# Patient Record
Sex: MALE | Race: WHITE | NOT HISPANIC OR LATINO | Employment: OTHER | URBAN - METROPOLITAN AREA
[De-identification: names, ages, dates, MRNs, and addresses within clinical notes are randomized per-mention and may not be internally consistent; named-entity substitution may affect disease eponyms.]

---

## 2019-11-21 ENCOUNTER — HOSPITAL ENCOUNTER (INPATIENT)
Facility: MEDICAL CENTER | Age: 79
LOS: 21 days | DRG: 698 | End: 2019-12-12
Attending: EMERGENCY MEDICINE | Admitting: HOSPITALIST
Payer: MEDICARE

## 2019-11-21 ENCOUNTER — APPOINTMENT (OUTPATIENT)
Dept: RADIOLOGY | Facility: MEDICAL CENTER | Age: 79
DRG: 698 | End: 2019-11-21
Attending: EMERGENCY MEDICINE
Payer: MEDICARE

## 2019-11-21 DIAGNOSIS — J45.31 MILD PERSISTENT REACTIVE AIRWAY DISEASE WITH WHEEZING WITH ACUTE EXACERBATION: ICD-10-CM

## 2019-11-21 DIAGNOSIS — T83.511A URINARY TRACT INFECTION ASSOCIATED WITH INDWELLING URETHRAL CATHETER, INITIAL ENCOUNTER (HCC): ICD-10-CM

## 2019-11-21 DIAGNOSIS — N39.0 URINARY TRACT INFECTION ASSOCIATED WITH INDWELLING URETHRAL CATHETER, INITIAL ENCOUNTER (HCC): ICD-10-CM

## 2019-11-21 DIAGNOSIS — N30.00 ACUTE CYSTITIS WITHOUT HEMATURIA: ICD-10-CM

## 2019-11-21 DIAGNOSIS — J18.9 COMMUNITY ACQUIRED PNEUMONIA, UNSPECIFIED LATERALITY: ICD-10-CM

## 2019-11-21 DIAGNOSIS — J18.9 PNEUMONIA DUE TO INFECTIOUS ORGANISM, UNSPECIFIED LATERALITY, UNSPECIFIED PART OF LUNG: ICD-10-CM

## 2019-11-21 PROBLEM — E83.51 HYPOCALCEMIA: Status: ACTIVE | Noted: 2019-11-21

## 2019-11-21 PROBLEM — R41.82 ALTERED MENTAL STATUS: Status: ACTIVE | Noted: 2019-11-21

## 2019-11-21 PROBLEM — N17.9 ACUTE RENAL FAILURE (ARF) (HCC): Status: ACTIVE | Noted: 2019-11-21

## 2019-11-21 PROBLEM — D64.9 NORMOCYTIC NORMOCHROMIC ANEMIA: Status: ACTIVE | Noted: 2019-11-21

## 2019-11-21 PROBLEM — D72.829 LEUKOCYTOSIS: Status: ACTIVE | Noted: 2019-11-21

## 2019-11-21 LAB
ALBUMIN SERPL BCP-MCNC: 3.2 G/DL (ref 3.2–4.9)
ALBUMIN/GLOB SERPL: 1 G/DL
ALP SERPL-CCNC: 50 U/L (ref 30–99)
ALT SERPL-CCNC: 6 U/L (ref 2–50)
ANION GAP SERPL CALC-SCNC: 10 MMOL/L (ref 0–11.9)
APPEARANCE UR: ABNORMAL
AST SERPL-CCNC: 16 U/L (ref 12–45)
BACTERIA #/AREA URNS HPF: ABNORMAL /HPF
BASOPHILS # BLD AUTO: 0.9 % (ref 0–1.8)
BASOPHILS # BLD: 0.11 K/UL (ref 0–0.12)
BILIRUB SERPL-MCNC: 0.7 MG/DL (ref 0.1–1.5)
BILIRUB UR QL STRIP.AUTO: NEGATIVE
BUN SERPL-MCNC: 35 MG/DL (ref 8–22)
CALCIUM SERPL-MCNC: 8.1 MG/DL (ref 8.5–10.5)
CHLORIDE SERPL-SCNC: 106 MMOL/L (ref 96–112)
CO2 SERPL-SCNC: 24 MMOL/L (ref 20–33)
COLOR UR: YELLOW
CREAT SERPL-MCNC: 2.54 MG/DL (ref 0.5–1.4)
EKG IMPRESSION: NORMAL
EOSINOPHIL # BLD AUTO: 0.03 K/UL (ref 0–0.51)
EOSINOPHIL NFR BLD: 0.2 % (ref 0–6.9)
EPI CELLS #/AREA URNS HPF: NEGATIVE /HPF
ERYTHROCYTE [DISTWIDTH] IN BLOOD BY AUTOMATED COUNT: 54.1 FL (ref 35.9–50)
FERRITIN SERPL-MCNC: 261 NG/ML (ref 22–322)
FLUAV RNA SPEC QL NAA+PROBE: NEGATIVE
FLUBV RNA SPEC QL NAA+PROBE: NEGATIVE
FOLATE SERPL-MCNC: 12.9 NG/ML
GLOBULIN SER CALC-MCNC: 3.2 G/DL (ref 1.9–3.5)
GLUCOSE BLD-MCNC: 99 MG/DL (ref 65–99)
GLUCOSE SERPL-MCNC: 103 MG/DL (ref 65–99)
GLUCOSE UR STRIP.AUTO-MCNC: NEGATIVE MG/DL
HCT VFR BLD AUTO: 31.8 % (ref 42–52)
HGB BLD-MCNC: 10.1 G/DL (ref 14–18)
HYALINE CASTS #/AREA URNS LPF: ABNORMAL /LPF
IMM GRANULOCYTES # BLD AUTO: 0.07 K/UL (ref 0–0.11)
IMM GRANULOCYTES NFR BLD AUTO: 0.6 % (ref 0–0.9)
IRON SATN MFR SERPL: ABNORMAL % (ref 15–55)
IRON SERPL-MCNC: <10 UG/DL (ref 50–180)
KETONES UR STRIP.AUTO-MCNC: NEGATIVE MG/DL
LACTATE BLD-SCNC: 1.2 MMOL/L (ref 0.5–2)
LEUKOCYTE ESTERASE UR QL STRIP.AUTO: ABNORMAL
LYMPHOCYTES # BLD AUTO: 0.4 K/UL (ref 1–4.8)
LYMPHOCYTES NFR BLD: 3.2 % (ref 22–41)
MCH RBC QN AUTO: 30.4 PG (ref 27–33)
MCHC RBC AUTO-ENTMCNC: 31.8 G/DL (ref 33.7–35.3)
MCV RBC AUTO: 95.8 FL (ref 81.4–97.8)
MICRO URNS: ABNORMAL
MONOCYTES # BLD AUTO: 0.56 K/UL (ref 0–0.85)
MONOCYTES NFR BLD AUTO: 4.5 % (ref 0–13.4)
NEUTROPHILS # BLD AUTO: 11.15 K/UL (ref 1.82–7.42)
NEUTROPHILS NFR BLD: 90.6 % (ref 44–72)
NITRITE UR QL STRIP.AUTO: NEGATIVE
NRBC # BLD AUTO: 0 K/UL
NRBC BLD-RTO: 0 /100 WBC
PH UR STRIP.AUTO: 6 [PH] (ref 5–8)
PLATELET # BLD AUTO: 209 K/UL (ref 164–446)
PMV BLD AUTO: 9 FL (ref 9–12.9)
POTASSIUM SERPL-SCNC: 4.9 MMOL/L (ref 3.6–5.5)
PROT SERPL-MCNC: 6.4 G/DL (ref 6–8.2)
PROT UR QL STRIP: NEGATIVE MG/DL
RBC # BLD AUTO: 3.32 M/UL (ref 4.7–6.1)
RBC # URNS HPF: ABNORMAL /HPF
RBC UR QL AUTO: ABNORMAL
SODIUM SERPL-SCNC: 140 MMOL/L (ref 135–145)
SP GR UR STRIP.AUTO: 1.01
TIBC SERPL-MCNC: 122 UG/DL (ref 250–450)
UROBILINOGEN UR STRIP.AUTO-MCNC: 0.2 MG/DL
VIT B12 SERPL-MCNC: 252 PG/ML (ref 211–911)
WBC # BLD AUTO: 12.3 K/UL (ref 4.8–10.8)
WBC #/AREA URNS HPF: ABNORMAL /HPF

## 2019-11-21 PROCEDURE — 51702 INSERT TEMP BLADDER CATH: CPT

## 2019-11-21 PROCEDURE — 87502 INFLUENZA DNA AMP PROBE: CPT

## 2019-11-21 PROCEDURE — 700102 HCHG RX REV CODE 250 W/ 637 OVERRIDE(OP): Performed by: EMERGENCY MEDICINE

## 2019-11-21 PROCEDURE — 304561 HCHG STAT O2

## 2019-11-21 PROCEDURE — 82746 ASSAY OF FOLIC ACID SERUM: CPT

## 2019-11-21 PROCEDURE — 700111 HCHG RX REV CODE 636 W/ 250 OVERRIDE (IP): Performed by: HOSPITALIST

## 2019-11-21 PROCEDURE — 700101 HCHG RX REV CODE 250: Performed by: EMERGENCY MEDICINE

## 2019-11-21 PROCEDURE — 94760 N-INVAS EAR/PLS OXIMETRY 1: CPT

## 2019-11-21 PROCEDURE — A9270 NON-COVERED ITEM OR SERVICE: HCPCS | Performed by: EMERGENCY MEDICINE

## 2019-11-21 PROCEDURE — 96365 THER/PROPH/DIAG IV INF INIT: CPT

## 2019-11-21 PROCEDURE — 307059 PAD,EAR PROTECTOR: Performed by: HOSPITALIST

## 2019-11-21 PROCEDURE — 87106 FUNGI IDENTIFICATION YEAST: CPT

## 2019-11-21 PROCEDURE — 302146: Performed by: HOSPITALIST

## 2019-11-21 PROCEDURE — 303105 HCHG CATHETER EXTRA

## 2019-11-21 PROCEDURE — 82728 ASSAY OF FERRITIN: CPT

## 2019-11-21 PROCEDURE — 87449 NOS EACH ORGANISM AG IA: CPT

## 2019-11-21 PROCEDURE — 99223 1ST HOSP IP/OBS HIGH 75: CPT | Mod: AI | Performed by: HOSPITALIST

## 2019-11-21 PROCEDURE — 87077 CULTURE AEROBIC IDENTIFY: CPT

## 2019-11-21 PROCEDURE — 87186 SC STD MICRODIL/AGAR DIL: CPT

## 2019-11-21 PROCEDURE — 700102 HCHG RX REV CODE 250 W/ 637 OVERRIDE(OP): Performed by: HOSPITALIST

## 2019-11-21 PROCEDURE — 700105 HCHG RX REV CODE 258: Performed by: EMERGENCY MEDICINE

## 2019-11-21 PROCEDURE — 81001 URINALYSIS AUTO W/SCOPE: CPT

## 2019-11-21 PROCEDURE — A9270 NON-COVERED ITEM OR SERVICE: HCPCS | Performed by: HOSPITALIST

## 2019-11-21 PROCEDURE — 80053 COMPREHEN METABOLIC PANEL: CPT

## 2019-11-21 PROCEDURE — 82962 GLUCOSE BLOOD TEST: CPT

## 2019-11-21 PROCEDURE — 770020 HCHG ROOM/CARE - TELE (206)

## 2019-11-21 PROCEDURE — 99285 EMERGENCY DEPT VISIT HI MDM: CPT

## 2019-11-21 PROCEDURE — 83550 IRON BINDING TEST: CPT

## 2019-11-21 PROCEDURE — 93005 ELECTROCARDIOGRAM TRACING: CPT | Performed by: EMERGENCY MEDICINE

## 2019-11-21 PROCEDURE — 83605 ASSAY OF LACTIC ACID: CPT

## 2019-11-21 PROCEDURE — 96367 TX/PROPH/DG ADDL SEQ IV INF: CPT

## 2019-11-21 PROCEDURE — 700105 HCHG RX REV CODE 258: Performed by: HOSPITALIST

## 2019-11-21 PROCEDURE — 85025 COMPLETE CBC W/AUTO DIFF WBC: CPT

## 2019-11-21 PROCEDURE — 87040 BLOOD CULTURE FOR BACTERIA: CPT | Mod: 91

## 2019-11-21 PROCEDURE — 700111 HCHG RX REV CODE 636 W/ 250 OVERRIDE (IP): Performed by: EMERGENCY MEDICINE

## 2019-11-21 PROCEDURE — 82607 VITAMIN B-12: CPT

## 2019-11-21 PROCEDURE — 71045 X-RAY EXAM CHEST 1 VIEW: CPT

## 2019-11-21 PROCEDURE — 87086 URINE CULTURE/COLONY COUNT: CPT

## 2019-11-21 PROCEDURE — 83540 ASSAY OF IRON: CPT

## 2019-11-21 RX ORDER — ACETAMINOPHEN 325 MG/1
650 TABLET ORAL EVERY 6 HOURS PRN
Status: DISCONTINUED | OUTPATIENT
Start: 2019-11-21 | End: 2019-12-12 | Stop reason: HOSPADM

## 2019-11-21 RX ORDER — LATANOPROST 50 UG/ML
1 SOLUTION/ DROPS OPHTHALMIC NIGHTLY
Status: DISCONTINUED | OUTPATIENT
Start: 2019-11-21 | End: 2019-12-12 | Stop reason: HOSPADM

## 2019-11-21 RX ORDER — CLOTRIMAZOLE 1 %
1 CREAM (GRAM) TOPICAL 2 TIMES DAILY
Status: DISCONTINUED | OUTPATIENT
Start: 2019-11-21 | End: 2019-12-12 | Stop reason: HOSPADM

## 2019-11-21 RX ORDER — POLYETHYLENE GLYCOL 3350 17 G/17G
17 POWDER, FOR SOLUTION ORAL DAILY
Status: ON HOLD | COMMUNITY
End: 2020-03-10

## 2019-11-21 RX ORDER — SIMVASTATIN 20 MG
10 TABLET ORAL NIGHTLY
Status: DISCONTINUED | OUTPATIENT
Start: 2019-11-21 | End: 2019-12-12 | Stop reason: HOSPADM

## 2019-11-21 RX ORDER — ACETAMINOPHEN 500 MG
500-1000 TABLET ORAL EVERY 6 HOURS PRN
Status: DISCONTINUED | OUTPATIENT
Start: 2019-11-21 | End: 2019-11-21

## 2019-11-21 RX ORDER — AMLODIPINE BESYLATE 10 MG/1
10 TABLET ORAL DAILY
Status: DISCONTINUED | OUTPATIENT
Start: 2019-11-21 | End: 2019-11-24

## 2019-11-21 RX ORDER — POLYETHYLENE GLYCOL 3350 17 G/17G
1 POWDER, FOR SOLUTION ORAL
Status: DISCONTINUED | OUTPATIENT
Start: 2019-11-21 | End: 2019-12-12 | Stop reason: HOSPADM

## 2019-11-21 RX ORDER — BISACODYL 10 MG
10 SUPPOSITORY, RECTAL RECTAL
Status: DISCONTINUED | OUTPATIENT
Start: 2019-11-21 | End: 2019-12-12 | Stop reason: HOSPADM

## 2019-11-21 RX ORDER — SIMVASTATIN 10 MG
10 TABLET ORAL NIGHTLY
COMMUNITY

## 2019-11-21 RX ORDER — LATANOPROST 50 UG/ML
1 SOLUTION/ DROPS OPHTHALMIC NIGHTLY
COMMUNITY

## 2019-11-21 RX ORDER — POLYETHYLENE GLYCOL 3350 17 G/17G
1 POWDER, FOR SOLUTION ORAL DAILY
Status: DISCONTINUED | OUTPATIENT
Start: 2019-11-21 | End: 2019-12-12 | Stop reason: HOSPADM

## 2019-11-21 RX ORDER — ACETAMINOPHEN 500 MG
500-1000 TABLET ORAL EVERY 6 HOURS PRN
Status: ON HOLD | COMMUNITY
End: 2020-03-10

## 2019-11-21 RX ORDER — CLOTRIMAZOLE 1 %
1 CREAM (GRAM) TOPICAL 2 TIMES DAILY
COMMUNITY
End: 2019-12-25

## 2019-11-21 RX ORDER — FAMOTIDINE 20 MG/1
20 TABLET, FILM COATED ORAL DAILY
COMMUNITY
End: 2019-12-25

## 2019-11-21 RX ORDER — SODIUM CHLORIDE 9 MG/ML
INJECTION, SOLUTION INTRAVENOUS CONTINUOUS
Status: DISCONTINUED | OUTPATIENT
Start: 2019-11-21 | End: 2019-11-24

## 2019-11-21 RX ORDER — TAMSULOSIN HYDROCHLORIDE 0.4 MG/1
0.4 CAPSULE ORAL DAILY
Status: DISCONTINUED | OUTPATIENT
Start: 2019-11-21 | End: 2019-12-12 | Stop reason: HOSPADM

## 2019-11-21 RX ORDER — ACETAMINOPHEN 325 MG/1
650 TABLET ORAL ONCE
Status: COMPLETED | OUTPATIENT
Start: 2019-11-21 | End: 2019-11-21

## 2019-11-21 RX ORDER — AMOXICILLIN 250 MG
2 CAPSULE ORAL 2 TIMES DAILY
Status: DISCONTINUED | OUTPATIENT
Start: 2019-11-21 | End: 2019-12-12 | Stop reason: HOSPADM

## 2019-11-21 RX ORDER — ONDANSETRON 4 MG/1
4 TABLET, ORALLY DISINTEGRATING ORAL EVERY 6 HOURS PRN
Status: ON HOLD | COMMUNITY
End: 2020-03-10

## 2019-11-21 RX ORDER — AMLODIPINE BESYLATE 10 MG/1
10 TABLET ORAL DAILY
Status: ON HOLD | COMMUNITY
End: 2019-12-28

## 2019-11-21 RX ORDER — DOCUSATE SODIUM 100 MG/1
100 CAPSULE, LIQUID FILLED ORAL 2 TIMES DAILY
Status: ON HOLD | COMMUNITY
End: 2020-03-10

## 2019-11-21 RX ORDER — ONDANSETRON 4 MG/1
4 TABLET, ORALLY DISINTEGRATING ORAL EVERY 4 HOURS PRN
Status: DISCONTINUED | OUTPATIENT
Start: 2019-11-21 | End: 2019-12-12 | Stop reason: HOSPADM

## 2019-11-21 RX ORDER — ENALAPRILAT 1.25 MG/ML
1.25 INJECTION INTRAVENOUS EVERY 6 HOURS PRN
Status: DISCONTINUED | OUTPATIENT
Start: 2019-11-21 | End: 2019-11-24

## 2019-11-21 RX ORDER — TAMSULOSIN HYDROCHLORIDE 0.4 MG/1
0.4 CAPSULE ORAL DAILY
Status: ON HOLD | COMMUNITY
End: 2019-12-28

## 2019-11-21 RX ORDER — ONDANSETRON 2 MG/ML
4 INJECTION INTRAMUSCULAR; INTRAVENOUS EVERY 4 HOURS PRN
Status: DISCONTINUED | OUTPATIENT
Start: 2019-11-21 | End: 2019-12-12 | Stop reason: HOSPADM

## 2019-11-21 RX ADMIN — SIMVASTATIN 10 MG: 20 TABLET, FILM COATED ORAL at 20:13

## 2019-11-21 RX ADMIN — ASPIRIN 81 MG: 81 TABLET, COATED ORAL at 12:32

## 2019-11-21 RX ADMIN — SODIUM CHLORIDE: 9 INJECTION, SOLUTION INTRAVENOUS at 14:44

## 2019-11-21 RX ADMIN — ACETAMINOPHEN 650 MG: 325 TABLET, FILM COATED ORAL at 12:31

## 2019-11-21 RX ADMIN — LATANOPROST 1 DROP: 50 SOLUTION OPHTHALMIC at 20:12

## 2019-11-21 RX ADMIN — TAMSULOSIN HYDROCHLORIDE 0.4 MG: 0.4 CAPSULE ORAL at 12:33

## 2019-11-21 RX ADMIN — AZITHROMYCIN MONOHYDRATE 500 MG: 500 INJECTION, POWDER, LYOPHILIZED, FOR SOLUTION INTRAVENOUS at 17:31

## 2019-11-21 RX ADMIN — ACETAMINOPHEN 650 MG: 325 TABLET, FILM COATED ORAL at 18:41

## 2019-11-21 RX ADMIN — CEFTRIAXONE SODIUM 2 G: 2 INJECTION, POWDER, FOR SOLUTION INTRAMUSCULAR; INTRAVENOUS at 05:16

## 2019-11-21 RX ADMIN — CLOTRIMAZOLE 1 APPLICATION: 10 CREAM TOPICAL at 17:31

## 2019-11-21 RX ADMIN — ACETAMINOPHEN 650 MG: 325 TABLET, FILM COATED ORAL at 05:23

## 2019-11-21 RX ADMIN — AMLODIPINE BESYLATE 10 MG: 10 TABLET ORAL at 12:32

## 2019-11-21 RX ADMIN — DOXYCYCLINE 100 MG: 100 INJECTION, POWDER, LYOPHILIZED, FOR SOLUTION INTRAVENOUS at 06:00

## 2019-11-21 RX ADMIN — FENTANYL CITRATE 25 MCG: 50 INJECTION INTRAMUSCULAR; INTRAVENOUS at 19:01

## 2019-11-21 ASSESSMENT — LIFESTYLE VARIABLES
TOTAL SCORE: 0
EVER FELT BAD OR GUILTY ABOUT YOUR DRINKING: NO
HOW MANY TIMES IN THE PAST YEAR HAVE YOU HAD 5 OR MORE DRINKS IN A DAY: 0
HAVE PEOPLE ANNOYED YOU BY CRITICIZING YOUR DRINKING: NO
ALCOHOL_USE: NO
TOTAL SCORE: 0
ON A TYPICAL DAY WHEN YOU DRINK ALCOHOL HOW MANY DRINKS DO YOU HAVE: 0
CONSUMPTION TOTAL: NEGATIVE
EVER HAD A DRINK FIRST THING IN THE MORNING TO STEADY YOUR NERVES TO GET RID OF A HANGOVER: NO
DO YOU DRINK ALCOHOL: NO
AVERAGE NUMBER OF DAYS PER WEEK YOU HAVE A DRINK CONTAINING ALCOHOL: 0
HAVE YOU EVER FELT YOU SHOULD CUT DOWN ON YOUR DRINKING: NO
EVER_SMOKED: UNABLE TO EVALUATE AT THIS TIME - NEEDS ASSESSMENT PRIOR TO DISCHARGE
TOTAL SCORE: 0
EVER_SMOKED: NEVER
DOES PATIENT WANT TO STOP DRINKING: NO
EVER_SMOKED: NEVER
EVER_SMOKED: NEVER

## 2019-11-21 ASSESSMENT — COGNITIVE AND FUNCTIONAL STATUS - GENERAL
DAILY ACTIVITIY SCORE: 12
WALKING IN HOSPITAL ROOM: A LOT
HELP NEEDED FOR BATHING: A LOT
STANDING UP FROM CHAIR USING ARMS: A LOT
MOBILITY SCORE: 12
TOILETING: A LOT
DRESSING REGULAR UPPER BODY CLOTHING: A LOT
PERSONAL GROOMING: A LOT
MOVING FROM LYING ON BACK TO SITTING ON SIDE OF FLAT BED: A LOT
DRESSING REGULAR LOWER BODY CLOTHING: A LOT
SUGGESTED CMS G CODE MODIFIER DAILY ACTIVITY: CL
TURNING FROM BACK TO SIDE WHILE IN FLAT BAD: A LOT
MOVING TO AND FROM BED TO CHAIR: A LOT
CLIMB 3 TO 5 STEPS WITH RAILING: A LOT
EATING MEALS: A LOT
SUGGESTED CMS G CODE MODIFIER MOBILITY: CL

## 2019-11-21 ASSESSMENT — COPD QUESTIONNAIRES
DO YOU EVER COUGH UP ANY MUCUS OR PHLEGM?: NO/ONLY WITH OCCASIONAL COLDS OR INFECTIONS
HAVE YOU SMOKED AT LEAST 100 CIGARETTES IN YOUR ENTIRE LIFE: NO/DON'T KNOW
HAVE YOU SMOKED AT LEAST 100 CIGARETTES IN YOUR ENTIRE LIFE: NO/DON'T KNOW
DURING THE PAST 4 WEEKS HOW MUCH DID YOU FEEL SHORT OF BREATH: NONE/LITTLE OF THE TIME
COPD SCREENING SCORE: 2
COPD SCREENING SCORE: 2
DO YOU EVER COUGH UP ANY MUCUS OR PHLEGM?: NO/ONLY WITH OCCASIONAL COLDS OR INFECTIONS
DURING THE PAST 4 WEEKS HOW MUCH DID YOU FEEL SHORT OF BREATH: NONE/LITTLE OF THE TIME

## 2019-11-21 ASSESSMENT — PATIENT HEALTH QUESTIONNAIRE - PHQ9
SUM OF ALL RESPONSES TO PHQ9 QUESTIONS 1 AND 2: 0
2. FEELING DOWN, DEPRESSED, IRRITABLE, OR HOPELESS: NOT AT ALL
1. LITTLE INTEREST OR PLEASURE IN DOING THINGS: NOT AT ALL

## 2019-11-21 NOTE — ASSESSMENT & PLAN NOTE
Secondary to the current pneumonia and urinary tract infection   Resolved  Continue antibiotics as above

## 2019-11-21 NOTE — H&P
Hospital Medicine History & Physical Note    Date of Service  2019    Primary Care Physician  No primary care provider on file.    Consultants  None.    Code Status  Full code, verified as he does have a POLST form    Chief Complaint  Unknown, altered mental status    History of Presenting Illness  79 y.o. male who presented 2019 with altered mental status.  The patient is identified to have community-acquired pneumonia bilaterally.  The patient is also found to have urinary tract infection with acute on chronic renal failure.  The patient's renal failure is most likely secondary to malfunctioning Steen catheter.  The patient's Steen catheter at this point has been replaced.  The patient had 1.5 L of urine retained.  Since the retention his abdominal pain is better and apparently the patient is urinating well.  The patient at this point will need continued aggressive antibiotic management.  We will monitor his mental status.    Review of Systems  Review of Systems   Unable to perform ROS: Mental acuity       Past Medical History  Unable to provide past medical history due to mental status    Surgical History  Unable to provide due to mental status    Family History  He was only able to tell me that both parents are  he is unable to give me any family history due to mental status.    Social History  Unable to provide social history.    Allergies  No Known Allergies    Medications  Prior to Admission Medications   Prescriptions Last Dose Informant Patient Reported? Taking?   SITagliptin (JANUVIA) 50 MG Tab 2019 at 0910 MAR from Other Facility Yes Yes   Sig: Take 50 mg by mouth every day.   acetaminophen (TYLENOL) 500 MG Tab PRN at PRN MAR from Other Facility Yes Yes   Sig: Take 500-1,000 mg by mouth every 6 hours as needed.   amLODIPine (NORVASC) 10 MG Tab 2019 at 0910 MAR from Other Facility Yes Yes   Sig: Take 10 mg by mouth every day.   aspirin EC (ECOTRIN) 81 MG Tablet Delayed  Response 11/20/2019 at 0910 MAR from Other Facility Yes Yes   Sig: Take 81 mg by mouth every day.   clotrimazole (LOTRIMIN) 1 % Cream 11/20/2019 at 1125 MAR from Other Facility Yes Yes   Sig: Apply 1 Application to affected area(s) 2 times a day. Apply to feet and toes   docusate sodium (COLACE) 100 MG Cap 11/20/2019 at 0700 MAR from Other Facility Yes Yes   Sig: Take 100 mg by mouth 2 times a day.   famotidine (PEPCID) 20 MG Tab 11/20/2019 at 0910 MAR from Other Facility Yes Yes   Sig: Take 20 mg by mouth every day.   insulin regular (HUMULIN R) 100 Unit/mL Solution 11/20/2019 at 2200 MAR from Other Facility Yes Yes   Sig: Inject 4-12 Units as instructed 4 Times a Day,Before Meals and at Bedtime. Sliding Scale  201-250= 4 units  251-300= 6 units  301-350= 8 units  351-400= 12 units   latanoprost (XALATAN) 0.005 % Solution 11/20/2019 at 1905 MAR from Other Facility Yes Yes   Sig: Place 1 Drop in both eyes every evening.   ondansetron (ZOFRAN ODT) 4 MG TABLET DISPERSIBLE PRN at PRN MAR from Other Facility Yes Yes   Sig: Take 4 mg by mouth every 6 hours as needed for Nausea.   polyethylene glycol/lytes (MIRALAX) Pack 11/20/2019 at 0910 MAR from Other Facility Yes Yes   Sig: Take 17 g by mouth every day.   simvastatin (ZOCOR) 10 MG Tab 11/20/2019 at 1900 MAR from Other Facility Yes Yes   Sig: Take 10 mg by mouth every evening.   tamsulosin (FLOMAX) 0.4 MG capsule 11/20/2019 at 1900 MAR from Other Facility Yes Yes   Sig: Take 0.4 mg by mouth every day.      Facility-Administered Medications: None       Physical Exam  Temp:  [36.9 °C (98.5 °F)-38.2 °C (100.7 °F)] 36.9 °C (98.5 °F)  Pulse:  [63-86] 63  Resp:  [20-30] 20  BP: (100-136)/(43-57) 100/43  SpO2:  [95 %-100 %] 97 %    Physical Exam  Vitals signs and nursing note reviewed.   Constitutional:       Appearance: He is obese. He is ill-appearing.      Interventions: Nasal cannula in place.   HENT:      Head: Normocephalic and atraumatic.      Right Ear: External ear  normal.      Left Ear: External ear normal.      Nose: Nose normal.      Mouth/Throat:      Mouth: Mucous membranes are dry.   Eyes:      General:         Right eye: No discharge.         Left eye: No discharge.      Extraocular Movements: Extraocular movements intact.      Conjunctiva/sclera: Conjunctivae normal.      Pupils: Pupils are equal, round, and reactive to light.   Neck:      Musculoskeletal: Normal range of motion and neck supple. No neck rigidity.      Vascular: No carotid bruit.   Cardiovascular:      Rate and Rhythm: Normal rate and regular rhythm.      Pulses: Normal pulses.      Heart sounds: Murmur present.   Pulmonary:      Effort: Accessory muscle usage and prolonged expiration present.      Breath sounds: Decreased air movement present. Examination of the right-upper field reveals wheezing. Examination of the left-upper field reveals wheezing. Examination of the right-middle field reveals wheezing. Examination of the left-middle field reveals wheezing. Examination of the right-lower field reveals wheezing and rhonchi. Examination of the left-lower field reveals wheezing and rhonchi. Wheezing and rhonchi present.   Abdominal:      General: Abdomen is flat. Bowel sounds are normal.      Palpations: Abdomen is soft.   Musculoskeletal: Normal range of motion.         General: No swelling.      Right lower leg: No edema.        Feet:    Skin:     General: Skin is warm and dry.      Findings: Abrasion and bruising present.          Neurological:      General: No focal deficit present.      Mental Status: He is easily aroused. He is lethargic, disoriented and confused.      GCS: GCS eye subscore is 4. GCS verbal subscore is 4. GCS motor subscore is 6.      Cranial Nerves: Cranial nerves are intact. No cranial nerve deficit.      Sensory: Sensation is intact. No sensory deficit.      Motor: Motor function is intact.      Coordination: Coordination is intact.      Gait: Gait normal.      Deep Tendon  Reflexes: Reflexes normal.   Psychiatric:         Attention and Perception: He is inattentive.         Mood and Affect: Affect is flat.         Speech: Speech is delayed.         Behavior: Behavior is slowed. Behavior is cooperative.         Cognition and Memory: Cognition is impaired. Memory is impaired. He exhibits impaired recent memory and impaired remote memory.         Laboratory:  Recent Labs     11/21/19  0441   WBC 12.3*   RBC 3.32*   HEMOGLOBIN 10.1*   HEMATOCRIT 31.8*   MCV 95.8   MCH 30.4   MCHC 31.8*   RDW 54.1*   PLATELETCT 209   MPV 9.0     Recent Labs     11/21/19  0441   SODIUM 140   POTASSIUM 4.9   CHLORIDE 106   CO2 24   GLUCOSE 103*   BUN 35*   CREATININE 2.54*   CALCIUM 8.1*     Recent Labs     11/21/19  0441   ALTSGPT 6   ASTSGOT 16   ALKPHOSPHAT 50   TBILIRUBIN 0.7   GLUCOSE 103*         No results for input(s): NTPROBNP in the last 72 hours.      No results for input(s): TROPONINT in the last 72 hours.    Urinalysis:    Recent Labs     11/21/19  0633   SPECGRAVITY 1.012   GLUCOSEUR Negative   KETONES Negative   NITRITE Negative   LEUKESTERAS Large*   WBCURINE *   RBCURINE 10-20*   BACTERIA Many*   EPITHELCELL Negative        Imaging:  DX-CHEST-PORTABLE (1 VIEW)   Final Result         1.  Pulmonary edema and/or infiltrates are identified, which are stable since the prior exam.   2.  Cardiomegaly   3.  Atherosclerosis            Assessment/Plan:  I anticipate this patient will require at least two midnights for appropriate medical management, necessitating inpatient admission.    * CAP (community acquired pneumonia)  Assessment & Plan  Patient at this point is too confused to tell me what happened.  Apparently the patient comes in because of respiratory insufficiency and is found to have at this point on imaging studies bilateral lower lobe infiltrates.  I reviewed the chest x-ray myself.  Patient also has leukocytosis.  At this point patient will be admitted for IV antibiotics with  Rocephin and azithromycin.  He will be placed on oxygen and RT protocol.  We will get sputum and blood cultures prior to initiation of antibiotics.    Acute renal failure (ARF) ([facility])  Assessment & Plan  Patient will be given fluid resuscitation with acute renal failure.  Continue to monitor renal functions and get a renal ultrasound.  This may be secondary to urinary obstruction as the patient initially had no output through his Steen catheter.  The Steen catheter has been replaced and at this point he put out 1.5 L of urine.  The patient will be monitored strictly on his intake and output.    Altered mental status  Assessment & Plan  Altered mental status may be a combination of the infections versus underlying dementia we will at this point continue to monitor his mental status.    UTI (urinary tract infection)  Assessment & Plan  Patient's urinary tract infection at this point is most likely secondary to a plugged Steen catheter.  This has now been replaced continue with IV Rocephin and follow cultures.    Leukocytosis  Assessment & Plan  Secondary to the current pneumonia and urinary tract infection continue to follow white blood cell count    Hypocalcemia  Assessment & Plan  Give calcium supplementation although corrected calcium at this point is within normal limits.    Normocytic normochromic anemia  Assessment & Plan  Monitor H&H if drops below 7 or 21 transfuse.  In the meantime check an iron panel.      VTE prophylaxis: Heparin

## 2019-11-21 NOTE — PROGRESS NOTES
2 RN Skin Check    2 RN skin check complete, with Kuldeep RN and Rand RN.   Devices in place: Nasal Cannula.  Skin assessed under devices: yes.  Confirmed pressure ulcers found on: Left ear, Left great toe, Left 2nd digit toe, Right great toe, stage 2 sacral. Left ear DTI, Left hip DTI, Left lateral and midal knee, Left knee abrasion; left heel boggy; left foot 3rd digit missing; left lower extremity dusky; left elbow dti; Right Lower extremity dusky; Right Ear DTI; Right Hip DTI; Right KNEE midial, and lateral DTI; Right knee scab;Right heel boggy; Right foot second digit missing; 3 open wounds open on back; Sacrum DTI to upper lumbar.  New potential pressure ulcers noted on see above . Wound consult placed Yes, pictures taken and uploaded.  The following interventions in place Pillows, Mepilex, Lotion, Barrier cream, Heel float boots, Waffle bed overlay and Waffle chair cushion, gray foams applied.

## 2019-11-21 NOTE — ED NOTES
Patient is AOx3 at this time, work of breathing is deep, unlabored, on 3lpm NC.  IV ABX running.  VSS.  ERP updated on patient condition.

## 2019-11-21 NOTE — ED TRIAGE NOTES
"Chief Complaint   Patient presents with   • Shortness of Breath     RR 30, came from assisted living facility, staff found him AOx2, does not wear O2, placed on 4lpm     Patient brought in by EMS from [facility].  Patient has had cough for 2 weeks, with green sputum, per EMS patient was placed on lpm at facility, received 1 albuterol, and 1 due neb treatment prior to arrival.    AOx4, on monitor, on Non rebreather at 7lpm currently.  O2 100%.    FSBS 99    /57   Pulse 82   Temp (!) 38.2 °C (100.7 °F) (Oral)   Resp (!) 30   Ht 2.007 m (6' 7\")   Wt 86.2 kg (190 lb)   SpO2 100%   BMI 21.40 kg/m²         "

## 2019-11-21 NOTE — ASSESSMENT & PLAN NOTE
-likely now has returned back to his baseline  -Neurology has been consulted and recommend OP FU.

## 2019-11-21 NOTE — ASSESSMENT & PLAN NOTE
-Likely was due urinary retention/sawyer obstruction  -CT renal colic on 11/22 showed normal kidneys  -FeNa 2.3%  -Monitor urine output, sawyer cath in place  -Creatinine stable  -Nephrology following  -follow labs

## 2019-11-21 NOTE — ED NOTES
Spoke with Luiz COREAS at [facility], RN states on rounding patients breathing had labored breathing, tachypneic, expiratory wheezes upon assessment with fever. RN states patient was AOx3 base line for them upon arrival to facility however, was altered (AOx2) today.  Called EMS due to concerns for Sepsis.

## 2019-11-21 NOTE — ASSESSMENT & PLAN NOTE
-Likely due to chronic indwelling Steen catheter  -Steen catheter was changed 11/21/2019  -Completed ABX course 12/7/2018

## 2019-11-21 NOTE — ED PROVIDER NOTES
"ED Provider Note    CHIEF COMPLAINT  Chief Complaint   Patient presents with   • Shortness of Breath     RR 30, came from assisted living facility, staff found him AOx2, does not wear O2, placed on 4lpm       HPI  Randy Bates is a 79 y.o. male who presents to the emergency department as a transfer from [facility].  He has had a cough for about 1 day by his report and otherwise feels fine.  He was noted to have a fever, tachypnea and altered mental status.  He was placed on supplemental oxygen by staff and EMS was called.  Apparently had wheezing and was given DuoNeb prior to arrival.  He reports that he is breathing fine now.  Reviewing transferred chart, he recently was diagnosed with pyelonephritis .  He had associated sepsis and acute renal failure.  He was bacteremic at the time.  He received ertapenem until 11/9.  Patient otherwise is a poor historian    Full code    REVIEW OF SYSTEMS  As per HPI, otherwise a 10 point review of systems is negative    PAST MEDICAL HISTORY  Chronic indwelling Steen catheter, pyelonephritis, sepsis, chronic kidney disease, pneumonia, type 2 diabetes, DVT, hyperlipidemia, osteoarthritis, hypertension    SOCIAL HISTORY  Social History     Tobacco Use   • Smoking status: Not on file   Substance Use Topics   • Alcohol use: Not on file   • Drug use: Not on file       SURGICAL HISTORY  No past surgical history on file.    CURRENT MEDICATIONS  Home Medications    **Home medications have not yet been reviewed for this encounter**         ALLERGIES  No Known Allergies    PHYSICAL EXAM  VITAL SIGNS: /57   Pulse 82   Temp (!) 38.2 °C (100.7 °F) (Oral)   Resp (!) 30   Ht 2.007 m (6' 7\")   Wt 86.2 kg (190 lb)   SpO2 100%   BMI 21.40 kg/m²    Constitutional: Awake and alert.  Elderly male  HENT:  Atraumatic, Normocephalic.Oropharynx mildly dry mucus membranes, Nose normal inspection.   Eyes: Normal inspection  Neck: Supple  Cardiovascular: Normal heart rate, " Normal rhythm.  Symmetric peripheral pulses.   Thorax & Lungs: Bibasilar crackles.  Decreased breath sounds without obvious wheezing  Abdomen: Bowel sounds normal, soft, non-distended, nontender, no mass  Skin: No cellulitis  Back: No tenderness, No CVA tenderness.   Extremities: Pedal edema with chronic venous stasis changes.  Missing toes.  Several abrasions over her toes and one on the left knee.  Neurologic: Grossly normal   Psychiatric: Anxious appearing    RADIOLOGY/PROCEDURES  DX-CHEST-PORTABLE (1 VIEW)   Final Result         1.  Pulmonary edema and/or infiltrates are identified, which are stable since the prior exam.   2.  Cardiomegaly   3.  Atherosclerosis           Imaging is interpreted by radiologist    Labs:  Results for orders placed or performed during the hospital encounter of 11/21/19   Lactic acid (lactate)   Result Value Ref Range    Lactic Acid 1.2 0.5 - 2.0 mmol/L   CBC WITH DIFFERENTIAL   Result Value Ref Range    WBC 12.3 (H) 4.8 - 10.8 K/uL    RBC 3.32 (L) 4.70 - 6.10 M/uL    Hemoglobin 10.1 (L) 14.0 - 18.0 g/dL    Hematocrit 31.8 (L) 42.0 - 52.0 %    MCV 95.8 81.4 - 97.8 fL    MCH 30.4 27.0 - 33.0 pg    MCHC 31.8 (L) 33.7 - 35.3 g/dL    RDW 54.1 (H) 35.9 - 50.0 fL    Platelet Count 209 164 - 446 K/uL    MPV 9.0 9.0 - 12.9 fL    Neutrophils-Polys 90.60 (H) 44.00 - 72.00 %    Lymphocytes 3.20 (L) 22.00 - 41.00 %    Monocytes 4.50 0.00 - 13.40 %    Eosinophils 0.20 0.00 - 6.90 %    Basophils 0.90 0.00 - 1.80 %    Immature Granulocytes 0.60 0.00 - 0.90 %    Nucleated RBC 0.00 /100 WBC    Neutrophils (Absolute) 11.15 (H) 1.82 - 7.42 K/uL    Lymphs (Absolute) 0.40 (L) 1.00 - 4.80 K/uL    Monos (Absolute) 0.56 0.00 - 0.85 K/uL    Eos (Absolute) 0.03 0.00 - 0.51 K/uL    Baso (Absolute) 0.11 0.00 - 0.12 K/uL    Immature Granulocytes (abs) 0.07 0.00 - 0.11 K/uL    NRBC (Absolute) 0.00 K/uL   COMP METABOLIC PANEL   Result Value Ref Range    Sodium 140 135 - 145 mmol/L    Potassium 4.9 3.6 - 5.5 mmol/L     Chloride 106 96 - 112 mmol/L    Co2 24 20 - 33 mmol/L    Anion Gap 10.0 0.0 - 11.9    Glucose 103 (H) 65 - 99 mg/dL    Bun 35 (H) 8 - 22 mg/dL    Creatinine 2.54 (H) 0.50 - 1.40 mg/dL    Calcium 8.1 (L) 8.5 - 10.5 mg/dL    AST(SGOT) 16 12 - 45 U/L    ALT(SGPT) 6 2 - 50 U/L    Alkaline Phosphatase 50 30 - 99 U/L    Total Bilirubin 0.7 0.1 - 1.5 mg/dL    Albumin 3.2 3.2 - 4.9 g/dL    Total Protein 6.4 6.0 - 8.2 g/dL    Globulin 3.2 1.9 - 3.5 g/dL    A-G Ratio 1.0 g/dL   URINALYSIS   Result Value Ref Range    Color Yellow     Character Cloudy (A)     Specific Gravity 1.012 <1.035    Ph 6.0 5.0 - 8.0    Glucose Negative Negative mg/dL    Ketones Negative Negative mg/dL    Protein Negative Negative mg/dL    Bilirubin Negative Negative    Urobilinogen, Urine 0.2 Negative    Nitrite Negative Negative    Leukocyte Esterase Large (A) Negative    Occult Blood Moderate (A) Negative    Micro Urine Req Microscopic    Influenza A/B By PCR (Adult - Flu Only)   Result Value Ref Range    Influenza virus A RNA Negative Negative    Influenza virus B, PCR Negative Negative   ESTIMATED GFR   Result Value Ref Range    GFR If African American 30 (A) >60 mL/min/1.73 m 2    GFR If Non  24 (A) >60 mL/min/1.73 m 2   URINE MICROSCOPIC (W/UA)   Result Value Ref Range    WBC  (A) /hpf    RBC 10-20 (A) /hpf    Bacteria Many (A) None /hpf    Epithelial Cells Negative /hpf    Hyaline Cast 0-2 /lpf   ACCU-CHEK GLUCOSE   Result Value Ref Range    Glucose - Accu-Ck 99 65 - 99 mg/dL   EKG   Result Value Ref Range    Report       [facility]    Test Date:  2019  Pt Name:    NIR JOSHI               Department: ER  MRN:        [id number]                      Room:        02  Gender:     Male                         Technician: [id number]  :        [id number]                   Requested By:ER TRIAGE PROTOCOL  Order #:    [id number]                    Franco MD: CLAYTNO PALMER,  MD    Measurements  Intervals                                Axis  Rate:       69                           P:  MS:                                      QRS:        36  QRSD:       84                           T:          71  QT:         348  QTc:        373    Interpretive Statements  Sinus rhythm  Nonspecific ST changes  No previous ECG available for comparison  Electronically Signed On 11- 5:03:01 PST by CLAYTON PALMER MD           COURSE & MEDICAL DECISION MAKING  Patient presents to the ER with cough, shortness of breath and fever.  Also recently had urinary tract infection and was recently hospitalized by report.  Patient was given IV antibiotics ceftriaxone and doxycycline.  Chest x-ray shows edema or infiltrates.  Has leukocytosis.  Steen catheter was changed and when new Steen was placed over a liter of urine was released.  Likely had obstruction and he has pyuria suggesting infection.  Urine culture was sent.  Patient will need to be admitted to the hospital for further treatment.  Dr. Parks was consulted.    FINAL IMPRESSION  1.  Simple sepsis  2.  Urinary tract infection, catheter associated  3.  Pulmonary edema, possible pneumonia  4.  Chronic renal insufficiency    CRITICAL CARE TIME 30 minutes  There was a very real possibility of deterioration of the patient's condition.  This patient required the highest level of care.  I provided critical care services which included: review of the medical record, treatment orders, ordering and reviewing test results, frequent reevaluation of the patient's condition and response to treatment, as well as discussing the case with appropriate personnel and various consultants. The critical care time associated with the care of this patient is exclusive of any procedures or specific interventions.    This dictation was created using voice recognition software. The accuracy of the dictation is limited to the abilities of the software.  The nursing notes were  reviewed and certain aspects of this information were incorporated into this note.      Electronically signed by: Edd Farias, 11/21/2019 4:58 AM

## 2019-11-21 NOTE — PROGRESS NOTES
Patient transferred from ED to [facility]. Tele monitor on and monitor room notified. Patient complaining of 7/10 abdominal pain and BP was 168/72. MD notified and orders received. Patient AOx3, disoriented to time (thinks it November of 1999) and on 3 L O2. Call light and belongings within reach. Bed in lowest position and locked. No further needs at this time.

## 2019-11-22 ENCOUNTER — APPOINTMENT (OUTPATIENT)
Dept: RADIOLOGY | Facility: MEDICAL CENTER | Age: 79
DRG: 698 | End: 2019-11-22
Attending: INTERNAL MEDICINE
Payer: MEDICARE

## 2019-11-22 PROBLEM — R78.81 GRAM-NEGATIVE BACTEREMIA: Status: ACTIVE | Noted: 2019-11-22

## 2019-11-22 PROBLEM — J96.01 ACUTE RESPIRATORY FAILURE WITH HYPOXIA (HCC): Status: ACTIVE | Noted: 2019-11-22

## 2019-11-22 PROBLEM — J45.901 REACTIVE AIRWAY DISEASE WITH WHEEZING WITH ACUTE EXACERBATION: Status: ACTIVE | Noted: 2019-11-22

## 2019-11-22 LAB
ANION GAP SERPL CALC-SCNC: 11 MMOL/L (ref 0–11.9)
BUN SERPL-MCNC: 33 MG/DL (ref 8–22)
CALCIUM SERPL-MCNC: 8 MG/DL (ref 8.5–10.5)
CHLORIDE SERPL-SCNC: 105 MMOL/L (ref 96–112)
CO2 SERPL-SCNC: 22 MMOL/L (ref 20–33)
CREAT SERPL-MCNC: 2.29 MG/DL (ref 0.5–1.4)
EKG IMPRESSION: NORMAL
ERYTHROCYTE [DISTWIDTH] IN BLOOD BY AUTOMATED COUNT: 54.6 FL (ref 35.9–50)
GLUCOSE SERPL-MCNC: 110 MG/DL (ref 65–99)
HCT VFR BLD AUTO: 31.8 % (ref 42–52)
HGB BLD-MCNC: 10.2 G/DL (ref 14–18)
L PNEUMO AG UR QL IA: NEGATIVE
LACTATE BLD-SCNC: 0.9 MMOL/L (ref 0.5–2)
MCH RBC QN AUTO: 30.6 PG (ref 27–33)
MCHC RBC AUTO-ENTMCNC: 32.1 G/DL (ref 33.7–35.3)
MCV RBC AUTO: 95.5 FL (ref 81.4–97.8)
PLATELET # BLD AUTO: 176 K/UL (ref 164–446)
PMV BLD AUTO: 9.1 FL (ref 9–12.9)
POTASSIUM SERPL-SCNC: 5.3 MMOL/L (ref 3.6–5.5)
RBC # BLD AUTO: 3.33 M/UL (ref 4.7–6.1)
SODIUM SERPL-SCNC: 138 MMOL/L (ref 135–145)
WBC # BLD AUTO: 17.4 K/UL (ref 4.8–10.8)

## 2019-11-22 PROCEDURE — 770020 HCHG ROOM/CARE - TELE (206)

## 2019-11-22 PROCEDURE — 700111 HCHG RX REV CODE 636 W/ 250 OVERRIDE (IP): Performed by: HOSPITALIST

## 2019-11-22 PROCEDURE — 36415 COLL VENOUS BLD VENIPUNCTURE: CPT

## 2019-11-22 PROCEDURE — 97165 OT EVAL LOW COMPLEX 30 MIN: CPT

## 2019-11-22 PROCEDURE — 80048 BASIC METABOLIC PNL TOTAL CA: CPT

## 2019-11-22 PROCEDURE — 99233 SBSQ HOSP IP/OBS HIGH 50: CPT | Performed by: INTERNAL MEDICINE

## 2019-11-22 PROCEDURE — 700102 HCHG RX REV CODE 250 W/ 637 OVERRIDE(OP): Performed by: HOSPITALIST

## 2019-11-22 PROCEDURE — 700111 HCHG RX REV CODE 636 W/ 250 OVERRIDE (IP): Performed by: INTERNAL MEDICINE

## 2019-11-22 PROCEDURE — 700105 HCHG RX REV CODE 258: Performed by: HOSPITALIST

## 2019-11-22 PROCEDURE — 74176 CT ABD & PELVIS W/O CONTRAST: CPT

## 2019-11-22 PROCEDURE — 93005 ELECTROCARDIOGRAM TRACING: CPT | Performed by: HOSPITALIST

## 2019-11-22 PROCEDURE — 85027 COMPLETE CBC AUTOMATED: CPT

## 2019-11-22 PROCEDURE — 93010 ELECTROCARDIOGRAM REPORT: CPT | Performed by: INTERNAL MEDICINE

## 2019-11-22 PROCEDURE — 83605 ASSAY OF LACTIC ACID: CPT

## 2019-11-22 PROCEDURE — A9270 NON-COVERED ITEM OR SERVICE: HCPCS | Performed by: HOSPITALIST

## 2019-11-22 PROCEDURE — 97162 PT EVAL MOD COMPLEX 30 MIN: CPT

## 2019-11-22 PROCEDURE — 700105 HCHG RX REV CODE 258: Performed by: INTERNAL MEDICINE

## 2019-11-22 PROCEDURE — 94760 N-INVAS EAR/PLS OXIMETRY 1: CPT

## 2019-11-22 RX ORDER — MORPHINE SULFATE 4 MG/ML
2 INJECTION, SOLUTION INTRAMUSCULAR; INTRAVENOUS ONCE
Status: ACTIVE | OUTPATIENT
Start: 2019-11-22 | End: 2019-11-23

## 2019-11-22 RX ADMIN — MICAFUNGIN SODIUM 100 MG: 20 INJECTION, POWDER, LYOPHILIZED, FOR SOLUTION INTRAVENOUS at 17:37

## 2019-11-22 RX ADMIN — ACETAMINOPHEN 650 MG: 325 TABLET, FILM COATED ORAL at 17:37

## 2019-11-22 RX ADMIN — CLOTRIMAZOLE 1 APPLICATION: 10 CREAM TOPICAL at 17:37

## 2019-11-22 RX ADMIN — AMLODIPINE BESYLATE 10 MG: 10 TABLET ORAL at 05:05

## 2019-11-22 RX ADMIN — SIMVASTATIN 10 MG: 20 TABLET, FILM COATED ORAL at 21:10

## 2019-11-22 RX ADMIN — TAMSULOSIN HYDROCHLORIDE 0.4 MG: 0.4 CAPSULE ORAL at 05:06

## 2019-11-22 RX ADMIN — ACETAMINOPHEN 650 MG: 325 TABLET, FILM COATED ORAL at 03:30

## 2019-11-22 RX ADMIN — FENTANYL CITRATE 50 MCG: 50 INJECTION INTRAMUSCULAR; INTRAVENOUS at 00:30

## 2019-11-22 RX ADMIN — SITAGLIPTIN 25 MG: 25 TABLET, FILM COATED ORAL at 06:46

## 2019-11-22 RX ADMIN — CLOTRIMAZOLE 1 APPLICATION: 10 CREAM TOPICAL at 05:05

## 2019-11-22 RX ADMIN — CEFTRIAXONE SODIUM 2 G: 2 INJECTION, POWDER, FOR SOLUTION INTRAMUSCULAR; INTRAVENOUS at 05:06

## 2019-11-22 RX ADMIN — SENNOSIDES AND DOCUSATE SODIUM 2 TABLET: 8.6; 5 TABLET ORAL at 05:05

## 2019-11-22 RX ADMIN — POLYETHYLENE GLYCOL 3350 1 PACKET: 17 POWDER, FOR SOLUTION ORAL at 05:05

## 2019-11-22 RX ADMIN — SENNOSIDES AND DOCUSATE SODIUM 2 TABLET: 8.6; 5 TABLET ORAL at 17:37

## 2019-11-22 RX ADMIN — FENTANYL CITRATE 50 MCG: 50 INJECTION INTRAMUSCULAR; INTRAVENOUS at 02:16

## 2019-11-22 RX ADMIN — LATANOPROST 1 DROP: 50 SOLUTION OPHTHALMIC at 21:10

## 2019-11-22 RX ADMIN — SODIUM CHLORIDE: 9 INJECTION, SOLUTION INTRAVENOUS at 03:05

## 2019-11-22 RX ADMIN — ASPIRIN 81 MG: 81 TABLET, COATED ORAL at 05:05

## 2019-11-22 ASSESSMENT — ENCOUNTER SYMPTOMS
HEMOPTYSIS: 0
WEAKNESS: 0
FEVER: 0
NAUSEA: 0
WHEEZING: 0
DIZZINESS: 0
CHILLS: 0
VOMITING: 0
SHORTNESS OF BREATH: 1
PALPITATIONS: 0
COUGH: 0
HEADACHES: 0
DIARRHEA: 0
TREMORS: 0
LOSS OF CONSCIOUSNESS: 0
ABDOMINAL PAIN: 0
FOCAL WEAKNESS: 0
SEIZURES: 0
FALLS: 0
EYE PAIN: 0
MYALGIAS: 0
EYE REDNESS: 0
INSOMNIA: 0
NERVOUS/ANXIOUS: 0
CONSTIPATION: 0
BLOOD IN STOOL: 0

## 2019-11-22 ASSESSMENT — COGNITIVE AND FUNCTIONAL STATUS - GENERAL
CLIMB 3 TO 5 STEPS WITH RAILING: A LOT
DRESSING REGULAR UPPER BODY CLOTHING: A LITTLE
DRESSING REGULAR LOWER BODY CLOTHING: A LITTLE
HELP NEEDED FOR BATHING: A LITTLE
MOBILITY SCORE: 13
PERSONAL GROOMING: A LITTLE
TOILETING: A LITTLE
WALKING IN HOSPITAL ROOM: A LITTLE
STANDING UP FROM CHAIR USING ARMS: A LITTLE
SUGGESTED CMS G CODE MODIFIER MOBILITY: CL
SUGGESTED CMS G CODE MODIFIER DAILY ACTIVITY: CK
MOVING FROM LYING ON BACK TO SITTING ON SIDE OF FLAT BED: UNABLE
DAILY ACTIVITIY SCORE: 19
MOVING TO AND FROM BED TO CHAIR: UNABLE
TURNING FROM BACK TO SIDE WHILE IN FLAT BAD: A LITTLE

## 2019-11-22 ASSESSMENT — GAIT ASSESSMENTS
GAIT LEVEL OF ASSIST: MINIMAL ASSIST
DEVIATION: BRADYKINETIC;DECREASED HEEL STRIKE;DECREASED TOE OFF
ASSISTIVE DEVICE: FRONT WHEEL WALKER
DISTANCE (FEET): 75

## 2019-11-22 ASSESSMENT — ACTIVITIES OF DAILY LIVING (ADL): TOILETING: INDEPENDENT

## 2019-11-22 NOTE — WOUND TEAM
"[facility]  Inpatient Services  Initial Wound and Skin Care Evaluation    Admission Date: 11/21/2019     Consult Date: 11/22/2019  HPI, PMH, SH: Reviewed    Unit where seen by Wound Team: [id number]     WOUND CONSULT RELATED TO:  Multiple areas    SUBJECTIVE:  \"What's this for?\"      Self Report / Pain Level:  \"My hips hurt.\"       OBJECTIVE:  Patient had difficulty understanding why skin assessment being done    WOUND TYPE, LOCATION, CHARACTERISTICS (Pressure Injuries: location, stage, POA or date identified)       Pressure Injury 11/21/19 Coccyx (Active)   Pressure Injury Stage 2    State of Healing Non-healing    Site Assessment Red    Winsome-wound Assessment Blanchable erythema;Hyperpigmented    Margins Attached edges    Wound Length (cm) 0.5 cm    Wound Width (cm) 0.8 cm    Wound Depth (cm) 0.1 cm    Wound Surface Area (cm^2) 0.4 cm^2    Tunneling 0 cm    Undermining 0 cm    Closure None    Drainage Amount None    Non-staged Wound Description Not applicable    Treatments Site care;Cleansed    Cleansing Normal Saline Irrigation    Periwound Protectant Not Applicable    Dressing Options Mepilex    Dressing Cleansing/Solutions Not Applicable    Dressing Status Clean;Dry;Intact    Dressing Change Frequency Every 72 hrs    NEXT Dressing Change  11/25/19    NEXT Weekly Photo (Inpatient Only) 11/28/19    WOUND NURSE ONLY - Odor None    WOUND NURSE ONLY - Exposed Structures None    WOUND NURSE ONLY - Tissue Type and Percentage 100% red        Vascular:    Dorsal Pedal pulses:  2+  Posterior tib pulses:   1+    ROTIZ:      NA not applying compression, no signs of arterial disease    Lab Values:    Lab Results   Component Value Date/Time    WBC 17.4 (H) 11/22/2019 01:59 AM    RBC 3.33 (L) 11/22/2019 01:59 AM    HEMOGLOBIN 10.2 (L) 11/22/2019 01:59 AM    HEMATOCRIT 31.8 (L) 11/22/2019 01:59 AM        No results found for: HBA1C        Culture:   Obtained No not needed, Results NA      INTERVENTIONS BY WOUND TEAM: "  Performed hand hygiene, donned gloves. Assessed feet, heels, toes first. Patient has darkly pigmented hemosiderin staining from knees to toes. Skin looks good, skin moisturizer is being applied. Small superficial abrasions to left medial distal foot and a few toes, these do not need advanced wound care, left open to air. Left knee with superficial abrasions, right knee intact. Left elbow intact. Ears intact. Sacrum intact. POA pressure injury to coccyx. Ordered sacral mepilex. Assisted CNA in pulling patient up in bed.    Dressing Selection:  Sacral mepilex         Interdisciplinary consultation: Patient, Bedside RN     EVALUATION: sacral mepilex redistributes the forces of friction and shear protecting bony prominences.     Factors affecting wound healing: confusion,   pressure  Goals: Steady decrease in wound area and depth weekly.    NURSING PLAN OF CARE ORDERS (X):    Dressing changes: See Dressing Care orders:   Skin care: See Skin Care orders: X  Rectal tube care: See Rectal Tube Care orders:   Other orders:    RSKIN: CURRENT (X) ORDERED (O):   Q shift Jose E:  X  Q shift pressure point assessments:  X  Pressure redistribution mattress  X          JANET          Bariatric JANET         Bariatric foam           Heel float boots      Heel silicone dressing      Float Heels off Bed with Pillows   X            Barrier wipes         Barrier Cream         Barrier paste          Sacral silicone dressing   X  Silicone O2 tubing    X     Anchorfast         Cannula fixation Device (Tender )          Gray Foam Ear protectors           Trach with Optifoam split foam                 Waffle cushion   O     Waffle Overlay   X      Rectal tube or BMS   Purwick/Condom Cath         Antifungal tx      Interdry          Reposition q 2 hours   X     Up to chair        Ambulate      PT/OT        Dietician        Diabetes Education      PO  X   TF     TPN     NPO   # days   Other        WOUND TEAM PLAN OF CARE (X):   NPWT change 3  x week:        Dressing changes by wound team:       Follow up as needed:   X    Other (explain):     Anticipated discharge plans (X):   SNF:   X        Home Care:           Outpatient Wound Center:            Self Care:            Other:

## 2019-11-22 NOTE — PROGRESS NOTES
Received report from night shift RN.  Assumed care at 0700. Call light within reach. Bed locked and in lowest position.  Non skid socks on, Belongings within  Reach.  Will continue to monitor hourly.  Pt in 8/10 pain but pain meds just given educated pt on Metabolization of medication and the body process.

## 2019-11-22 NOTE — PROGRESS NOTES
Received bedside report from RN, pt care assumed, VSS, pt assessment complete. Pt AAOx3 with periods of confusion noted, no pain c/o 0/10 pain at this time. No signs of acute distress noted at this time. POC discussed with pt and verbalizes no questions. Pt denies any additional needs at this time. Bed in lowest position, bed alarm on, pt educated on fall risk and verbalized understanding, call light within reach, hourly rounding initiated.

## 2019-11-22 NOTE — THERAPY
"Pt is an 78 y/o male admitted for AMS from SNF, found to have CAP. Pt presents to acute PT with impairments in fucntional mobility, balance, gait, strength and activity tolerance. pt required standing rest breaks every 20-25 ft during ambulation due to SOB while on 2L. Pt will benefit from acute PT interventions to address present impairments.     Physical Therapy Evaluation completed.   Bed Mobility:  Supine to Sit: (seated in chair upon PT arrival)  Transfers: Sit to Stand: Moderate Assist(-> Min A depending on surface height)  Gait: Level Of Assist: Minimal Assist with Front-Wheel Walker  x75 ft     Plan of Care: Will benefit from Physical Therapy 3 times per week  Discharge Recommendations: Equipment: Will Continue to Assess for Equipment Needs.   Post-acute therapy: Recommend post-acute placement for continued physical therapy services prior to discharge home. Patient can tolerate post-acute therapies at a 5x/week frequency.         See \"Rehab Therapy-Acute\" Patient Summary Report for complete documentation.     " Larisa Espinal asked me to call patient. She is scheduled on Wednesday 10/4/17 at 9:20AM with Larisa Espinal. Larisa would like to have more time for her appointment (40 Minutes) and wants to know if she can come into clinic a little earlier at the 9:00AM spot. Please relay this to patient when she calls back. I left her a voicmail.       Irene Mathur CMA ([facility])

## 2019-11-22 NOTE — CARE PLAN
Problem: Communication  Goal: The ability to communicate needs accurately and effectively will improve  Outcome: PROGRESSING AS EXPECTED  Intervention: [place] patient and significant other/support system to call light to alert staff of needs  Flowsheets (Taken 11/22/2019 0201)  Oriented to:: All of the Following : Location of Bathroom, Visiting Policy, Unit Routine, Call Light and Bedside Controls, Bedside Rail Policy, Smoking Policy, Rights and Responsibilities, Bedside Report, and Patient Education Notebook; Visiting Policy; Call Light & Bedside Controls; Smoking Policy; Patient Education Notebook; Location of bathroom; Unit Routine; Bedside Rail Policy; Patient Rights and Responsibilities     Problem: Safety  Goal: Will remain free from falls  Outcome: PROGRESSING AS EXPECTED  Intervention: Implement fall precautions  Flowsheets  Taken 11/22/2019 0201  Bed Alarm: Yes - Alarm On  Chair/Bed Strip Alarm: Yes - Alarm On  Taken 11/21/2019 2000  Environmental Precautions: Treaded Slipper Socks on Patient;Personal Belongings, Wastebasket, Call Bell etc. in Easy Reach;Transferred to Stronger Side;Report Given to Other Health Care Providers Regarding Fall Risk;Bed in Low Position;Communication Sign for Patients & Families;Mobility Assessed & Appropriate Sign Placed     Problem: Skin Integrity  Goal: Risk for impaired skin integrity will decrease  Outcome: PROGRESSING AS EXPECTED  Intervention: Implement precautions to protect skin integrity in collaboration with the interdisciplinary team  Flowsheets (Taken 11/22/2019 0201)  Skin Preventative Measures: Pillows in Use for Support / Positioning; Silicone Oxygen Tubing in Use; Pillows in Use to Float Heels; Gray Foam for Oxygen Tubing; Heel Float Boots in Use   Bed Types: Pressure Redistribution Mattress (Atmosair)

## 2019-11-22 NOTE — THERAPY
"Occupational Therapy Evaluation completed.   Functional Status:  SPV LB dressing, SPV seated grooming, Independent self feeding, Min A toileting, Min A stand pivot w/ FWW  Plan of Care: Will benefit from Occupational Therapy 3 times per week  Discharge Recommendations:  Equipment: Will Continue to Assess for Equipment Needs. Post-acute therapy Recommend post-acute placement for additional occupational therapy services prior to discharge home. Patient can tolerate post-acute therapies at a 5x/week frequency.    See \"Rehab Therapy-Acute\" Patient Summary Report for complete documentation.      Pt is an 78 y/o male who presents to acute due to AMS and a cough. Pt found to have CAP and UTI. [facility] at [facility] for [facility], per RN plan is for [facility]. Pt donned socks w/ SPV and increased time, required min A for toileting and stand pivot for balance and sequencing. Will continue to follow for Acute OT services. At this time recommend post acute placement, however, pt shows potential to progress to DC home w/ HH if it is an [facility].  "

## 2019-11-23 ENCOUNTER — APPOINTMENT (OUTPATIENT)
Dept: CARDIOLOGY | Facility: MEDICAL CENTER | Age: 79
DRG: 698 | End: 2019-11-23
Attending: INTERNAL MEDICINE
Payer: MEDICARE

## 2019-11-23 LAB
ANION GAP SERPL CALC-SCNC: 11 MMOL/L (ref 0–11.9)
BACTERIA UR CULT: ABNORMAL
BACTERIA UR CULT: ABNORMAL
BUN SERPL-MCNC: 35 MG/DL (ref 8–22)
CALCIUM SERPL-MCNC: 8.7 MG/DL (ref 8.5–10.5)
CHLORIDE SERPL-SCNC: 104 MMOL/L (ref 96–112)
CO2 SERPL-SCNC: 24 MMOL/L (ref 20–33)
CREAT SERPL-MCNC: 1.97 MG/DL (ref 0.5–1.4)
ERYTHROCYTE [DISTWIDTH] IN BLOOD BY AUTOMATED COUNT: 55.2 FL (ref 35.9–50)
GLUCOSE SERPL-MCNC: 132 MG/DL (ref 65–99)
HCT VFR BLD AUTO: 33.3 % (ref 42–52)
HGB BLD-MCNC: 10.3 G/DL (ref 14–18)
LV EJECT FRACT MOD 2C 99903: 67.05
MCH RBC QN AUTO: 30.5 PG (ref 27–33)
MCHC RBC AUTO-ENTMCNC: 30.9 G/DL (ref 33.7–35.3)
MCV RBC AUTO: 98.5 FL (ref 81.4–97.8)
PLATELET # BLD AUTO: 196 K/UL (ref 164–446)
PMV BLD AUTO: 9.5 FL (ref 9–12.9)
POTASSIUM SERPL-SCNC: 4.6 MMOL/L (ref 3.6–5.5)
PROCALCITONIN SERPL-MCNC: 1.82 NG/ML
RBC # BLD AUTO: 3.38 M/UL (ref 4.7–6.1)
SIGNIFICANT IND 70042: ABNORMAL
SITE SITE: ABNORMAL
SODIUM SERPL-SCNC: 139 MMOL/L (ref 135–145)
SOURCE SOURCE: ABNORMAL
WBC # BLD AUTO: 11.2 K/UL (ref 4.8–10.8)

## 2019-11-23 PROCEDURE — 93306 TTE W/DOPPLER COMPLETE: CPT

## 2019-11-23 PROCEDURE — 770020 HCHG ROOM/CARE - TELE (206)

## 2019-11-23 PROCEDURE — 94640 AIRWAY INHALATION TREATMENT: CPT

## 2019-11-23 PROCEDURE — 84145 PROCALCITONIN (PCT): CPT

## 2019-11-23 PROCEDURE — 94760 N-INVAS EAR/PLS OXIMETRY 1: CPT

## 2019-11-23 PROCEDURE — 700101 HCHG RX REV CODE 250: Performed by: INTERNAL MEDICINE

## 2019-11-23 PROCEDURE — 85027 COMPLETE CBC AUTOMATED: CPT

## 2019-11-23 PROCEDURE — 700111 HCHG RX REV CODE 636 W/ 250 OVERRIDE (IP): Performed by: INTERNAL MEDICINE

## 2019-11-23 PROCEDURE — 700102 HCHG RX REV CODE 250 W/ 637 OVERRIDE(OP): Performed by: HOSPITALIST

## 2019-11-23 PROCEDURE — 700111 HCHG RX REV CODE 636 W/ 250 OVERRIDE (IP): Performed by: HOSPITALIST

## 2019-11-23 PROCEDURE — A9270 NON-COVERED ITEM OR SERVICE: HCPCS | Performed by: HOSPITALIST

## 2019-11-23 PROCEDURE — 51798 US URINE CAPACITY MEASURE: CPT

## 2019-11-23 PROCEDURE — 93306 TTE W/DOPPLER COMPLETE: CPT | Mod: 26 | Performed by: INTERNAL MEDICINE

## 2019-11-23 PROCEDURE — 80048 BASIC METABOLIC PNL TOTAL CA: CPT

## 2019-11-23 PROCEDURE — 700105 HCHG RX REV CODE 258: Performed by: HOSPITALIST

## 2019-11-23 PROCEDURE — 99223 1ST HOSP IP/OBS HIGH 75: CPT | Performed by: INTERNAL MEDICINE

## 2019-11-23 PROCEDURE — 99233 SBSQ HOSP IP/OBS HIGH 50: CPT | Performed by: INTERNAL MEDICINE

## 2019-11-23 PROCEDURE — 700105 HCHG RX REV CODE 258: Performed by: INTERNAL MEDICINE

## 2019-11-23 RX ORDER — HALOPERIDOL 5 MG/ML
2 INJECTION INTRAMUSCULAR ONCE
Status: COMPLETED | OUTPATIENT
Start: 2019-11-23 | End: 2019-11-23

## 2019-11-23 RX ADMIN — SENNOSIDES AND DOCUSATE SODIUM 2 TABLET: 8.6; 5 TABLET ORAL at 17:17

## 2019-11-23 RX ADMIN — POLYETHYLENE GLYCOL 3350 1 PACKET: 17 POWDER, FOR SOLUTION ORAL at 17:17

## 2019-11-23 RX ADMIN — FAMOTIDINE 20 MG: 10 INJECTION INTRAVENOUS at 17:17

## 2019-11-23 RX ADMIN — MICAFUNGIN SODIUM 100 MG: 20 INJECTION, POWDER, LYOPHILIZED, FOR SOLUTION INTRAVENOUS at 05:49

## 2019-11-23 RX ADMIN — ACETAMINOPHEN 650 MG: 325 TABLET, FILM COATED ORAL at 14:19

## 2019-11-23 RX ADMIN — ASPIRIN 81 MG: 81 TABLET, COATED ORAL at 05:21

## 2019-11-23 RX ADMIN — HALOPERIDOL LACTATE 2 MG: 5 INJECTION, SOLUTION INTRAMUSCULAR at 21:08

## 2019-11-23 RX ADMIN — ACETAMINOPHEN 650 MG: 325 TABLET, FILM COATED ORAL at 05:21

## 2019-11-23 RX ADMIN — CLOTRIMAZOLE 1 APPLICATION: 10 CREAM TOPICAL at 05:49

## 2019-11-23 RX ADMIN — AMLODIPINE BESYLATE 10 MG: 10 TABLET ORAL at 05:21

## 2019-11-23 RX ADMIN — SITAGLIPTIN 25 MG: 25 TABLET, FILM COATED ORAL at 05:53

## 2019-11-23 RX ADMIN — LATANOPROST 1 DROP: 50 SOLUTION OPHTHALMIC at 21:44

## 2019-11-23 RX ADMIN — ALBUTEROL SULFATE 2.5 MG: 2.5 SOLUTION RESPIRATORY (INHALATION) at 05:43

## 2019-11-23 RX ADMIN — SODIUM CHLORIDE: 9 INJECTION, SOLUTION INTRAVENOUS at 01:28

## 2019-11-23 RX ADMIN — CEFTRIAXONE SODIUM 2 G: 2 INJECTION, POWDER, FOR SOLUTION INTRAMUSCULAR; INTRAVENOUS at 05:21

## 2019-11-23 RX ADMIN — CEFEPIME 2 G: 2 INJECTION, POWDER, FOR SOLUTION INTRAVENOUS at 08:51

## 2019-11-23 RX ADMIN — TAMSULOSIN HYDROCHLORIDE 0.4 MG: 0.4 CAPSULE ORAL at 05:22

## 2019-11-23 RX ADMIN — SODIUM CHLORIDE: 9 INJECTION, SOLUTION INTRAVENOUS at 19:11

## 2019-11-23 ASSESSMENT — ENCOUNTER SYMPTOMS
EYE PAIN: 0
DIARRHEA: 0
CONSTIPATION: 0
COUGH: 0
BLOOD IN STOOL: 0
ABDOMINAL PAIN: 0
DIZZINESS: 0
INSOMNIA: 0
FALLS: 0
HEADACHES: 0
WHEEZING: 0
EYE REDNESS: 0
FEVER: 0
TREMORS: 0
SHORTNESS OF BREATH: 1
MYALGIAS: 0
VOMITING: 0
HEMOPTYSIS: 0
FOCAL WEAKNESS: 0
NERVOUS/ANXIOUS: 0
WEAKNESS: 0
LOSS OF CONSCIOUSNESS: 0
CHILLS: 0
NAUSEA: 0
SEIZURES: 0
PALPITATIONS: 0

## 2019-11-23 NOTE — PROGRESS NOTES
Pt returned from CT. Tele box on and monitor room notified. Pt is A&O3, on 3L via NC, no complaints of pain. Pt updated on POC- all questions and concerns answered. Will continue to monitor.

## 2019-11-23 NOTE — PROGRESS NOTES
Report received at bedside, patient care assumed. Tele box on. Patient sitting up in chair, reoriented and updated on POC, no requests at this time. Fall precautions in place with bed in lowest position, treaded socks on, and call light within reach. Chair alarm in use.

## 2019-11-23 NOTE — CONSULTS
DATE OF SERVICE:  2019    INFECTIOUS DISEASE CONSULTATION    REASON FOR CONSULT:  UTI and Gram-negative sepsis.    CONSULTING PHYSICIAN:  Erich Alba MD    HISTORY OF PRESENT ILLNESS:  Please note majority of history was obtained from   the medical records as the patient is a poor historian due to his underlying   dementia.  This is an 79-year-old white male who was admitted to the hospital   on 2019 due to altered mental status.  He had concern for   acute-on-chronic renal failure, urinary tract infection as well as   community-acquired pneumonia.  His renal failure was thought to be due to a   malfunctioning Steen catheter and a new catheter was placed.  He was found to   have significant urinary retention up over 1500 mL.  Urine culture was showing   Gram-negative rods and blood cultures showing Gram-negative rods, so   infectious disease was consulted for antibiotic recommendations and   management.  The patient is currently sitting up in a chair.  He denied any   abdominal pain, visual changes except for one episode of lower abdominal pain   this morning, but that has resolved.  He denies nausea or vomiting or any   other complaints.  He just wants to know when if he is going home today.    ALLERGIES No antibiotic allergies      REVIEW OF SYSTEMS:  Definitely limited due to the patient's mental status and   probable underlying dementia and is therefore unreliable.    PAST MEDICAL HISTORY:  Hypertension, GERD, diabetes, and BPH.    FAMILY HISTORY:  The patient states both his parents are .  He states   he does have a family history of diabetes.   Denies any cancer, coronary   artery disease or stroke.    SOCIAL HISTORY:  Denies smoking, drinking, or use of illicit drugs.    PHYSICAL EXAMINATION:  GENERAL:  He is an elderly male in no acute distress, pleasant, cooperative.  VITAL SIGNS:  T-max of 101.5, current temperature 96.9, blood pressure 140/79,   pulse 57, respiratory rate 17,  oxygen saturation 96% on 2 L nasal cannula.    He weighs 90 kilos.  HEENT:  Normocephalic, atraumatic.  Pupils are equal, round, reactive to   light.  Extraocular movements are intact.  Oropharynx is clear.  NECK:  Supple.  There is no JVD or stridor.  No cervical lymphadenopathy.  CARDIOVASCULAR:  Bradycardic, regular rate and rhythm, unable to auscultate   any murmurs.  CHEST:  Grossly clear to auscultation bilaterally and unlabored.  Initial   wheezing and rhonchi were reported, none present currently.  ABDOMEN:  Soft, nontender, nondistended.  There is no rebound or guarding.    Steen catheter is in place.  EXTREMITIES:  He has bilateral lower extremity edema.  SKIN:  Warm.  NEUROLOGIC:  He is awake, alert, oriented to person and place.  Mood is   normal.  Affect is normal.    LABORATORY DATA:  Current labs show white blood cell count 11.2, improved from   17.4, H and H of 10.3 and 33, platelets 196.  Sodium 139, potassium 4.6,   chloride 104, bicarbonate 24, glucose 132, BUN 35, creatinine 1.97, admitting   creatinine of 2.29.  Urinalysis showed moderate blood, large leukocyte   esterase,  wbc's.  Legionella screen was negative.  Influenza screen was   negative.  Blood cultures 2/2 were showing non-lactose fermenting   Gram-negative rods.  One blood culture is showing yeast.  Urine culture   showing pseudomonas.    IMAGING:  CT scan of the kidneys showed dilated small bowel, possible early   obstruction.  No bowel perforation or enlarged prostate.  Small bilateral   pleural effusions with atelectasis.  Chest x-ray showed pulmonary edema.  EKG   shows a QTC of 447.    ASSESSMENT AND PLAN:  An 79-year-old male admitted with altered mental status.    Initially thought to have community-acquired pneumonia; however, chest x-ray   was reviewed by me shows more pulmonary edema and atelectasis.   A CT scan of   the kidneys also showed atelectasis, small pleural effusions.  He did   have significant fever and  leukocytosis, so initially started on ceftriaxone   and because of the findings of pseudomonas was switched to cefepime.    Anticipate the non-lactose fermenting Gram-negative millicent causing his   Gram-negative sepsis will most likely be pseudomonas as well.  Unclear source   for the yeast in his blood.  He should have repeat blood cultures every 48   hours until negative.  I have started him on micafungin pending identification   of the yeast.  Because he is an unreliable historian, he should have   ophthalmology evaluate for Candida endophthalmitis.  He is also having acute   renal failure.  This is multifactorial including his sepsis, urinary tract   infection, and obstructed Steen catheter.  His Steen catheter has since been   replaced.  Antibiotics will need to be dose adjusted accordingly and continue   to monitor his urinary function carefully.  Further recommendations per   culture results and clinical course.  Discussed with internal medicine.    Thank you and we will follow with you.       ____________________________________     MD ADILSON SRIVASTAVA / TIM    DD:  11/23/2019 13:45:24  DT:  11/23/2019 14:24:43    D#:  [id number]  Job#:  [id number]

## 2019-11-23 NOTE — CARE PLAN
Problem: Communication  Goal: The ability to communicate needs accurately and effectively will improve  Outcome: PROGRESSING AS EXPECTED  Note:   Pt participates in POC however requires frequent reminders and reorienting. Pt and board updated, all questions and concerns answered.       Problem: Safety  Goal: Will remain free from injury  Outcome: PROGRESSING AS EXPECTED  Note:   Safety and fall education given with bed and strip alarm on. All fall precautions are in place with belongings at bedside table. Needs are tended to. Educated to use call light for assistance. Pt verbalized understanding.       Problem: Venous Thromboembolism (VTW)/Deep Vein Thrombosis (DVT) Prevention:  Goal: Patient will participate in Venous Thrombosis (VTE)/Deep Vein Thrombosis (DVT)Prevention Measures  Outcome: PROGRESSING AS EXPECTED  Flowsheets (Taken 11/22/2019 2005)  Mechanical Prophylaxis : SCDs, Sequential Compression Device  Note:   Pt has SCDs for VTE prophylaxis. SCDs on.     Problem: Bowel/Gastric:  Goal: Normal bowel function is maintained or improved  Outcome: PROGRESSING AS EXPECTED  Note:   Pt had 900 mL out at beginning of shift. Steen care done with soap and water.      Problem: Pain Management  Goal: Pain level will decrease to patient's comfort goal  Outcome: PROGRESSING AS EXPECTED  Note:   Pt has reported no pain throughout shift.      Problem: Skin Integrity  Goal: Risk for impaired skin integrity will decrease  Outcome: PROGRESSING AS EXPECTED  Note:   Pt is on Q2 turns. Mepilex in use on bony prominences. Pillows/float boots to float heels. Barrier cream applied to sacrum.

## 2019-11-23 NOTE — PROGRESS NOTES
[facility] Medicine Daily Progress Note    Date of Service  11/22/2019    Chief Complaint  79 y.o. male admitted 11/21/2019 with Shortness of Breath (RR 30, came from assisted living facility, staff found him AOx2, does not wear O2, placed on 4lpm)        Hospital Course    Presents with Shortness of Breath (RR 30, came from assisted living facility, staff found him AOx2, does not wear O2, placed on 4lpm)  Also altered.  At the ED, mild fever, hemodynamically stable. He however was hypoxic and put on O2.   CXR:  1.  Pulmonary edema and/or infiltrates are identified, which are stable since the prior exam.  2.  Cardiomegaly  3.  Atherosclerosis  Mild leukocytosis. Elevated Cr-. U/A suggestive of UTI.        Interval Problem Update  11/12. Doing better, Still wheezing. No pain.     Consultants/Specialty  COnsult Nephrology if Cr- worsens    Code Status  full    Disposition  PT/OT ordered. HHC?    Review of Systems  Review of Systems   Constitutional: Negative for chills and fever.   HENT: Negative for congestion, hearing loss and nosebleeds.    Eyes: Negative for pain and redness.   Respiratory: Positive for shortness of breath. Negative for cough, hemoptysis and wheezing.    Cardiovascular: Negative for chest pain and palpitations.   Gastrointestinal: Negative for abdominal pain, blood in stool, constipation, diarrhea, nausea and vomiting.   Genitourinary: Negative for dysuria, frequency and hematuria.   Musculoskeletal: Negative for falls, joint pain and myalgias.   Skin: Negative for rash.   Neurological: Negative for dizziness, tremors, focal weakness, seizures, loss of consciousness, weakness and headaches.   Psychiatric/Behavioral: The patient is not nervous/anxious and does not have insomnia.    All other systems reviewed and are negative.       Physical Exam  Temp:  [36.3 °C (97.3 °F)-38.6 °C (101.5 °F)] 37.8 °C (100 °F)  Pulse:  [52-73] 58  Resp:  [18-30] 18  BP: (102-131)/(46-58) 124/55  SpO2:  [89 %-98 %] 96  %    Physical Exam  Vitals signs and nursing note reviewed.   HENT:      Head: Normocephalic and atraumatic.      Right Ear: External ear normal.      Left Ear: External ear normal.      Nose: Nose normal.      Mouth/Throat:      Mouth: Mucous membranes are moist.   Eyes:      General: No scleral icterus.     Conjunctiva/sclera: Conjunctivae normal.   Neck:      Musculoskeletal: Normal range of motion and neck supple.   Cardiovascular:      Rate and Rhythm: Normal rate and regular rhythm.      Heart sounds: No murmur. No friction rub. No gallop.    Pulmonary:      Breath sounds: Wheezing and rhonchi present.      Comments: COugh  Abdominal:      General: Abdomen is flat. Bowel sounds are normal. There is no distension.      Palpations: Abdomen is soft.      Tenderness: There is no tenderness. There is no guarding.   Musculoskeletal: Normal range of motion.      Right lower leg: Edema (Trace) present.      Left lower leg: Edema (Trace) present.   Skin:     General: Skin is warm.   Neurological:      Mental Status: He is alert and oriented to person, place, and time. Mental status is at baseline.   Psychiatric:         Mood and Affect: Mood normal.         Behavior: Behavior normal.         Thought Content: Thought content normal.         Judgment: Judgment normal.         Fluids  No intake or output data in the 24 hours ending 11/22/19 1609    Laboratory  Recent Labs     11/21/19  0441 11/22/19  0159   WBC 12.3* 17.4*   RBC 3.32* 3.33*   HEMOGLOBIN 10.1* 10.2*   HEMATOCRIT 31.8* 31.8*   MCV 95.8 95.5   MCH 30.4 30.6   MCHC 31.8* 32.1*   RDW 54.1* 54.6*   PLATELETCT 209 176   MPV 9.0 9.1     Recent Labs     11/21/19  0441 11/22/19  0159   SODIUM 140 138   POTASSIUM 4.9 5.3   CHLORIDE 106 105   CO2 24 22   GLUCOSE 103* 110*   BUN 35* 33*   CREATININE 2.54* 2.29*   CALCIUM 8.1* 8.0*                   Imaging  DX-CHEST-PORTABLE (1 VIEW)   Final Result         1.  Pulmonary edema and/or infiltrates are identified, which  "are stable since the prior exam.   2.  Cardiomegaly   3.  Atherosclerosis      CT-RENAL COLIC EVALUATION(A/P W/O)    (Results Pending)   EC-ECHOCARDIOGRAM COMPLETE W/O CONT    (Results Pending)        Assessment/Plan  * Acute respiratory failure with hypoxia (HCC)  Assessment & Plan  Due to PNA  May have RAD    Acute renal failure (ARF) ([facility])  Assessment & Plan  \"Patient will be given fluid resuscitation with acute renal failure.  Continue to monitor renal functions and get a renal ultrasound.  This may be secondary to urinary obstruction as the patient initially had no output through his Steen catheter.  The Steen catheter has been replaced and at this point he put out 1.5 L of urine.  The patient will be monitored strictly on his intake and output.\"  No renal US ordered, so I ordered a CT renal instead  Trend Cr-    Altered mental status  Assessment & Plan  Altered mental status may be a combination of the infections versus underlying dementia we will at this point continue to monitor his mental status.  Resolved.  Cognitive deficits. May be his baseline.    UTI (urinary tract infection)  Assessment & Plan  Patient's urinary tract infection at this point is most likely secondary to a plugged Steen catheter.  This has now been replaced continue with IV Rocephin and follow cultures.  Ordered CT renal    CAP (community acquired pneumonia)  Assessment & Plan  Patient at this point is too confused to tell me what happened.  Apparently the patient comes in because of respiratory insufficiency and is found to have at this point on imaging studies bilateral lower lobe infiltrates.  I reviewed the chest x-ray myself.  Patient also has leukocytosis.  At this point patient will be admitted for IV antibiotics with Rocephin and azithromycin.  He will be placed on oxygen and RT protocol.  We will get sputum and blood cultures prior to initiation of antibiotics.  COntinue antibiotics, resp and O2 per " protocol      Leukocytosis  Assessment & Plan  Secondary to the current pneumonia and urinary tract infection continue to follow white blood cell count    Gram-negative bacteremia  Assessment & Plan  Noted on blood cultures 11/21  ID consult in the AM  COntinue Rocephin    Reactive airway disease with wheezing with acute exacerbation  Assessment & Plan  Mild  Resp and O2 per protocol    Hypocalcemia  Assessment & Plan  Give calcium supplementation although corrected calcium at this point is within normal limits.    Normocytic normochromic anemia  Assessment & Plan  Monitor H&H if drops below 7 or 21 transfuse.  In the meantime check an iron panel.       VTE prophylaxis: SCDs  Reviewed vitals, labs, imaging, staff notes.  Discussed assessment and plan with Randy Bates   Discussed with LYUDMILA

## 2019-11-23 NOTE — CARE PLAN
Problem: Oxygenation:  Goal: Maintain adequate oxygenation dependent on patient condition  Outcome: PROGRESSING AS EXPECTED  Note:   2klpm/nc     Problem: Hyperinflation:  Goal: Prevent or improve atelectasis  Outcome: PROGRESSING AS EXPECTED  Note:   IS QID pulling 1750

## 2019-11-23 NOTE — PROGRESS NOTES
Bedside report received and patient care assumed. Pt is off for CT. Pt board updated. Will round when patient returns.

## 2019-11-23 NOTE — ASSESSMENT & PLAN NOTE
-Positive urine culture as well  -ID has signed off.  Patient completed ABX course 12/7/2019  -Cardiology has signed off.  SARAVANAN on 12/3/2019- for endocarditis  -Afebrile.  No leukocytosis

## 2019-11-24 ENCOUNTER — APPOINTMENT (OUTPATIENT)
Dept: RADIOLOGY | Facility: MEDICAL CENTER | Age: 79
DRG: 698 | End: 2019-11-24
Attending: INTERNAL MEDICINE
Payer: MEDICARE

## 2019-11-24 PROBLEM — G92.8 TOXIC METABOLIC ENCEPHALOPATHY: Status: ACTIVE | Noted: 2019-11-24

## 2019-11-24 LAB
ACTION RANGE TRIGGERED IACRT: NO
ALBUMIN SERPL BCP-MCNC: 3.1 G/DL (ref 3.2–4.9)
ALBUMIN/GLOB SERPL: 1.1 G/DL
ALP SERPL-CCNC: 43 U/L (ref 30–99)
ALT SERPL-CCNC: 9 U/L (ref 2–50)
AMMONIA PLAS-SCNC: 24 UMOL/L (ref 11–45)
AMMONIA PLAS-SCNC: 35 UMOL/L (ref 11–45)
AMPHET UR QL SCN: NEGATIVE
ANION GAP SERPL CALC-SCNC: 5 MMOL/L (ref 0–11.9)
ANION GAP SERPL CALC-SCNC: 7 MMOL/L (ref 0–11.9)
APPEARANCE UR: CLEAR
AST SERPL-CCNC: 32 U/L (ref 12–45)
BACTERIA #/AREA URNS HPF: NEGATIVE /HPF
BACTERIA BLD CULT: ABNORMAL
BACTERIA BLD CULT: ABNORMAL
BARBITURATES UR QL SCN: NEGATIVE
BASE EXCESS BLDA CALC-SCNC: -1 MMOL/L (ref -4–3)
BASE EXCESS BLDA CALC-SCNC: -2 MMOL/L (ref -4–3)
BASE EXCESS BLDA CALC-SCNC: -2 MMOL/L (ref -4–3)
BASOPHILS # BLD AUTO: 0.3 % (ref 0–1.8)
BASOPHILS # BLD: 0.03 K/UL (ref 0–0.12)
BENZODIAZ UR QL SCN: NEGATIVE
BILIRUB SERPL-MCNC: 0.7 MG/DL (ref 0.1–1.5)
BILIRUB UR QL STRIP.AUTO: NEGATIVE
BODY TEMPERATURE: 38.6 CENTIGRADE
BODY TEMPERATURE: ABNORMAL DEGREES
BODY TEMPERATURE: NORMAL CENTIGRADE
BUN SERPL-MCNC: 31 MG/DL (ref 8–22)
BUN SERPL-MCNC: 34 MG/DL (ref 8–22)
BZE UR QL SCN: NEGATIVE
CALCIUM SERPL-MCNC: 8 MG/DL (ref 8.5–10.5)
CALCIUM SERPL-MCNC: 8.3 MG/DL (ref 8.5–10.5)
CANNABINOIDS UR QL SCN: NEGATIVE
CHLORIDE SERPL-SCNC: 105 MMOL/L (ref 96–112)
CHLORIDE SERPL-SCNC: 107 MMOL/L (ref 96–112)
CO2 BLDA-SCNC: 24 MMOL/L (ref 20–33)
CO2 SERPL-SCNC: 22 MMOL/L (ref 20–33)
CO2 SERPL-SCNC: 24 MMOL/L (ref 20–33)
COLOR UR: YELLOW
CREAT SERPL-MCNC: 1.84 MG/DL (ref 0.5–1.4)
CREAT SERPL-MCNC: 1.95 MG/DL (ref 0.5–1.4)
EOSINOPHIL # BLD AUTO: 0.02 K/UL (ref 0–0.51)
EOSINOPHIL NFR BLD: 0.2 % (ref 0–6.9)
EPI CELLS #/AREA URNS HPF: NEGATIVE /HPF
ERYTHROCYTE [DISTWIDTH] IN BLOOD BY AUTOMATED COUNT: 52.7 FL (ref 35.9–50)
ERYTHROCYTE [DISTWIDTH] IN BLOOD BY AUTOMATED COUNT: 52.8 FL (ref 35.9–50)
GLOBULIN SER CALC-MCNC: 2.7 G/DL (ref 1.9–3.5)
GLUCOSE SERPL-MCNC: 114 MG/DL (ref 65–99)
GLUCOSE SERPL-MCNC: 94 MG/DL (ref 65–99)
GLUCOSE UR STRIP.AUTO-MCNC: NEGATIVE MG/DL
HCO3 BLDA-SCNC: 22 MMOL/L (ref 17–25)
HCO3 BLDA-SCNC: 23 MMOL/L (ref 17–25)
HCO3 BLDA-SCNC: 23 MMOL/L (ref 17–25)
HCT VFR BLD AUTO: 28.3 % (ref 42–52)
HCT VFR BLD AUTO: 29.3 % (ref 42–52)
HGB BLD-MCNC: 8.9 G/DL (ref 14–18)
HGB BLD-MCNC: 9.3 G/DL (ref 14–18)
HOROWITZ INDEX BLDA+IHG-RTO: 148 MM[HG]
IMM GRANULOCYTES # BLD AUTO: 0.07 K/UL (ref 0–0.11)
IMM GRANULOCYTES NFR BLD AUTO: 0.7 % (ref 0–0.9)
INHALED O2 FLOW RATE: 2 L/MIN (ref 2–10)
INHALED O2 FLOW RATE: 5 L/MIN (ref 2–10)
INR PPP: 1.5 (ref 0.87–1.13)
INST. QUALIFIED PATIENT IIQPT: YES
KETONES UR STRIP.AUTO-MCNC: NEGATIVE MG/DL
LACTATE BLD-SCNC: 0.9 MMOL/L (ref 0.5–2)
LACTATE BLD-SCNC: 1 MMOL/L (ref 0.5–2)
LACTATE BLD-SCNC: 1.2 MMOL/L (ref 0.5–2)
LACTATE BLD-SCNC: 1.3 MMOL/L (ref 0.5–2)
LEUKOCYTE ESTERASE UR QL STRIP.AUTO: ABNORMAL
LIPASE SERPL-CCNC: 13 U/L (ref 11–82)
LYMPHOCYTES # BLD AUTO: 0.5 K/UL (ref 1–4.8)
LYMPHOCYTES NFR BLD: 4.6 % (ref 22–41)
MCH RBC QN AUTO: 30 PG (ref 27–33)
MCH RBC QN AUTO: 30.2 PG (ref 27–33)
MCHC RBC AUTO-ENTMCNC: 31.4 G/DL (ref 33.7–35.3)
MCHC RBC AUTO-ENTMCNC: 31.7 G/DL (ref 33.7–35.3)
MCV RBC AUTO: 95.1 FL (ref 81.4–97.8)
MCV RBC AUTO: 95.3 FL (ref 81.4–97.8)
METHADONE UR QL SCN: NEGATIVE
MICRO URNS: ABNORMAL
MONOCYTES # BLD AUTO: 0.67 K/UL (ref 0–0.85)
MONOCYTES NFR BLD AUTO: 6.2 % (ref 0–13.4)
MRSA DNA SPEC QL NAA+PROBE: NORMAL
NEUTROPHILS # BLD AUTO: 9.47 K/UL (ref 1.82–7.42)
NEUTROPHILS NFR BLD: 88 % (ref 44–72)
NITRITE UR QL STRIP.AUTO: NEGATIVE
NRBC # BLD AUTO: 0 K/UL
NRBC BLD-RTO: 0 /100 WBC
O2/TOTAL GAS SETTING VFR VENT: 60 %
OPIATES UR QL SCN: NEGATIVE
OXYCODONE UR QL SCN: NEGATIVE
PCO2 BLDA: 32.8 MMHG (ref 26–37)
PCO2 BLDA: 34.3 MMHG (ref 26–37)
PCO2 BLDA: 37.6 MMHG (ref 26–37)
PCO2 TEMP ADJ BLDA: 37.4 MMHG (ref 26–37)
PCP UR QL SCN: NEGATIVE
PH BLDA: 7.39 [PH] (ref 7.4–7.5)
PH BLDA: 7.44 [PH] (ref 7.4–7.5)
PH BLDA: 7.44 [PH] (ref 7.4–7.5)
PH TEMP ADJ BLDA: 7.4 [PH] (ref 7.4–7.5)
PH UR STRIP.AUTO: 5 [PH] (ref 5–8)
PLATELET # BLD AUTO: 172 K/UL (ref 164–446)
PLATELET # BLD AUTO: 172 K/UL (ref 164–446)
PMV BLD AUTO: 9.2 FL (ref 9–12.9)
PMV BLD AUTO: 9.5 FL (ref 9–12.9)
PO2 BLDA: 59.7 MMHG (ref 64–87)
PO2 BLDA: 85.8 MMHG (ref 64–87)
PO2 BLDA: 89 MMHG (ref 64–87)
PO2 TEMP ADJ BLDA: 88 MMHG (ref 64–87)
POTASSIUM SERPL-SCNC: 4.4 MMOL/L (ref 3.6–5.5)
POTASSIUM SERPL-SCNC: 4.5 MMOL/L (ref 3.6–5.5)
PROPOXYPH UR QL SCN: NEGATIVE
PROT SERPL-MCNC: 5.8 G/DL (ref 6–8.2)
PROT UR QL STRIP: NEGATIVE MG/DL
PROTHROMBIN TIME: 18.5 SEC (ref 12–14.6)
RBC # BLD AUTO: 2.97 M/UL (ref 4.7–6.1)
RBC # BLD AUTO: 3.08 M/UL (ref 4.7–6.1)
RBC # URNS HPF: ABNORMAL /HPF
RBC UR QL AUTO: ABNORMAL
SAO2 % BLDA: 90.3 % (ref 93–99)
SAO2 % BLDA: 95.9 % (ref 93–99)
SAO2 % BLDA: 97 % (ref 93–99)
SIGNIFICANT IND 70042: ABNORMAL
SIGNIFICANT IND 70042: NORMAL
SITE SITE: ABNORMAL
SITE SITE: NORMAL
SODIUM SERPL-SCNC: 134 MMOL/L (ref 135–145)
SODIUM SERPL-SCNC: 136 MMOL/L (ref 135–145)
SOURCE SOURCE: ABNORMAL
SOURCE SOURCE: NORMAL
SP GR UR STRIP.AUTO: 1.01
SPECIMEN DRAWN FROM PATIENT: ABNORMAL
TSH SERPL DL<=0.005 MIU/L-ACNC: 4.67 UIU/ML (ref 0.38–5.33)
UROBILINOGEN UR STRIP.AUTO-MCNC: 0.2 MG/DL
VIT B12 SERPL-MCNC: 528 PG/ML (ref 211–911)
WBC # BLD AUTO: 10.8 K/UL (ref 4.8–10.8)
WBC # BLD AUTO: 11.8 K/UL (ref 4.8–10.8)
WBC #/AREA URNS HPF: ABNORMAL /HPF

## 2019-11-24 PROCEDURE — 83605 ASSAY OF LACTIC ACID: CPT

## 2019-11-24 PROCEDURE — 700102 HCHG RX REV CODE 250 W/ 637 OVERRIDE(OP): Performed by: HOSPITALIST

## 2019-11-24 PROCEDURE — 94640 AIRWAY INHALATION TREATMENT: CPT

## 2019-11-24 PROCEDURE — 36600 WITHDRAWAL OF ARTERIAL BLOOD: CPT

## 2019-11-24 PROCEDURE — 700111 HCHG RX REV CODE 636 W/ 250 OVERRIDE (IP): Performed by: INTERNAL MEDICINE

## 2019-11-24 PROCEDURE — A9270 NON-COVERED ITEM OR SERVICE: HCPCS | Performed by: HOSPITALIST

## 2019-11-24 PROCEDURE — 83036 HEMOGLOBIN GLYCOSYLATED A1C: CPT

## 2019-11-24 PROCEDURE — 83690 ASSAY OF LIPASE: CPT

## 2019-11-24 PROCEDURE — 82525 ASSAY OF COPPER: CPT

## 2019-11-24 PROCEDURE — 99359 PROLONG SERV W/O CONTACT ADD: CPT | Performed by: INTERNAL MEDICINE

## 2019-11-24 PROCEDURE — 80307 DRUG TEST PRSMV CHEM ANLYZR: CPT

## 2019-11-24 PROCEDURE — 700102 HCHG RX REV CODE 250 W/ 637 OVERRIDE(OP): Performed by: INTERNAL MEDICINE

## 2019-11-24 PROCEDURE — 85025 COMPLETE CBC W/AUTO DIFF WBC: CPT

## 2019-11-24 PROCEDURE — 80048 BASIC METABOLIC PNL TOTAL CA: CPT

## 2019-11-24 PROCEDURE — 36415 COLL VENOUS BLD VENIPUNCTURE: CPT

## 2019-11-24 PROCEDURE — 99233 SBSQ HOSP IP/OBS HIGH 50: CPT | Mod: 25 | Performed by: INTERNAL MEDICINE

## 2019-11-24 PROCEDURE — 87040 BLOOD CULTURE FOR BACTERIA: CPT | Mod: 91

## 2019-11-24 PROCEDURE — 85027 COMPLETE CBC AUTOMATED: CPT

## 2019-11-24 PROCEDURE — 82140 ASSAY OF AMMONIA: CPT

## 2019-11-24 PROCEDURE — 51798 US URINE CAPACITY MEASURE: CPT

## 2019-11-24 PROCEDURE — 99358 PROLONG SERVICE W/O CONTACT: CPT | Performed by: INTERNAL MEDICINE

## 2019-11-24 PROCEDURE — 84207 ASSAY OF VITAMIN B-6: CPT

## 2019-11-24 PROCEDURE — 84443 ASSAY THYROID STIM HORMONE: CPT

## 2019-11-24 PROCEDURE — 700101 HCHG RX REV CODE 250: Performed by: INTERNAL MEDICINE

## 2019-11-24 PROCEDURE — 99233 SBSQ HOSP IP/OBS HIGH 50: CPT | Performed by: INTERNAL MEDICINE

## 2019-11-24 PROCEDURE — 700105 HCHG RX REV CODE 258: Performed by: INTERNAL MEDICINE

## 2019-11-24 PROCEDURE — 82164 ANGIOTENSIN I ENZYME TEST: CPT

## 2019-11-24 PROCEDURE — 82607 VITAMIN B-12: CPT

## 2019-11-24 PROCEDURE — 87641 MR-STAPH DNA AMP PROBE: CPT

## 2019-11-24 PROCEDURE — 82803 BLOOD GASES ANY COMBINATION: CPT

## 2019-11-24 PROCEDURE — 94760 N-INVAS EAR/PLS OXIMETRY 1: CPT

## 2019-11-24 PROCEDURE — 84155 ASSAY OF PROTEIN SERUM: CPT

## 2019-11-24 PROCEDURE — 86039 ANTINUCLEAR ANTIBODIES (ANA): CPT

## 2019-11-24 PROCEDURE — 86038 ANTINUCLEAR ANTIBODIES: CPT

## 2019-11-24 PROCEDURE — 770022 HCHG ROOM/CARE - ICU (200)

## 2019-11-24 PROCEDURE — 700105 HCHG RX REV CODE 258: Performed by: HOSPITALIST

## 2019-11-24 PROCEDURE — 99291 CRITICAL CARE FIRST HOUR: CPT | Performed by: INTERNAL MEDICINE

## 2019-11-24 PROCEDURE — 81001 URINALYSIS AUTO W/SCOPE: CPT

## 2019-11-24 PROCEDURE — 80053 COMPREHEN METABOLIC PANEL: CPT

## 2019-11-24 PROCEDURE — 86790 VIRUS ANTIBODY NOS: CPT

## 2019-11-24 PROCEDURE — 85610 PROTHROMBIN TIME: CPT

## 2019-11-24 PROCEDURE — 84165 PROTEIN E-PHORESIS SERUM: CPT

## 2019-11-24 PROCEDURE — 84425 ASSAY OF VITAMIN B-1: CPT

## 2019-11-24 PROCEDURE — A9270 NON-COVERED ITEM OR SERVICE: HCPCS | Performed by: INTERNAL MEDICINE

## 2019-11-24 PROCEDURE — 71045 X-RAY EXAM CHEST 1 VIEW: CPT

## 2019-11-24 RX ORDER — IPRATROPIUM BROMIDE AND ALBUTEROL SULFATE 2.5; .5 MG/3ML; MG/3ML
3 SOLUTION RESPIRATORY (INHALATION)
Status: DISCONTINUED | OUTPATIENT
Start: 2019-11-24 | End: 2019-11-27

## 2019-11-24 RX ORDER — FUROSEMIDE 10 MG/ML
20 INJECTION INTRAMUSCULAR; INTRAVENOUS ONCE
Status: COMPLETED | OUTPATIENT
Start: 2019-11-24 | End: 2019-11-24

## 2019-11-24 RX ORDER — SODIUM CHLORIDE, SODIUM LACTATE, POTASSIUM CHLORIDE, AND CALCIUM CHLORIDE .6; .31; .03; .02 G/100ML; G/100ML; G/100ML; G/100ML
500 INJECTION, SOLUTION INTRAVENOUS
Status: DISCONTINUED | OUTPATIENT
Start: 2019-11-24 | End: 2019-11-26

## 2019-11-24 RX ORDER — ASPIRIN 81 MG/1
324 TABLET, CHEWABLE ORAL DAILY
Status: DISCONTINUED | OUTPATIENT
Start: 2019-11-25 | End: 2019-11-25

## 2019-11-24 RX ORDER — FUROSEMIDE 10 MG/ML
INJECTION INTRAMUSCULAR; INTRAVENOUS
Status: ACTIVE
Start: 2019-11-24 | End: 2019-11-25

## 2019-11-24 RX ORDER — ASPIRIN 300 MG/1
300 SUPPOSITORY RECTAL DAILY
Status: DISCONTINUED | OUTPATIENT
Start: 2019-11-25 | End: 2019-11-25

## 2019-11-24 RX ORDER — LACTOBACILLUS RHAMNOSUS GG 10B CELL
1 CAPSULE ORAL
Status: DISCONTINUED | OUTPATIENT
Start: 2019-11-24 | End: 2019-11-25

## 2019-11-24 RX ORDER — ENALAPRILAT 1.25 MG/ML
1.25 INJECTION INTRAVENOUS EVERY 6 HOURS PRN
Status: DISCONTINUED | OUTPATIENT
Start: 2019-11-24 | End: 2019-12-08

## 2019-11-24 RX ORDER — LORAZEPAM 2 MG/ML
0.5 INJECTION INTRAMUSCULAR EVERY 4 HOURS PRN
Status: DISCONTINUED | OUTPATIENT
Start: 2019-11-24 | End: 2019-11-24

## 2019-11-24 RX ORDER — HEPARIN SODIUM 5000 [USP'U]/ML
5000 INJECTION, SOLUTION INTRAVENOUS; SUBCUTANEOUS EVERY 8 HOURS
Status: DISCONTINUED | OUTPATIENT
Start: 2019-11-24 | End: 2019-12-12 | Stop reason: HOSPADM

## 2019-11-24 RX ORDER — HALOPERIDOL 5 MG/ML
2-5 INJECTION INTRAMUSCULAR EVERY 4 HOURS PRN
Status: DISCONTINUED | OUTPATIENT
Start: 2019-11-24 | End: 2019-12-12 | Stop reason: HOSPADM

## 2019-11-24 RX ORDER — ASPIRIN 81 MG/1
243 TABLET, CHEWABLE ORAL ONCE
Status: COMPLETED | OUTPATIENT
Start: 2019-11-24 | End: 2019-11-24

## 2019-11-24 RX ORDER — ASPIRIN 325 MG
325 TABLET ORAL DAILY
Status: DISCONTINUED | OUTPATIENT
Start: 2019-11-25 | End: 2019-11-25

## 2019-11-24 RX ORDER — IPRATROPIUM BROMIDE AND ALBUTEROL SULFATE 2.5; .5 MG/3ML; MG/3ML
3 SOLUTION RESPIRATORY (INHALATION)
Status: DISCONTINUED | OUTPATIENT
Start: 2019-11-24 | End: 2019-12-12 | Stop reason: HOSPADM

## 2019-11-24 RX ADMIN — ALBUTEROL SULFATE 2.5 MG: 2.5 SOLUTION RESPIRATORY (INHALATION) at 00:35

## 2019-11-24 RX ADMIN — IPRATROPIUM BROMIDE AND ALBUTEROL SULFATE 3 ML: .5; 3 SOLUTION RESPIRATORY (INHALATION) at 18:49

## 2019-11-24 RX ADMIN — TAMSULOSIN HYDROCHLORIDE 0.4 MG: 0.4 CAPSULE ORAL at 05:43

## 2019-11-24 RX ADMIN — ALBUTEROL SULFATE 2.5 MG: 2.5 SOLUTION RESPIRATORY (INHALATION) at 07:51

## 2019-11-24 RX ADMIN — LORAZEPAM 0.5 MG: 2 INJECTION INTRAMUSCULAR; INTRAVENOUS at 14:45

## 2019-11-24 RX ADMIN — ASPIRIN 81 MG 243 MG: 81 TABLET ORAL at 13:20

## 2019-11-24 RX ADMIN — HEPARIN SODIUM 5000 UNITS: 5000 INJECTION INTRAVENOUS; SUBCUTANEOUS at 15:19

## 2019-11-24 RX ADMIN — PIPERACILLIN AND TAZOBACTAM 4.5 G: 4; .5 INJECTION, POWDER, LYOPHILIZED, FOR SOLUTION INTRAVENOUS; PARENTERAL at 15:18

## 2019-11-24 RX ADMIN — MICAFUNGIN SODIUM 100 MG: 20 INJECTION, POWDER, LYOPHILIZED, FOR SOLUTION INTRAVENOUS at 07:00

## 2019-11-24 RX ADMIN — ACETAMINOPHEN 650 MG: 325 TABLET, FILM COATED ORAL at 08:02

## 2019-11-24 RX ADMIN — FUROSEMIDE 20 MG: 10 INJECTION, SOLUTION INTRAMUSCULAR; INTRAVENOUS at 15:02

## 2019-11-24 RX ADMIN — LORAZEPAM 0.5 MG: 2 INJECTION INTRAMUSCULAR; INTRAVENOUS at 14:23

## 2019-11-24 RX ADMIN — HEPARIN SODIUM 5000 UNITS: 5000 INJECTION INTRAVENOUS; SUBCUTANEOUS at 21:36

## 2019-11-24 RX ADMIN — POLYETHYLENE GLYCOL 3350 1 PACKET: 17 POWDER, FOR SOLUTION ORAL at 05:40

## 2019-11-24 RX ADMIN — SITAGLIPTIN 50 MG: 50 TABLET, FILM COATED ORAL at 05:43

## 2019-11-24 RX ADMIN — CLOTRIMAZOLE 1 APPLICATION: 10 CREAM TOPICAL at 19:28

## 2019-11-24 RX ADMIN — LORAZEPAM 0.5 MG: 2 INJECTION INTRAMUSCULAR; INTRAVENOUS at 09:12

## 2019-11-24 RX ADMIN — VANCOMYCIN HYDROCHLORIDE 2300 MG: 500 INJECTION, POWDER, LYOPHILIZED, FOR SOLUTION INTRAVENOUS at 10:07

## 2019-11-24 RX ADMIN — ASPIRIN 81 MG: 81 TABLET, COATED ORAL at 05:43

## 2019-11-24 RX ADMIN — SENNOSIDES AND DOCUSATE SODIUM 2 TABLET: 8.6; 5 TABLET ORAL at 05:42

## 2019-11-24 RX ADMIN — PIPERACILLIN AND TAZOBACTAM 4.5 G: 4; .5 INJECTION, POWDER, LYOPHILIZED, FOR SOLUTION INTRAVENOUS; PARENTERAL at 18:19

## 2019-11-24 RX ADMIN — CLOTRIMAZOLE 1 APPLICATION: 10 CREAM TOPICAL at 05:43

## 2019-11-24 RX ADMIN — FAMOTIDINE 20 MG: 10 INJECTION INTRAVENOUS at 05:43

## 2019-11-24 RX ADMIN — IPRATROPIUM BROMIDE AND ALBUTEROL SULFATE 3 ML: .5; 3 SOLUTION RESPIRATORY (INHALATION) at 15:07

## 2019-11-24 RX ADMIN — IPRATROPIUM BROMIDE AND ALBUTEROL SULFATE 3 ML: .5; 3 SOLUTION RESPIRATORY (INHALATION) at 10:28

## 2019-11-24 RX ADMIN — SODIUM CHLORIDE: 9 INJECTION, SOLUTION INTRAVENOUS at 10:17

## 2019-11-24 RX ADMIN — LATANOPROST 1 DROP: 50 SOLUTION OPHTHALMIC at 22:33

## 2019-11-24 RX ADMIN — AMLODIPINE BESYLATE 10 MG: 10 TABLET ORAL at 05:42

## 2019-11-24 RX ADMIN — CEFEPIME 2 G: 2 INJECTION, POWDER, FOR SOLUTION INTRAVENOUS at 05:41

## 2019-11-24 RX ADMIN — Medication 1 CAPSULE: at 09:12

## 2019-11-24 ASSESSMENT — ENCOUNTER SYMPTOMS
FEVER: 0
BLOOD IN STOOL: 0
SHORTNESS OF BREATH: 1
EYE REDNESS: 0
EYE PAIN: 0
WHEEZING: 0
CHILLS: 0
COUGH: 0
MYALGIAS: 0
CONSTIPATION: 0
INSOMNIA: 0
PALPITATIONS: 0
DIZZINESS: 0
LOSS OF CONSCIOUSNESS: 0
FOCAL WEAKNESS: 0
DIARRHEA: 0
NERVOUS/ANXIOUS: 0
NAUSEA: 0
HEMOPTYSIS: 0
HEADACHES: 0
TREMORS: 0
VOMITING: 0
ABDOMINAL PAIN: 0
SEIZURES: 0
FALLS: 0
WEAKNESS: 0

## 2019-11-24 NOTE — PROGRESS NOTES
Dr. Gonda notified of pt bradycardia with HR sustaining between 45-48bpm.  Plan of care to continue.

## 2019-11-24 NOTE — CONSULTS
Surgery General History & Physical Note    Date  11/24/2019    Primary Care Physician  Madhuri Rahman M.D.    CC  Concern for developing bowel obstruction on CT    Consulting physician Dr. Anjali SCRUGGS  This is a 79 y.o. male who presented with altered mental status on 11/21/2019 and was admitted to the hospitalist service. He was found to have pneumonia, a UTI and bacteremia. I was consulted this morning by Dr. Alba for concern for bowel obstruction mentioned by the radiologist on his CT scan dated 11/22. Per report, there has been no nausea or vomiting. He hasn't had a bowel movement since admission. Apparently he was complaining of some abdominal pain yesterday. I am unable to elicit any history of the patient due to disorientation.     No past medical history on file.   Diabetes, hypertension, GERD, CKD    No past surgical history on file.   Unable to ask patient, none in chart    Current Facility-Administered Medications   Medication Dose Route Frequency Provider Last Rate Last Dose   • ipratropium-albuterol (DUONEB) nebulizer solution  3 mL Nebulization 4X/DAY (RT) Erich Alba M.D.   3 mL at 11/24/19 1028   • lactated ringers infusion (BOLUS)  500 mL Intravenous Once PRN Erich Alba M.D.       • lactobacillus rhamnosus (CULTURELLE) capsule 1 Cap  1 Cap Oral QDAY with Breakfast Erich Alba M.D.   1 Cap at 11/24/19 0912   • Pharmacy consult request - Allow for permissive hypertension: SBP up to 220 mmHg/DBP up to 120 mmHg x 48 hours   Other PHARMACY TO DOSE Erich Alba M.D.       • [START ON 11/25/2019] aspirin (ASA) tablet 325 mg  325 mg Oral DAILY Erich Alba M.D.        Or   • [START ON 11/25/2019] aspirin (ASA) chewable tab 324 mg  324 mg Oral DAILY Erich Alba M.D.        Or   • [START ON 11/25/2019] aspirin (ASA) suppository 300 mg  300 mg Rectal DAILY Erich Alba M.D.       • LORazepam (ATIVAN) injection 0.5 mg  0.5 mg Intravenous Q4HRS PRN Erich Alba M.D.    0.5 mg at 11/24/19 0912   • cefepime (MAXIPIME) 2 g in  mL IVPB  2 g Intravenous Q12HRS Flor Patel M.D.       • MD Alert...Vancomycin per Pharmacy   Other PHARMACY TO DOSE Erich Alba M.D.       • [START ON 11/25/2019] famotidine (PEPCID) injection 20 mg  20 mg Intravenous DAILY Erich Alba M.D.       • enalaprilat (VASOTEC) injection 1.25 mg  1.25 mg Intravenous Q6HRS PRN Erich Alba M.D.       • aspirin (ASA) chewable tab 243 mg  243 mg Oral Once Erich Alba M.D.       • vancomycin (VANCOCIN) 2,300 mg in  mL IVPB  25 mg/kg Intravenous Once Erich Alba M.D. 166.7 mL/hr at 11/24/19 1007 2,300 mg at 11/24/19 1007   • heparin injection 5,000 Units  5,000 Units Subcutaneous Q8HRS Erich Alba M.D.       • ipratropium-albuterol (DUONEB) nebulizer solution  3 mL Nebulization Q2HRS PRN (RT) Jeremy M Gonda, M.D.       • haloperidol lactate (HALDOL) injection 2-5 mg  2-5 mg Intravenous Q4HRS PRN Jeremy M Gonda, M.D.       • SITagliptin (JANUVIA) tablet 50 mg  50 mg Oral DAILY Erich Alba M.D.   50 mg at 11/24/19 0543   • micafungin (MYCAMINE) 100 mg in D5W 100 mL ivpb  100 mg Intravenous Q24HRS Flor Patel M.D.   Stopped at 11/24/19 0800   • clotrimazole (LOTRIMIN) 1 % cream 1 Application  1 Application Topical BID Shelbie Treviño M.D.   1 Application at 11/24/19 0543   • latanoprost (XALATAN) 0.005 % ophthalmic solution 1 Drop  1 Drop Both Eyes Nightly Shelbie Treviño M.D.   1 Drop at 11/23/19 3914   • polyethylene glycol/lytes (MIRALAX) PACKET 1 Packet  1 Packet Oral DAILY Levabdulkadir Levai, M.D.   1 Packet at 11/24/19 0540   • simvastatin (ZOCOR) tablet 10 mg  10 mg Oral Nightly Shelbie Treviño M.D.   10 mg at 11/22/19 2110   • tamsulosin (FLOMAX) capsule 0.4 mg  0.4 mg Oral DAILY Shelbie Treviño M.D.   0.4 mg at 11/24/19 0543   • acetaminophen (TYLENOL) tablet 650 mg  650 mg Oral Q6HRS PRN Shelbie Treviño M.D.   650 mg at 11/24/19 0802   • ondansetron  (ZOFRAN) syringe/vial injection 4 mg  4 mg Intravenous Q4HRS PRN Shelbie Treviño M.D.       • ondansetron (ZOFRAN ODT) dispertab 4 mg  4 mg Oral Q4HRS PRN Shelbie Treviño M.D.       • senna-docusate (PERICOLACE or SENOKOT S) 8.6-50 MG per tablet 2 Tab  2 Tab Oral BID Shelbie Treviño M.D.   2 Tab at 11/24/19 0542    And   • polyethylene glycol/lytes (MIRALAX) PACKET 1 Packet  1 Packet Oral QDAY PRN Shelbie Treviño M.D.   1 Packet at 11/23/19 1717    And   • magnesium hydroxide (MILK OF MAGNESIA) suspension 30 mL  30 mL Oral QDAY PRN Shelbie Treviño M.D.        And   • bisacodyl (DULCOLAX) suppository 10 mg  10 mg Rectal QDAY PRN Shelbie Treviño M.D.       • Respiratory Care per Protocol   Nebulization Continuous RT Shelbie Treviño M.D.       • fentaNYL (SUBLIMAZE) injection 25-50 mcg  25-50 mcg Intravenous Q HOUR PRN Shelbie Treviño M.D.   50 mcg at 11/22/19 0216   • Influenza Vaccine High-Dose pf injection 0.5 mL  0.5 mL Intramuscular Once PRN Kuldeep Art R.N.           Social History     Socioeconomic History   • Marital status:      Spouse name: Not on file   • Number of children: Not on file   • Years of education: Not on file   • Highest education level: Not on file   Occupational History   • Not on file   Social Needs   • Financial resource strain: Not on file   • Food insecurity:     Worry: Not on file     Inability: Not on file   • Transportation needs:     Medical: Not on file     Non-medical: Not on file   Tobacco Use   • Smoking status: Never Smoker   • Smokeless tobacco: Never Used   Substance and Sexual Activity   • Alcohol use: Not on file   • Drug use: Not on file   • Sexual activity: Not on file   Lifestyle   • Physical activity:     Days per week: Not on file     Minutes per session: Not on file   • Stress: Not on file   Relationships   • Social connections:     Talks on phone: Not on file     Gets together: Not on file     Attends [employer] service: Not on file     Active member of club or  organization: Not on file     Attends meetings of clubs or organizations: Not on file     Relationship status: Not on file   • Intimate partner violence:     Fear of current or ex partner: Not on file     Emotionally abused: Not on file     Physically abused: Not on file     Forced sexual activity: Not on file   Other Topics Concern   • Not on file   Social History Narrative   • Not on file       No family history on file.    Allergies  Patient has no known allergies.    Review of Systems  Unable to obtain due to patient's mental status    Physical Exam   Constitutional: He appears well-developed and well-nourished.   HENT:   Head: Normocephalic and atraumatic.   Eyes: Conjunctivae are normal. Right eye exhibits no discharge. Left eye exhibits no discharge. No scleral icterus.   Neck: Normal range of motion. No thyromegaly present.   Cardiovascular: Normal rate, regular rhythm and intact distal pulses.   Pulmonary/Chest: Effort normal. No respiratory distress.   Abdominal: Soft. He exhibits no distension. There is no tenderness.   Abdominal exam difficult due to shortness of breath and mental status, but he is not wincing or guarding on palpation. He does not seem to have peritoneal signs.    Musculoskeletal: Normal range of motion.         General: Edema present.   Lymphadenopathy:     He has no cervical adenopathy.   Neurological:   Makes eye contact. Speaks in sentences. Doesn't answer questions.    Skin: Skin is warm and dry. No rash noted. He is not diaphoretic. No erythema.   BIlateral lower extremity edema, 2+ pitting below the mid shin   Psychiatric:   Agitated, not answering questions, speaks in full sentences that do not have any relation to topics at hand       Vital Signs  Blood Pressure : 145/95   Temperature: (!) 38.4 °C (101.1 °F)   Pulse: 60   Respiration: (!) 32   Pulse Oximetry: 96 %       Labs:  Recent Labs     11/23/19  1217 11/24/19  0255 11/24/19  0900   WBC 11.2* 11.8* 10.8   RBC 3.38* 3.08*  2.97*   HEMOGLOBIN 10.3* 9.3* 8.9*   HEMATOCRIT 33.3* 29.3* 28.3*   MCV 98.5* 95.1 95.3   MCH 30.5 30.2 30.0   MCHC 30.9* 31.7* 31.4*   RDW 55.2* 52.8* 52.7*   PLATELETCT 196 172 172   MPV 9.5 9.5 9.2     Recent Labs     11/23/19  1217 11/24/19  0255 11/24/19  0827   SODIUM 139 136 134*   POTASSIUM 4.6 4.5 4.4   CHLORIDE 104 105 107   CO2 24 24 22   GLUCOSE 132* 94 114*   BUN 35* 34* 31*   CREATININE 1.97* 1.95* 1.84*   CALCIUM 8.7 8.0* 8.3*     Recent Labs     11/24/19  0900   INR 1.50*     Recent Labs     11/24/19  0827 11/24/19  0900 11/24/19  1054   ASTSGOT 32  --   --    ALTSGPT 9  --   --    TBILIRUBIN 0.7  --   --    ALKPHOSPHAT 43  --   --    GLOBULIN 2.7  --   --    INR  --  1.50*  --    AMMONIA 24  --  35       Radiology:  DX-CHEST-PORTABLE (1 VIEW)   Final Result      Similar reticular opacities suspicious for edema and there are likely small layering effusions      More focal basilar opacity is more likely from atelectasis than consolidation      EC-ECHOCARDIOGRAM COMPLETE W/O CONT   Final Result      CT-RENAL COLIC EVALUATION(A/P W/O)   Final Result      1.  Dilated fluid-filled small bowel loops in RIGHT lower quadrant concerning for developing obstruction.   2.  No evidence of bowel perforation.   3.  Colonic diverticulosis without evidence for diverticulitis.   4.  No renal stone or hydronephrosis.   5.  Appendix is not visualized.   6.  Enlarged prostate.   7.  Small bilateral pleural effusions with associated atelectasis.   8.  Colonic diverticulosis without evidence for diverticulitis.      DX-CHEST-PORTABLE (1 VIEW)   Final Result         1.  Pulmonary edema and/or infiltrates are identified, which are stable since the prior exam.   2.  Cardiomegaly   3.  Atherosclerosis      CT-CHEST (THORAX) W/O    (Results Pending)   [id number] VIEW    (Results Pending)   CT-HEAD W/O    (Results Pending)   MR-MRA HEAD-W/O    (Results Pending)   MR-BRAIN-W/O    (Results Pending)   MR-CERVICAL SPINE-W/O     (Results Pending)   DX-LUMBAR PUNCTURE FOR DIAGNOSIS    (Results Pending)     On my review of the CT Abd/pelvis imaging on 11/22, there are some dilated loops of small bowel in the right lower quadrant. There is air and stool throughout the colon. No free air or free fluid. No mesenteric or bowel edema.     Assessment/Plan:  Unlikely to have a bowel obstruction or bowel ischemia to explain his clinical status.   More likely ileus due to .     Though this patient has not had a bowel movement during his hospital course, he also has not had any nausea or vomiting. No abdominal imaging has been done since his CT two days ago, but on exam he is not distended. Given his pneumonia, UTI and bacteremia I think it is very likely he has an ileus due to these illnesses, but I see no indication for surgical intervention at this time. I agree with checking a plain film of the abdomen today. Since he has not had any vomiting I would not recommend NG tube placement at this time.   Will follow.

## 2019-11-24 NOTE — PROGRESS NOTES
Notified provider that patient is very agitated and restless, pulling off monitor, pulling sawyer catheter, and pulled out IV. Patient is AOx1 and trying to get out of bed.    DR. Lopez placed order for 2mg Haldol - given.

## 2019-11-24 NOTE — PROGRESS NOTES
Dr. Gonda notified of pt HR in the 50's.  Per Dr. Gonda, notify MD if pt HR sustains <50 bpm and pt becomes symptomatic with bradycardia.

## 2019-11-24 NOTE — ASSESSMENT & PLAN NOTE
Likely metabolic encephalopathy  Delirium and I question whether this is his true baseline.   Seroquel 25mg qhs  Haldol prn agitation, delirium

## 2019-11-24 NOTE — PROGRESS NOTES
Called Dr. Lopez to update that patient is still agitated and restless, pulled out another IV and tugging at sawyer, will not leave oxygen or heart monitor place. Received verbal order with readback for bilateral soft wrist restraints - orders placed by provider request.

## 2019-11-24 NOTE — ASSESSMENT & PLAN NOTE
Continue scheduled and PRN bronchodilators  RT protocol  Hold off on steroids for now as he does not appear bronchospastic on my exam

## 2019-11-24 NOTE — PROGRESS NOTES
Infectious Disease Progress Note    Author: Flor Patel M.D. Date & Time of service: 2019  2:32 PM    Chief Complaint:  UTI and Gram-negative sepsis   Candiemia    Interval History:  79-year-old male admitted with altered mental status.    Bkwp886.1 WBC 10.8 AMS worsened-resp failure and bradycardia-now transferred to ICU. Obtunded    Labs Reviewed, Medications Reviewed and Radiology Reviewed.    Review of Systems:  Review of Systems   Unable to perform ROS: Mental status change       Hemodynamics:  Temp (24hrs), Av.8 °C (100 °F), Min:37.2 °C (99 °F), Max:38.4 °C (101.1 °F)  Temperature: (!) 38.4 °C (101.1 °F)  Pulse  Av.3  Min: 48  Max: 86   Blood Pressure : 145/95       Physical Exam:  Physical Exam  Vitals signs and nursing note reviewed.   Constitutional:       Appearance: He is ill-appearing. He is not diaphoretic.   HENT:      Nose: No congestion or rhinorrhea.      Mouth/Throat:      Mouth: Mucous membranes are dry.   Eyes:      General: No scleral icterus.        Right eye: No discharge.         Left eye: No discharge.   Neck:      Musculoskeletal: No neck rigidity or muscular tenderness.   Cardiovascular:      Rate and Rhythm: Bradycardia present.   Pulmonary:      Effort: Pulmonary effort is normal.      Breath sounds: No stridor. Rales present.   Chest:      Chest wall: No tenderness.   Abdominal:      General: There is no distension.      Palpations: Abdomen is soft.      Tenderness: There is no tenderness. There is no guarding.   Genitourinary:     Comments: sawyer  Musculoskeletal:      Right lower leg: Edema present.      Left lower leg: Edema present.   Neurological:      Comments: obtunded   Psychiatric:      Comments: Unable to assess         Meds:    Current Facility-Administered Medications:   •  ipratropium-albuterol  •  LR  •  lactobacillus rhamnosus  •  Pharmacy  •  [START ON 2019] aspirin **OR** [START ON 2019] aspirin **OR** [START ON 2019]  aspirin  •  LORazepam  •  MD Alert...Vancomycin per Pharmacy  •  [START ON 11/25/2019] famotidine  •  enalaprilat  •  heparin  •  ipratropium-albuterol  •  haloperidol lactate  •  piperacillin-tazobactam **AND** piperacillin-tazobactam  •  SITagliptin  •  micafungin  •  clotrimazole  •  latanoprost  •  polyethylene glycol/lytes  •  simvastatin  •  tamsulosin  •  acetaminophen  •  ondansetron  •  ondansetron  •  senna-docusate **AND** polyethylene glycol/lytes **AND** magnesium hydroxide **AND** bisacodyl  •  Respiratory Care per Protocol  •  fentaNYL  •  Influenza Vaccine High-Dose pf    Labs:  Recent Labs     11/23/19 1217 11/24/19  0255 11/24/19  0900   WBC 11.2* 11.8* 10.8   RBC 3.38* 3.08* 2.97*   HEMOGLOBIN 10.3* 9.3* 8.9*   HEMATOCRIT 33.3* 29.3* 28.3*   MCV 98.5* 95.1 95.3   MCH 30.5 30.2 30.0   RDW 55.2* 52.8* 52.7*   PLATELETCT 196 172 172   MPV 9.5 9.5 9.2   NEUTSPOLYS  --   --  88.00*   LYMPHOCYTES  --   --  4.60*   MONOCYTES  --   --  6.20   EOSINOPHILS  --   --  0.20   BASOPHILS  --   --  0.30     Recent Labs     11/23/19  1217 11/24/19  0255 11/24/19  0827   SODIUM 139 136 134*   POTASSIUM 4.6 4.5 4.4   CHLORIDE 104 105 107   CO2 24 24 22   GLUCOSE 132* 94 114*   BUN 35* 34* 31*     Recent Labs     11/23/19  1217 11/24/19  0255 11/24/19  0827   ALBUMIN  --   --  3.1*   TBILIRUBIN  --   --  0.7   ALKPHOSPHAT  --   --  43   TOTPROTEIN  --   --  5.8*   ALTSGPT  --   --  9   ASTSGOT  --   --  32   CREATININE 1.97* 1.95* 1.84*       Imaging:  Ct-renal Colic Evaluation(a/p W/o)    Result Date: 11/22/2019 11/22/2019 6:59 PM HISTORY/REASON FOR EXAM:  abnormal labs Cr- and urine infection r/o hydronephrosis. TECHNIQUE/EXAM DESCRIPTION AND NUMBER OF VIEWS: CT scan renal/colic without contrast. 5 mm helical images of the abdomen and pelvis were obtained from the diaphragmatic domes through the pubic symphysis. Low dose optimization technique was utilized for this CT exam including automated exposure control and  adjustment of the mA and/or kV according to patient size. COMPARISON: None. FINDINGS: Visualized lung bases show small bilateral pleural fluid collections with associated dependent atelectasis, worse on the LEFT. The liver is unremarkable. The spleen is unremarkable. Adrenal glands are unremarkable. The kidneys are unremarkable. Pancreas is atrophic. The gallbladder is unremarkable. Steen catheter within mildly distended bladder. Prostate is enlarged. Colonic diverticula noted. Increased small bowel and colonic gas.  Prominent fluid-filled small bowel loops in the RIGHT lower quadrant. No pneumoperitoneum. Moderate atherosclerotic calcification of abdominal aorta. Lumbar spine degenerative changes. T12 vertebral hemangioma. T-spine wall edema.     1.  Dilated fluid-filled small bowel loops in RIGHT lower quadrant concerning for developing obstruction. 2.  No evidence of bowel perforation. 3.  Colonic diverticulosis without evidence for diverticulitis. 4.  No renal stone or hydronephrosis. 5.  Appendix is not visualized. 6.  Enlarged prostate. 7.  Small bilateral pleural effusions with associated atelectasis. 8.  Colonic diverticulosis without evidence for diverticulitis.    Dx-chest-portable (1 View)    Result Date: 11/24/2019 11/24/2019 10:53 AM HISTORY/REASON FOR EXAM: Shortness of Breath. TECHNIQUE/EXAM DESCRIPTION AND NUMBER OF VIEWS: Single AP view of the chest. COMPARISON: 11/21/2019 FINDINGS: Lungs: Large volumes with reticular predominantly increased opacity is similar to comparison. There is also some hazy opacity at the bases especially on the left where there is some hemidiaphragm elevation Pleura:  Small layering effusions are likely Heart and mediastinum: There is stable cardiac silhouette enlargement.     Similar reticular opacities suspicious for edema and there are likely small layering effusions More focal basilar opacity is more likely from atelectasis than consolidation    Dx-chest-portable (1  View)    Result Date: 11/21/2019 11/21/2019 4:49 AM HISTORY/REASON FOR EXAM: Possible sepsis. TECHNIQUE/EXAM DESCRIPTION:  Single AP view of the chest. COMPARISON: None FINDINGS: Overlying cardiac leads are present. Cardiomegaly is observed. Atherosclerotic calcification of the aorta is noted.  The central  pulmonary vasculature appears prominent and indistinct. The lungs appear well expanded bilaterally.  Diffuse scattered hazy pulmonary parenchymal opacities are seen. The lung bases are partially excluded. The bony structures appear age-appropriate.     1.  Pulmonary edema and/or infiltrates are identified, which are stable since the prior exam. 2.  Cardiomegaly 3.  Atherosclerosis    Ec-echocardiogram Complete W/o Cont    Result Date: 11/23/2019  Transthoracic Echo Report Echocardiography Laboratory CONCLUSIONS No prior study is available for comparison. Technically difficult study. Normal left ventricular chamber size. Grossly preserved left ventricular systolic function. The right ventricle was normal in size and function. Mild aortic stenosis. S LV EF:        % FINDINGS Left Ventricle Normal left ventricular chamber size. Mild concentric left ventricular hypertrophy. Grossly preserved left ventricular systolic function. Unable to assess wall motion. Indeterminate diastolic function. Right Ventricle The right ventricle was normal in size and function. Right Atrium The right atrium is normal in size. Inferior vena cava is not well visualized. Left Atrium The left atrium is normal in size. Left atrial volume index is 22 mL/sq m. Mitral Valve The mitral valve is not well visualized. No stenosis or regurgitation seen. Aortic Valve The aortic valve is not well visualized. Calcified aortic valve leaflets. Mild aortic stenosis. Transvalvular gradients are - Peak: 20 mmHg, Mean: 12 mmHg. Dimensionless index is (0.39). No aortic insufficiency. Tricuspid Valve Structurally normal tricuspid valve without significant  "stenosis or regurgitation. Pulmonic Valve Structurally normal pulmonic valve without significant stenosis or regurgitation. Pericardium Normal pericardium without effusion. Aorta The aortic root is normal. Ascending aorta not well visualized. Marcelo Smith MD (Electronically Signed) Final Date:     23 November 2019                 16:53      Micro:  Results     Procedure Component Value Units Date/Time    Blood Culture [[id number]] Collected:  11/24/19 1300    Order Status:  Completed Specimen:  Blood from Peripheral Updated:  11/24/19 1419    Narrative:       Collected By:[id number] CAMRON MARISABEL L.  From different peripheral sites, if not done within the last  24 hours (Per Hospital Policy: Only change specimen source to  \"Line\" if specified by physician order)    Blood Culture [[id number]] Collected:  11/24/19 1300    Order Status:  Completed Specimen:  Blood from Peripheral Updated:  11/24/19 1419    Narrative:       Collected By:[id number] CAMRON MARISABEL L.  From different peripheral sites, if not done within the last  24 hours (Per Hospital Policy: Only change specimen source to  \"Line\" if specified by physician order)    HSV 1/2 By PCR(Herpes)+[id number] [[id number]]     Order Status:  No result Specimen:  Genital from Blood     CSF Culture [[id number]]     Order Status:  No result Specimen:  CSF from Tap     BLOOD CULTURE [[id number]]  (Abnormal) Collected:  11/21/19 0441    Order Status:  Completed Specimen:  Blood from Peripheral Updated:  11/24/19 0945     Significant Indicator POS     Source BLD     Site PERIPHERAL     Culture Result Growth detected by Bactec instrument. 11/22/2019  14:54      Pseudomonas aeruginosa  See previous culture for sensitivity report.  P.aeruginosa may develop resistance during prolonged therapy  with all antibiotics. Isolates that are initially susceptible  may become resistant within three to four days after  initiation of therapy. Testing of repeat isolates may be  warranted.        " "Yeast    Narrative:       CALL  [facility]  171 tel. [phone],  CALLED  171 tel. [phone] 11/22/2019, 16:12, RB PERF. RESULTS CALLED  TO:[phone]  Per Hospital Policy: Only change Specimen Src: to \"Line\" if  specified by physician order.  Left Forearm/Arm    BLOOD CULTURE [[id number]]  (Abnormal)  (Susceptibility) Collected:  11/21/19 0441    Order Status:  Completed Specimen:  Blood from Peripheral Updated:  11/24/19 0930     Significant Indicator POS     Source BLD     Site PERIPHERAL     Culture Result Growth detected by Bactec instrument. 11/22/2019  06:34      Pseudomonas aeruginosa  P.aeruginosa may develop resistance during prolonged therapy  with all antibiotics. Isolates that are initially susceptible  may become resistant within three to four days after  initiation of therapy. Testing of repeat isolates may be  warranted.      Narrative:       CALL  [facility]  171 tel. [phone],  CALLED  171 tel. [phone] 11/22/2019, 06:38, RB PERF. RESULTS CALLED  TO:[phone]  Per Hospital Policy: Only change Specimen Src: to \"Line\" if  specified by physician order.  Right AC    Susceptibility     Pseudomonas aeruginosa (1)     Antibiotic Interpretation Microscan Method Status    Ceftazidime Sensitive 4 mcg/mL GIRISH Final    Ciprofloxacin Sensitive <=1 mcg/mL GIRISH Final    Cefepime Sensitive <=2 mcg/mL GIRISH Final    Amikacin Sensitive <=16 mcg/mL GIRISH Final    Gentamicin Sensitive <=4 mcg/mL GIRISH Final    Tobramycin Sensitive <=4 mcg/mL GIRISH Final    Meropenem Sensitive <=1 mcg/mL GIRISH Final    Pip/Tazobactam Sensitive <=16 mcg/mL GIRISH Final                   MRSA By PCR (Amp) [[id number]]     Order Status:  No result Specimen:  Respirate from Nares     URINALYSIS [[id number]]     Order Status:  No result Specimen:  Urine, Clean Catch     Urinalysis [[id number]]     Order Status:  No result Specimen:  Urine     URINE CULTURE(NEW) [[id number]]  (Abnormal)  (Susceptibility) Collected:  11/21/19 0633    Order Status:  Completed Specimen:  Urine " Updated:  11/23/19 0823     Significant Indicator POS     Source UR     Site -     Culture Result -      Pseudomonas aeruginosa  ,000 cfu/mL  P.aeruginosa may develop resistance during prolonged therapy  with all antibiotics. Isolates that are initially susceptible  may become resistant within three to four days after  initiation of therapy. Testing of repeat isolates may be  warranted.      Narrative:       Indication for culture:->Septic Shock: Persistent  hypotension,  Lactic acid > 4, vasopressors/inotropes started  Indication for culture:->Septic Shock: Persistent    Susceptibility     Pseudomonas aeruginosa (1)     Antibiotic Interpretation Microscan Method Status    Ceftazidime Sensitive 4 mcg/mL GIRISH Final    Ciprofloxacin Sensitive <=1 mcg/mL GIRISH Final    Cefepime Sensitive <=2 mcg/mL GIRISH Final    Amikacin Sensitive <=16 mcg/mL GIRISH Final    Gentamicin Sensitive <=4 mcg/mL GIRISH Final    Tobramycin Sensitive <=4 mcg/mL GIRISH Final    Levofloxacin Sensitive <=2 mcg/mL GIRISH Final    Meropenem Sensitive <=1 mcg/mL GIRISH Final    Pip/Tazobactam Sensitive <=16 mcg/mL GIRISH Final                   Influenza By PCR, A/B [[id number]]     Order Status:  Sent Specimen:  Respirate from Nasopharyngeal     Culture Respiratory W/ GRM STN [[id number]]     Order Status:  Completed Specimen:  Respirate from Sputum     URINALYSIS [[id number]]  (Abnormal) Collected:  11/21/19 0633    Order Status:  Completed Specimen:  Urine Updated:  11/21/19 0716     Color Yellow     Character Cloudy     Specific Gravity 1.012     Ph 6.0     Glucose Negative mg/dL      Ketones Negative mg/dL      Protein Negative mg/dL      Bilirubin Negative     Urobilinogen, Urine 0.2     Nitrite Negative     Leukocyte Esterase Large     Occult Blood Moderate     Micro Urine Req Microscopic    Narrative:       Indication for culture:->Septic Shock: Persistent  hypotension,  Lactic acid > 4, vasopressors/inotropes started    Influenza A/B By PCR (Adult - Flu Only)  [[id number]] Collected:  11/21/19 0525    Order Status:  Completed Specimen:  Respirate from Nasopharyngeal Updated:  11/21/19 0615     Influenza virus A RNA Negative     Influenza virus B, PCR Negative    BLOOD CULTURE [[id number]] Collected:  11/21/19 0000    Order Status:  Canceled Specimen:  Other from Peripheral     BLOOD CULTURE [[id number]] Collected:  11/21/19 0000    Order Status:  Canceled Specimen:  Other from Peripheral           Assessment:  Active Hospital Problems    Diagnosis   • *Acute respiratory failure with hypoxia, RAD, PNA (Formerly [facility]) [J96.01]   • CAP (community acquired pneumonia) [J18.9]   • UTI (urinary tract infection) [N39.0]   • Altered mental status [R41.82]   • Acute renal failure (ARF) ([facility]ly [facility]) [N17.9]   • Leukocytosis [D72.829]   • Reactive airway disease with wheezing with acute exacerbation [J45.901]   • Gram-negative bacteremia [R78.81]       Plan:  UTI and Gram-negative sepsis   Remains febrile  AMS worse  LOLA improved  Resp status worse-CXR c/w pulm edema  Urine and Blood cxs +PSAR  Change cefepime to Zosyn due to concern for aspiration and worsening mental status    Candidemia  Continue micafungin pending identification of the yeast.     Ophthalmology evaluate for Candida endophthalmitis.   Recommend SARAVANAN if feasible as TTE unrevealing  Repeat blood cxs done today    Acute renal failure  Multifactorial including his sepsis, urinary tract infection, and obstructed Steen catheter.    His Steen catheter has since been replaced.    Antibiotics will need to be dose adjusted accordingly  CT neg for obstruction or stone  Monitor his urinary function carefully.    AMS, worse  Multifactorial incl infections, meds, hosp,   Aspiration and fall precautions  CT or MRI brain if feasible    Ileus vs bowel obstr  Appreciate surgery eval  Keep abx IV  High risk for     Discussed with DR Alba/Pharm.

## 2019-11-24 NOTE — PROGRESS NOTES
[facility] Medicine Daily Progress Note    Date of Service  11/24/2019    Chief Complaint  79 y.o. male admitted 11/21/2019 with Shortness of Breath (RR 30, came from assisted living facility, staff found him AOx2, does not wear O2, placed on 4lpm)        Hospital Course    Presents with Shortness of Breath (RR 30, came from assisted living facility, staff found him AOx2, does not wear O2, placed on 4lpm)  Also altered.  At the ED, mild fever, hemodynamically stable. He however was hypoxic and put on O2.   CXR:  1.  Pulmonary edema and/or infiltrates are identified, which are stable since the prior exam.  2.  Cardiomegaly  3.  Atherosclerosis  Mild leukocytosis. Elevated Cr-. U/A suggestive of UTI.        Interval Problem Update  11/22. Doing better, Still wheezing. No pain.   11/23. Improved but deconditioned.  11/24. Patient was having difficulty breathing. He was also confused and had to be put on restraints. Patient also complained of foot paresthesias. Nurses felt left leg was weak. On exam, he did say it was painful rather than weak. He also has a fever. He hasn;t had a bowel movement and a CT ordered showed dilated loops of small bowel RUQ.    Consultants/Specialty  Nephrology  Ophthalmology  ID  Critical Care  Neurology  Surgery    Code Status  full    Disposition  ICU    Review of Systems  Review of Systems   Constitutional: Negative for chills and fever.   HENT: Negative for congestion, hearing loss and nosebleeds.    Eyes: Negative for pain and redness.   Respiratory: Positive for shortness of breath. Negative for cough, hemoptysis and wheezing.    Cardiovascular: Negative for chest pain and palpitations.   Gastrointestinal: Negative for abdominal pain, blood in stool, constipation, diarrhea, nausea and vomiting.   Genitourinary: Negative for dysuria, frequency and hematuria.   Musculoskeletal: Negative for falls, joint pain and myalgias.   Skin: Negative for rash.   Neurological: Negative for dizziness,  tremors, focal weakness, seizures, loss of consciousness, weakness and headaches.   Psychiatric/Behavioral: The patient is not nervous/anxious and does not have insomnia.    All other systems reviewed and are negative.       Physical Exam  Temp:  [36.1 °C (96.9 °F)-38.4 °C (101.1 °F)] 38.4 °C (101.1 °F)  Pulse:  [55-66] 65  Resp:  [16-34] 34  BP: (133-148)/(67-92) 141/92  SpO2:  [93 %-97 %] 97 %    Physical Exam  Vitals signs and nursing note reviewed.   Constitutional:       Comments: Elderly   HENT:      Head: Normocephalic and atraumatic.      Right Ear: External ear normal.      Left Ear: External ear normal.      Nose: Nose normal.      Mouth/Throat:      Mouth: Mucous membranes are moist.   Eyes:      General: No scleral icterus.     Conjunctiva/sclera: Conjunctivae normal.   Neck:      Musculoskeletal: Normal range of motion and neck supple.   Cardiovascular:      Rate and Rhythm: Normal rate and regular rhythm.      Heart sounds: No murmur. No friction rub. No gallop.    Pulmonary:      Effort: Respiratory distress present.      Breath sounds: Wheezing and rhonchi present.      Comments: COugh  Abdominal:      General: Abdomen is flat. Bowel sounds are normal. There is no distension.      Palpations: Abdomen is soft.      Tenderness: There is no tenderness. There is no guarding.   Musculoskeletal: Normal range of motion.      Right lower leg: Edema (Trace) present.      Left lower leg: Edema (Trace) present.   Skin:     General: Skin is warm.   Neurological:      Comments: Confused  Agitated  LE weakness and paresthesias   Psychiatric:      Comments: Agitated         Fluids    Intake/Output Summary (Last 24 hours) at 11/24/2019 1034  Last data filed at 11/24/2019 0531  Gross per 24 hour   Intake 1816 ml   Output 1650 ml   Net 166 ml       Laboratory  Recent Labs     11/23/19  1217 11/24/19  0255 11/24/19  0900   WBC 11.2* 11.8* 10.8   RBC 3.38* 3.08* 2.97*   HEMOGLOBIN 10.3* 9.3* 8.9*   HEMATOCRIT 33.3*  29.3* 28.3*   MCV 98.5* 95.1 95.3   MCH 30.5 30.2 30.0   MCHC 30.9* 31.7* 31.4*   RDW 55.2* 52.8* 52.7*   PLATELETCT 196 172 172   MPV 9.5 9.5 9.2     Recent Labs     11/23/19  1217 11/24/19  0255 11/24/19  0827   SODIUM 139 136 134*   POTASSIUM 4.6 4.5 4.4   CHLORIDE 104 105 107   CO2 24 24 22   GLUCOSE 132* 94 114*   BUN 35* 34* 31*   CREATININE 1.97* 1.95* 1.84*   CALCIUM 8.7 8.0* 8.3*     Recent Labs     11/24/19  0900   INR 1.50*               Imaging  EC-ECHOCARDIOGRAM COMPLETE W/O CONT   Final Result      CT-RENAL COLIC EVALUATION(A/P W/O)   Final Result      1.  Dilated fluid-filled small bowel loops in RIGHT lower quadrant concerning for developing obstruction.   2.  No evidence of bowel perforation.   3.  Colonic diverticulosis without evidence for diverticulitis.   4.  No renal stone or hydronephrosis.   5.  Appendix is not visualized.   6.  Enlarged prostate.   7.  Small bilateral pleural effusions with associated atelectasis.   8.  Colonic diverticulosis without evidence for diverticulitis.      DX-CHEST-PORTABLE (1 VIEW)   Final Result         1.  Pulmonary edema and/or infiltrates are identified, which are stable since the prior exam.   2.  Cardiomegaly   3.  Atherosclerosis      CT-CHEST (THORAX) W/O    (Results Pending)   [id number] VIEW    (Results Pending)   CT-HEAD W/O    (Results Pending)   MR-MRA HEAD-W/O    (Results Pending)   MR-BRAIN-W/O    (Results Pending)   MR-CERVICAL SPINE-W/O    (Results Pending)   DX-LUMBAR PUNCTURE FOR DIAGNOSIS    (Results Pending)        Assessment/Plan  * Acute respiratory failure with hypoxia, RAD, PNA ([facility])  Assessment & Plan  Due to PNA  May have RAD  Improved. Ordered ambulate with oximetry. SNF vs rehab  11/24. More short of breath. ABG unremarkable but then he continued to have increased work of breathing and mentation changes.  Ordered repeat ABG, CXR, CT Chest  COnsulted Critical Care  Transfer to ICU    Acute renal failure (ARF) ([facility])  Assessment &  "Plan  \"Patient will be given fluid resuscitation with acute renal failure.  Continue to monitor renal functions and get a renal ultrasound.  This may be secondary to urinary obstruction as the patient initially had no output through his Steen catheter.  The Steen catheter has been replaced and at this point he put out 1.5 L of urine.  The patient will be monitored strictly on his intake and output.\"  No renal US ordered, so I ordered a CT renal instead  Trend Cr-  While better than admission, on 11/24, patient confused, possibly septic. No history of CKD that can be obtained. Nephrology consulted.     Altered mental status  Assessment & Plan  Altered mental status may be a combination of the infections versus underlying dementia we will at this point continue to monitor his mental status.  Resolved.  Cognitive deficits. May be his baseline.  On 11/24 he became more agiated and confused. He also has respiratory difficulty    UTI (urinary tract infection)  Assessment & Plan  Patient's urinary tract infection at this point is most likely secondary to a plugged Steen catheter.  This has now been replaced continue with IV Rocephin and follow cultures.  Ordered CT renal  That showed no hydronephrosis but did show RUQ dilated loops (see above)    CAP (community acquired pneumonia)  Assessment & Plan  Patient at this point is too confused to tell me what happened.  Apparently the patient comes in because of respiratory insufficiency and is found to have at this point on imaging studies bilateral lower lobe infiltrates.  I reviewed the chest x-ray myself.  Patient also has leukocytosis.  At this point patient will be admitted for IV antibiotics with Rocephin and azithromycin.  He will be placed on oxygen and RT protocol.  We will get sputum and blood cultures prior to initiation of antibiotics.  COntinue antibiotics, resp and O2 per protocol  On 11/24 resp distress. On Zosyn, micafungin      Leukocytosis  Assessment & " Plan  Secondary to the current pneumonia and urinary tract infection continue to follow white blood cell count    Toxic metabolic encephalopathy  Assessment & Plan  Multifactorial.  Also has fever.   Unable to do bedside LP because of agitation  Spoke with Radiology they also mentioned they can;t do image guided LP  Head CT  Neurology ordered MRI/MRA    Gram-negative bacteremia  Assessment & Plan  Noted on blood cultures 11/21  ID consult in the AM  COntinue Rocephin    Reactive airway disease with wheezing with acute exacerbation  Assessment & Plan  Mild  Resp and O2 per protocol    Hypocalcemia  Assessment & Plan  Give calcium supplementation although corrected calcium at this point is within normal limits.    Normocytic normochromic anemia  Assessment & Plan  Monitor H&H if drops below 7 or 21 transfuse.  In the meantime check an iron panel.       VTE prophylaxis: per Dr. Treviño's note heparin SQ was intended so I ordered thatr  Reviewed vitals, labs, imaging, staff notes.  Discussed assessment and plan with Randy Bates   Discussed with RN    I spent total of 35 minutes reviewing  the chart, notes, vitals, labs, imaging, ordering labs, evaluating Randy Bates for assessment, enacting the plan above. Medical decision making is therefore complex. Time was devoted to counseling and coordinating care including review of records, pertinent lab data and studies, as well as discussing diagnostic evaluation and work up, planned therapeutic interventions and future disposition of care. Where indicated, the assessment and plan reflect discussion of patient with consultants, other healthcare providers, family members, and additional research needed to obtain further information in formulating the plan of care for Randy Bates.    I spent 120 minutes of non face to face time performing additional research, reviewing medical records, discussing plan of care with other healthcare providers. Start time: 0830 End time:  1030  Addressed rapid response and ordered a plethora of tests and labs: Ativan PRN for agiattion, CXR, CT Chest, CT head, MRI and MRA, LP and LP tests, STAT labs, advanced antibiotics  Consulted and spoke with   Dr. Perez Pulmo/CC  Dr. Patel, ID  Dr. Lopes, Ophthalmology  Dr. Lockwood, Nephrology  Dr. Harrison, Neurology  Dr. Doan, Surgery

## 2019-11-24 NOTE — CONSULTS
Neurology Interval Note    The patient's chart has been reviewed. Case discussed with Dr. Erich Alba.  Neurological question posed as it pertains to the patient's paresthesias in the distal extremities.  I was unable to evaluate the patient at the bedside as a rapid response was called for respiratory distress and patient was upgraded to the ICU.    Preliminary neurological recommendations:  -MRI brain and MRA head to assess for central pathology  -Serum studies ordered  -May require lumbar puncture for diagnostic work-up  -Pending consultation by infectious diseases, nephrology, and pulmonary    As the work-up for paresthesias is usually done in an outpatient setting, I will follow the serum protein electrophoresis with reflex to immunofixation, hemoglobin A1c, heavy metals, TSH, angiotensin-converting enzyme, vitamin B12, vitamin B1 and B6, HIV and HSV. Please formally consult when the patient's clinical status is appropriate, if there are revealing neuroimaging findings, or if there is a specific neurological question that needs addressing.    Lawrence Harrison MD  Director, [facility], [facility]1983  Neurohospitalist, [facility]  Clinical  of Neurology, [employer]  t) [phone] (f) [phone]

## 2019-11-24 NOTE — ASSESSMENT & PLAN NOTE
Continue cefepime and vancomycin  Follow-up on final culture results  Bronchoscopy if patient requires intubation

## 2019-11-24 NOTE — CONSULTS
DATE OF SERVICE:  11/24/2019    NEPHROLOGY CONSULTATION    REASON FOR CONSULTATION:  This is a nephrology consultation for evaluation and   management of acute kidney injury on chronic kidney disease.    REQUESTING PHYSICIAN:  From [facility].    HISTORY OF PRESENT ILLNESS:  The patient is an 79-year-old gentleman with   history of dementia, hypertension, diabetes, benign prostatic hypertrophy, and   gastroesophageal reflux disease who presented to [facility] on 11/21 with altered mental status.  There was concern at the time for   acute on chronic renal failure, unknown what his baseline serum creatinine   is, but when he initially presented to [facility], his serum creatinine was 2.54   and over the course of his stay, it came down to 1.84 today.  Nephrology was   asked to see him to evaluate his renal failure.  He is unable to give me a   full history given the fact that he is demented and currently tachypneic, in   respiratory distress.  ICU team is in the process of bringing him over to the   ICU for further evaluation and emergent treatment.  His renal failure was   presumed to be secondary to an obstructed Steen.  He had urinary retention of   about 1500 mL  Urine culture was also showing evidence of gram-negative rods   and blood cultures showing gram-negative rods.    ALLERGIES:  No known drug allergies noted.    REVIEW OF SYSTEMS:  Unable to fully assess given his overall critical medical   condition.    PAST MEDICAL HISTORY:  As mentioned above, diabetes, hypertension, BPH,   gastroesophageal reflux disease, and chronic kidney disease, unknown what his   baseline serum creatinine is.    FAMILY HISTORY:  Unremarkable for kidney disease.    SOCIAL HISTORY:  There is no evidence of smoking, drinking, or illicit drug   use.    REVIEW OF SYSTEMS:  As mentioned, unable to fully assess.    CURRENT MEDICATIONS:  Include aspirin, Maxipime, Lotrimin, Pepcid, Zocor,   Januvia,  and Flomax.    PHYSICAL EXAMINATION:  VITAL SIGNS:  Revealing a blood pressure 145/95 with a pulse of 60,   respiratory rate is 28, temperature 38.4.  GENERAL:  He is lying in bed, in respiratory distress, on 100% oxygen with   critical care team at bedside as well.  HEENT:  Normocephalic, atraumatic.  NECK:  Supple, no JVD.  CHEST:  With crackles at the base bilaterally.  CARDIOVASCULAR:  S1, S2 heard.  ABDOMEN:  Soft, nondistended.  Bowel sounds are present.  EXTREMITIES:  Without any evidence of cyanosis or clubbing.  He has bilateral   lower extremity edema.  SKIN:  With no evidence of rash, pruritus, or wounds.  NEUROLOGIC:  Nonfocal.    LABORATORY DATA:  White count of 10.8, hemoglobin 8.9, and platelet count of   172.  Sodium 134, potassium 4.4, chloride 107, bicarbonate 22, BUN and   creatinine 31/1.84, calcium 8.3, and albumin of 3.1.    ASSESSMENT:  1.  His acute kidney injury, on presentation, has improved somewhat, but we do   not know what his underlying baseline serum creatinine is as there is no   hospital record of this.  I would discontinue his IV fluids at this point   given the fact that he is in respiratory distress.  He has echocardiogram   showing normal left ventricular chamber size, mild concentric left ventricular   hypertrophy, and preserved left ventricular systolic function.  His current   distress is very likely secondary to sepsis/pneumonia and not congestive heart   failure, very likely.  2.  Chronic kidney disease, unknown baseline levels.  3.  Acidosis.  4.  Anemia.  5.  Respiratory distress.  6.  Community-acquired pneumonia.  7.  Sepsis.  8.  Altered mental status.  9.  Urinary tract infection.  10.  Gram-negative bacteremia.  11.  Hypocalcemia.    PLAN:  There is no urgent need for dialysis at this time.  As mentioned above,   stop the IV fluids.  He needs to go to the intensive care unit, should be on   IV antibiotics with renal dosing.  Avoid nephrotoxins.    Thank you for  allowing us to care for the patient.  We will closely follow him   with you.    Total critical care time over 65 minutes.       ____________________________________     MD CARLOS WOLF / TIM    DD:  11/24/2019 11:09:29  DT:  11/24/2019 11:32:59    D#:  [id number]  Job#:  [id number]

## 2019-11-24 NOTE — ASSESSMENT & PLAN NOTE
Secondary to sepsis, ATN - Creatinine is stable.   Target goal UOP >50cc/hr  Avoid nephrotoxins  Monitor with ongoing sepsis management, maintain perfusion  Monitor electrolytes, urine output, creatinine closely

## 2019-11-24 NOTE — ASSESSMENT & PLAN NOTE
Pseudomonas, pansensitive, likely secondary UTI versus pneumonia  Continue cefepime  ID consultation

## 2019-11-24 NOTE — PROGRESS NOTES
[facility] Medicine Daily Progress Note    Date of Service  11/23/2019    Chief Complaint  79 y.o. male admitted 11/21/2019 with Shortness of Breath (RR 30, came from assisted living facility, staff found him AOx2, does not wear O2, placed on 4lpm)        Hospital Course    Presents with Shortness of Breath (RR 30, came from assisted living facility, staff found him AOx2, does not wear O2, placed on 4lpm)  Also altered.  At the ED, mild fever, hemodynamically stable. He however was hypoxic and put on O2.   CXR:  1.  Pulmonary edema and/or infiltrates are identified, which are stable since the prior exam.  2.  Cardiomegaly  3.  Atherosclerosis  Mild leukocytosis. Elevated Cr-. U/A suggestive of UTI.        Interval Problem Update  11/22. Doing better, Still wheezing. No pain.   11/23. Improved but deconditioned.    Consultants/Specialty  COnsult Nephrology if Cr- worsens    Code Status  full    Disposition  TX recommend skilled. Skilled vs rehab    Review of Systems  Review of Systems   Constitutional: Negative for chills and fever.   HENT: Negative for congestion, hearing loss and nosebleeds.    Eyes: Negative for pain and redness.   Respiratory: Positive for shortness of breath. Negative for cough, hemoptysis and wheezing.    Cardiovascular: Negative for chest pain and palpitations.   Gastrointestinal: Negative for abdominal pain, blood in stool, constipation, diarrhea, nausea and vomiting.   Genitourinary: Negative for dysuria, frequency and hematuria.   Musculoskeletal: Negative for falls, joint pain and myalgias.   Skin: Negative for rash.   Neurological: Negative for dizziness, tremors, focal weakness, seizures, loss of consciousness, weakness and headaches.   Psychiatric/Behavioral: The patient is not nervous/anxious and does not have insomnia.    All other systems reviewed and are negative.       Physical Exam  Temp:  [36.1 °C (96.9 °F)-37.3 °C (99.2 °F)] 37.2 °C (99 °F)  Pulse:  [51-66] 66  Resp:  [16-20]  18  BP: (121-152)/(71-79) 144/73  SpO2:  [92 %-97 %] 94 %    Physical Exam  Vitals signs and nursing note reviewed.   HENT:      Head: Normocephalic and atraumatic.      Right Ear: External ear normal.      Left Ear: External ear normal.      Nose: Nose normal.      Mouth/Throat:      Mouth: Mucous membranes are moist.   Eyes:      General: No scleral icterus.     Conjunctiva/sclera: Conjunctivae normal.   Neck:      Musculoskeletal: Normal range of motion and neck supple.   Cardiovascular:      Rate and Rhythm: Normal rate and regular rhythm.      Heart sounds: No murmur. No friction rub. No gallop.    Pulmonary:      Breath sounds: Wheezing and rhonchi present.      Comments: COugh  Abdominal:      General: Abdomen is flat. Bowel sounds are normal. There is no distension.      Palpations: Abdomen is soft.      Tenderness: There is no tenderness. There is no guarding.   Musculoskeletal: Normal range of motion.      Right lower leg: Edema (Trace) present.      Left lower leg: Edema (Trace) present.   Skin:     General: Skin is warm.   Neurological:      Mental Status: He is alert and oriented to person, place, and time. Mental status is at baseline.   Psychiatric:         Mood and Affect: Mood normal.         Behavior: Behavior normal.         Thought Content: Thought content normal.         Judgment: Judgment normal.         Fluids    Intake/Output Summary (Last 24 hours) at 11/23/2019 2044  Last data filed at 11/23/2019 1800  Gross per 24 hour   Intake 2296 ml   Output 2250 ml   Net 46 ml       Laboratory  Recent Labs     11/21/19  0441 11/22/19  0159 11/23/19  1217   WBC 12.3* 17.4* 11.2*   RBC 3.32* 3.33* 3.38*   HEMOGLOBIN 10.1* 10.2* 10.3*   HEMATOCRIT 31.8* 31.8* 33.3*   MCV 95.8 95.5 98.5*   MCH 30.4 30.6 30.5   MCHC 31.8* 32.1* 30.9*   RDW 54.1* 54.6* 55.2*   PLATELETCT 209 176 196   MPV 9.0 9.1 9.5     Recent Labs     11/21/19  0441 11/22/19  0159 11/23/19  1217   SODIUM 140 138 139   POTASSIUM 4.9 5.3 4.6  "  CHLORIDE 106 105 104   CO2 24 22 24   GLUCOSE 103* 110* 132*   BUN 35* 33* 35*   CREATININE 2.54* 2.29* 1.97*   CALCIUM 8.1* 8.0* 8.7                   Imaging  EC-ECHOCARDIOGRAM COMPLETE W/O CONT   Final Result      CT-RENAL COLIC EVALUATION(A/P W/O)   Final Result      1.  Dilated fluid-filled small bowel loops in RIGHT lower quadrant concerning for developing obstruction.   2.  No evidence of bowel perforation.   3.  Colonic diverticulosis without evidence for diverticulitis.   4.  No renal stone or hydronephrosis.   5.  Appendix is not visualized.   6.  Enlarged prostate.   7.  Small bilateral pleural effusions with associated atelectasis.   8.  Colonic diverticulosis without evidence for diverticulitis.      DX-CHEST-PORTABLE (1 VIEW)   Final Result         1.  Pulmonary edema and/or infiltrates are identified, which are stable since the prior exam.   2.  Cardiomegaly   3.  Atherosclerosis           Assessment/Plan  * Acute respiratory failure with hypoxia, RAD, PNA ([facility])  Assessment & Plan  Due to PNA  May have RAD  Improved. Ordered ambulate with oximetry. SNF vs rehab    Acute renal failure (ARF) ([facility])  Assessment & Plan  \"Patient will be given fluid resuscitation with acute renal failure.  Continue to monitor renal functions and get a renal ultrasound.  This may be secondary to urinary obstruction as the patient initially had no output through his Steen catheter.  The Steen catheter has been replaced and at this point he put out 1.5 L of urine.  The patient will be monitored strictly on his intake and output.\"  No renal US ordered, so I ordered a CT renal instead  Trend Cr-    Altered mental status  Assessment & Plan  Altered mental status may be a combination of the infections versus underlying dementia we will at this point continue to monitor his mental status.  Resolved.  Cognitive deficits. May be his baseline.    UTI (urinary tract infection)  Assessment & Plan  Patient's urinary tract infection at " this point is most likely secondary to a plugged Steen catheter.  This has now been replaced continue with IV Rocephin and follow cultures.  Ordered CT renal    CAP (community acquired pneumonia)  Assessment & Plan  Patient at this point is too confused to tell me what happened.  Apparently the patient comes in because of respiratory insufficiency and is found to have at this point on imaging studies bilateral lower lobe infiltrates.  I reviewed the chest x-ray myself.  Patient also has leukocytosis.  At this point patient will be admitted for IV antibiotics with Rocephin and azithromycin.  He will be placed on oxygen and RT protocol.  We will get sputum and blood cultures prior to initiation of antibiotics.  COntinue antibiotics, resp and O2 per protocol      Leukocytosis  Assessment & Plan  Secondary to the current pneumonia and urinary tract infection continue to follow white blood cell count    Gram-negative bacteremia  Assessment & Plan  Noted on blood cultures 11/21  ID consult in the AM  COntinue Rocephin    Reactive airway disease with wheezing with acute exacerbation  Assessment & Plan  Mild  Resp and O2 per protocol    Hypocalcemia  Assessment & Plan  Give calcium supplementation although corrected calcium at this point is within normal limits.    Normocytic normochromic anemia  Assessment & Plan  Monitor H&H if drops below 7 or 21 transfuse.  In the meantime check an iron panel.       VTE prophylaxis: per Dr. Treviño's note heparin SQ was intended so I ordered thatr  Reviewed vitals, labs, imaging, staff notes.  Discussed assessment and plan with Randy Bates   Discussed with LYUDMILA

## 2019-11-24 NOTE — ASSESSMENT & PLAN NOTE
With pansensitive Pseudomonas  Continue antibiotic, antifungal  ID following  Sawyer has been exchanged at admission. Pt is chronic sawyer indwelling.

## 2019-11-24 NOTE — CONSULTS
Critical Care Consultation    Date of consult: 11/24/2019    Referring Physician  Erich Alba M.D.    Reason for Consultation  Acute hypoxic respiratory failure, AMS    History of Presenting Illness  79 y.o. male who presented 11/21/2019 with a past medical history significant for hypertension, chronic healing Steen catheter and type 2 diabetes who was transferred from [facility] on November 21 for 1 day of fever, tachypnea, altered mental status and cough.  He was found to be mildly hypoxic and wheezing and responded to a DuoNeb.  He was admitted to the hospital and began treatment for pneumonia as well as urinary tract infection and was found to be bacteremic with Pseudomonas which is the same bacteria in his urine culture.  Infectious disease was consulted and agreed with his ongoing antibiotic therapy.  He remained altered and this morning had an increase in his oxygen requirement and work of breathing and a rapid response was called and he was transferred to the intensive care unit where I was consulted for his critical care management.  He has been seen by neurology as well although I do not yet see a note from them who ordered a stroke work-up.  Patient is unable to provide any additional history at this time given his current clinical condition.    Code Status  Full Code    Review of Systems  Review of Systems   Unable to perform ROS: Mental status change     Past Medical History   has no past medical history on file. -Hypertension, chronic indwelling Steen catheter, pyelonephritis, chronic kidney disease, type C diabetes, DVT, hyperlipidemia, osteoarthritis    Surgical History   has no past surgical history on file. -Unknown    Family History  family history is not on file. -Hypertension    Social History   reports that he has never smoked. He has never used smokeless tobacco.    Medications  Home Medications     Reviewed by Shelbie Treviño M.D. (Physician) on 11/21/19 at 0748  Med  List Status: Complete   Medication Last Dose Status   acetaminophen (TYLENOL) 500 MG Tab PRN Active   amLODIPine (NORVASC) 10 MG Tab 11/20/2019 Active   aspirin EC (ECOTRIN) 81 MG Tablet Delayed Response 11/20/2019 Active   clotrimazole (LOTRIMIN) 1 % Cream 11/20/2019 Active   docusate sodium (COLACE) 100 MG Cap 11/20/2019 Active   famotidine (PEPCID) 20 MG Tab 11/20/2019 Active   insulin regular (HUMULIN R) 100 Unit/mL Solution 11/20/2019 Active   latanoprost (XALATAN) 0.005 % Solution 11/20/2019 Active   ondansetron (ZOFRAN ODT) 4 MG TABLET DISPERSIBLE PRN Active   polyethylene glycol/lytes (MIRALAX) Pack 11/20/2019 Active   simvastatin (ZOCOR) 10 MG Tab 11/20/2019 Active   SITagliptin (JANUVIA) 50 MG Tab 11/20/2019 Active   tamsulosin (FLOMAX) 0.4 MG capsule 11/20/2019 Active              Current Facility-Administered Medications   Medication Dose Route Frequency Provider Last Rate Last Dose   • ipratropium-albuterol (DUONEB) nebulizer solution  3 mL Nebulization 4X/DAY (RT) Erich Alba M.D.   3 mL at 11/24/19 1028   • lactated ringers infusion (BOLUS)  500 mL Intravenous Once PRN Erich Alba M.D.       • lactobacillus rhamnosus (CULTURELLE) capsule 1 Cap  1 Cap Oral QDAY with Breakfast Erich Alba M.D.   1 Cap at 11/24/19 0912   • Pharmacy consult request - Allow for permissive hypertension: SBP up to 220 mmHg/DBP up to 120 mmHg x 48 hours   Other PHARMACY TO DOSE Erich Alba M.D.       • [START ON 11/25/2019] aspirin (ASA) tablet 325 mg  325 mg Oral DAILY Erich Alba M.D.        Or   • [START ON 11/25/2019] aspirin (ASA) chewable tab 324 mg  324 mg Oral DAILY Erich Alba M.D.        Or   • [START ON 11/25/2019] aspirin (ASA) suppository 300 mg  300 mg Rectal DAILY Erich Alba M.D.       • LORazepam (ATIVAN) injection 0.5 mg  0.5 mg Intravenous Q4HRS PRN Erich Alba M.D.   0.5 mg at 11/24/19 0912   • cefepime (MAXIPIME) 2 g in  mL IVPB  2 g Intravenous Q12HRS  Flor Patel M.D.       • MD Alert...Vancomycin per Pharmacy   Other PHARMACY TO DOSE Erich Alba M.D.       • [START ON 11/25/2019] famotidine (PEPCID) injection 20 mg  20 mg Intravenous DAILY Erich Alba M.D.       • enalaprilat (VASOTEC) injection 1.25 mg  1.25 mg Intravenous Q6HRS PRN Erich Alba M.D.       • aspirin (ASA) chewable tab 243 mg  243 mg Oral Once Erich Alab M.D.       • vancomycin (VANCOCIN) 2,300 mg in  mL IVPB  25 mg/kg Intravenous Once Erich Alba M.D. 166.7 mL/hr at 11/24/19 1007 2,300 mg at 11/24/19 1007   • heparin injection 5,000 Units  5,000 Units Subcutaneous Q8HRS Erich Alba M.D.       • albuterol (PROVENTIL) 2.5mg/0.5ml nebulizer solution 2.5 mg  2.5 mg Nebulization Q4H PRN (RT) Erich Alba M.D.   2.5 mg at 11/24/19 0751   • SITagliptin (JANUVIA) tablet 50 mg  50 mg Oral DAILY Erich Alba M.D.   50 mg at 11/24/19 0543   • micafungin (MYCAMINE) 100 mg in D5W 100 mL ivpb  100 mg Intravenous Q24HRS Flor Patel M.D.   Stopped at 11/24/19 0800   • clotrimazole (LOTRIMIN) 1 % cream 1 Application  1 Application Topical BID Shelbie Treviño M.D.   1 Application at 11/24/19 0543   • latanoprost (XALATAN) 0.005 % ophthalmic solution 1 Drop  1 Drop Both Eyes Nightly Shelbie Treviño M.D.   1 Drop at 11/23/19 2144   • polyethylene glycol/lytes (MIRALAX) PACKET 1 Packet  1 Packet Oral DAILY Shelbie Treviño M.D.   1 Packet at 11/24/19 0540   • simvastatin (ZOCOR) tablet 10 mg  10 mg Oral Nightly Shelbie Treviño M.D.   10 mg at 11/22/19 2110   • tamsulosin (FLOMAX) capsule 0.4 mg  0.4 mg Oral DAILY Shelbie Treviño M.D.   0.4 mg at 11/24/19 0543   • acetaminophen (TYLENOL) tablet 650 mg  650 mg Oral Q6HRS PRN Shelbie Treviño M.D.   650 mg at 11/24/19 0802   • ondansetron (ZOFRAN) syringe/vial injection 4 mg  4 mg Intravenous Q4HRS PRN Shelbie Treviño M.D.       • ondansetron (ZOFRAN ODT) dispertab 4 mg  4 mg Oral Q4HRS PRN Shelbie Treviño M.D.        • senna-docusate (PERICOLACE or SENOKOT S) 8.6-50 MG per tablet 2 Tab  2 Tab Oral BID Shelbie Treviño M.D.   2 Tab at 11/24/19 0542    And   • polyethylene glycol/lytes (MIRALAX) PACKET 1 Packet  1 Packet Oral QDAY PRN Shelbie Treviño M.D.   1 Packet at 11/23/19 1717    And   • magnesium hydroxide (MILK OF MAGNESIA) suspension 30 mL  30 mL Oral QDAY PRN Shelbie Treviño M.D.        And   • bisacodyl (DULCOLAX) suppository 10 mg  10 mg Rectal QDAY PRN Shelbie Treviño M.D.       • Respiratory Care per Protocol   Nebulization Continuous RT Shelbie Treviño M.D.       • fentaNYL (SUBLIMAZE) injection 25-50 mcg  25-50 mcg Intravenous Q HOUR PRN Shelbie Treviño M.D.   50 mcg at 11/22/19 0216   • Influenza Vaccine High-Dose pf injection 0.5 mL  0.5 mL Intramuscular Once PRN Kuldeep Art R.N.           Allergies  No Known Allergies    Vital Signs last 24 hours  Temp:  [36.1 °C (96.9 °F)-38.4 °C (101.1 °F)] 38.4 °C (101.1 °F)  Pulse:  [55-66] 60  Resp:  [16-34] 32  BP: (133-148)/(67-95) 145/95  SpO2:  [93 %-98 %] 96 %    Physical Exam  Physical Exam  Vitals signs and nursing note reviewed.   Constitutional:       General: He is in acute distress.      Appearance: Normal appearance. He is normal weight. He is ill-appearing. He is not diaphoretic.   HENT:      Head: Normocephalic and atraumatic.      Right Ear: External ear normal.      Left Ear: External ear normal.      Nose: Nose normal. No congestion.      Mouth/Throat:      Mouth: Mucous membranes are dry.      Pharynx: No oropharyngeal exudate.      Comments: Oxygen facemask in place  Eyes:      General: No scleral icterus.     Conjunctiva/sclera: Conjunctivae normal.      Pupils: Pupils are equal, round, and reactive to light.   Neck:      Musculoskeletal: Neck supple. No neck rigidity.      Comments: No meningismus  Cardiovascular:      Rate and Rhythm: Regular rhythm. Bradycardia present. Frequent extrasystoles are present.     Chest Wall: PMI is displaced.       Pulses: No decreased pulses.      Heart sounds: Heart sounds are distant. No murmur.   Pulmonary:      Effort: Tachypnea and respiratory distress present. No accessory muscle usage or retractions.      Breath sounds: Examination of the right-lower field reveals rhonchi and rales. Examination of the left-lower field reveals rhonchi and rales. Rhonchi and rales present. No decreased breath sounds or wheezing.      Comments: Able to speak in full sentences, strong cough, gag intact  Abdominal:      General: Bowel sounds are normal. There is no distension.      Palpations: Abdomen is soft.      Tenderness: There is no tenderness. There is no guarding.   Genitourinary:     Comments: Genital edema, Steen catheter in place  Musculoskeletal:         General: Swelling present. No tenderness.      Right lower leg: Edema present.      Left lower leg: Edema present.   Skin:     General: Skin is warm and dry.      Capillary Refill: Capillary refill takes less than 2 seconds.      Findings: No rash.   Neurological:      General: No focal deficit present.      Mental Status: He is alert. He is disoriented.      Cranial Nerves: No cranial nerve deficit.      Comments: Moves all extremities, follows commands, oriented to person only, talking to himself   Psychiatric:      Comments: Unable to assess given current clinical condition         Fluids    Intake/Output Summary (Last 24 hours) at 11/24/2019 1108  Last data filed at 11/24/2019 0531  Gross per 24 hour   Intake 1816 ml   Output 1650 ml   Net 166 ml       Laboratory  Recent Results (from the past 48 hour(s))   CBC WITHOUT DIFFERENTIAL    Collection Time: 11/23/19 12:17 PM   Result Value Ref Range    WBC 11.2 (H) 4.8 - 10.8 K/uL    RBC 3.38 (L) 4.70 - 6.10 M/uL    Hemoglobin 10.3 (L) 14.0 - 18.0 g/dL    Hematocrit 33.3 (L) 42.0 - 52.0 %    MCV 98.5 (H) 81.4 - 97.8 fL    MCH 30.5 27.0 - 33.0 pg    MCHC 30.9 (L) 33.7 - 35.3 g/dL    RDW 55.2 (H) 35.9 - 50.0 fL    Platelet Count 196  164 - 446 K/uL    MPV 9.5 9.0 - 12.9 fL   Procalcitonin    Collection Time: 11/23/19 12:17 PM   Result Value Ref Range    Procalcitonin 1.82 (H) <0.25 ng/mL   Basic Metabolic Panel    Collection Time: 11/23/19 12:17 PM   Result Value Ref Range    Sodium 139 135 - 145 mmol/L    Potassium 4.6 3.6 - 5.5 mmol/L    Chloride 104 96 - 112 mmol/L    Co2 24 20 - 33 mmol/L    Glucose 132 (H) 65 - 99 mg/dL    Bun 35 (H) 8 - 22 mg/dL    Creatinine 1.97 (H) 0.50 - 1.40 mg/dL    Calcium 8.7 8.5 - 10.5 mg/dL    Anion Gap 11.0 0.0 - 11.9   ESTIMATED GFR    Collection Time: 11/23/19 12:17 PM   Result Value Ref Range    GFR If  40 (A) >60 mL/min/1.73 m 2    GFR If Non  33 (A) >60 mL/min/1.73 m 2   EC-ECHOCARDIOGRAM COMPLETE W/O CONT    Collection Time: 11/23/19  3:41 PM   Result Value Ref Range    Eject.Frac. MOD 2C 67.05    CBC WITHOUT DIFFERENTIAL    Collection Time: 11/24/19  2:55 AM   Result Value Ref Range    WBC 11.8 (H) 4.8 - 10.8 K/uL    RBC 3.08 (L) 4.70 - 6.10 M/uL    Hemoglobin 9.3 (L) 14.0 - 18.0 g/dL    Hematocrit 29.3 (L) 42.0 - 52.0 %    MCV 95.1 81.4 - 97.8 fL    MCH 30.2 27.0 - 33.0 pg    MCHC 31.7 (L) 33.7 - 35.3 g/dL    RDW 52.8 (H) 35.9 - 50.0 fL    Platelet Count 172 164 - 446 K/uL    MPV 9.5 9.0 - 12.9 fL   Basic Metabolic Panel    Collection Time: 11/24/19  2:55 AM   Result Value Ref Range    Sodium 136 135 - 145 mmol/L    Potassium 4.5 3.6 - 5.5 mmol/L    Chloride 105 96 - 112 mmol/L    Co2 24 20 - 33 mmol/L    Glucose 94 65 - 99 mg/dL    Bun 34 (H) 8 - 22 mg/dL    Creatinine 1.95 (H) 0.50 - 1.40 mg/dL    Calcium 8.0 (L) 8.5 - 10.5 mg/dL    Anion Gap 7.0 0.0 - 11.9   ESTIMATED GFR    Collection Time: 11/24/19  2:55 AM   Result Value Ref Range    GFR If  40 (A) >60 mL/min/1.73 m 2    GFR If Non  33 (A) >60 mL/min/1.73 m 2   LACTIC ACID    Collection Time: 11/24/19  8:27 AM   Result Value Ref Range    Lactic Acid 1.3 0.5 - 2.0 mmol/L   AMMONIA     Collection Time: 11/24/19  8:27 AM   Result Value Ref Range    Ammonia 24 11 - 45 umol/L   Comp Metabolic Panel    Collection Time: 11/24/19  8:27 AM   Result Value Ref Range    Sodium 134 (L) 135 - 145 mmol/L    Potassium 4.4 3.6 - 5.5 mmol/L    Chloride 107 96 - 112 mmol/L    Co2 22 20 - 33 mmol/L    Anion Gap 5.0 0.0 - 11.9    Glucose 114 (H) 65 - 99 mg/dL    Bun 31 (H) 8 - 22 mg/dL    Creatinine 1.84 (H) 0.50 - 1.40 mg/dL    Calcium 8.3 (L) 8.5 - 10.5 mg/dL    AST(SGOT) 32 12 - 45 U/L    ALT(SGPT) 9 2 - 50 U/L    Alkaline Phosphatase 43 30 - 99 U/L    Total Bilirubin 0.7 0.1 - 1.5 mg/dL    Albumin 3.1 (L) 3.2 - 4.9 g/dL    Total Protein 5.8 (L) 6.0 - 8.2 g/dL    Globulin 2.7 1.9 - 3.5 g/dL    A-G Ratio 1.1 g/dL   LIPASE    Collection Time: 11/24/19  8:27 AM   Result Value Ref Range    Lipase 13 11 - 82 U/L   ESTIMATED GFR    Collection Time: 11/24/19  8:27 AM   Result Value Ref Range    GFR If  43 (A) >60 mL/min/1.73 m 2    GFR If Non African American 35 (A) >60 mL/min/1.73 m 2   ABG - LAB    Collection Time: 11/24/19  8:47 AM   Result Value Ref Range    Ph 7.44 7.40 - 7.50    Pco2 34.3 26.0 - 37.0 mmHg    Po2 59.7 (L) 64.0 - 87.0 mmHg    O2 Saturation 90.3 (L) 93.0 - 99.0 %    Hco3 23 17 - 25 mmol/L    Base Excess -1 -4 - 3 mmol/L    Body Temp 38.6 Centigrade    O2 Therapy 2.0 2.0 - 10.0 L/min   Prothrombin time (INR)    Collection Time: 11/24/19  9:00 AM   Result Value Ref Range    PT 18.5 (H) 12.0 - 14.6 sec    INR 1.50 (H) 0.87 - 1.13   CBC WITH DIFFERENTIAL    Collection Time: 11/24/19  9:00 AM   Result Value Ref Range    WBC 10.8 4.8 - 10.8 K/uL    RBC 2.97 (L) 4.70 - 6.10 M/uL    Hemoglobin 8.9 (L) 14.0 - 18.0 g/dL    Hematocrit 28.3 (L) 42.0 - 52.0 %    MCV 95.3 81.4 - 97.8 fL    MCH 30.0 27.0 - 33.0 pg    MCHC 31.4 (L) 33.7 - 35.3 g/dL    RDW 52.7 (H) 35.9 - 50.0 fL    Platelet Count 172 164 - 446 K/uL    MPV 9.2 9.0 - 12.9 fL    Neutrophils-Polys 88.00 (H) 44.00 - 72.00 %    Lymphocytes  4.60 (L) 22.00 - 41.00 %    Monocytes 6.20 0.00 - 13.40 %    Eosinophils 0.20 0.00 - 6.90 %    Basophils 0.30 0.00 - 1.80 %    Immature Granulocytes 0.70 0.00 - 0.90 %    Nucleated RBC 0.00 /100 WBC    Neutrophils (Absolute) 9.47 (H) 1.82 - 7.42 K/uL    Lymphs (Absolute) 0.50 (L) 1.00 - 4.80 K/uL    Monos (Absolute) 0.67 0.00 - 0.85 K/uL    Eos (Absolute) 0.02 0.00 - 0.51 K/uL    Baso (Absolute) 0.03 0.00 - 0.12 K/uL    Immature Granulocytes (abs) 0.07 0.00 - 0.11 K/uL    NRBC (Absolute) 0.00 K/uL       Imaging  DX-CHEST-PORTABLE (1 VIEW)   Final Result      Similar reticular opacities suspicious for edema and there are likely small layering effusions      More focal basilar opacity is more likely from atelectasis than consolidation      EC-ECHOCARDIOGRAM COMPLETE W/O CONT   Final Result      CT-RENAL COLIC EVALUATION(A/P W/O)   Final Result      1.  Dilated fluid-filled small bowel loops in RIGHT lower quadrant concerning for developing obstruction.   2.  No evidence of bowel perforation.   3.  Colonic diverticulosis without evidence for diverticulitis.   4.  No renal stone or hydronephrosis.   5.  Appendix is not visualized.   6.  Enlarged prostate.   7.  Small bilateral pleural effusions with associated atelectasis.   8.  Colonic diverticulosis without evidence for diverticulitis.      DX-CHEST-PORTABLE (1 VIEW)   Final Result         1.  Pulmonary edema and/or infiltrates are identified, which are stable since the prior exam.   2.  Cardiomegaly   3.  Atherosclerosis      CT-CHEST (THORAX) W/O    (Results Pending)   [id number] VIEW    (Results Pending)   CT-HEAD W/O    (Results Pending)   MR-MRA HEAD-W/O    (Results Pending)   MR-BRAIN-W/O    (Results Pending)   MR-CERVICAL SPINE-W/O    (Results Pending)    *Personally reviewed chest x-ray showing enlarged cardiac silhouette with moderate diffuse infiltrates, hiatal hernia, and not significantly changed from admission x-ray.    Assessment/Plan  * Acute  respiratory failure with hypoxia, RAD, PNA ([facility])  Assessment & Plan  Begin patient on high flow nasal cannula titrating flow and FiO2 to keep SaO2 greater than 92%  Low threshold for intubation, not a BiPAP candidate given acute delirium  RT/O2 protocol  Pulmonary toiletry  Diuresis    Acute renal failure (ARF) ([facility])  Assessment & Plan  Secondary to sepsis, ATN -improving  Avoid nephrotoxins  Monitor with ongoing sepsis management, maintain perfusion  Monitor electrolytes, urine output, creatinine closely    Altered mental status  Assessment & Plan  Likely metabolic encephalopathy  CT head without contrast  Neurology consulted  Hold off on LP for now given no focal deficits, no meningismus, obvious source for fever and deterioration and risk of procedure  Limit sedatives, trial PRN Haldol if needed for agitation    UTI (urinary tract infection)  Assessment & Plan  With pansensitive Pseudomonas  Continue antibiotic, antifungal  Infectious disease consultation  Steen catheter exchange if not already done since admission    CAP (community acquired pneumonia)  Assessment & Plan  Continue cefepime and vancomycin  Follow-up on final culture results  Bronchoscopy if patient requires intubation    Leukocytosis  Assessment & Plan  Secondary to infection -improving  Monitor with anti-infective therapy    Gram-negative bacteremia  Assessment & Plan  Pseudomonas, pansensitive, likely secondary UTI versus pneumonia  Continue cefepime  ID consultation    Reactive airway disease with wheezing with acute exacerbation  Assessment & Plan  Continue scheduled and PRN bronchodilators  RT protocol  Hold off on steroids for now as he does not appear bronchospastic on my exam    Hypocalcemia  Assessment & Plan  Repletion and monitor closely    Normocytic normochromic anemia  Assessment & Plan  Daily CBC with conservative transfusion strategy      Discussed patient condition and risk of morbidity and/or mortality with Hospitalist, RN, RT,  Pharmacy, Charge nurse / hot rounds, Patient and infectious disease.    The patient remains critically ill.  Critical care time = 34 minutes in directly providing and coordinating critical care and extensive data review.  No time overlap and excludes procedures.

## 2019-11-24 NOTE — PROGRESS NOTES
Daughter Carrie updated on move to ICU T622  She reports that she will call and check up on him in a little while

## 2019-11-24 NOTE — RESPIRATORY CARE
COPD EDUCATION by COPD CLINICAL EDUCATOR  11/24/2019 at 6:29 AM by Lauren Jenkins     Patient reviewed by COPD education team. Patient does not have a history or diagnosis of COPD and is a non-smoker, therefore does not qualify for the COPD program.

## 2019-11-24 NOTE — CARE PLAN
Respiratory Update    Treatment modality: IS  Frequency: QID  IS value 1000    Pt tolerating current treatments well with no adverse reactions.

## 2019-11-25 ENCOUNTER — APPOINTMENT (OUTPATIENT)
Dept: RADIOLOGY | Facility: MEDICAL CENTER | Age: 79
DRG: 698 | End: 2019-11-25
Attending: SURGERY
Payer: MEDICARE

## 2019-11-25 ENCOUNTER — APPOINTMENT (OUTPATIENT)
Dept: RADIOLOGY | Facility: MEDICAL CENTER | Age: 79
DRG: 698 | End: 2019-11-25
Attending: INTERNAL MEDICINE
Payer: MEDICARE

## 2019-11-25 PROBLEM — B37.7 CANDIDEMIA (HCC): Status: ACTIVE | Noted: 2019-11-25

## 2019-11-25 PROBLEM — B96.5 BACTEREMIA DUE TO PSEUDOMONAS: Status: ACTIVE | Noted: 2019-11-22

## 2019-11-25 LAB
ANION GAP SERPL CALC-SCNC: 9 MMOL/L (ref 0–11.9)
BASOPHILS # BLD AUTO: 0.9 % (ref 0–1.8)
BASOPHILS # BLD: 0.07 K/UL (ref 0–0.12)
BUN SERPL-MCNC: 27 MG/DL (ref 8–22)
CALCIUM SERPL-MCNC: 8.2 MG/DL (ref 8.5–10.5)
CHLORIDE SERPL-SCNC: 107 MMOL/L (ref 96–112)
CHOLEST SERPL-MCNC: 97 MG/DL (ref 100–199)
CO2 SERPL-SCNC: 25 MMOL/L (ref 20–33)
CREAT SERPL-MCNC: 1.83 MG/DL (ref 0.5–1.4)
EOSINOPHIL # BLD AUTO: 0.2 K/UL (ref 0–0.51)
EOSINOPHIL NFR BLD: 2.5 % (ref 0–6.9)
ERYTHROCYTE [DISTWIDTH] IN BLOOD BY AUTOMATED COUNT: 53 FL (ref 35.9–50)
EST. AVERAGE GLUCOSE BLD GHB EST-MCNC: 114 MG/DL
GLUCOSE SERPL-MCNC: 77 MG/DL (ref 65–99)
HBA1C MFR BLD: 5.6 % (ref 0–5.6)
HCT VFR BLD AUTO: 29.7 % (ref 42–52)
HDLC SERPL-MCNC: 38 MG/DL
HGB BLD-MCNC: 9.3 G/DL (ref 14–18)
IMM GRANULOCYTES # BLD AUTO: 0.04 K/UL (ref 0–0.11)
IMM GRANULOCYTES NFR BLD AUTO: 0.5 % (ref 0–0.9)
LDLC SERPL CALC-MCNC: 45 MG/DL
LYMPHOCYTES # BLD AUTO: 0.47 K/UL (ref 1–4.8)
LYMPHOCYTES NFR BLD: 5.9 % (ref 22–41)
MCH RBC QN AUTO: 30.1 PG (ref 27–33)
MCHC RBC AUTO-ENTMCNC: 31.3 G/DL (ref 33.7–35.3)
MCV RBC AUTO: 96.1 FL (ref 81.4–97.8)
MONOCYTES # BLD AUTO: 0.57 K/UL (ref 0–0.85)
MONOCYTES NFR BLD AUTO: 7.1 % (ref 0–13.4)
NEUTROPHILS # BLD AUTO: 6.64 K/UL (ref 1.82–7.42)
NEUTROPHILS NFR BLD: 83.1 % (ref 44–72)
NRBC # BLD AUTO: 0 K/UL
NRBC BLD-RTO: 0 /100 WBC
PLATELET # BLD AUTO: 165 K/UL (ref 164–446)
PMV BLD AUTO: 9.2 FL (ref 9–12.9)
POTASSIUM SERPL-SCNC: 4.4 MMOL/L (ref 3.6–5.5)
PROCALCITONIN SERPL-MCNC: 0.71 NG/ML
RBC # BLD AUTO: 3.09 M/UL (ref 4.7–6.1)
SODIUM SERPL-SCNC: 141 MMOL/L (ref 135–145)
TRIGL SERPL-MCNC: 69 MG/DL (ref 0–149)
WBC # BLD AUTO: 8 K/UL (ref 4.8–10.8)

## 2019-11-25 PROCEDURE — 770022 HCHG ROOM/CARE - ICU (200)

## 2019-11-25 PROCEDURE — 700102 HCHG RX REV CODE 250 W/ 637 OVERRIDE(OP): Performed by: INTERNAL MEDICINE

## 2019-11-25 PROCEDURE — 94640 AIRWAY INHALATION TREATMENT: CPT

## 2019-11-25 PROCEDURE — 700111 HCHG RX REV CODE 636 W/ 250 OVERRIDE (IP): Performed by: INTERNAL MEDICINE

## 2019-11-25 PROCEDURE — 87040 BLOOD CULTURE FOR BACTERIA: CPT

## 2019-11-25 PROCEDURE — 99233 SBSQ HOSP IP/OBS HIGH 50: CPT | Performed by: HOSPITALIST

## 2019-11-25 PROCEDURE — 700102 HCHG RX REV CODE 250 W/ 637 OVERRIDE(OP): Performed by: HOSPITALIST

## 2019-11-25 PROCEDURE — 700101 HCHG RX REV CODE 250: Performed by: INTERNAL MEDICINE

## 2019-11-25 PROCEDURE — 74018 RADEX ABDOMEN 1 VIEW: CPT

## 2019-11-25 PROCEDURE — 80048 BASIC METABOLIC PNL TOTAL CA: CPT

## 2019-11-25 PROCEDURE — 99233 SBSQ HOSP IP/OBS HIGH 50: CPT | Performed by: INTERNAL MEDICINE

## 2019-11-25 PROCEDURE — 700105 HCHG RX REV CODE 258: Performed by: INTERNAL MEDICINE

## 2019-11-25 PROCEDURE — 71045 X-RAY EXAM CHEST 1 VIEW: CPT

## 2019-11-25 PROCEDURE — A9270 NON-COVERED ITEM OR SERVICE: HCPCS | Performed by: INTERNAL MEDICINE

## 2019-11-25 PROCEDURE — 85025 COMPLETE CBC W/AUTO DIFF WBC: CPT

## 2019-11-25 PROCEDURE — A9270 NON-COVERED ITEM OR SERVICE: HCPCS | Performed by: HOSPITALIST

## 2019-11-25 PROCEDURE — 99291 CRITICAL CARE FIRST HOUR: CPT | Performed by: INTERNAL MEDICINE

## 2019-11-25 PROCEDURE — 80061 LIPID PANEL: CPT

## 2019-11-25 PROCEDURE — 84145 PROCALCITONIN (PCT): CPT

## 2019-11-25 RX ORDER — QUETIAPINE FUMARATE 25 MG/1
50 TABLET, FILM COATED ORAL EVERY EVENING
Status: DISCONTINUED | OUTPATIENT
Start: 2019-11-25 | End: 2019-11-30

## 2019-11-25 RX ADMIN — SENNOSIDES AND DOCUSATE SODIUM 2 TABLET: 8.6; 5 TABLET ORAL at 17:10

## 2019-11-25 RX ADMIN — IPRATROPIUM BROMIDE AND ALBUTEROL SULFATE 3 ML: .5; 3 SOLUTION RESPIRATORY (INHALATION) at 15:34

## 2019-11-25 RX ADMIN — PIPERACILLIN AND TAZOBACTAM 4.5 G: 4; .5 INJECTION, POWDER, LYOPHILIZED, FOR SOLUTION INTRAVENOUS; PARENTERAL at 21:59

## 2019-11-25 RX ADMIN — MICAFUNGIN SODIUM 100 MG: 20 INJECTION, POWDER, LYOPHILIZED, FOR SOLUTION INTRAVENOUS at 05:14

## 2019-11-25 RX ADMIN — HEPARIN SODIUM 5000 UNITS: 5000 INJECTION INTRAVENOUS; SUBCUTANEOUS at 14:24

## 2019-11-25 RX ADMIN — HEPARIN SODIUM 5000 UNITS: 5000 INJECTION INTRAVENOUS; SUBCUTANEOUS at 04:48

## 2019-11-25 RX ADMIN — QUETIAPINE FUMARATE 50 MG: 25 TABLET ORAL at 17:10

## 2019-11-25 RX ADMIN — LATANOPROST 1 DROP: 50 SOLUTION OPHTHALMIC at 21:59

## 2019-11-25 RX ADMIN — IPRATROPIUM BROMIDE AND ALBUTEROL SULFATE 3 ML: .5; 3 SOLUTION RESPIRATORY (INHALATION) at 05:21

## 2019-11-25 RX ADMIN — IPRATROPIUM BROMIDE AND ALBUTEROL SULFATE 3 ML: .5; 3 SOLUTION RESPIRATORY (INHALATION) at 18:33

## 2019-11-25 RX ADMIN — CLOTRIMAZOLE 1 APPLICATION: 10 CREAM TOPICAL at 17:10

## 2019-11-25 RX ADMIN — SITAGLIPTIN 50 MG: 50 TABLET, FILM COATED ORAL at 10:46

## 2019-11-25 RX ADMIN — SIMVASTATIN 10 MG: 20 TABLET, FILM COATED ORAL at 21:59

## 2019-11-25 RX ADMIN — Medication 1 CAPSULE: at 10:47

## 2019-11-25 RX ADMIN — HALOPERIDOL LACTATE 3 MG: 5 INJECTION, SOLUTION INTRAMUSCULAR at 15:56

## 2019-11-25 RX ADMIN — IPRATROPIUM BROMIDE AND ALBUTEROL SULFATE 3 ML: .5; 3 SOLUTION RESPIRATORY (INHALATION) at 10:57

## 2019-11-25 RX ADMIN — FAMOTIDINE 20 MG: 10 INJECTION INTRAVENOUS at 04:49

## 2019-11-25 RX ADMIN — PIPERACILLIN AND TAZOBACTAM 4.5 G: 4; .5 INJECTION, POWDER, LYOPHILIZED, FOR SOLUTION INTRAVENOUS; PARENTERAL at 14:24

## 2019-11-25 RX ADMIN — PIPERACILLIN AND TAZOBACTAM 4.5 G: 4; .5 INJECTION, POWDER, LYOPHILIZED, FOR SOLUTION INTRAVENOUS; PARENTERAL at 04:48

## 2019-11-25 RX ADMIN — ASPIRIN 300 MG: 300 SUPPOSITORY RECTAL at 09:35

## 2019-11-25 RX ADMIN — TAMSULOSIN HYDROCHLORIDE 0.4 MG: 0.4 CAPSULE ORAL at 10:46

## 2019-11-25 RX ADMIN — HEPARIN SODIUM 5000 UNITS: 5000 INJECTION INTRAVENOUS; SUBCUTANEOUS at 21:59

## 2019-11-25 RX ADMIN — ACETAMINOPHEN 650 MG: 325 TABLET, FILM COATED ORAL at 14:29

## 2019-11-25 RX ADMIN — CLOTRIMAZOLE 1 APPLICATION: 10 CREAM TOPICAL at 04:48

## 2019-11-25 ASSESSMENT — ENCOUNTER SYMPTOMS
SHORTNESS OF BREATH: 0
FEVER: 0
SHORTNESS OF BREATH: 1
ABDOMINAL PAIN: 0
VOMITING: 0
COUGH: 1
BACK PAIN: 0
CHILLS: 0
DIZZINESS: 0
HEADACHES: 0
COUGH: 0
WEAKNESS: 0
HEMOPTYSIS: 0
PALPITATIONS: 0
DIARRHEA: 0
NERVOUS/ANXIOUS: 0
ORTHOPNEA: 0
NAUSEA: 0
SPUTUM PRODUCTION: 0

## 2019-11-25 NOTE — PROGRESS NOTES
Infectious Disease Progress Note    Author: Julia Sherman M.D. Date & Time of service: 2019  7:10 AM    Chief Complaint:  UTI and Gram-negative sepsis   Candiemia     Interval History:  79-year-old male admitted with altered mental status.    [id number] WBC 10.8 AMS worsened-resp failure and bradycardia-now transferred to ICU. Obtunded     Review of Systems:  Review of Systems   Unable to perform ROS: Mental status change       Hemodynamics:  Temp (24hrs), Av.1 °C (98.8 °F), Min:36.7 °C (98 °F), Max:38.4 °C (101.1 °F)  Temperature: 36.8 °C (98.2 °F)  Pulse  Av.9  Min: 44  Max: 86   Blood Pressure : 138/64       Physical Exam:  Physical Exam  Constitutional:       Appearance: Normal appearance.   HENT:      Head: Normocephalic and atraumatic.   Cardiovascular:      Rate and Rhythm: Normal rate and regular rhythm.      Heart sounds: Normal heart sounds.   Pulmonary:      Effort: Pulmonary effort is normal.      Comments: Crackles   Abdominal:      General: Abdomen is flat. There is no distension.      Palpations: Abdomen is soft.      Tenderness: There is no tenderness. There is no guarding.   Musculoskeletal:         General: No swelling or tenderness.   Skin:     General: Skin is warm and dry.   Neurological:      Comments: Oriented only to self, per nursing moving all extremities         Meds:    Current Facility-Administered Medications:   •  ipratropium-albuterol  •  LR  •  lactobacillus rhamnosus  •  Pharmacy  •  aspirin **OR** aspirin **OR** aspirin  •  famotidine  •  enalaprilat  •  heparin  •  ipratropium-albuterol  •  haloperidol lactate  •  [COMPLETED] piperacillin-tazobactam **AND** piperacillin-tazobactam  •  SITagliptin  •  micafungin  •  clotrimazole  •  latanoprost  •  polyethylene glycol/lytes  •  simvastatin  •  tamsulosin  •  acetaminophen  •  ondansetron  •  ondansetron  •  senna-docusate **AND** polyethylene glycol/lytes **AND** magnesium hydroxide **AND** bisacodyl  •   Respiratory Care per Protocol  •  fentaNYL  •  Influenza Vaccine High-Dose pf    Labs:  Recent Labs     11/24/19 0255 11/24/19  0900 11/25/19  0440   WBC 11.8* 10.8 8.0   RBC 3.08* 2.97* 3.09*   HEMOGLOBIN 9.3* 8.9* 9.3*   HEMATOCRIT 29.3* 28.3* 29.7*   MCV 95.1 95.3 96.1   MCH 30.2 30.0 30.1   RDW 52.8* 52.7* 53.0*   PLATELETCT 172 172 165   MPV 9.5 9.2 9.2   NEUTSPOLYS  --  88.00* 83.10*   LYMPHOCYTES  --  4.60* 5.90*   MONOCYTES  --  6.20 7.10   EOSINOPHILS  --  0.20 2.50   BASOPHILS  --  0.30 0.90     Recent Labs     11/24/19 0255 11/24/19  0827 11/25/19  0400   SODIUM 136 134* 141   POTASSIUM 4.5 4.4 4.4   CHLORIDE 105 107 107   CO2 24 22 25   GLUCOSE 94 114* 77   BUN 34* 31* 27*     Recent Labs     11/24/19 0255 11/24/19  0827 11/25/19  0400   ALBUMIN  --  3.1*  --    TBILIRUBIN  --  0.7  --    ALKPHOSPHAT  --  43  --    TOTPROTEIN  --  5.8*  --    ALTSGPT  --  9  --    ASTSGOT  --  32  --    CREATININE 1.95* 1.84* 1.83*       Imaging:  Ct-renal Colic Evaluation(a/p W/o)    Result Date: 11/22/2019 11/22/2019 6:59 PM HISTORY/REASON FOR EXAM:  abnormal labs Cr- and urine infection r/o hydronephrosis. TECHNIQUE/EXAM DESCRIPTION AND NUMBER OF VIEWS: CT scan renal/colic without contrast. 5 mm helical images of the abdomen and pelvis were obtained from the diaphragmatic domes through the pubic symphysis. Low dose optimization technique was utilized for this CT exam including automated exposure control and adjustment of the mA and/or kV according to patient size. COMPARISON: None. FINDINGS: Visualized lung bases show small bilateral pleural fluid collections with associated dependent atelectasis, worse on the LEFT. The liver is unremarkable. The spleen is unremarkable. Adrenal glands are unremarkable. The kidneys are unremarkable. Pancreas is atrophic. The gallbladder is unremarkable. Steen catheter within mildly distended bladder. Prostate is enlarged. Colonic diverticula noted. Increased small bowel and colonic  gas.  Prominent fluid-filled small bowel loops in the RIGHT lower quadrant. No pneumoperitoneum. Moderate atherosclerotic calcification of abdominal aorta. Lumbar spine degenerative changes. T12 vertebral hemangioma. T-spine wall edema.     1.  Dilated fluid-filled small bowel loops in RIGHT lower quadrant concerning for developing obstruction. 2.  No evidence of bowel perforation. 3.  Colonic diverticulosis without evidence for diverticulitis. 4.  No renal stone or hydronephrosis. 5.  Appendix is not visualized. 6.  Enlarged prostate. 7.  Small bilateral pleural effusions with associated atelectasis. 8.  Colonic diverticulosis without evidence for diverticulitis.    Dx-chest-limited (1 View)    Result Date: 11/25/2019 11/25/2019 2:08 AM HISTORY/REASON FOR EXAM:  Respiratory failure TECHNIQUE/EXAM DESCRIPTION AND NUMBER OF VIEWS: Single portable view of the chest. COMPARISON: 11/24/2019 FINDINGS: LUNGS: Slight interval worsening in opacities in the right mid/lower lung. Unchanged opacity in the left lung base. No significant pleural effusions. PNEUMOTHORAX: None. LINES AND TUBES: None. MEDIASTINUM: Stable cardiac silhouette. MUSCULOSKELETAL STRUCTURES: Unchanged.     1.  Interval worsening of pulmonary opacities in the right mid/lower lung. 2.  Unchanged left basilar atelectasis and/or consolidation. No significant pleural effusions.    Dx-chest-portable (1 View)    Result Date: 11/24/2019 11/24/2019 10:53 AM HISTORY/REASON FOR EXAM: Shortness of Breath. TECHNIQUE/EXAM DESCRIPTION AND NUMBER OF VIEWS: Single AP view of the chest. COMPARISON: 11/21/2019 FINDINGS: Lungs: Large volumes with reticular predominantly increased opacity is similar to comparison. There is also some hazy opacity at the bases especially on the left where there is some hemidiaphragm elevation Pleura:  Small layering effusions are likely Heart and mediastinum: There is stable cardiac silhouette enlargement.     Similar reticular opacities  suspicious for edema and there are likely small layering effusions More focal basilar opacity is more likely from atelectasis than consolidation    Dx-chest-portable (1 View)    Result Date: 2019 4:49 AM HISTORY/REASON FOR EXAM: Possible sepsis. TECHNIQUE/EXAM DESCRIPTION:  Single AP view of the chest. COMPARISON: None FINDINGS: Overlying cardiac leads are present. Cardiomegaly is observed. Atherosclerotic calcification of the aorta is noted.  The central  pulmonary vasculature appears prominent and indistinct. The lungs appear well expanded bilaterally.  Diffuse scattered hazy pulmonary parenchymal opacities are seen. The lung bases are partially excluded. The bony structures appear age-appropriate.     1.  Pulmonary edema and/or infiltrates are identified, which are stable since the prior exam. 2.  Cardiomegaly 3.  Atherosclerosis    Ec-echocardiogram Complete W/o Cont    Result Date: 2019  Transthoracic Echo Report Echocardiography Laboratory CONCLUSIONS No prior study is available for comparison. Technically difficult study. Normal left ventricular chamber size. Grossly preserved left ventricular systolic function. The right ventricle was normal in size and function. Mild aortic stenosis. NIR JOSHI Exam Date:         2019                    13:42 Exam Location:     Inpatient Priority:          Routine Ordering Physician:        KRISTINA GALEANA Referring Physician:       [id number] Sosa Sonographer:               Tessie Hill RDCS Age:    79     Gender:    M MRN:    [id number] :    1940 BSA:    2.27   Ht (in):    79     Wt (lb):    198 Exam Type:     Complete Indications:     Shortness of breath ICD Codes:       R06.02 CPT Codes:       91960 BP:   124    /   55     HR:   55 Technical Quality:       Technically difficult study -                          adequate information is obtained MEASUREMENTS  (Male / Female) Normal Values 2D ECHO LV Diastolic Diameter PLAX         5 cm                  4.2 - 5.9 / 3.9 - 5.3 cm LV Systolic Diameter PLAX         2.6 cm                2.1 - 4.0 cm IVS Diastolic Thickness           1.1 cm                LVPW Diastolic Thickness          1.3 cm                LVOT Diameter                     1.7 cm                LV Ejection Fraction MOD 2C       67.1 %                DOPPLER AV Peak Velocity                  2.2 m/s               AV Peak Gradient                  19 mmHg               AV Mean Gradient                  10.9 mmHg             LVOT Peak Velocity                1.1 m/s               AV Area Cont Eq vti               0.97 cm???              Mitral E Point Velocity           0.61 m/s              Mitral E to A Ratio               1.7                   MV Pressure Half Time             27.9 ms               MV Area PHT                       7.9 cm???               MV Deceleration Time              96.4 ms               * Indicates values subject to auto-interpretation LV EF:        % FINDINGS Left Ventricle Normal left ventricular chamber size. Mild concentric left ventricular hypertrophy. Grossly preserved left ventricular systolic function. Unable to assess wall motion. Indeterminate diastolic function. Right Ventricle The right ventricle was normal in size and function. Right Atrium The right atrium is normal in size. Inferior vena cava is not well visualized. Left Atrium The left atrium is normal in size. Left atrial volume index is 22 mL/sq m. Mitral Valve The mitral valve is not well visualized. No stenosis or regurgitation seen. Aortic Valve The aortic valve is not well visualized. Calcified aortic valve leaflets. Mild aortic stenosis. Transvalvular gradients are - Peak: 20 mmHg, Mean: 12 mmHg. Dimensionless index is (0.39). No aortic insufficiency. Tricuspid Valve Structurally normal tricuspid valve without significant stenosis or regurgitation. Pulmonic Valve Structurally normal pulmonic valve without significant stenosis or  "regurgitation. Pericardium Normal pericardium without effusion. Aorta The aortic root is normal. Ascending aorta not well visualized. Marcelo Smith MD (Electronically Signed) Final Date:     23 November 2019                 16:53      Micro:  Results     Procedure Component Value Units Date/Time    Urinalysis [[id number]]  (Abnormal) Collected:  11/24/19 1920    Order Status:  Completed Specimen:  Urine, Steen Cath Updated:  11/24/19 2328     Color Yellow     Character Clear     Specific Gravity 1.010     Ph 5.0     Glucose Negative mg/dL      Ketones Negative mg/dL      Protein Negative mg/dL      Bilirubin Negative     Urobilinogen, Urine 0.2     Nitrite Negative     Leukocyte Esterase Moderate     Occult Blood Large     Micro Urine Req Microscopic    Narrative:       Collected By:[id number] BESSY ALONSO  If not done within the last 24 hours    MRSA By PCR (Amp) [[id number]] Collected:  11/24/19 1730    Order Status:  Completed Specimen:  Respirate from Nares Updated:  11/24/19 2121     Significant Indicator NEG     Source RESP     Site NARES     MRSA PCR Negative for MRSA by PCR.    Narrative:       Collected By:[id number] CAMRON MCCOY  Per [facility] Protocol  Collected By:[id number] CAMRON MCCOY    URINALYSIS [[id number]] Collected:  11/24/19 1920    Order Status:  Canceled Specimen:  Urine, Steen Cath     URINALYSIS [[id number]] Collected:  11/24/19 1920    Order Status:  Canceled Specimen:  Urine, Steen Cath     Blood Culture [[id number]] Collected:  11/24/19 1300    Order Status:  Completed Specimen:  Blood from Peripheral Updated:  11/24/19 1419    Narrative:       Collected By:[id number] CMARON MCCOY  From different peripheral sites, if not done within the last  24 hours (Per Hospital Policy: Only change specimen source to  \"Line\" if specified by physician order)    Blood Culture [[id number]] Collected:  11/24/19 1300    Order Status:  Completed Specimen:  Blood from Peripheral Updated:  11/24/19 " "1419    Narrative:       Collected By:[id number] CAMRON MCCOY  From different peripheral sites, if not done within the last  24 hours (Per Hospital Policy: Only change specimen source to  \"Line\" if specified by physician order)    HSV 1/2 By PCR(Herpes)+FW1400 [[id number]]     Order Status:  Canceled Specimen:  Genital from Blood     CSF Culture [[id number]]     Order Status:  Canceled Specimen:  CSF from Tap     BLOOD CULTURE [[id number]]  (Abnormal) Collected:  11/21/19 0441    Order Status:  Completed Specimen:  Blood from Peripheral Updated:  11/24/19 0945     Significant Indicator POS     Source BLD     Site PERIPHERAL     Culture Result Growth detected by Bactec instrument. 11/22/2019  14:54      Pseudomonas aeruginosa  See previous culture for sensitivity report.  P.aeruginosa may develop resistance during prolonged therapy  with all antibiotics. Isolates that are initially susceptible  may become resistant within three to four days after  initiation of therapy. Testing of repeat isolates may be  warranted.        Yeast    Narrative:       CALL  [facility]  171 tel. [phone],  CALLED  171 tel. [phone] 11/22/2019, 16:12, RB PERF. RESULTS CALLED  TO:[id number]  Per Hospital Policy: Only change Specimen Src: to \"Line\" if  specified by physician order.  Left Forearm/Arm    BLOOD CULTURE [[id number]]  (Abnormal)  (Susceptibility) Collected:  11/21/19 0441    Order Status:  Completed Specimen:  Blood from Peripheral Updated:  11/24/19 0930     Significant Indicator POS     Source BLD     Site PERIPHERAL     Culture Result Growth detected by Bactec instrument. 11/22/2019  06:34      Pseudomonas aeruginosa  P.aeruginosa may develop resistance during prolonged therapy  with all antibiotics. Isolates that are initially susceptible  may become resistant within three to four days after  initiation of therapy. Testing of repeat isolates may be  warranted.      Narrative:       CALL  [facility]  171 tel. [phone],  CALLED  171 tel. " "[phone] 11/22/2019, 06:38, RB PERF. RESULTS CALLED  TO:[id number]  Per Hospital Policy: Only change Specimen Src: to \"Line\" if  specified by physician order.  Right AC    Susceptibility     Pseudomonas aeruginosa (1)     Antibiotic Interpretation Microscan Method Status    Ceftazidime Sensitive 4 mcg/mL GIRISH Final    Ciprofloxacin Sensitive <=1 mcg/mL GIRISH Final    Cefepime Sensitive <=2 mcg/mL GIRISH Final    Amikacin Sensitive <=16 mcg/mL GIRISH Final    Gentamicin Sensitive <=4 mcg/mL GIRISH Final    Tobramycin Sensitive <=4 mcg/mL GIRISH Final    Meropenem Sensitive <=1 mcg/mL GIRISH Final    Pip/Tazobactam Sensitive <=16 mcg/mL GIRISH Final                   URINE CULTURE(NEW) [[id number]]  (Abnormal)  (Susceptibility) Collected:  11/21/19 0633    Order Status:  Completed Specimen:  Urine Updated:  11/23/19 0823     Significant Indicator POS     Source UR     Site -     Culture Result -      Pseudomonas aeruginosa  ,000 cfu/mL  P.aeruginosa may develop resistance during prolonged therapy  with all antibiotics. Isolates that are initially susceptible  may become resistant within three to four days after  initiation of therapy. Testing of repeat isolates may be  warranted.      Narrative:       Indication for culture:->Septic Shock: Persistent  hypotension,  Lactic acid > 4, vasopressors/inotropes started  Indication for culture:->Septic Shock: Persistent    Susceptibility     Pseudomonas aeruginosa (1)     Antibiotic Interpretation Microscan Method Status    Ceftazidime Sensitive 4 mcg/mL GIRISH Final    Ciprofloxacin Sensitive <=1 mcg/mL GIRISH Final    Cefepime Sensitive <=2 mcg/mL GIRISH Final    Amikacin Sensitive <=16 mcg/mL GIRISH Final    Gentamicin Sensitive <=4 mcg/mL GIRISH Final    Tobramycin Sensitive <=4 mcg/mL GIRISH Final    Levofloxacin Sensitive <=2 mcg/mL GIRISH Final    Meropenem Sensitive <=1 mcg/mL GIRISH Final    Pip/Tazobactam Sensitive <=16 mcg/mL GIRISH Final                   URINALYSIS [[id number]]  (Abnormal) Collected:  " 11/21/19 0633    Order Status:  Completed Specimen:  Urine Updated:  11/21/19 0716     Color Yellow     Character Cloudy     Specific Gravity 1.012     Ph 6.0     Glucose Negative mg/dL      Ketones Negative mg/dL      Protein Negative mg/dL      Bilirubin Negative     Urobilinogen, Urine 0.2     Nitrite Negative     Leukocyte Esterase Large     Occult Blood Moderate     Micro Urine Req Microscopic    Narrative:       Indication for culture:->Septic Shock: Persistent  hypotension,  Lactic acid > 4, vasopressors/inotropes started    Influenza A/B By PCR (Adult - Flu Only) [[id number]] Collected:  11/21/19 0525    Order Status:  Completed Specimen:  Respirate from Nasopharyngeal Updated:  11/21/19 0615     Influenza virus A RNA Negative     Influenza virus B, PCR Negative    Culture Respiratory W/ GRM STN [[id number]] Collected:  11/21/19 0000    Order Status:  Canceled Specimen:  Sputum     BLOOD CULTURE [[id number]] Collected:  11/21/19 0000    Order Status:  Canceled Specimen:  Other from Peripheral     BLOOD CULTURE [[id number]] Collected:  11/21/19 0000    Order Status:  Canceled Specimen:  Other from Peripheral     Influenza By PCR, A/B [[id number]] Collected:  11/21/19 0000    Order Status:  Canceled Specimen:  Respirate from Nasopharyngeal           Assessment:  Active Hospital Problems    Diagnosis   • *Acute respiratory failure with hypoxia, RAD, PNA ([facility]) [J96.01]   • CAP (community acquired pneumonia) [J18.9]   • UTI (urinary tract infection) [N39.0]   • Altered mental status [R41.82]   • Acute renal failure (ARF) ([facility]) [N17.9]   • Leukocytosis [D72.829]   • Normocytic normochromic anemia [D64.9]   • Hypocalcemia [E83.51]   • Toxic metabolic encephalopathy [G92]   • Reactive airway disease with wheezing with acute exacerbation [J45.901]   • Gram-negative bacteremia [R78.81]     Interval 24 hour assessment:      101.1, O2 15 L high flow, increased from admit but weaning slowly today  Labs reviewed  Imaging  personally reviewed both images and report.  Chest x-ray stable to slightly worse on right per my read.   Studies reviewed    Micro reviewed    Pt continued on Zosyn 4.5 g every 8 and micafungin.  Remains oriented only to self.  Not responsive and not agitated during my exam. Events: LP attempted at bedside but unable to obtain due to agitation.  Neurology recommended MRI/MRA.     79-year-old white male admitted on 11/21/2019 due to altered mental status.  He had concern for   acute-on-chronic renal failure, urinary tract infection as well as CAP.  His renal failure was thought to be due to a malfunctioning Steen catheter and a new catheter was placed with significant urinary retention up over 1500 mL.  Urine culture was showing  Gram-negative rods and blood cultures showing Gram-negative rods, so infectious disease was consulted for antibiotic recommendations and management.  Rapid response was called due to concern for paresthesia of the distal extremities and he was transferred to the ICU.  Blood cultures on 11/21 with growth of yeast as well as Pseudomonas.     Plan:  UTI and Gram-negative bacteremia -Pseudomonas  Fevers, ongoing   Urine and Blood cxs +PSAR    --- Continue Zosyn for PSAR UTI and bacteremia, possible aspiration pneumonia  --- Agree with repeat blood cultures today, in process     Candidemia, unclear etiology, patient without central line, significant urinary retention but did not grow from urine culture.  CT concerning for developing obstruction but no obvious bowel perforation.    --- Ophthalmology evaluate for Candida endophthalmitis as patient is encephalopathic and unable to voice if there are any vision changes  --- Recommend SARAVANAN if feasible as TTE unrevealing  --- Follow-up repeat blood cultures from 11/24   --- Continue micafungin until yeast is identified     Encephalopathy, agitation  Plan had been for LP but this was not completed  MRA/ MRI pending    --- Follow-up imaging.  If patient  continues to be febrile and encephalopathic will need to reconsider LP    Hypoxemia, pulmonary edema     Acute renal failure-ongoing but improved from admit  Multifactorial including his sepsis, urinary tract infection, and obstructed Steen catheter.    His Steen catheter has since been replaced.    Antibiotics will need to be dose adjusted accordingly  CT neg for obstruction or stone  Monitor his urinary function carefully.     AMS, worse  Multifactorial incl infections, meds, hosp,   Aspiration and fall precautions  CT or MRI brain if feasible     Ileus vs bowel obstr  Appreciate surgery eval  Keep abx IV     Discussed with internal medicine, Dr. Gutierrez. ID will follow.

## 2019-11-25 NOTE — PROGRESS NOTES
2 Rn skin assessment completed with LYUDMILA Moss.  Pt has wounds located:  - bruising on R lower back  - Red sores on R foot  - Abrasion on L knee  - scattered bruising/redness on BUE and BLE  - open sore on sacrum with nonblanchable redness.    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

## 2019-11-25 NOTE — PROGRESS NOTES
While waiting for CT scan pt began having increased expiratory wheezes and remained agitated.  Pt transported back to T622 at 1445.   Dr. Gonda and RT at bedside assessing pt.   Orders placed into Epic.

## 2019-11-25 NOTE — PROGRESS NOTES
Nephrology Daily Progress Note    Date of Service  11/25/2019    Chief Complaint  The patient is an 79-year-old gentleman with   history of dementia, hypertension, diabetes, benign prostatic hypertrophy, and   gastroesophageal reflux disease who presented to [facility] on 11/21 with altered mental status.  There was concern at the time for   acute on chronic renal failure, unknown what his baseline serum creatinine   is, but when he initially presented to [facility], his serum creatinine was 2.54   and over the course of his stay, it came down to 1.84.    11/24/19 - Nephrology was   asked to see him to evaluate his renal failure.  He is unable to give me a   full history given the fact that he is demented and currently tachypneic, in   respiratory distress.  ICU team is in the process of bringing him over to the   ICU for further evaluation and emergent treatment.  His renal failure was   presumed to be secondary to an obstructed Steen.  He had urinary retention of   about 1500 mL  Urine culture was also showing evidence of gram-negative rods   and blood cultures showing gram-negative rods.    Interval Problem Update  11/25/19 - Appears comfortable.    Review of Systems  Review of Systems   Unable to perform ROS: Dementia        Physical Exam  Temp:  [36.5 °C (97.7 °F)-37 °C (98.6 °F)] 36.9 °C (98.4 °F)  Pulse:  [44-62] 56  Resp:  [16-27] 16  BP: (114-149)/() 128/66  SpO2:  [89 %-100 %] 95 %    Physical Exam  Vitals signs and nursing note reviewed.   HENT:      Head: Normocephalic and atraumatic.   Eyes:      Extraocular Movements: Extraocular movements intact.   Neck:      Musculoskeletal: Neck supple.   Cardiovascular:      Rate and Rhythm: Regular rhythm. Bradycardia present.      Pulses: Normal pulses.   Pulmonary:      Breath sounds: Normal breath sounds.   Abdominal:      Palpations: Abdomen is soft.   Skin:     General: Skin is warm.         Fluids    Intake/Output Summary (Last 24  hours) at 11/25/2019 1131  Last data filed at 11/25/2019 1000  Gross per 24 hour   Intake 1000.1 ml   Output 4025 ml   Net -3024.9 ml       Laboratory  Recent Labs     11/24/19  0255 11/24/19  0900 11/25/19  0440   WBC 11.8* 10.8 8.0   RBC 3.08* 2.97* 3.09*   HEMOGLOBIN 9.3* 8.9* 9.3*   HEMATOCRIT 29.3* 28.3* 29.7*   MCV 95.1 95.3 96.1   MCH 30.2 30.0 30.1   MCHC 31.7* 31.4* 31.3*   RDW 52.8* 52.7* 53.0*   PLATELETCT 172 172 165   MPV 9.5 9.2 9.2     Recent Labs     11/24/19  0255 11/24/19  0827 11/25/19  0400   SODIUM 136 134* 141   POTASSIUM 4.5 4.4 4.4   CHLORIDE 105 107 107   CO2 24 22 25   GLUCOSE 94 114* 77   BUN 34* 31* 27*   CREATININE 1.95* 1.84* 1.83*   CALCIUM 8.0* 8.3* 8.2*     Recent Labs     11/24/19  0900   INR 1.50*     No results for input(s): NTPROBNP in the last 72 hours.  Recent Labs     11/25/19  0400   TRIGLYCERIDE 69   HDL 38*   LDL 45     ASSESSMENT:  1.  His acute kidney injury, on presentation, has improved somewhat. Cause likely sepsis + urinary retention.  2.  Chronic kidney disease, unknown baseline levels.  3.  Acidosis.  4.  Anemia.  5.  Respiratory distress.  6.  Community-acquired pneumonia.  7.  Sepsis.  8.  Altered mental status.  9.  Urinary tract infection.  10.  Gram-negative bacteremia.  11.  Hypocalcemia    Plan:  Continue as is  Follow.

## 2019-11-25 NOTE — PROGRESS NOTES
Progress Note               Author: Shellie Escotobyron Date & Time created: 11/25/2019  8:07 AM     Interval History:  No evidence of bowel function yesterday. No vomiting.     Review of Systems:  Review of Systems   Reason unable to perform ROS: Oriented only to self.       Physical Exam:  Physical Exam  Constitutional:       General: He is not in acute distress.     Comments: Sleeping comfortably, doesn't awaken to voice or touch   HENT:      Head: Normocephalic and atraumatic.      Mouth/Throat:      Mouth: Mucous membranes are moist.   Cardiovascular:      Rate and Rhythm: Normal rate and regular rhythm.   Pulmonary:      Effort: Pulmonary effort is normal.      Comments: On high flow nasal cannula  Abdominal:      General: Abdomen is flat. There is no distension.      Palpations: Abdomen is soft.      Comments: Unable to evaluate tenderness   Musculoskeletal: Normal range of motion.   Skin:     General: Skin is warm and dry.         Labs:  Recent Labs     11/24/19  0847 11/24/19  1054 11/24/19  1505   IFVCY27W 7.44 7.44  --    NJCNIK980W 34.3 32.8  --    BAOGD046G 59.7* 85.8  --    OYGR2SFT 90.3* 95.9  --    ARTHCO3 23 22  --    O5LTCUISQ 2.0 5.0  --    ARTBE -1 -2  --    ISTATAPH  --   --  7.394*   ISTATAPCO2  --   --  37.6*   ISTATAPO2  --   --  89*   ISTATATCO2  --   --  24   JNRGADE9LHM  --   --  97   ISTATARTHCO3  --   --  23.0   ISTATARTBE  --   --  -2   ISTATTEMP  --   --  98.3 F   ISTATFIO2  --   --  60   ISTATSPEC  --   --  Arterial   ISTATAPHTC  --   --  7.396*   [id number]  --   --  88*         Recent Labs     11/24/19  0255 11/24/19  0827 11/25/19  0400   SODIUM 136 134* 141   POTASSIUM 4.5 4.4 4.4   CHLORIDE 105 107 107   CO2 24 22 25   BUN 34* 31* 27*   CREATININE 1.95* 1.84* 1.83*   CALCIUM 8.0* 8.3* 8.2*     Recent Labs     11/24/19  0255 11/24/19  0827 11/25/19  0400   ALTSGPT  --  9  --    ASTSGOT  --  32  --    ALKPHOSPHAT  --  43  --    TBILIRUBIN  --  0.7  --    LIPASE  --  13  --     GLUCOSE 94 114* 77     Recent Labs     19  02519  0919  0440   RBC 3.08* 2.97* 3.09*   HEMOGLOBIN 9.3* 8.9* 9.3*   HEMATOCRIT 29.3* 28.3* 29.7*   PLATELETCT 172 172 165   PROTHROMBTM  --  18.5*  --    INR  --  1.50*  --      Recent Labs     19  0819  0440   WBC 11.8*  --  10.8 8.0   NEUTSPOLYS  --   --  88.00* 83.10*   LYMPHOCYTES  --   --  4.60* 5.90*   MONOCYTES  --   --  6.20 7.10   EOSINOPHILS  --   --  0.20 2.50   BASOPHILS  --   --  0.30 0.90   ASTSGOT  --  32  --   --    ALTSGPT  --  9  --   --    ALKPHOSPHAT  --  43  --   --    TBILIRUBIN  --  0.7  --   --      Hemodynamics:  Temp (24hrs), Av.9 °C (98.5 °F), Min:36.7 °C (98 °F), Max:38.4 °C (101.1 °F)  Temperature: 36.8 °C (98.2 °F)  Pulse  Av.9  Min: 44  Max: 86   Blood Pressure : 138/64     Respiratory:    Respiration: 16, Pulse Oximetry: 96 %, O2 Daily Delivery Respiratory : Highflow Nasal Cannula     Given By:: (aerogen), Work Of Breathing / Effort: Mild  RUL Breath Sounds: Transmitted Upper Airway Sound, RML Breath Sounds: Transmitted Upper Airway Sound, RLL Breath Sounds: Transmitted Upper Airway Sound, STANLEY Breath Sounds: Transmitted Upper Airway Sound, LLL Breath Sounds: Transmitted Upper Airway Sound  Fluids:    Intake/Output Summary (Last 24 hours) at 2019 0807  Last data filed at 2019 0614  Gross per 24 hour   Intake 800.1 ml   Output 3300 ml   Net -2499.9 ml     Weight: 96 kg (211 lb 10.3 oz)  GI/Nutrition:  Orders Placed This Encounter   Procedures   • Diet Order Clear Liquid     Standing Status:   Standing     Number of Occurrences:   1     Order Specific Question:   Diet:     Answer:   Clear Liquid [10]     Medical Decision Making, by Problem:  Active Hospital Problems    Diagnosis   • *Acute respiratory failure with hypoxia, RAD, PNA (HCC) [J96.01]   • CAP (community acquired pneumonia) [J18.9]   • UTI (urinary tract infection) [N39.0]   • Altered mental  status [R41.82]   • Acute renal failure (ARF) (HCC) [N17.9]   • Leukocytosis [D72.829]   • Normocytic normochromic anemia [D64.9]   • Hypocalcemia [E83.51]   • Toxic metabolic encephalopathy [G92]   • Reactive airway disease with wheezing with acute exacerbation [J45.901]   • Gram-negative bacteremia [R78.81]       Plan:  No evidence of obstruction on exam or by history other than that he hasn't had a bowel movement.   Abd plain film ordered to evaluate for signs of obstruction.   Ok to give meds by mouth if otherwise appropriate. Ok to give suppositories to encourage bowel function.     Quality-Core Measures   Reviewed items::  Labs reviewed  Steen catheter::  No Steen

## 2019-11-25 NOTE — PROGRESS NOTES
Pt's work of breathing has increased, RT at bedside. Pt continues to be alert, pleasantly confused, restless/agitated and in bilateral soft wrist restraints. Charge RN notified and rapid response called. Pt will be transferred to the [facility]

## 2019-11-25 NOTE — CARE PLAN
Problem: Oxygenation:  Goal: Maintain adequate oxygenation dependent on patient condition  Outcome: PROGRESSING AS EXPECTED   Pt on 50L, 50% HHFNC, will continue to titrate as tolerated.    Problem: Bronchoconstriction:  Goal: Improve in air movement and diminished wheezing  Outcome: PROGRESSING AS EXPECTED   Duoneb QID

## 2019-11-25 NOTE — PROGRESS NOTES
Dr. Gonda and Dr. Stallworth at bedside.  Dr. Gonda and Dr. Stallworth aware that pt is lethargic yet arousable with noxious stimuli.

## 2019-11-25 NOTE — CONSULTS
CC: yeast in blood, rule out endophthalmitis    HPI: 78 yo male admitted for AMS, hypoxia, was found to have renal failure, pneumonia, UTI, gram neg rods on blood cultures and yeast on blood cultures. Unable to give history of any visual changes.    POHx:   ?cat surgery    PMHx: HTN, GERD, DM, BPH, CKD.    ipratropium-albuterol, 3 mL, Nebulization, 4X/DAY (RT)  lactobacillus rhamnosus, 1 Cap, Oral, QDAY with Breakfast  Pharmacy, , Other, PHARMACY TO DOSE  aspirin, 325 mg, Oral, DAILY    Or  aspirin, 324 mg, Oral, DAILY    Or  aspirin, 300 mg, Rectal, DAILY  famotidine, 20 mg, Intravenous, DAILY  heparin, 5,000 Units, Subcutaneous, Q8HRS  piperacillin-tazobactam, 4.5 g, Intravenous, Q8HRS  SITagliptin, 50 mg, Oral, DAILY  micafungin, 100 mg, Intravenous, Q24HRS  clotrimazole, 1 Application, Topical, BID  latanoprost, 1 Drop, Both Eyes, Nightly  polyethylene glycol/lytes, 1 Packet, Oral, DAILY  simvastatin, 10 mg, Oral, Nightly  tamsulosin, 0.4 mg, Oral, DAILY  senna-docusate, 2 Tab, Oral, BID        Patient has no known allergies.    Social History     Tobacco Use   • Smoking status: Never Smoker   • Smokeless tobacco: Never Used   Substance Use Topics   • Alcohol use: Not on file   • Drug use: Not on file     No family history on file.    EXAM:  Vitals:    11/25/19 0640   BP:    Pulse: (!) 52   Resp: 16   Temp:    SpO2: 96%     Patient not alert or oriented  VA - pt unable, pupils minimally reactive  Pupils - minimally reactive, unable to appreciate any definite APD  Motility +dolls head  CF - pt unable  Ext: eyelids closed, no lag, +ULD  SLE: PCIOL and PCO OD, 2+NSC OS  DFE: no vitritis, no heme, no optic nerve head edema.    A/P:  78 yo in ICU with multiple medical conditions, yeast in blood cultures:  - No evidence of endophthalmitis on dilated exam.  - Pseudophakia, PCO OD - follow as outpatient.  - Cataract OS - follow as outpatient.  - Dermatochalasis - follow as outpatient.     Filibreto Wyman M.D.  [facility]  [phone]     controlled: AC per cardiology etiology unclear - ? due to a.fib  cardiac cath proposed - patient refusing and wants second opinion with his out patient cardiologist - encouraged patient to have his out patient MDs call me if any hematology concern no further events  continue to monitor   echo normal Chest pain is due to fracture: Pain control  - Sling for comfort  - No acute orthopedic intervention Chest pain is due to fracture: Pain control  - Sling for comfort  - No acute orthopedic intervention Chest pain is due to fracture: Pain control  - Sling for comfort  - No acute orthopedic intervention etiology unclear - ? due to a.fib  cardiac cath proposed - patient refusing and wants second opinion with his out patient cardiologist leg elevation leg elevation no further events  continue to monitor   echo normal no further events  continue to monitor   echo normal no further events  continue to monitor   echo normal  patient at present refusing coronary angiogram this AM event likely vasovagal secondary to blood draws  now resolved  continue to monitor   echo normal leg elevation Rate controlled continue to monitor on tele  echo NL LV fxn   defer to cardiologist controlled: AC per cardiology

## 2019-11-25 NOTE — PROGRESS NOTES
Bedside report received from night shift RN. Pt alert, pleasantly confused, agitated/restless and is in bilateral soft wrist restraints. Pt using accessory muscles  To breath, RT at bedside. Elevated temp, pt was given acetaminophen and cooling measures initiated. MD paged and updated on PT condition. Spoke with MRI, xray and CT who will schedule to pt when he is calmer and able to sit still for imaging. Bed in low and locked position, call button within reach and fall precautions are in palce

## 2019-11-25 NOTE — PROGRESS NOTES
Critical Care Progress Note    Date of admission  11/21/2019    Chief Complaint  Acute hypoxic resp failure, [facility]    Hospital Course    79 y.o. male who presented 11/21/2019 with a past medical history significant for hypertension, chronic healing Steen catheter and type 2 diabetes who was transferred from [facility] on November 21 for 1 day of fever, tachypnea, altered mental status and cough.  He was found to be mildly hypoxic and wheezing and responded to a DuoNeb.  He was admitted to the hospital and began treatment for pneumonia as well as urinary tract infection and was found to be bacteremic with Pseudomonas which is the same bacteria in his urine culture.  Infectious disease was consulted and agreed with his ongoing antibiotic therapy.  He remained altered and this morning had an increase in his oxygen requirement and work of breathing and a rapid response was called and he was transferred to the intensive care unit where I was consulted for his critical care management.  He has been seen by neurology as well although I do not yet see a note from them who ordered a stroke work-up.  Patient is unable to provide any additional history at this time given his current clinical condition      Interval Problem Update  Reviewed last 24 hour events:  Afebrile overnihgt. Last fever was 11/24 with Tm 38.4C  HR in 40-50s, SBP in 130s-140s  FiO2 40%, on 15L/min, sat >95%  WBC 8.0K  Hgb 9.3K, plt 165K  Sodium 141, K 4.4, HCO3 25.   Serum creatinine 1.83, good UOP with negative 3L in the past 24 hours, -7L since admission  Lactate 0.9  PCL 1.82 to 0.71      Review of Systems  Review of Systems   Constitutional: Negative for chills, fever and malaise/fatigue.   HENT: Negative for congestion.    Respiratory: Negative for cough, sputum production and shortness of breath.    Cardiovascular: Negative for chest pain, palpitations, orthopnea and leg swelling.   Gastrointestinal: Negative for abdominal pain,  diarrhea, nausea and vomiting.   Musculoskeletal: Negative for back pain and joint pain.   Skin: Negative for rash.   Neurological: Negative for weakness and headaches.   Psychiatric/Behavioral: The patient is not nervous/anxious.    All other systems reviewed and are negative.       Vital Signs for last 24 hours   Temp:  [36.7 °C (98 °F)-38.4 °C (101.1 °F)] 36.8 °C (98.2 °F)  Pulse:  [44-68] 52  Resp:  [16-34] 16  BP: (114-149)/() 138/64  SpO2:  [89 %-100 %] 96 %    Hemodynamic parameters for last 24 hours       Respiratory Information for the last 24 hours       Physical Exam   Physical Exam  Vitals signs and nursing note reviewed.   Constitutional:       General: He is not in acute distress.     Appearance: Normal appearance. He is not ill-appearing, toxic-appearing or diaphoretic.   HENT:      Head: Normocephalic and atraumatic.      Mouth/Throat:      Comments: Dry oral mucosa  Eyes:      Conjunctiva/sclera: Conjunctivae normal.   Neck:      Musculoskeletal: Neck supple.   Cardiovascular:      Rate and Rhythm: Normal rate and regular rhythm.      Pulses: Normal pulses.      Heart sounds: Normal heart sounds. No murmur.   Pulmonary:      Effort: Pulmonary effort is normal. No respiratory distress.      Breath sounds: Normal breath sounds. No wheezing, rhonchi or rales.      Comments: On high flow nasal cannula 30% 15L/min  Abdominal:      General: Bowel sounds are normal. There is no distension.      Palpations: Abdomen is soft. There is no mass.      Tenderness: There is no tenderness.      Hernia: No hernia is present.   Musculoskeletal:         General: No swelling or tenderness.   Skin:     General: Skin is warm and dry.      Coloration: Skin is not jaundiced.   Neurological:      General: No focal deficit present.      Mental Status: He is alert and oriented to person, place, and time.      Cranial Nerves: No cranial nerve deficit.      Comments: Moving all extremities, no focal neuro deficit    Psychiatric:         Mood and Affect: Mood normal.         Behavior: Behavior normal.         Medications  Current Facility-Administered Medications   Medication Dose Route Frequency Provider Last Rate Last Dose   • ipratropium-albuterol (DUONEB) nebulizer solution  3 mL Nebulization 4X/DAY (RT) Erich Alba M.D.   Stopped at 11/25/19 0700   • lactated ringers infusion (BOLUS)  500 mL Intravenous Once PRN Erich Alba M.D.       • lactobacillus rhamnosus (CULTURELLE) capsule 1 Cap  1 Cap Oral QDAY with Breakfast Erich Alba M.D.   1 Cap at 11/24/19 0912   • Pharmacy consult request - Allow for permissive hypertension: SBP up to 220 mmHg/DBP up to 120 mmHg x 48 hours   Other PHARMACY TO DOSE Erich Alba M.D.       • aspirin (ASA) tablet 325 mg  325 mg Oral DAILY Erich Alba M.D.        Or   • aspirin (ASA) chewable tab 324 mg  324 mg Oral DAILY Erich Alba M.D.        Or   • aspirin (ASA) suppository 300 mg  300 mg Rectal DAILY Erich Alba M.D.       • famotidine (PEPCID) injection 20 mg  20 mg Intravenous DAILY Erich Alba M.D.   20 mg at 11/25/19 0449   • enalaprilat (VASOTEC) injection 1.25 mg  1.25 mg Intravenous Q6HRS PRN Erich Alba M.D.       • heparin injection 5,000 Units  5,000 Units Subcutaneous Q8HRS Erich Alba M.D.   5,000 Units at 11/25/19 0448   • ipratropium-albuterol (DUONEB) nebulizer solution  3 mL Nebulization Q2HRS PRN (RT) Jeremy M Gonda, M.D.   3 mL at 11/25/19 0521   • haloperidol lactate (HALDOL) injection 2-5 mg  2-5 mg Intravenous Q4HRS PRN Jeremy M Gonda, M.D.       • piperacillin-tazobactam (ZOSYN) 4.5 g in  mL IVPB  4.5 g Intravenous Q8HRS Flor Patel M.D. 25 mL/hr at 11/25/19 0448 4.5 g at 11/25/19 0448   • SITagliptin (JANUVIA) tablet 50 mg  50 mg Oral DAILY Erich Alba M.D.   50 mg at 11/24/19 0543   • micafungin (MYCAMINE) 100 mg in D5W 100 mL ivpb  100 mg Intravenous Q24HRS Flor Patel M.D.    Stopped at 11/25/19 0614   • clotrimazole (LOTRIMIN) 1 % cream 1 Application  1 Application Topical BID Shelbie Treviño M.D.   1 Application at 11/25/19 0448   • latanoprost (XALATAN) 0.005 % ophthalmic solution 1 Drop  1 Drop Both Eyes Nightly Shelbie Treviño M.D.   1 Drop at 11/24/19 2233   • polyethylene glycol/lytes (MIRALAX) PACKET 1 Packet  1 Packet Oral DAILY Shelbie Treviño M.D.   Stopped at 11/25/19 0600   • simvastatin (ZOCOR) tablet 10 mg  10 mg Oral Nightly Shelbie Treviño M.D.   Stopped at 11/24/19 2100   • tamsulosin (FLOMAX) capsule 0.4 mg  0.4 mg Oral DAILY Shelbie Treviño M.D.   0.4 mg at 11/24/19 0543   • acetaminophen (TYLENOL) tablet 650 mg  650 mg Oral Q6HRS PRN Shelbie Treviño M.D.   650 mg at 11/24/19 0802   • ondansetron (ZOFRAN) syringe/vial injection 4 mg  4 mg Intravenous Q4HRS PRN Shelbie Treviño M.D.       • ondansetron (ZOFRAN ODT) dispertab 4 mg  4 mg Oral Q4HRS PRN Shelbie Treviño M.D.       • senna-docusate (PERICOLACE or SENOKOT S) 8.6-50 MG per tablet 2 Tab  2 Tab Oral BID Shelbie Treviño M.D.   Stopped at 11/24/19 1800    And   • polyethylene glycol/lytes (MIRALAX) PACKET 1 Packet  1 Packet Oral QDAY PRN Shelbie Treviño M.D.   1 Packet at 11/23/19 1717    And   • magnesium hydroxide (MILK OF MAGNESIA) suspension 30 mL  30 mL Oral QDAY PRN Shelbie Treviño M.D.        And   • bisacodyl (DULCOLAX) suppository 10 mg  10 mg Rectal QDAY PRN Shelbie Treviño M.D.       • Respiratory Care per Protocol   Nebulization Continuous RT Shelbie Treviño M.D.       • fentaNYL (SUBLIMAZE) injection 25-50 mcg  25-50 mcg Intravenous Q HOUR PRN Shelbie Treviño M.D.   50 mcg at 11/22/19 0216   • Influenza Vaccine High-Dose pf injection 0.5 mL  0.5 mL Intramuscular Once PRN Kuldeep Art, RAnishN.           Fluids    Intake/Output Summary (Last 24 hours) at 11/25/2019 0723  Last data filed at 11/25/2019 0614  Gross per 24 hour   Intake 800.1 ml   Output 3300 ml   Net -2499.9 ml       Laboratory  Recent Labs      11/24/19  0847 11/24/19  1054 11/24/19  1505   YQHZH21G 7.44 7.44  --    MEJDHY974O 34.3 32.8  --    REMSZ422A 59.7* 85.8  --    UCVR6BRG 90.3* 95.9  --    ARTHCO3 23 22  --    [id number] 2.0 5.0  --    ARTBE -1 -2  --    ISTATAPH  --   --  7.394*   ISTATAPCO2  --   --  37.6*   ISTATAPO2  --   --  89*   ISTATATCO2  --   --  24   [id number]  --   --  97   ISTATARTHCO3  --   --  23.0   ISTATARTBE  --   --  -2   ISTATTEMP  --   --  98.3 F   ISTATFIO2  --   --  60   ISTATSPEC  --   --  Arterial   ISTATAPHTC  --   --  7.396*   [id number]  --   --  88*         Recent Labs     11/24/19  0255 11/24/19  0827 11/25/19  0400   SODIUM 136 134* 141   POTASSIUM 4.5 4.4 4.4   CHLORIDE 105 107 107   CO2 24 22 25   BUN 34* 31* 27*   CREATININE 1.95* 1.84* 1.83*   CALCIUM 8.0* 8.3* 8.2*     Recent Labs     11/24/19  0255 11/24/19  0827 11/25/19  0400   ALTSGPT  --  9  --    ASTSGOT  --  32  --    ALKPHOSPHAT  --  43  --    TBILIRUBIN  --  0.7  --    LIPASE  --  13  --    GLUCOSE 94 114* 77     Recent Labs     11/24/19  0255 11/24/19  0827 11/24/19  0900 11/25/19  0440   WBC 11.8*  --  10.8 8.0   NEUTSPOLYS  --   --  88.00* 83.10*   LYMPHOCYTES  --   --  4.60* 5.90*   MONOCYTES  --   --  6.20 7.10   EOSINOPHILS  --   --  0.20 2.50   BASOPHILS  --   --  0.30 0.90   ASTSGOT  --  32  --   --    ALTSGPT  --  9  --   --    ALKPHOSPHAT  --  43  --   --    TBILIRUBIN  --  0.7  --   --      Recent Labs     11/24/19  0255 11/24/19  0900 11/25/19  0440   RBC 3.08* 2.97* 3.09*   HEMOGLOBIN 9.3* 8.9* 9.3*   HEMATOCRIT 29.3* 28.3* 29.7*   PLATELETCT 172 172 165   PROTHROMBTM  --  18.5*  --    INR  --  1.50*  --        Imaging  X-Ray:  I have personally reviewed the images and compared with prior images.  CXR today 11/125 with bilateral infiltrates noted. +pulm congestion    Microbiology  11/21 BCx 2/2: pseudomonas (pan sensitive), yeast  11/21 UCx: pseudomonas, UA +pyuria  11/24 Nasal MRSA screen negative    Assessment/Plan  * UTI (urinary tract  infection)- (present on admission)  Assessment & Plan  With pansensitive Pseudomonas  Continue antibiotic, antifungal  Infectious disease consultation  Steen catheter exchange if not already done since admission    Candidemia ([facility])- (present on admission)  Assessment & Plan  On micafungin.   Pending speciation for yeast.   Repeated blood cultures are pending    Bacteremia due to Pseudomonas- (present on admission)  Assessment & Plan  Pseudomonas, pansensitive, likely secondary UTI versus pneumonia  Continue cefepime  ID consultation    Acute respiratory failure with hypoxia, RAD, PNA ([facility])- (present on admission)  Assessment & Plan  Continue active titration of high flow nasal cannula FiO2 to keep SaO2 greater than 92%  Low threshold for intubation, not a BiPAP candidate given acute delirium  RT/O2 protocol  Pulmonary toiletry  Diuresis    Altered mental status- (present on admission)  Assessment & Plan  Likely metabolic encephalopathy. Appears resolving compared to yesterday  Given the resolution of his AMS, can discontinue CT brain and MRI brain  No need for LP at this point.   Limit sedatives, trial PRN Haldol if needed for agitation  Neurology consulted    CAP (community acquired pneumonia)- (present on admission)  Assessment & Plan  Continue cefepime and vancomycin  Follow-up on final culture results  Bronchoscopy if patient requires intubation    Acute renal failure (ARF) ([facility])- (present on admission)  Assessment & Plan  Secondary to sepsis, ATN - Creatinine is stable.   Target goal UOP >50cc/hr  Avoid nephrotoxins  Monitor with ongoing sepsis management, maintain perfusion  Monitor electrolytes, urine output, creatinine closely    Leukocytosis- (present on admission)  Assessment & Plan  Secondary to infection -improving overall.       Reactive airway disease with wheezing with acute exacerbation- (present on admission)  Assessment & Plan  Continue scheduled and PRN bronchodilators  RT protocol  Hold off on  steroids for now as he does not appear bronchospastic on my exam    Hypocalcemia- (present on admission)  Assessment & Plan  Repletion and monitor closely    Normocytic normochromic anemia- (present on admission)  Assessment & Plan  Daily CBC with conservative transfusion strategy       VTE:  Heparin  Ulcer: Not Indicated  Lines: Steen Catheter  Ongoing indication addressed    I have performed a physical exam and reviewed and updated ROS and Plan today (11/25/2019). In review of yesterday's note (11/24/2019), there are no changes except as documented above.     Discussed patient condition and risk of morbidity and/or mortality with Hospitalist, RN, RT, Charge nurse / hot rounds and Patient  The patient remains critically ill.  Critical care time = 40 minutes in directly providing and coordinating critical care and extensive data review.  No time overlap and excludes procedures.

## 2019-11-25 NOTE — ASSESSMENT & PLAN NOTE
On micafungin.   Pt is candida glabrata. Repeated BCx negative  ?source - abdomen vs other. No central line  Repeated blood cultures are negative to date  Defer to ID for duration

## 2019-11-25 NOTE — CARE PLAN
Problem: Respiratory:  Goal: Respiratory status will improve  Outcome: PROGRESSING AS EXPECTED     Problem: Pain Management  Goal: Pain level will decrease to patient's comfort goal  Outcome: PROGRESSING AS EXPECTED     Problem: Safety - Medical Restraint  Goal: Remains free of injury from restraints (Restraint for Interference with Medical Device)  Description  INTERVENTIONS:  1. Determine that other, less restrictive measures have been tried or would not be effective before applying the restraint  2. Evaluate the patient's condition at the time of restraint application  3. Inform patient/family regarding the reason for restraint  4. Q2H: Monitor safety, psychosocial status, comfort, nutrition and hydration  Outcome: PROGRESSING AS EXPECTED   Patient remains in bilateral soft wrist restraints due to attempts to pull at lines and tubes. Will continue to monitor.

## 2019-11-25 NOTE — CARE PLAN
Problem: Communication  Goal: The ability to communicate needs accurately and effectively will improve  Outcome: PROGRESSING AS EXPECTED  Intervention: Reorient patient to environment as needed  Note:   Pt now back to baseline orientation, A&O2     Problem: Safety  Goal: Will remain free from injury  Outcome: PROGRESSING AS EXPECTED  Intervention: Provide assistance with mobility  Note:   Pt up edge of bed with minimal agitation/hip pain.

## 2019-11-25 NOTE — ASSESSMENT & PLAN NOTE
Multifactorial.  Also has fever.   Unable to do bedside LP because of agitation  Spoke with Radiology they also mentioned they can;t do image guided LP  Head CT  Neurology ordered MRI/MRA

## 2019-11-25 NOTE — ASSESSMENT & PLAN NOTE
-Blood cultures  -ID has signed off.  Completed ABX course with micafungin 12/6/2019  -Evaluated by ophthalmology -negative for endophthalmitis  -Afebrile.  No leukocytosis  -Resolved

## 2019-11-25 NOTE — CARE PLAN
Problem: Oxygenation:  Goal: Maintain adequate oxygenation dependent on patient condition  Outcome: PROGRESSING AS EXPECTED     Problem: Hyperinflation:  Goal: Prevent or improve atelectasis  Outcome: PROGRESSING AS EXPECTED     Problem: Bronchoconstriction:  Goal: Improve in air movement and diminished wheezing  Outcome: PROGRESSING AS EXPECTED     15L 40% HHFNC  Duo QID  IS QID

## 2019-11-26 ENCOUNTER — APPOINTMENT (OUTPATIENT)
Dept: RADIOLOGY | Facility: MEDICAL CENTER | Age: 79
DRG: 698 | End: 2019-11-26
Attending: INTERNAL MEDICINE
Payer: MEDICARE

## 2019-11-26 LAB
ACE SERPL-CCNC: 24 U/L (ref 9–67)
COPPER SERPL-MCNC: 120.3 UG/DL (ref 70–140)
NUCLEAR IGG SER QL IA: DETECTED

## 2019-11-26 PROCEDURE — 700111 HCHG RX REV CODE 636 W/ 250 OVERRIDE (IP): Performed by: INTERNAL MEDICINE

## 2019-11-26 PROCEDURE — 94640 AIRWAY INHALATION TREATMENT: CPT

## 2019-11-26 PROCEDURE — 99233 SBSQ HOSP IP/OBS HIGH 50: CPT | Performed by: INTERNAL MEDICINE

## 2019-11-26 PROCEDURE — 71045 X-RAY EXAM CHEST 1 VIEW: CPT

## 2019-11-26 PROCEDURE — A9270 NON-COVERED ITEM OR SERVICE: HCPCS | Performed by: INTERNAL MEDICINE

## 2019-11-26 PROCEDURE — 83825 ASSAY OF MERCURY: CPT

## 2019-11-26 PROCEDURE — 700102 HCHG RX REV CODE 250 W/ 637 OVERRIDE(OP): Performed by: INTERNAL MEDICINE

## 2019-11-26 PROCEDURE — 700102 HCHG RX REV CODE 250 W/ 637 OVERRIDE(OP): Performed by: HOSPITALIST

## 2019-11-26 PROCEDURE — 770001 HCHG ROOM/CARE - MED/SURG/GYN PRIV*

## 2019-11-26 PROCEDURE — 87899 AGENT NOS ASSAY W/OPTIC: CPT

## 2019-11-26 PROCEDURE — 82300 ASSAY OF CADMIUM: CPT

## 2019-11-26 PROCEDURE — 700105 HCHG RX REV CODE 258: Performed by: INTERNAL MEDICINE

## 2019-11-26 PROCEDURE — 83655 ASSAY OF LEAD: CPT

## 2019-11-26 PROCEDURE — 82175 ASSAY OF ARSENIC: CPT

## 2019-11-26 PROCEDURE — 97164 PT RE-EVAL EST PLAN CARE: CPT

## 2019-11-26 PROCEDURE — 99232 SBSQ HOSP IP/OBS MODERATE 35: CPT | Performed by: HOSPITALIST

## 2019-11-26 PROCEDURE — 700101 HCHG RX REV CODE 250: Performed by: INTERNAL MEDICINE

## 2019-11-26 PROCEDURE — A9270 NON-COVERED ITEM OR SERVICE: HCPCS | Performed by: HOSPITALIST

## 2019-11-26 RX ORDER — AMLODIPINE BESYLATE 10 MG/1
10 TABLET ORAL
Status: DISCONTINUED | OUTPATIENT
Start: 2019-11-26 | End: 2019-12-12 | Stop reason: HOSPADM

## 2019-11-26 RX ADMIN — HEPARIN SODIUM 5000 UNITS: 5000 INJECTION INTRAVENOUS; SUBCUTANEOUS at 05:16

## 2019-11-26 RX ADMIN — LATANOPROST 1 DROP: 50 SOLUTION OPHTHALMIC at 20:15

## 2019-11-26 RX ADMIN — IPRATROPIUM BROMIDE AND ALBUTEROL SULFATE 3 ML: .5; 3 SOLUTION RESPIRATORY (INHALATION) at 18:47

## 2019-11-26 RX ADMIN — SENNOSIDES AND DOCUSATE SODIUM 2 TABLET: 8.6; 5 TABLET ORAL at 05:16

## 2019-11-26 RX ADMIN — SIMVASTATIN 10 MG: 20 TABLET, FILM COATED ORAL at 20:15

## 2019-11-26 RX ADMIN — PIPERACILLIN AND TAZOBACTAM 4.5 G: 4; .5 INJECTION, POWDER, LYOPHILIZED, FOR SOLUTION INTRAVENOUS; PARENTERAL at 20:15

## 2019-11-26 RX ADMIN — CLOTRIMAZOLE 1 APPLICATION: 10 CREAM TOPICAL at 05:16

## 2019-11-26 RX ADMIN — POLYETHYLENE GLYCOL 3350 1 PACKET: 17 POWDER, FOR SOLUTION ORAL at 05:32

## 2019-11-26 RX ADMIN — FAMOTIDINE 20 MG: 10 INJECTION INTRAVENOUS at 05:16

## 2019-11-26 RX ADMIN — HALOPERIDOL LACTATE 4 MG: 5 INJECTION, SOLUTION INTRAMUSCULAR at 16:39

## 2019-11-26 RX ADMIN — SITAGLIPTIN 50 MG: 50 TABLET, FILM COATED ORAL at 05:17

## 2019-11-26 RX ADMIN — PIPERACILLIN AND TAZOBACTAM 4.5 G: 4; .5 INJECTION, POWDER, LYOPHILIZED, FOR SOLUTION INTRAVENOUS; PARENTERAL at 05:41

## 2019-11-26 RX ADMIN — PIPERACILLIN AND TAZOBACTAM 4.5 G: 4; .5 INJECTION, POWDER, LYOPHILIZED, FOR SOLUTION INTRAVENOUS; PARENTERAL at 13:01

## 2019-11-26 RX ADMIN — HEPARIN SODIUM 5000 UNITS: 5000 INJECTION INTRAVENOUS; SUBCUTANEOUS at 20:14

## 2019-11-26 RX ADMIN — AMLODIPINE BESYLATE 10 MG: 10 TABLET ORAL at 10:59

## 2019-11-26 RX ADMIN — HEPARIN SODIUM 5000 UNITS: 5000 INJECTION INTRAVENOUS; SUBCUTANEOUS at 13:02

## 2019-11-26 RX ADMIN — TAMSULOSIN HYDROCHLORIDE 0.4 MG: 0.4 CAPSULE ORAL at 05:17

## 2019-11-26 RX ADMIN — IPRATROPIUM BROMIDE AND ALBUTEROL SULFATE 3 ML: .5; 3 SOLUTION RESPIRATORY (INHALATION) at 14:55

## 2019-11-26 RX ADMIN — IPRATROPIUM BROMIDE AND ALBUTEROL SULFATE 3 ML: .5; 3 SOLUTION RESPIRATORY (INHALATION) at 06:46

## 2019-11-26 RX ADMIN — SENNOSIDES AND DOCUSATE SODIUM 2 TABLET: 8.6; 5 TABLET ORAL at 16:45

## 2019-11-26 RX ADMIN — MICAFUNGIN SODIUM 100 MG: 20 INJECTION, POWDER, LYOPHILIZED, FOR SOLUTION INTRAVENOUS at 05:00

## 2019-11-26 RX ADMIN — CLOTRIMAZOLE 1 APPLICATION: 10 CREAM TOPICAL at 16:45

## 2019-11-26 RX ADMIN — ACETAMINOPHEN 650 MG: 325 TABLET, FILM COATED ORAL at 22:09

## 2019-11-26 RX ADMIN — QUETIAPINE FUMARATE 50 MG: 25 TABLET ORAL at 17:00

## 2019-11-26 RX ADMIN — MAGNESIUM HYDROXIDE 30 ML: 400 SUSPENSION ORAL at 13:07

## 2019-11-26 RX ADMIN — ACETAMINOPHEN 650 MG: 325 TABLET, FILM COATED ORAL at 05:16

## 2019-11-26 RX ADMIN — IPRATROPIUM BROMIDE AND ALBUTEROL SULFATE 3 ML: .5; 3 SOLUTION RESPIRATORY (INHALATION) at 10:15

## 2019-11-26 ASSESSMENT — ENCOUNTER SYMPTOMS
WEAKNESS: 0
ORTHOPNEA: 0
BACK PAIN: 0
PALPITATIONS: 0
NERVOUS/ANXIOUS: 0
SPUTUM PRODUCTION: 0
FEVER: 0
VOMITING: 0
ABDOMINAL PAIN: 0
CHILLS: 0
DIZZINESS: 0
COUGH: 0
HEADACHES: 0
BLURRED VISION: 0
NAUSEA: 0
MYALGIAS: 0
DIARRHEA: 0
SHORTNESS OF BREATH: 0

## 2019-11-26 ASSESSMENT — GAIT ASSESSMENTS: GAIT LEVEL OF ASSIST: UNABLE TO PARTICIPATE

## 2019-11-26 ASSESSMENT — COGNITIVE AND FUNCTIONAL STATUS - GENERAL
MOBILITY SCORE: 10
WALKING IN HOSPITAL ROOM: TOTAL
TURNING FROM BACK TO SIDE WHILE IN FLAT BAD: A LOT
MOVING TO AND FROM BED TO CHAIR: A LOT
CLIMB 3 TO 5 STEPS WITH RAILING: TOTAL
STANDING UP FROM CHAIR USING ARMS: A LOT
MOVING FROM LYING ON BACK TO SITTING ON SIDE OF FLAT BED: A LOT
SUGGESTED CMS G CODE MODIFIER MOBILITY: CL

## 2019-11-26 NOTE — CARE PLAN
Problem: Infection  Goal: Will remain free from infection  Outcome: PROGRESSING AS EXPECTED  Intervention: Assess signs and symptoms of infection  Note:   Blood cultures resulted negative. Performing sawyer care and oral care to decrease risk for infection.     Problem: Respiratory:  Goal: Respiratory status will improve  Outcome: PROGRESSING AS EXPECTED  Intervention: Assess and monitor pulmonary status  Note:   Patient's oxygen demand is increasing. Pt now on 2L oxymask.

## 2019-11-26 NOTE — PROGRESS NOTES
Monitor Summary     Sinus Gurmeet to Normal Sinus 0.20/0.08/0.46    HR- 54-65  BP- 120-149 Systolic

## 2019-11-26 NOTE — PROGRESS NOTES
This RN spoke with patient's cousin (Carrie) on the phone, who says she needs an MD note saying the pt cannot make decisions for himself in order to pay his bills for assisted living. This RN told Carrie that Lolita DORANTES would call to explain more to her if we can or cannot obtain that and gave Lolita Butler's number.    Carrie (cousin): [phone]

## 2019-11-26 NOTE — PROGRESS NOTES
Infectious Disease Progress Note    Author: Julia Sherman M.D. Date & Time of service: 2019  11:15 AM    Chief Complaint:  UTI and Gram-negative sepsis   Candiemia     Interval History:  79-year-old male admitted with altered mental status.    [id number] WBC 10.8 AMS worsened-resp failure and bradycardia-now transferred to ICU. Obtunded   101.1, O2 15 L high flow, increased from admit but weaning slowly today, oriented only to self.  LP attempted at bedside but unable to obtain due to agitation.  Neurology recommended MRI/MRA.     Review of Systems:  Review of Systems   Unable to perform ROS: Mental status change   Constitutional: Negative for chills and fever.       Hemodynamics:  Temp (24hrs), Av.7 °C (98.1 °F), Min:36.2 °C (97.1 °F), Max:37 °C (98.6 °F)  Temperature: 36.9 °C (98.4 °F)  Pulse  Av.4  Min: 44  Max: 86   Blood Pressure : 146/68       Physical Exam:  Physical Exam  Constitutional:       Appearance: Normal appearance.   HENT:      Head: Normocephalic and atraumatic.   Cardiovascular:      Rate and Rhythm: Normal rate and regular rhythm.      Pulses: Normal pulses.      Heart sounds: Normal heart sounds.   Pulmonary:      Effort: Pulmonary effort is normal.      Breath sounds: Normal breath sounds.   Abdominal:      General: Abdomen is flat. Bowel sounds are normal. There is no distension.      Palpations: Abdomen is soft.   Musculoskeletal:         General: Swelling present.   Skin:     General: Skin is warm and dry.   Neurological:      Comments: Somnolent, confused per team          Meds:    Current Facility-Administered Medications:   •  amLODIPine  •  QUEtiapine  •  ipratropium-albuterol  •  famotidine  •  enalaprilat  •  heparin  •  ipratropium-albuterol  •  haloperidol lactate  •  [COMPLETED] piperacillin-tazobactam **AND** piperacillin-tazobactam  •  SITagliptin  •  micafungin  •  clotrimazole  •  latanoprost  •  polyethylene glycol/lytes  •  simvastatin  •   tamsulosin  •  acetaminophen  •  ondansetron  •  ondansetron  •  senna-docusate **AND** polyethylene glycol/lytes **AND** magnesium hydroxide **AND** bisacodyl  •  Respiratory Care per Protocol  •  Influenza Vaccine High-Dose pf    Labs:  Recent Labs     11/24/19 0255 11/24/19  0900 11/25/19  0440   WBC 11.8* 10.8 8.0   RBC 3.08* 2.97* 3.09*   HEMOGLOBIN 9.3* 8.9* 9.3*   HEMATOCRIT 29.3* 28.3* 29.7*   MCV 95.1 95.3 96.1   MCH 30.2 30.0 30.1   RDW 52.8* 52.7* 53.0*   PLATELETCT 172 172 165   MPV 9.5 9.2 9.2   NEUTSPOLYS  --  88.00* 83.10*   LYMPHOCYTES  --  4.60* 5.90*   MONOCYTES  --  6.20 7.10   EOSINOPHILS  --  0.20 2.50   BASOPHILS  --  0.30 0.90     Recent Labs     11/24/19 0255 11/24/19  0827 11/25/19  0400   SODIUM 136 134* 141   POTASSIUM 4.5 4.4 4.4   CHLORIDE 105 107 107   CO2 24 22 25   GLUCOSE 94 114* 77   BUN 34* 31* 27*     Recent Labs     11/24/19 0255 11/24/19  0827 11/25/19  0400   ALBUMIN  --  3.1*  --    TBILIRUBIN  --  0.7  --    ALKPHOSPHAT  --  43  --    TOTPROTEIN  --  5.8*  --    ALTSGPT  --  9  --    ASTSGOT  --  32  --    CREATININE 1.95* 1.84* 1.83*       Imaging:  Ct-renal Colic Evaluation(a/p W/o)    Result Date: 11/22/2019 11/22/2019 6:59 PM HISTORY/REASON FOR EXAM:  abnormal labs Cr- and urine infection r/o hydronephrosis. TECHNIQUE/EXAM DESCRIPTION AND NUMBER OF VIEWS: CT scan renal/colic without contrast. 5 mm helical images of the abdomen and pelvis were obtained from the diaphragmatic domes through the pubic symphysis. Low dose optimization technique was utilized for this CT exam including automated exposure control and adjustment of the mA and/or kV according to patient size. COMPARISON: None. FINDINGS: Visualized lung bases show small bilateral pleural fluid collections with associated dependent atelectasis, worse on the LEFT. The liver is unremarkable. The spleen is unremarkable. Adrenal glands are unremarkable. The kidneys are unremarkable. Pancreas is atrophic. The  gallbladder is unremarkable. Steen catheter within mildly distended bladder. Prostate is enlarged. Colonic diverticula noted. Increased small bowel and colonic gas.  Prominent fluid-filled small bowel loops in the RIGHT lower quadrant. No pneumoperitoneum. Moderate atherosclerotic calcification of abdominal aorta. Lumbar spine degenerative changes. T12 vertebral hemangioma. T-spine wall edema.     1.  Dilated fluid-filled small bowel loops in RIGHT lower quadrant concerning for developing obstruction. 2.  No evidence of bowel perforation. 3.  Colonic diverticulosis without evidence for diverticulitis. 4.  No renal stone or hydronephrosis. 5.  Appendix is not visualized. 6.  Enlarged prostate. 7.  Small bilateral pleural effusions with associated atelectasis. 8.  Colonic diverticulosis without evidence for diverticulitis.    [id number] View    Result Date: 11/25/2019 11/25/2019 3:01 PM HISTORY/REASON FOR EXAM:  Abdominal pain. TECHNIQUE/EXAM DESCRIPTION AND NUMBER OF VIEWS:  1 view(s) of the abdomen. COMPARISON: None FINDINGS: AP view of the abdomen demonstrates scattered loops of air-filled bowel. Small bowel loops measure up to 4.9 cm. Phleboliths are in the pelvis. There are arterial calcifications. Degenerative changes are in the spine.     Diffuse bowel distention. Findings are suggestive of ileus. Early obstruction could have a similar appearance.    Dx-chest-limited (1 View)    Result Date: 11/26/2019 11/26/2019 3:12 AM HISTORY/REASON FOR EXAM:  Shortness of Breath TECHNIQUE/EXAM DESCRIPTION AND NUMBER OF VIEWS: Single portable view of the chest. COMPARISON: Yesterday FINDINGS: HEART: Stable size. There is atherosclerotic calcification in the aortic arch. LUNGS: Lung volumes are low. There are perihilar opacities. There are bibasilar opacities. PLEURA: No effusion or pneumothorax.     Bibasilar and perihilar underinflation atelectasis which could obscure an additional process. This is  unchanged.    Dx-chest-limited (1 View)    Result Date: 11/25/2019 11/25/2019 2:08 AM HISTORY/REASON FOR EXAM:  Respiratory failure TECHNIQUE/EXAM DESCRIPTION AND NUMBER OF VIEWS: Single portable view of the chest. COMPARISON: 11/24/2019 FINDINGS: LUNGS: Slight interval worsening in opacities in the right mid/lower lung. Unchanged opacity in the left lung base. No significant pleural effusions. PNEUMOTHORAX: None. LINES AND TUBES: None. MEDIASTINUM: Stable cardiac silhouette. MUSCULOSKELETAL STRUCTURES: Unchanged.     1.  Interval worsening of pulmonary opacities in the right mid/lower lung. 2.  Unchanged left basilar atelectasis and/or consolidation. No significant pleural effusions.    Dx-chest-portable (1 View)    Result Date: 11/24/2019 11/24/2019 10:53 AM HISTORY/REASON FOR EXAM: Shortness of Breath. TECHNIQUE/EXAM DESCRIPTION AND NUMBER OF VIEWS: Single AP view of the chest. COMPARISON: 11/21/2019 FINDINGS: Lungs: Large volumes with reticular predominantly increased opacity is similar to comparison. There is also some hazy opacity at the bases especially on the left where there is some hemidiaphragm elevation Pleura:  Small layering effusions are likely Heart and mediastinum: There is stable cardiac silhouette enlargement.     Similar reticular opacities suspicious for edema and there are likely small layering effusions More focal basilar opacity is more likely from atelectasis than consolidation    Dx-chest-portable (1 View)    Result Date: 11/21/2019 11/21/2019 4:49 AM HISTORY/REASON FOR EXAM: Possible sepsis. TECHNIQUE/EXAM DESCRIPTION:  Single AP view of the chest. COMPARISON: None FINDINGS: Overlying cardiac leads are present. Cardiomegaly is observed. Atherosclerotic calcification of the aorta is noted.  The central  pulmonary vasculature appears prominent and indistinct. The lungs appear well expanded bilaterally.  Diffuse scattered hazy pulmonary parenchymal opacities are seen. The lung bases are  partially excluded. The bony structures appear age-appropriate.     1.  Pulmonary edema and/or infiltrates are identified, which are stable since the prior exam. 2.  Cardiomegaly 3.  Atherosclerosis    Ec-echocardiogram Complete W/o Cont    Result Date: 2019  Transthoracic Echo Report Echocardiography Laboratory CONCLUSIONS No prior study is available for comparison. Technically difficult study. Normal left ventricular chamber size. Grossly preserved left ventricular systolic function. The right ventricle was normal in size and function. Mild aortic stenosis. NIR JOSHI Exam Date:         2019                    13:42 Exam Location:     Inpatient Priority:          Routine Ordering Physician:        KRISTINA GALEANA Referring Physician:       [id number] Whiteside Sonographer:               Tessie Hill RDCS Age:    79     Gender:    M MRN:    [id number] :    1940 BSA:    2.27   Ht (in):    79     Wt (lb):    198 Exam Type:     Complete Indications:     Shortness of breath ICD Codes:       R06.02 CPT Codes:       69138 BP:   124    /   55     HR:   55 Technical Quality:       Technically difficult study -                          adequate information is obtained MEASUREMENTS  (Male / Female) Normal Values 2D ECHO LV Diastolic Diameter PLAX        5 cm                  4.2 - 5.9 / 3.9 - 5.3 cm LV Systolic Diameter PLAX         2.6 cm                2.1 - 4.0 cm IVS Diastolic Thickness           1.1 cm                LVPW Diastolic Thickness          1.3 cm                LVOT Diameter                     1.7 cm                LV Ejection Fraction MOD 2C       67.1 %                DOPPLER AV Peak Velocity                  2.2 m/s               AV Peak Gradient                  19 mmHg               AV Mean Gradient                  10.9 mmHg             LVOT Peak Velocity                1.1 m/s               AV Area Cont Eq vti               0.97 cm???              Mitral E Point Velocity            0.61 m/s              Mitral E to A Ratio               1.7                   MV Pressure Half Time             27.9 ms               MV Area PHT                       7.9 cm???               MV Deceleration Time              96.4 ms               * Indicates values subject to auto-interpretation LV EF:        % FINDINGS Left Ventricle Normal left ventricular chamber size. Mild concentric left ventricular hypertrophy. Grossly preserved left ventricular systolic function. Unable to assess wall motion. Indeterminate diastolic function. Right Ventricle The right ventricle was normal in size and function. Right Atrium The right atrium is normal in size. Inferior vena cava is not well visualized. Left Atrium The left atrium is normal in size. Left atrial volume index is 22 mL/sq m. Mitral Valve The mitral valve is not well visualized. No stenosis or regurgitation seen. Aortic Valve The aortic valve is not well visualized. Calcified aortic valve leaflets. Mild aortic stenosis. Transvalvular gradients are - Peak: 20 mmHg, Mean: 12 mmHg. Dimensionless index is (0.39). No aortic insufficiency. Tricuspid Valve Structurally normal tricuspid valve without significant stenosis or regurgitation. Pulmonic Valve Structurally normal pulmonic valve without significant stenosis or regurgitation. Pericardium Normal pericardium without effusion. Aorta The aortic root is normal. Ascending aorta not well visualized. Marcelo Smith MD (Electronically Signed) Final Date:     23 November 2019                 16:53      Micro:  Results     Procedure Component Value Units Date/Time    BLOOD CULTURE [[id number]]  (Abnormal) Collected:  11/21/19 0441    Order Status:  Completed Specimen:  Blood from Peripheral Updated:  11/26/19 0832     Significant Indicator POS     Source BLD     Site PERIPHERAL     Culture Result Growth detected by Bactec instrument. 11/22/2019  14:54      Pseudomonas aeruginosa  See previous culture for sensitivity  "report.  P.aeruginosa may develop resistance during prolonged therapy  with all antibiotics. Isolates that are initially susceptible  may become resistant within three to four days after  initiation of therapy. Testing of repeat isolates may be  warranted.        Candida glabrata    Narrative:       CALL  Seals  171 tel. [phone],  CALLED  171 tel. [phone] 11/22/2019, 16:12, RB PERF. RESULTS CALLED  TO:[id number]  Per Hospital Policy: Only change Specimen Src: to \"Line\" if  specified by physician order.  Left Forearm/Arm    BLOOD CULTURE [[id number]] Collected:  11/25/19 0800    Order Status:  Completed Specimen:  Blood from Peripheral Updated:  11/26/19 0732     Significant Indicator NEG     Source BLD     Site PERIPHERAL     Culture Result No Growth  Note: Blood cultures are incubated for 5 days and  are monitored continuously.Positive blood cultures  are called to the RN and reported as soon as  they are identified.      Narrative:       Collected By:[id number] YUKO MCCOY  Per Hospital Policy: Only change Specimen Src: to \"Line\" if  specified by physician order.  Right Forearm/Arm    BLOOD CULTURE [[id number]] Collected:  11/25/19 0800    Order Status:  Completed Specimen:  Blood from Peripheral Updated:  11/26/19 0732     Significant Indicator NEG     Source BLD     Site PERIPHERAL     Culture Result No Growth  Note: Blood cultures are incubated for 5 days and  are monitored continuously.Positive blood cultures  are called to the RN and reported as soon as  they are identified.      Narrative:       Collected By:[id number] YUKO MCCOY  Per Hospital Policy: Only change Specimen Src: to \"Line\" if  specified by physician order.  Left Forearm/Arm    Blood Culture [[id number]] Collected:  11/24/19 1300    Order Status:  Completed Specimen:  Blood from Peripheral Updated:  11/25/19 0845     Significant Indicator NEG     Source BLD     Site PERIPHERAL     Culture Result No Growth  Note: Blood cultures are " "incubated for 5 days and  are monitored continuously.Positive blood cultures  are called to the RN and reported as soon as  they are identified.      Narrative:       Collected By:[id number] CAMRON MCCOY  From different peripheral sites, if not done within the last  24 hours (Per Hospital Policy: Only change specimen source to  \"Line\" if specified by physician order)  Right Forearm/Arm    Blood Culture [[id number]] Collected:  11/24/19 1300    Order Status:  Completed Specimen:  Blood from Peripheral Updated:  11/25/19 0845     Significant Indicator NEG     Source BLD     Site PERIPHERAL     Culture Result No Growth  Note: Blood cultures are incubated for 5 days and  are monitored continuously.Positive blood cultures  are called to the RN and reported as soon as  they are identified.      Narrative:       Collected By:[id number] CAMRON MCCOY  From different peripheral sites, if not done within the last  24 hours (Per Hospital Policy: Only change specimen source to  \"Line\" if specified by physician order)  Left Forearm/Arm    Urinalysis [[id number]]  (Abnormal) Collected:  11/24/19 1920    Order Status:  Completed Specimen:  Urine, Steen Cath Updated:  11/24/19 2328     Color Yellow     Character Clear     Specific Gravity 1.010     Ph 5.0     Glucose Negative mg/dL      Ketones Negative mg/dL      Protein Negative mg/dL      Bilirubin Negative     Urobilinogen, Urine 0.2     Nitrite Negative     Leukocyte Esterase Moderate     Occult Blood Large     Micro Urine Req Microscopic    Narrative:       Collected By:[id number] BESSY ALONSO  If not done within the last 24 hours    MRSA By PCR (Amp) [[id number]] Collected:  11/24/19 1730    Order Status:  Completed Specimen:  Respirate from Nares Updated:  11/24/19 2121     Significant Indicator NEG     Source RESP     Site NARES     MRSA PCR Negative for MRSA by PCR.    Narrative:       Collected By:[id number] CAMRON MCCOY  Per [employer] Protocol  Collected " "By:[id number] CAMRON MCCOY    URINALYSIS [[id number]] Collected:  11/24/19 1920    Order Status:  Canceled Specimen:  Urine, Steen Cath     URINALYSIS [[id number]] Collected:  11/24/19 1920    Order Status:  Canceled Specimen:  Urine, Steen Cath     HSV 1/2 By PCR(Herpes)+[id number] [[id number]]     Order Status:  Canceled Specimen:  Genital from Blood     CSF Culture [[id number]]     Order Status:  Canceled Specimen:  CSF from Tap     BLOOD CULTURE [[id number]]  (Abnormal)  (Susceptibility) Collected:  11/21/19 0441    Order Status:  Completed Specimen:  Blood from Peripheral Updated:  11/24/19 0930     Significant Indicator POS     Source BLD     Site PERIPHERAL     Culture Result Growth detected by Bactec instrument. 11/22/2019  06:34      Pseudomonas aeruginosa  P.aeruginosa may develop resistance during prolonged therapy  with all antibiotics. Isolates that are initially susceptible  may become resistant within three to four days after  initiation of therapy. Testing of repeat isolates may be  warranted.      Narrative:       CALL  Seals  171 tel. [phone],  CALLED  171 tel. [phone] 11/22/2019, 06:38, RB PERF. RESULTS CALLED  TO:[id number]  Per Hospital Policy: Only change Specimen Src: to \"Line\" if  specified by physician order.  Right AC    Susceptibility     Pseudomonas aeruginosa (1)     Antibiotic Interpretation Microscan Method Status    Ceftazidime Sensitive 4 mcg/mL GIRISH Final    Ciprofloxacin Sensitive <=1 mcg/mL GIRISH Final    Cefepime Sensitive <=2 mcg/mL GIRISH Final    Amikacin Sensitive <=16 mcg/mL GIRISH Final    Gentamicin Sensitive <=4 mcg/mL GIRISH Final    Tobramycin Sensitive <=4 mcg/mL GIRISH Final    Meropenem Sensitive <=1 mcg/mL GIRISH Final    Pip/Tazobactam Sensitive <=16 mcg/mL GIRISH Final                   URINE CULTURE(NEW) [[id number]]  (Abnormal)  (Susceptibility) Collected:  11/21/19 0633    Order Status:  Completed Specimen:  Urine Updated:  11/23/19 0823     Significant Indicator POS     Source UR     " Site -     Culture Result -      Pseudomonas aeruginosa  ,000 cfu/mL  P.aeruginosa may develop resistance during prolonged therapy  with all antibiotics. Isolates that are initially susceptible  may become resistant within three to four days after  initiation of therapy. Testing of repeat isolates may be  warranted.      Narrative:       Indication for culture:->Septic Shock: Persistent  hypotension,  Lactic acid > 4, vasopressors/inotropes started  Indication for culture:->Septic Shock: Persistent    Susceptibility     Pseudomonas aeruginosa (1)     Antibiotic Interpretation Microscan Method Status    Ceftazidime Sensitive 4 mcg/mL GIRISH Final    Ciprofloxacin Sensitive <=1 mcg/mL GIRISH Final    Cefepime Sensitive <=2 mcg/mL GIRISH Final    Amikacin Sensitive <=16 mcg/mL GIRISH Final    Gentamicin Sensitive <=4 mcg/mL GIRISH Final    Tobramycin Sensitive <=4 mcg/mL GIRISH Final    Levofloxacin Sensitive <=2 mcg/mL GIRISH Final    Meropenem Sensitive <=1 mcg/mL GIRISH Final    Pip/Tazobactam Sensitive <=16 mcg/mL GIRISH Final                   URINALYSIS [[id number]]  (Abnormal) Collected:  11/21/19 0633    Order Status:  Completed Specimen:  Urine Updated:  11/21/19 0716     Color Yellow     Character Cloudy     Specific Gravity 1.012     Ph 6.0     Glucose Negative mg/dL      Ketones Negative mg/dL      Protein Negative mg/dL      Bilirubin Negative     Urobilinogen, Urine 0.2     Nitrite Negative     Leukocyte Esterase Large     Occult Blood Moderate     Micro Urine Req Microscopic    Narrative:       Indication for culture:->Septic Shock: Persistent  hypotension,  Lactic acid > 4, vasopressors/inotropes started    Influenza A/B By PCR (Adult - Flu Only) [[id number]] Collected:  11/21/19 0525    Order Status:  Completed Specimen:  Respirate from Nasopharyngeal Updated:  11/21/19 0615     Influenza virus A RNA Negative     Influenza virus B, PCR Negative    Culture Respiratory W/ GRM STN [[id number]] Collected:  11/21/19 0000    Order  Status:  Canceled Specimen:  Sputum     BLOOD CULTURE [[id number]] Collected:  11/21/19 0000    Order Status:  Canceled Specimen:  Other from Peripheral     BLOOD CULTURE [[id number]] Collected:  11/21/19 0000    Order Status:  Canceled Specimen:  Other from Peripheral     Influenza By PCR, A/B [[id number]] Collected:  11/21/19 0000    Order Status:  Canceled Specimen:  Respirate from Nasopharyngeal           Assessment:  Active Hospital Problems    Diagnosis   • *UTI (urinary tract infection) [N39.0]   • Candidemia ([facility]) [B37.7]   • Acute respiratory failure with hypoxia, RAD, PNA ([facility]) [J96.01]   • Bacteremia due to Pseudomonas [R78.81]   • CAP (community acquired pneumonia) [J18.9]   • Altered mental status [R41.82]   • Leukocytosis [D72.829]   • Acute renal failure (ARF) ([facility]) [N17.9]   • Reactive airway disease with wheezing with acute exacerbation [J45.901]   • Normocytic normochromic anemia [D64.9]   • Hypocalcemia [E83.51]   • Toxic metabolic encephalopathy [G92]     Interval 24 hour assessment:      AF, O2 1 L oxymask   Labs reviewed, none new today   Imaging personally reviewed both images and report. Stable per my read.   Studies reviewed  Micro reviewed    Pt continued on Zosyn and micafungin.  Somnolent today and remains confused.    79-year-old white male admitted on 11/21/2019 due to altered mental status.  He had concern for   acute-on-chronic renal failure, urinary tract infection as well as CAP.  His renal failure was thought to be due to a malfunctioning Steen catheter and a new catheter was placed with significant urinary retention up over 1500 mL.  Urine culture was showing  Gram-negative rods and blood cultures showing Gram-negative rods, so infectious disease was consulted for antibiotic recommendations and management.  Rapid response was called due to concern for paresthesia of the distal extremities and he was transferred to the ICU.  Blood cultures on 11/21 with growth of yeast as well as  Pseudomonas.      Plan:  UTI and Gram-negative bacteremia - PSAR   Fevers, ongoing   Urine and Blood cxs +PSAR     --- Continue Zosyn for PSAR UTI and bacteremia, possible aspiration pneumonia  --- F/up repeat blood cultures today, in process     Candidemia, C glabrata unclear etiology, patient without central line, significant urinary retention but did not grow from urine culture.  CT concerning for developing obstruction but no obvious bowel perforation.  BCx 11/21+ C glabrata     --- Ophthalmology evaluate for Candida endophthalmitis as patient is encephalopathic and unable to voice if there are any vision changes  --- Recommend SARAVANAN if feasible as TTE unrevealing  --- Follow-up repeat blood cultures from 11/24   --- Continue micafungin until yeast is identified      Encephalopathy, agitation  Plan had been for LP but this was not completed  MRA/ MRI considered but not done      Hypoxemia, pulmonary edema - improved      Acute renal failure-ongoing but improved from admit  Multifactorial including his sepsis, urinary tract infection, and obstructed Steen catheter.    His Steen catheter has since been replaced.    Antibiotics will need to be dose adjusted accordingly  CT neg for obstruction or stone  Monitor his urinary function carefully.     AMS, worse  Multifactorial incl infections, meds, hosp,   Aspiration and fall precautions  CT or MRI brain if feasible     Ileus vs bowel obstr  Appreciate surgery eval  Keep abx IV     Discussed with internal medicine, Dr. Gutierrez.  ID will follow.

## 2019-11-26 NOTE — PROGRESS NOTES
Critical Care Progress Note    Date of admission  11/21/2019    Chief Complaint  Acute hypoxic resp failure, AMS    Hospital Course    79 y.o. male who presented 11/21/2019 with a past medical history significant for hypertension, chronic healing Steen catheter and type 2 diabetes who was transferred from [facility] on November 21 for 1 day of fever, tachypnea, altered mental status and cough.  He was found to be mildly hypoxic and wheezing and responded to a DuoNeb.  He was admitted to the hospital and began treatment for pneumonia as well as urinary tract infection and was found to be bacteremic with Pseudomonas which is the same bacteria in his urine culture.  Infectious disease was consulted and agreed with his ongoing antibiotic therapy.  He remained altered and this morning had an increase in his oxygen requirement and work of breathing and a rapid response was called and he was transferred to the intensive care unit where I was consulted for his critical care management.  He has been seen by neurology as well although I do not yet see a note from them who ordered a stroke work-up.  Patient is unable to provide any additional history at this time given his current clinical condition      Interval Problem Update  Reviewed last 24 hour events:  Afebrile overnihgt.   HRin 50-60s  SBP in 120-140s  On 1L NC, sat >93%  Pt remains delirious. Received seroquel 25mg PO last night and had good sleep  Received total haldol 3mg yesterday.   Per family, this is his baseline mental status.   UOP in 150-600cc.   BMP, CBC pending    Review of Systems  Review of Systems   Constitutional: Negative for chills, fever and malaise/fatigue.   HENT: Negative for congestion.    Respiratory: Negative for cough, sputum production and shortness of breath.    Cardiovascular: Negative for chest pain, palpitations, orthopnea and leg swelling.   Gastrointestinal: Negative for abdominal pain, diarrhea, nausea and vomiting.    Musculoskeletal: Negative for back pain and joint pain.   Skin: Negative for rash.   Neurological: Negative for weakness and headaches.   Psychiatric/Behavioral: The patient is not nervous/anxious.    All other systems reviewed and are negative.       Vital Signs for last 24 hours   Temp:  [36.2 °C (97.1 °F)-37 °C (98.6 °F)] 36.5 °C (97.7 °F)  Pulse:  [54-64] 57  Resp:  [15-25] 17  BP: (121-149)/(51-84) 139/68  SpO2:  [87 %-100 %] 97 %    Hemodynamic parameters for last 24 hours       Respiratory Information for the last 24 hours       Physical Exam   Physical Exam  Vitals signs and nursing note reviewed.   Constitutional:       General: He is not in acute distress.     Appearance: Normal appearance. He is not ill-appearing, toxic-appearing or diaphoretic.   HENT:      Head: Normocephalic and atraumatic.      Mouth/Throat:      Comments: Dry oral mucosa  Eyes:      Conjunctiva/sclera: Conjunctivae normal.   Neck:      Musculoskeletal: Neck supple.   Cardiovascular:      Rate and Rhythm: Normal rate and regular rhythm.      Pulses: Normal pulses.      Heart sounds: Normal heart sounds. No murmur.   Pulmonary:      Effort: Pulmonary effort is normal. No respiratory distress.      Breath sounds: Normal breath sounds. No wheezing, rhonchi or rales.      Comments: On high flow nasal cannula 30% 15L/min  Abdominal:      General: Bowel sounds are normal. There is no distension.      Palpations: Abdomen is soft. There is no mass.      Tenderness: There is no tenderness.      Hernia: No hernia is present.   Musculoskeletal:         General: No swelling or tenderness.   Skin:     General: Skin is warm and dry.      Coloration: Skin is not jaundiced.   Neurological:      General: No focal deficit present.      Mental Status: He is alert and oriented to person, place, and time.      Cranial Nerves: No cranial nerve deficit.      Comments: Moving all extremities, no focal neuro deficit   Psychiatric:         Mood and Affect:  Mood normal.         Behavior: Behavior normal.         Medications  Current Facility-Administered Medications   Medication Dose Route Frequency Provider Last Rate Last Dose   • QUEtiapine (SEROQUEL) tablet 50 mg  50 mg Oral Q EVENING Loki Gutierrez D.O.   50 mg at 11/25/19 1710   • ipratropium-albuterol (DUONEB) nebulizer solution  3 mL Nebulization 4X/DAY (RT) Erich Alba M.D.   3 mL at 11/26/19 0646   • lactated ringers infusion (BOLUS)  500 mL Intravenous Once PRN Erich Alba M.D.       • famotidine (PEPCID) injection 20 mg  20 mg Intravenous DAILY Erich Alba M.D.   20 mg at 11/26/19 0516   • enalaprilat (VASOTEC) injection 1.25 mg  1.25 mg Intravenous Q6HRS PRN Erich Alba M.D.       • heparin injection 5,000 Units  5,000 Units Subcutaneous Q8HRS Erich Alba M.D.   5,000 Units at 11/26/19 0516   • ipratropium-albuterol (DUONEB) nebulizer solution  3 mL Nebulization Q2HRS PRN (RT) Jeremy M Gonda, M.D.   3 mL at 11/25/19 0521   • haloperidol lactate (HALDOL) injection 2-5 mg  2-5 mg Intravenous Q4HRS PRN Jeremy M Gonda, M.D.   3 mg at 11/25/19 1556   • piperacillin-tazobactam (ZOSYN) 4.5 g in  mL IVPB  4.5 g Intravenous Q8HRS Flor Patel M.D. 25 mL/hr at 11/26/19 0541 4.5 g at 11/26/19 0541   • SITagliptin (JANUVIA) tablet 50 mg  50 mg Oral DAILY Erich Alba M.D.   50 mg at 11/26/19 0517   • micafungin (MYCAMINE) 100 mg in D5W 100 mL ivpb  100 mg Intravenous Q24HRS Flor Patel M.D.   Stopped at 11/26/19 0600   • clotrimazole (LOTRIMIN) 1 % cream 1 Application  1 Application Topical BID Shelbie Treviño M.D.   1 Application at 11/26/19 0516   • latanoprost (XALATAN) 0.005 % ophthalmic solution 1 Drop  1 Drop Both Eyes Nightly Shelbie Treviño M.D.   1 Drop at 11/25/19 3216   • polyethylene glycol/lytes (MIRALAX) PACKET 1 Packet  1 Packet Oral DAILY Shelbie Treviño M.D.   1 Packet at 11/26/19 3853   • simvastatin (ZOCOR) tablet 10 mg  10 mg Oral Nightly Shelbie  MEL Treviño   10 mg at 11/25/19 2159   • tamsulosin (FLOMAX) capsule 0.4 mg  0.4 mg Oral DAILY Shelbie Treviño M.D.   0.4 mg at 11/26/19 0517   • acetaminophen (TYLENOL) tablet 650 mg  650 mg Oral Q6HRS PRN Shelbie Treviño M.D.   650 mg at 11/26/19 0516   • ondansetron (ZOFRAN) syringe/vial injection 4 mg  4 mg Intravenous Q4HRS PRN Shelbie Treviño M.D.       • ondansetron (ZOFRAN ODT) dispertab 4 mg  4 mg Oral Q4HRS PRN Shelbie Treviño M.D.       • senna-docusate (PERICOLACE or SENOKOT S) 8.6-50 MG per tablet 2 Tab  2 Tab Oral BID Shelbie Treviño M.D.   2 Tab at 11/26/19 0516    And   • polyethylene glycol/lytes (MIRALAX) PACKET 1 Packet  1 Packet Oral QDAY PRN Shelbie Treviño M.D.   1 Packet at 11/23/19 1717    And   • magnesium hydroxide (MILK OF MAGNESIA) suspension 30 mL  30 mL Oral QDAY PRN Shelbie Treviño M.D.        And   • bisacodyl (DULCOLAX) suppository 10 mg  10 mg Rectal QDAY PRN Shelbie Treviño M.D.       • Respiratory Care per Protocol   Nebulization Continuous RT Shelbie Treviño M.D.       • fentaNYL (SUBLIMAZE) injection 25-50 mcg  25-50 mcg Intravenous Q HOUR PRN Shelbie Treviño M.D.   50 mcg at 11/22/19 0216   • Influenza Vaccine High-Dose pf injection 0.5 mL  0.5 mL Intramuscular Once PRN Kuldeep Art R.N.           Fluids    Intake/Output Summary (Last 24 hours) at 11/26/2019 0740  Last data filed at 11/26/2019 0600  Gross per 24 hour   Intake 200 ml   Output 3610 ml   Net -3410 ml       Laboratory  Recent Labs     11/24/19  0847 11/24/19  1054 11/24/19  1505   XUUVH72H 7.44 7.44  --    JMLAMJ031T 34.3 32.8  --    LMFHX870Q 59.7* 85.8  --    ISHB2CIT 90.3* 95.9  --    ARTHCO3 23 22  --    [id number] 2.0 5.0  --    ARTBE -1 -2  --    ISTATAPH  --   --  7.394*   ISTATAPCO2  --   --  37.6*   ISTATAPO2  --   --  89*   ISTATATCO2  --   --  24   OIMWIZB2WFY  --   --  97   ISTATARTHCO3  --   --  23.0   ISTATARTBE  --   --  -2   ISTATTEMP  --   --  98.3 F   ISTATFIO2  --   --  60   ISTATSPEC  --   --   Arterial   ISTATAPHTC  --   --  7.396*   [id number]  --   --  88*         Recent Labs     11/24/19 0255 11/24/19 0827 11/25/19  0400   SODIUM 136 134* 141   POTASSIUM 4.5 4.4 4.4   CHLORIDE 105 107 107   CO2 24 22 25   BUN 34* 31* 27*   CREATININE 1.95* 1.84* 1.83*   CALCIUM 8.0* 8.3* 8.2*     Recent Labs     11/24/19 0255 11/24/19 0827 11/25/19  0400   ALTSGPT  --  9  --    ASTSGOT  --  32  --    ALKPHOSPHAT  --  43  --    TBILIRUBIN  --  0.7  --    LIPASE  --  13  --    GLUCOSE 94 114* 77     Recent Labs     11/24/19 0255 11/24/19 0827 11/24/19 0900 11/25/19  0440   WBC 11.8*  --  10.8 8.0   NEUTSPOLYS  --   --  88.00* 83.10*   LYMPHOCYTES  --   --  4.60* 5.90*   MONOCYTES  --   --  6.20 7.10   EOSINOPHILS  --   --  0.20 2.50   BASOPHILS  --   --  0.30 0.90   ASTSGOT  --  32  --   --    ALTSGPT  --  9  --   --    ALKPHOSPHAT  --  43  --   --    TBILIRUBIN  --  0.7  --   --      Recent Labs     11/24/19 0255 11/24/19 0900 11/25/19  0440   RBC 3.08* 2.97* 3.09*   HEMOGLOBIN 9.3* 8.9* 9.3*   HEMATOCRIT 29.3* 28.3* 29.7*   PLATELETCT 172 172 165   PROTHROMBTM  --  18.5*  --    INR  --  1.50*  --        Imaging  X-Ray:  I have personally reviewed the images and compared with prior images.  CXR 11/25 with bilateral infiltrates noted. +pulm congestion  CXR today 11/26, compared to yesterday, personally reviewed. Appears improving.     Microbiology  11/21 BCx 2/2: pseudomonas (pan sensitive), yeast  11/21 UCx: pseudomonas, UA +pyuria  11/24 Nasal MRSA screen negative    Assessment/Plan  * UTI (urinary tract infection)- (present on admission)  Assessment & Plan  With pansensitive Pseudomonas  Continue antibiotic, antifungal  ID following  Sawyer has been exchanged at admission. Pt is chronic sawyer indwelling.       Candidemia (HCC)- (present on admission)  Assessment & Plan  On micafungin.   Pt is candida glabrata. Repeated BCx negative  ?source - abdomen vs other. No central line  Repeated blood cultures are  negative to date  Defer to ID for duration     Bacteremia due to Pseudomonas- (present on admission)  Assessment & Plan  Pseudomonas, pansensitive, likely secondary UTI versus pneumonia  Continue cefepime  ID consultation    Acute respiratory failure with hypoxia, RAD, PNA (HCC)- (present on admission)  Assessment & Plan  On oxymask 2-3L NC.   Delirium  RT/O2 protocol  Pulmonary toiletry  Diuresis    Altered mental status- (present on admission)  Assessment & Plan  Likely metabolic encephalopathy  Delirium and I question whether this is his true baseline.   Seroquel 25mg qhs  Haldol prn agitation, delirium        CAP (community acquired pneumonia)- (present on admission)  Assessment & Plan  Continue cefepime and vancomycin  Follow-up on final culture results  Bronchoscopy if patient requires intubation    Acute renal failure (ARF) (HCC)- (present on admission)  Assessment & Plan  Secondary to sepsis, ATN - Creatinine is stable.   Target goal UOP >50cc/hr  Avoid nephrotoxins  Monitor with ongoing sepsis management, maintain perfusion  Monitor electrolytes, urine output, creatinine closely    Leukocytosis- (present on admission)  Assessment & Plan  Secondary to infection -improving overall.       Reactive airway disease with wheezing with acute exacerbation- (present on admission)  Assessment & Plan  Continue scheduled and PRN bronchodilators  RT protocol  Hold off on steroids for now as he does not appear bronchospastic on my exam    Hypocalcemia- (present on admission)  Assessment & Plan  Repletion and monitor closely    Normocytic normochromic anemia- (present on admission)  Assessment & Plan  Daily CBC with conservative transfusion strategy       VTE:  Heparin  Ulcer: Not Indicated  Lines: Steen Catheter  Ongoing indication addressed    I have performed a physical exam and reviewed and updated ROS and Plan today (11/26/2019). In review of yesterday's note (11/25/2019), there are no changes except as documented  above.     Discussed patient condition and risk of morbidity and/or mortality with Hospitalist, RN, RT, Charge nurse / hot rounds and Patient

## 2019-11-26 NOTE — PROGRESS NOTES
Nephrology Daily Progress Note    Date of Service  11/26/2019    Chief Complaint  The patient is an 79-year-old gentleman with   history of dementia, hypertension, diabetes, benign prostatic hypertrophy, and   gastroesophageal reflux disease who presented to [facility] on 11/21 with altered mental status.  There was concern at the time for   acute on chronic renal failure, unknown what his baseline serum creatinine   is, but when he initially presented to [facility], his serum creatinine was 2.54   and over the course of his stay, it came down to 1.84.    11/24/19 - Nephrology was   asked to see him to evaluate his renal failure.  He is unable to give me a   full history given the fact that he is demented and currently tachypneic, in   respiratory distress.  ICU team is in the process of bringing him over to the   ICU for further evaluation and emergent treatment.  His renal failure was   presumed to be secondary to an obstructed Steen.  He had urinary retention of   about 1500 mL  Urine culture was also showing evidence of gram-negative rods   and blood cultures showing gram-negative rods.    Interval Problem Update  11/25/19 - Appears comfortable.  11/26/19 - Comfrtable.    Review of Systems  Review of Systems   Unable to perform ROS: Dementia        Physical Exam  Temp:  [36.2 °C (97.1 °F)-37 °C (98.6 °F)] 36.9 °C (98.4 °F)  Pulse:  [54-64] 55  Resp:  [14-25] 16  BP: (121-157)/(51-84) 157/75  SpO2:  [87 %-100 %] 92 %    Physical Exam  Vitals signs and nursing note reviewed.   HENT:      Head: Normocephalic and atraumatic.   Eyes:      Extraocular Movements: Extraocular movements intact.   Neck:      Musculoskeletal: Neck supple.   Cardiovascular:      Rate and Rhythm: Regular rhythm. Bradycardia present.      Pulses: Normal pulses.   Pulmonary:      Breath sounds: Normal breath sounds.   Abdominal:      Palpations: Abdomen is soft.   Skin:     General: Skin is warm.          Fluids    Intake/Output Summary (Last 24 hours) at 11/26/2019 1028  Last data filed at 11/26/2019 1000  Gross per 24 hour   Intake 350 ml   Output 3485 ml   Net -3135 ml       Laboratory  Recent Labs     11/24/19  0255 11/24/19  0900 11/25/19  0440   WBC 11.8* 10.8 8.0   RBC 3.08* 2.97* 3.09*   HEMOGLOBIN 9.3* 8.9* 9.3*   HEMATOCRIT 29.3* 28.3* 29.7*   MCV 95.1 95.3 96.1   MCH 30.2 30.0 30.1   MCHC 31.7* 31.4* 31.3*   RDW 52.8* 52.7* 53.0*   PLATELETCT 172 172 165   MPV 9.5 9.2 9.2     Recent Labs     11/24/19  0255 11/24/19  0827 11/25/19  0400   SODIUM 136 134* 141   POTASSIUM 4.5 4.4 4.4   CHLORIDE 105 107 107   CO2 24 22 25   GLUCOSE 94 114* 77   BUN 34* 31* 27*   CREATININE 1.95* 1.84* 1.83*   CALCIUM 8.0* 8.3* 8.2*     Recent Labs     11/24/19  0900   INR 1.50*     No results for input(s): NTPROBNP in the last 72 hours.  Recent Labs     11/25/19  0400   TRIGLYCERIDE 69   HDL 38*   LDL 45     ASSESSMENT:  1.  His acute kidney injury, on presentation, has improved somewhat. Cause likely urosepsis + urinary retention.  2.  Chronic kidney disease, unknown baseline levels  3.  Acidosis.  4.  Anemia.  5.  Respiratory distress.  6.  Community-acquired pneumonia.  7.  Sepsis.  8.  Altered mental status.  9.  Urinary tract infection.  10.  Gram-negative bacteremia.  11.  Hypocalcemia    Plan:  Continue as is  Follow.

## 2019-11-26 NOTE — PROGRESS NOTES
Monitor Summary  Sinus Bradycardia-Sinus Rhythm  Rate 54-64  .18/.12/.48    12 Hour Chart Check complete at bedside.

## 2019-11-26 NOTE — CARE PLAN
Problem: Oxygenation:  Goal: Maintain adequate oxygenation dependent on patient condition  Outcome: PROGRESSING AS EXPECTED   Pt on 4L oxymask    Problem: Bronchoconstriction:  Goal: Improve in air movement and diminished wheezing  Outcome: PROGRESSING AS EXPECTED   Duoneb QID

## 2019-11-26 NOTE — PROGRESS NOTES
"Pt is A&Ox2, which is confirmed as his baseline via phone call with cousin Carrie.   Pt is taking off high flow nasal cannula, ekg leads, and is trying to \"get out of bed and drive to dinner.\"  Notified MD Gutierrez. Orders to administer haldol and new orders for pm meds.  "

## 2019-11-26 NOTE — CARE PLAN
Problem: Safety  Goal: Will remain free from injury  Outcome: PROGRESSING AS EXPECTED  Goal: Will remain free from falls  Outcome: PROGRESSING AS EXPECTED     Problem: Infection  Goal: Will remain free from infection  Outcome: PROGRESSING AS EXPECTED     Problem: Venous Thromboembolism (VTW)/Deep Vein Thrombosis (DVT) Prevention:  Goal: Patient will participate in Venous Thrombosis (VTE)/Deep Vein Thrombosis (DVT)Prevention Measures  Outcome: PROGRESSING AS EXPECTED     Problem: Knowledge Deficit  Goal: Knowledge of disease process/condition, treatment plan, diagnostic tests, and medications will improve  Outcome: PROGRESSING AS EXPECTED  Goal: Knowledge of the prescribed therapeutic regimen will improve  Outcome: PROGRESSING AS EXPECTED     Problem: Discharge Barriers/Planning  Goal: Patient's continuum of care needs will be met  Outcome: PROGRESSING AS EXPECTED     Problem: Respiratory:  Goal: Respiratory status will improve  Outcome: PROGRESSING AS EXPECTED     Problem: Fluid Volume:  Goal: Will maintain balanced intake and output  Outcome: PROGRESSING AS EXPECTED     Problem: Pain Management  Goal: Pain level will decrease to patient's comfort goal  Outcome: PROGRESSING AS EXPECTED  Note:   Pt is on 3L oxymask when he takes it off stats decrease down to mid 80's pt has a strong productive cough however is unable to use yankar to clear and swallows sputum.      Problem: Skin Integrity  Goal: Risk for impaired skin integrity will decrease  Outcome: PROGRESSING AS EXPECTED     Problem: Psychosocial Needs:  Goal: Level of anxiety will decrease  Outcome: PROGRESSING AS EXPECTED     Problem: Safety - Medical Restraint  Goal: Remains free of injury from restraints (Restraint for Interference with Medical Device)  Description  INTERVENTIONS:  1. Determine that other, less restrictive measures have been tried or would not be effective before applying the restraint  2. Evaluate the patient's condition at the time of  restraint application  3. Inform patient/family regarding the reason for restraint  4. Q2H: Monitor safety, psychosocial status, comfort, nutrition and hydration  Outcome: PROGRESSING AS EXPECTED  Goal: Free from restraint(s) (Restraint for Interference with Medical Device)  Description  INTERVENTIONS:  1. ONCE/SHIFT or MINIMUM Q12H: Assess and document the continuing need for restraints  2. Q24H: Continued use of restraint requires LIP to perform face to face examination and written order  3. Identify and implement measures to help patient regain control  Outcome: PROGRESSING AS EXPECTED

## 2019-11-26 NOTE — PROGRESS NOTES
Hospital Medicine Daily Progress Note    Date of Service  11/26/2019    Chief Complaint  79 y.o. male admitted 11/21/2019 with confusion.    Hospital Course    Mr Bates has a history of insulin-dependent diabetes, hypertension, and urinary retention with chronic Sawyer catheter.  He presented to the emergency room 11/21/2019 with altered mental status.  Patient was admitted and treated for pneumonia as well as urinary tract infection.  Blood and urine cultures were positive for Pseudomonas.  On 11/24/2019 the patient worsened becoming more confused and developing increasing oxygen demands, he was transferred to the ICU on high flow nasal cannula. He improved and was stable for transfer back to the medical floor.        Interval Problem Update  Off HFNC, on oxymask 2L  Baseline dementia A&Ox2, but seems delirious, improved on seroquel  Gets agitated when disoriented, but doing ok  SB-SR 50s-60s  BP good  Afeb  Last bm 11/22, advancing bm protocol  On clears  Good UOP per sawyer; came in with one from other facility for retention  Mobilizing now  PIVx2  CXR much better  On home famotidine, hep dvt proph  Day 5 micafungin  Day 3 zosyn for pseudomonas  ID following  SBP up to 157, will restart home norvasc    Consultants/Specialty  Intensivist  Infectious disease  Ophthalmology  Surgery    Code Status  Full Code    Disposition  Can transfer out to med floor    Eventual discharge likely back to SNF    Review of Systems  Review of Systems   Constitutional: Negative for chills and fever.   Eyes: Negative for blurred vision.   Respiratory: Negative for cough and shortness of breath.    Cardiovascular: Negative for chest pain and palpitations.   Gastrointestinal: Negative for abdominal pain, nausea and vomiting.   Genitourinary: Negative for dysuria.   Musculoskeletal: Negative for myalgias.   Neurological: Negative for dizziness and headaches.   All other systems reviewed and are negative.       Physical Exam  Temp:  [36.2  °C (97.1 °F)-37 °C (98.6 °F)] 36.5 °C (97.7 °F)  Pulse:  [54-64] 57  Resp:  [15-25] 17  BP: (121-149)/(51-84) 139/68  SpO2:  [87 %-100 %] 97 %    Physical Exam  Vitals signs and nursing note reviewed.   Constitutional:       Appearance: He is well-developed.   HENT:      Head: Normocephalic and atraumatic.   Cardiovascular:      Rate and Rhythm: Normal rate and regular rhythm.      Heart sounds: Normal heart sounds. No murmur.   Pulmonary:      Effort: Pulmonary effort is normal. No respiratory distress.      Breath sounds: Wheezing (very mild end expiratory) present. No rales.      Comments: Facemask 2L, satting well  Abdominal:      General: Bowel sounds are normal. There is no distension.      Palpations: Abdomen is soft.      Tenderness: There is no tenderness.   Musculoskeletal: Normal range of motion.      Right lower leg: No edema.      Left lower leg: No edema.   Skin:     General: Skin is warm and dry.      Findings: Lesion (some scabs on toes, covered with cream) present. No erythema.   Neurological:      Mental Status: He is disoriented.      Comments: Able to say his name, and carry on a conversation, knows he's in [facility]; mildly confused; mostly redirectable         Fluids    Intake/Output Summary (Last 24 hours) at 11/26/2019 0821  Last data filed at 11/26/2019 0600  Gross per 24 hour   Intake 20 ml   Output 3110 ml   Net -3090 ml       Laboratory  Recent Labs     11/24/19  0255 11/24/19  0900 11/25/19  0440   WBC 11.8* 10.8 8.0   RBC 3.08* 2.97* 3.09*   HEMOGLOBIN 9.3* 8.9* 9.3*   HEMATOCRIT 29.3* 28.3* 29.7*   MCV 95.1 95.3 96.1   MCH 30.2 30.0 30.1   MCHC 31.7* 31.4* 31.3*   RDW 52.8* 52.7* 53.0*   PLATELETCT 172 172 165   MPV 9.5 9.2 9.2     Recent Labs     11/24/19  0255 11/24/19  0827 11/25/19  0400   SODIUM 136 134* 141   POTASSIUM 4.5 4.4 4.4   CHLORIDE 105 107 107   CO2 24 22 25   GLUCOSE 94 114* 77   BUN 34* 31* 27*   CREATININE 1.95* 1.84* 1.83*   CALCIUM 8.0* 8.3* 8.2*     Recent Labs      11/24/19  0900   INR 1.50*         Recent Labs     11/25/19  0400   TRIGLYCERIDE 69   HDL 38*   LDL 45       Imaging  DX-CHEST-LIMITED (1 VIEW)   Final Result      Bibasilar and perihilar underinflation atelectasis which could obscure an additional process. This is unchanged.      [id number] VIEW   Final Result      Diffuse bowel distention. Findings are suggestive of ileus. Early obstruction could have a similar appearance.      DX-CHEST-LIMITED (1 VIEW)   Final Result      1.  Interval worsening of pulmonary opacities in the right mid/lower lung.   2.  Unchanged left basilar atelectasis and/or consolidation. No significant pleural effusions.      DX-CHEST-PORTABLE (1 VIEW)   Final Result      Similar reticular opacities suspicious for edema and there are likely small layering effusions      More focal basilar opacity is more likely from atelectasis than consolidation      EC-ECHOCARDIOGRAM COMPLETE W/O CONT   Final Result      CT-RENAL COLIC EVALUATION(A/P W/O)   Final Result      1.  Dilated fluid-filled small bowel loops in RIGHT lower quadrant concerning for developing obstruction.   2.  No evidence of bowel perforation.   3.  Colonic diverticulosis without evidence for diverticulitis.   4.  No renal stone or hydronephrosis.   5.  Appendix is not visualized.   6.  Enlarged prostate.   7.  Small bilateral pleural effusions with associated atelectasis.   8.  Colonic diverticulosis without evidence for diverticulitis.      DX-CHEST-PORTABLE (1 VIEW)   Final Result         1.  Pulmonary edema and/or infiltrates are identified, which are stable since the prior exam.   2.  Cardiomegaly   3.  Atherosclerosis           Assessment/Plan  * UTI (urinary tract infection)- (present on admission)  Assessment & Plan  Urinary tract infection associated with chronic indwelling Steen catheter  Complicated urinary tract infection with associated encephalopathy  Appreciate infectious disease consultation, continuing  Zosyn    Candidemia (HCC)- (present on admission)  Assessment & Plan  Appreciate ID recommendations  Continuing micafungin    Bacteremia due to Pseudomonas- (present on admission)  Assessment & Plan  Infectious disease following, continue Zosyn    Acute respiratory failure with hypoxia, RAD, PNA (HCC)- (present on admission)  Assessment & Plan  Improved    Altered mental status- (present on admission)  Assessment & Plan  Patient has baseline dementia  He was quite confused earlier in stay but this has improved daily; getting seroquel for sleep, ?delirium clearing  Suspect altered mental status related to infection and sepsis      CAP (community acquired pneumonia)- (present on admission)  Assessment & Plan  Pseudomonas; continue zosyn  Rt protocol  Improved respiratory status        Acute renal failure (ARF) ([facility])- (present on admission)  Assessment & Plan  Baseline creatinine is unknown; likely was 2/2 urinary retention/sawyer obstruction; had 1500ml in bladder prior to resolution  Appreciate nephrology following  Avoid nephrotoxins; stable creatinine    Leukocytosis- (present on admission)  Assessment & Plan  Secondary to the current pneumonia and urinary tract infection   Resolved    Reactive airway disease with wheezing with acute exacerbation- (present on admission)  Assessment & Plan  RT protocol    Hypocalcemia- (present on admission)  Assessment & Plan  Corrected normal    Normocytic normochromic anemia- (present on admission)  Assessment & Plan  Monitor H&H if drops below 7 or 21 transfuse.   Rising 9.3 today       VTE prophylaxis: heparin

## 2019-11-26 NOTE — THERAPY
"Physical Therapy Treatment completed.   Bed Mobility:  Supine to Sit: Moderate Assist  Transfers: Sit to Stand: Moderate Assist  Gait: Level Of Assist: Unable to Participate with Front-Wheel Walker       Plan of Care: Will benefit from Physical Therapy 3 times per week  Discharge Recommendations: Equipment: Will Continue to Assess for Equipment Needs. Recommend post-acute placement for continued physical therapy services prior to discharge home. Patient can tolerate post-acute therapies at a 5x/week frequency.        See \"Rehab Therapy-Acute\" Patient Summary Report for complete documentation.     Pt is progressing slower than expected with functional mobility and appears to have declined in functional mobility from prior session. Pt is now requiring increased cues, redirection, and reorientation during mobility. Pt demonstrates with increased weakenss, dec activity tolerance, poor motor planning/sequencing with functional mobility. Pt required Mod A for all functional mobility with FWW use. Pt was able to partcipate in seated ther-ex and standing ther-ex. Deferred ambulation and tranfers to chair due to poor seated upright balance and poor following during mobility. Pt will continue to benefit from skilled PT while in house, with recommendation for post acute therapy services prior to d/c home. Will continue to progress towards currents goals.   "

## 2019-11-27 LAB
ALBUMIN SERPL ELPH-MCNC: 2.8 G/DL (ref 3.75–5.01)
ALPHA1 GLOB SERPL ELPH-MCNC: 0.58 G/DL (ref 0.19–0.46)
ALPHA2 GLOB SERPL ELPH-MCNC: 0.86 G/DL (ref 0.48–1.05)
ANA INTERPRETIVE COMMENT Q5143: NORMAL
ANION GAP SERPL CALC-SCNC: 7 MMOL/L (ref 0–11.9)
ANTINUCLEAR ANTIBODY (ANA), HEP-2, IGG Q5142: NORMAL
ARSENIC BLD-MCNC: <10 UG/L (ref 0–12)
B-GLOBULIN SERPL ELPH-MCNC: 0.67 G/DL (ref 0.48–1.1)
BACTERIA BLD CULT: ABNORMAL
BUN SERPL-MCNC: 19 MG/DL (ref 8–22)
CADMIUM BLD-MCNC: <1 UG/L (ref 0–5)
CALCIUM SERPL-MCNC: 8.2 MG/DL (ref 8.5–10.5)
CHLORIDE SERPL-SCNC: 105 MMOL/L (ref 96–112)
CO2 SERPL-SCNC: 31 MMOL/L (ref 20–33)
CREAT SERPL-MCNC: 1.74 MG/DL (ref 0.5–1.4)
ERYTHROCYTE [DISTWIDTH] IN BLOOD BY AUTOMATED COUNT: 52.2 FL (ref 35.9–50)
FUNGAL MIC INTERP  7292: NORMAL
GAMMA GLOB SERPL ELPH-MCNC: 0.9 G/DL (ref 0.62–1.51)
GLUCOSE SERPL-MCNC: 110 MG/DL (ref 65–99)
HCT VFR BLD AUTO: 30 % (ref 42–52)
HGB BLD-MCNC: 9.6 G/DL (ref 14–18)
INTERPRETATION SERPL IFE-IMP: ABNORMAL
LEAD BLDV-MCNC: <2 UG/DL (ref 0–4.9)
MCH RBC QN AUTO: 30.5 PG (ref 27–33)
MCHC RBC AUTO-ENTMCNC: 32 G/DL (ref 33.7–35.3)
MCV RBC AUTO: 95.2 FL (ref 81.4–97.8)
MERCURY BLD-MCNC: <2.5 UG/L (ref 0–10)
MONOCLON BAND OBS SERPL: ABNORMAL
PATHOLOGY STUDY: ABNORMAL
PLATELET # BLD AUTO: 201 K/UL (ref 164–446)
PMV BLD AUTO: 8.7 FL (ref 9–12.9)
POTASSIUM SERPL-SCNC: 4.6 MMOL/L (ref 3.6–5.5)
PROT SERPL-MCNC: 5.8 G/DL (ref 6.3–8.2)
RBC # BLD AUTO: 3.15 M/UL (ref 4.7–6.1)
S PNEUM AG UR QL: NEGATIVE
SIGNIFICANT IND 70042: ABNORMAL
SIGNIFICANT IND 70042: NORMAL
SITE SITE: ABNORMAL
SITE SITE: NORMAL
SODIUM SERPL-SCNC: 143 MMOL/L (ref 135–145)
SOURCE SOURCE: ABNORMAL
SOURCE SOURCE: NORMAL
VIT B6 SERPL-MCNC: 7.4 NMOL/L (ref 20–125)
WBC # BLD AUTO: 7.4 K/UL (ref 4.8–10.8)

## 2019-11-27 PROCEDURE — 700111 HCHG RX REV CODE 636 W/ 250 OVERRIDE (IP): Performed by: INTERNAL MEDICINE

## 2019-11-27 PROCEDURE — 97116 GAIT TRAINING THERAPY: CPT

## 2019-11-27 PROCEDURE — 85027 COMPLETE CBC AUTOMATED: CPT

## 2019-11-27 PROCEDURE — 700105 HCHG RX REV CODE 258: Performed by: INTERNAL MEDICINE

## 2019-11-27 PROCEDURE — 700102 HCHG RX REV CODE 250 W/ 637 OVERRIDE(OP): Performed by: INTERNAL MEDICINE

## 2019-11-27 PROCEDURE — 770006 HCHG ROOM/CARE - MED/SURG/GYN SEMI*

## 2019-11-27 PROCEDURE — 700101 HCHG RX REV CODE 250: Performed by: INTERNAL MEDICINE

## 2019-11-27 PROCEDURE — 94640 AIRWAY INHALATION TREATMENT: CPT

## 2019-11-27 PROCEDURE — 97535 SELF CARE MNGMENT TRAINING: CPT

## 2019-11-27 PROCEDURE — 97530 THERAPEUTIC ACTIVITIES: CPT

## 2019-11-27 PROCEDURE — A9270 NON-COVERED ITEM OR SERVICE: HCPCS | Performed by: HOSPITALIST

## 2019-11-27 PROCEDURE — A9270 NON-COVERED ITEM OR SERVICE: HCPCS | Performed by: INTERNAL MEDICINE

## 2019-11-27 PROCEDURE — 80048 BASIC METABOLIC PNL TOTAL CA: CPT

## 2019-11-27 PROCEDURE — 99232 SBSQ HOSP IP/OBS MODERATE 35: CPT | Performed by: HOSPITALIST

## 2019-11-27 PROCEDURE — 700102 HCHG RX REV CODE 250 W/ 637 OVERRIDE(OP): Performed by: HOSPITALIST

## 2019-11-27 PROCEDURE — 99232 SBSQ HOSP IP/OBS MODERATE 35: CPT | Performed by: INTERNAL MEDICINE

## 2019-11-27 RX ORDER — IPRATROPIUM BROMIDE AND ALBUTEROL SULFATE 2.5; .5 MG/3ML; MG/3ML
3 SOLUTION RESPIRATORY (INHALATION)
Status: DISCONTINUED | OUTPATIENT
Start: 2019-11-27 | End: 2019-12-05

## 2019-11-27 RX ADMIN — ACETAMINOPHEN 650 MG: 325 TABLET, FILM COATED ORAL at 14:26

## 2019-11-27 RX ADMIN — HEPARIN SODIUM 5000 UNITS: 5000 INJECTION INTRAVENOUS; SUBCUTANEOUS at 22:16

## 2019-11-27 RX ADMIN — SIMVASTATIN 10 MG: 20 TABLET, FILM COATED ORAL at 22:15

## 2019-11-27 RX ADMIN — AMLODIPINE BESYLATE 10 MG: 10 TABLET ORAL at 05:44

## 2019-11-27 RX ADMIN — ACETAMINOPHEN 650 MG: 325 TABLET, FILM COATED ORAL at 05:50

## 2019-11-27 RX ADMIN — IPRATROPIUM BROMIDE AND ALBUTEROL SULFATE 3 ML: .5; 3 SOLUTION RESPIRATORY (INHALATION) at 07:20

## 2019-11-27 RX ADMIN — CLOTRIMAZOLE 1 APPLICATION: 10 CREAM TOPICAL at 18:04

## 2019-11-27 RX ADMIN — PIPERACILLIN AND TAZOBACTAM 4.5 G: 4; .5 INJECTION, POWDER, LYOPHILIZED, FOR SOLUTION INTRAVENOUS; PARENTERAL at 12:39

## 2019-11-27 RX ADMIN — LATANOPROST 1 DROP: 50 SOLUTION OPHTHALMIC at 22:16

## 2019-11-27 RX ADMIN — SENNOSIDES AND DOCUSATE SODIUM 2 TABLET: 8.6; 5 TABLET ORAL at 05:44

## 2019-11-27 RX ADMIN — CLOTRIMAZOLE 1 APPLICATION: 10 CREAM TOPICAL at 05:44

## 2019-11-27 RX ADMIN — POLYETHYLENE GLYCOL 3350 1 PACKET: 17 POWDER, FOR SOLUTION ORAL at 05:45

## 2019-11-27 RX ADMIN — PIPERACILLIN AND TAZOBACTAM 4.5 G: 4; .5 INJECTION, POWDER, LYOPHILIZED, FOR SOLUTION INTRAVENOUS; PARENTERAL at 22:15

## 2019-11-27 RX ADMIN — FAMOTIDINE 20 MG: 10 INJECTION INTRAVENOUS at 05:45

## 2019-11-27 RX ADMIN — SENNOSIDES AND DOCUSATE SODIUM 2 TABLET: 8.6; 5 TABLET ORAL at 18:04

## 2019-11-27 RX ADMIN — MICAFUNGIN SODIUM 100 MG: 20 INJECTION, POWDER, LYOPHILIZED, FOR SOLUTION INTRAVENOUS at 05:45

## 2019-11-27 RX ADMIN — TAMSULOSIN HYDROCHLORIDE 0.4 MG: 0.4 CAPSULE ORAL at 05:45

## 2019-11-27 RX ADMIN — PIPERACILLIN AND TAZOBACTAM 4.5 G: 4; .5 INJECTION, POWDER, LYOPHILIZED, FOR SOLUTION INTRAVENOUS; PARENTERAL at 05:45

## 2019-11-27 RX ADMIN — ACETAMINOPHEN 650 MG: 325 TABLET, FILM COATED ORAL at 23:26

## 2019-11-27 RX ADMIN — SITAGLIPTIN 50 MG: 50 TABLET, FILM COATED ORAL at 05:44

## 2019-11-27 RX ADMIN — HEPARIN SODIUM 5000 UNITS: 5000 INJECTION INTRAVENOUS; SUBCUTANEOUS at 13:00

## 2019-11-27 RX ADMIN — QUETIAPINE FUMARATE 50 MG: 25 TABLET ORAL at 18:05

## 2019-11-27 ASSESSMENT — COGNITIVE AND FUNCTIONAL STATUS - GENERAL
TOILETING: A LOT
DAILY ACTIVITIY SCORE: 15
DRESSING REGULAR UPPER BODY CLOTHING: A LITTLE
PERSONAL GROOMING: A LITTLE
SUGGESTED CMS G CODE MODIFIER DAILY ACTIVITY: CK
DRESSING REGULAR LOWER BODY CLOTHING: A LOT
EATING MEALS: A LITTLE
HELP NEEDED FOR BATHING: A LOT

## 2019-11-27 ASSESSMENT — ENCOUNTER SYMPTOMS
ABDOMINAL PAIN: 0
SHORTNESS OF BREATH: 0
HEADACHES: 0
CHILLS: 1
PALPITATIONS: 0
COUGH: 0
VOMITING: 0
FEVER: 0
NAUSEA: 0
MYALGIAS: 0
DIZZINESS: 0

## 2019-11-27 ASSESSMENT — GAIT ASSESSMENTS
DEVIATION: SHUFFLED GAIT;DECREASED HEEL STRIKE;DECREASED TOE OFF;BRADYKINETIC
DISTANCE (FEET): 80
ASSISTIVE DEVICE: FRONT WHEEL WALKER
GAIT LEVEL OF ASSIST: MINIMAL ASSIST

## 2019-11-27 NOTE — PROGRESS NOTES
Monitor Summary  Sinus Bradycardia-Sinus Rhythm  Rate 53-64  .20/.12/.48    12 Hour Chart Check complete at bedside.

## 2019-11-27 NOTE — THERAPY
"Progressing slowly.  Limited by cognition.  Easily distracted.  Confused t/o.  Max assist to sit eob, mod to return.  Ambulation w/ min assist and fww.  Continue to follow.    Physical Therapy Treatment completed.   Bed Mobility:  Supine to Sit: (P) Maximal Assist  Transfers: Sit to Stand: (P) Minimal Assist  Gait: Level Of Assist: (P) Minimal Assist with Front-Wheel Walker     78  Plan of Care: Will benefit from Physical Therapy 3 times per week  Discharge Recommendations: Equipment: Will Continue to Assess for Equipment Needs. Post-acute therapy   Recommend post-acute placement for continued physical therapy services prior to discharge home. Patient can tolerate post-acute therapies at a 5x/week frequency.       See \"Rehab Therapy-Acute\" Patient Summary Report for complete documentation.       "

## 2019-11-27 NOTE — THERAPY
"Occupational Therapy Treatment completed with focus on ADLs, ADL transfers and cognition.  Functional Status:  Pt asleep on arrival.  Pt was confused upon waking but remained confused throughout the session.  Pt was Max A for supine to sit EOB.  Pt was Max A to don socks.  Pt stood with Mod A.  Pt able to take steps to sink with Mod A & FWW.  Pt has bilateral foot drop but he is able to compensate.  Pt does not follow commands well & was disoriented.  Pt returned to supine in bed & quickly fell back asleep.  Pt should be able to be more independent if he was less confused.  Plan of Care: Will benefit from Occupational Therapy 3 times per week  Discharge Recommendations:  Equipment Will Continue to Assess for Equipment Needs. Post-acute therapy Discharge to a transitional care facility for continued skilled therapy services.    See \"Rehab Therapy-Acute\" Patient Summary Report for complete documentation.   "

## 2019-11-27 NOTE — PROGRESS NOTES
Hospital Medicine Daily Progress Note    Date of Service  11/27/2019    Chief Complaint  79 y.o. male admitted 11/21/2019 with confusion.    Hospital Course    Mr Bates has a history of insulin-dependent diabetes, hypertension, and urinary retention with chronic Steen catheter.  He presented to the emergency room 11/21/2019 with altered mental status.  Patient was admitted and treated for pneumonia as well as urinary tract infection.  Blood and urine cultures were positive for Pseudomonas.  He was seen by ophthalmology to make sure he didn't have endophthalmitis, and this was clear.  He is to follow for pseudophakia, cataract, and dermatochalasis as an outpatient.  On 11/24/2019 the patient worsened becoming more confused and developing increasing oxygen demands, he was transferred to the ICU on high flow nasal cannula. He improved and was stable for transfer back to the medical floor.  He had had an ileus with concern for developing bowel obstruction on CT scan, but this cleared and he had a bm on 11/27.         Interval Problem Update  No changes overnight, though no haldol needed  BMs this am  Stood and took a few steps with 2 person assist  Day 6/14 micafungin  Day 4 zosyn  Advance diet    Consultants/Specialty  Intensivist   Infectious disease   Nephrology Dr. Lockwood  Surgery Dr. Polk  Neurology Dr. Harrison  Ophthalmology Dr. Lopes    Code Status  Full Code    Disposition  Can transfer out to med floor, orders in yesterday    Back to SNF; waiting on placement    Review of Systems  Review of Systems   Constitutional: Positive for chills (feels cold). Negative for fever.   Respiratory: Negative for cough and shortness of breath.    Cardiovascular: Negative for chest pain and palpitations.   Gastrointestinal: Negative for abdominal pain, nausea and vomiting.   Genitourinary: Negative for dysuria.   Musculoskeletal: Negative for myalgias.   Neurological: Negative for dizziness and headaches.   All other  systems reviewed and are negative.       Physical Exam  Temp:  [36.3 °C (97.3 °F)-37 °C (98.6 °F)] 36.3 °C (97.3 °F)  Pulse:  [50-64] 55  Resp:  [14-22] 19  BP: (120-157)/(56-75) 136/69  SpO2:  [88 %-100 %] 92 %    Physical Exam  Vitals signs and nursing note reviewed.   Constitutional:       Appearance: He is well-developed.   Cardiovascular:      Rate and Rhythm: Normal rate and regular rhythm.      Heart sounds: Normal heart sounds. No murmur.   Pulmonary:      Effort: Pulmonary effort is normal. No respiratory distress.      Breath sounds: Wheezing (very mild end expiratory) present. No rales.      Comments: Facemask 1L, satting well but does desat when mask comes off face  Abdominal:      General: Bowel sounds are normal. There is no distension.      Palpations: Abdomen is soft.      Tenderness: There is no tenderness.   Musculoskeletal: Normal range of motion.      Right lower leg: No edema.      Left lower leg: No edema.   Skin:     General: Skin is warm and dry.      Findings: Lesion (some scabs on toes, covered with cream) present. No erythema.   Neurological:      Mental Status: He is disoriented.      Comments: Remains mildly confused; asking if the door stop is the heater, but did ask because he said the room was cold         Fluids    Intake/Output Summary (Last 24 hours) at 11/27/2019 0800  Last data filed at 11/27/2019 0800  Gross per 24 hour   Intake 1800 ml   Output 2400 ml   Net -600 ml       Laboratory  Recent Labs     11/24/19  0900 11/25/19  0440 11/27/19  0421   WBC 10.8 8.0 7.4   RBC 2.97* 3.09* 3.15*   HEMOGLOBIN 8.9* 9.3* 9.6*   HEMATOCRIT 28.3* 29.7* 30.0*   MCV 95.3 96.1 95.2   MCH 30.0 30.1 30.5   MCHC 31.4* 31.3* 32.0*   RDW 52.7* 53.0* 52.2*   PLATELETCT 172 165 201   MPV 9.2 9.2 8.7*     Recent Labs     11/24/19  0827 11/25/19  0400 11/27/19  0421   SODIUM 134* 141 143   POTASSIUM 4.4 4.4 4.6   CHLORIDE 107 107 105   CO2 22 25 31   GLUCOSE 114* 77 110*   BUN 31* 27* 19   CREATININE  1.84* 1.83* 1.74*   CALCIUM 8.3* 8.2* 8.2*     Recent Labs     11/24/19  0900   INR 1.50*         Recent Labs     11/25/19  0400   TRIGLYCERIDE 69   HDL 38*   LDL 45       Imaging  DX-CHEST-LIMITED (1 VIEW)   Final Result      Bibasilar and perihilar underinflation atelectasis which could obscure an additional process. This is unchanged.      [id number] VIEW   Final Result      Diffuse bowel distention. Findings are suggestive of ileus. Early obstruction could have a similar appearance.      DX-CHEST-LIMITED (1 VIEW)   Final Result      1.  Interval worsening of pulmonary opacities in the right mid/lower lung.   2.  Unchanged left basilar atelectasis and/or consolidation. No significant pleural effusions.      DX-CHEST-PORTABLE (1 VIEW)   Final Result      Similar reticular opacities suspicious for edema and there are likely small layering effusions      More focal basilar opacity is more likely from atelectasis than consolidation      EC-ECHOCARDIOGRAM COMPLETE W/O CONT   Final Result      CT-RENAL COLIC EVALUATION(A/P W/O)   Final Result      1.  Dilated fluid-filled small bowel loops in RIGHT lower quadrant concerning for developing obstruction.   2.  No evidence of bowel perforation.   3.  Colonic diverticulosis without evidence for diverticulitis.   4.  No renal stone or hydronephrosis.   5.  Appendix is not visualized.   6.  Enlarged prostate.   7.  Small bilateral pleural effusions with associated atelectasis.   8.  Colonic diverticulosis without evidence for diverticulitis.      DX-CHEST-PORTABLE (1 VIEW)   Final Result         1.  Pulmonary edema and/or infiltrates are identified, which are stable since the prior exam.   2.  Cardiomegaly   3.  Atherosclerosis           Assessment/Plan  * UTI (urinary tract infection)- (present on admission)  Assessment & Plan  Urinary tract infection associated with chronic indwelling Steen catheter  Complicated urinary tract infection with associated  encephalopathy  Appreciate infectious disease consultation, continuing Zosyn    Candidemia (HCC)- (present on admission)  Assessment & Plan  Appreciate ID recommendations  Continuing micafungin day 6/14    Bacteremia due to Pseudomonas- (present on admission)  Assessment & Plan  Infectious disease following, continue Zosyn    Acute respiratory failure with hypoxia, RAD, PNA (HCC)- (present on admission)  Assessment & Plan  Improved    Altered mental status- (present on admission)  Assessment & Plan  Patient has baseline dementia  He was quite confused earlier in stay but this has improved daily; getting seroquel for sleep  Suspect altered mental status related to infection and sepsis      CAP (community acquired pneumonia)- (present on admission)  Assessment & Plan  Pseudomonas; continue zosyn per ID  Rt protocol  Respiratory status continues to improve        Acute renal failure (ARF) ([facility])- (present on admission)  Assessment & Plan  Baseline creatinine is unknown; likely was 2/2 urinary retention/sawyer obstruction; had 1500ml in bladder prior to resolution  Avoid nephrotoxins; improving creatinine    Leukocytosis- (present on admission)  Assessment & Plan  Secondary to the current pneumonia and urinary tract infection   Resolved    Reactive airway disease with wheezing with acute exacerbation- (present on admission)  Assessment & Plan  RT protocol    Hypocalcemia- (present on admission)  Assessment & Plan  Corrected normal    Normocytic normochromic anemia- (present on admission)  Assessment & Plan  Monitor H&H if drops below 7 or 21 transfuse.   Rising 9.6 today       VTE prophylaxis: heparin

## 2019-11-27 NOTE — PROGRESS NOTES
Nephrology Daily Progress Note    Date of Service  11/27/2019    Chief Complaint  The patient is an 79-year-old gentleman with   history of dementia, hypertension, diabetes, benign prostatic hypertrophy, and   gastroesophageal reflux disease who presented to [facility] on 11/21 with altered mental status.  There was concern at the time for   acute on chronic renal failure, unknown what his baseline serum creatinine   is, but when he initially presented to [facility], his serum creatinine was 2.54   and over the course of his stay, it came down to 1.84.    11/24/19 - Nephrology was   asked to see him to evaluate his renal failure.  He is unable to give me a   full history given the fact that he is demented and currently tachypneic, in   respiratory distress.  ICU team is in the process of bringing him over to the   ICU for further evaluation and emergent treatment.  His renal failure was   presumed to be secondary to an obstructed Steen.  He had urinary retention of   about 1500 mL  Urine culture was also showing evidence of gram-negative rods   and blood cultures showing gram-negative rods.    Interval Problem Update  11/25/19 - Appears comfortable.  11/26/19 - Comfortable.  11/27/19 - No changes.    Review of Systems  Review of Systems   Unable to perform ROS: Dementia        Physical Exam  Temp:  [36.3 °C (97.3 °F)-37 °C (98.6 °F)] 36.3 °C (97.3 °F)  Pulse:  [50-63] 52  Resp:  [14-22] 19  BP: (120-157)/(56-75) 137/66  SpO2:  [88 %-100 %] 94 %    Physical Exam  Vitals signs and nursing note reviewed.   HENT:      Head: Normocephalic and atraumatic.   Eyes:      Extraocular Movements: Extraocular movements intact.   Neck:      Musculoskeletal: Neck supple.   Cardiovascular:      Rate and Rhythm: Regular rhythm. Bradycardia present.      Pulses: Normal pulses.   Pulmonary:      Breath sounds: Normal breath sounds.   Abdominal:      Palpations: Abdomen is soft.   Skin:     General: Skin is warm.          Fluids    Intake/Output Summary (Last 24 hours) at 11/27/2019 0958  Last data filed at 11/27/2019 0800  Gross per 24 hour   Intake 1800 ml   Output 2400 ml   Net -600 ml       Laboratory  Recent Labs     11/25/19  0440 11/27/19  0421   WBC 8.0 7.4   RBC 3.09* 3.15*   HEMOGLOBIN 9.3* 9.6*   HEMATOCRIT 29.7* 30.0*   MCV 96.1 95.2   MCH 30.1 30.5   MCHC 31.3* 32.0*   RDW 53.0* 52.2*   PLATELETCT 165 201   MPV 9.2 8.7*     Recent Labs     11/25/19  0400 11/27/19 0421   SODIUM 141 143   POTASSIUM 4.4 4.6   CHLORIDE 107 105   CO2 25 31   GLUCOSE 77 110*   BUN 27* 19   CREATININE 1.83* 1.74*   CALCIUM 8.2* 8.2*         No results for input(s): NTPROBNP in the last 72 hours.  Recent Labs     11/25/19 0400   TRIGLYCERIDE 69   HDL 38*   LDL 45     ASSESSMENT:  1.  His acute kidney injury, on presentation, has improved somewhat. Cause likely urosepsis + urinary retention.  2.  Chronic kidney disease, unknown baseline levels. May be at base[place]ine now.  3.  Acidosis.  4.  Anemia.  5.  Respiratory distress.  6.  Community-acquired pneumonia.  7.  Sepsis.  8.  Altered mental status.  9.  Urinary tract infection.  10.  Gram-negative bacteremia.  11.  Hypocalcemia    Plan:  Continue as is  Will sign off  Thank you.  Call if any questions.

## 2019-11-27 NOTE — PROGRESS NOTES
Infectious Disease Progress Note    Author: Julia Sherman M.D. Date & Time of service: 2019  10:51 AM       Chief Complaint:  UTI and Gram-negative sepsis   Candiemia     Interval History:  79-year-old male admitted with altered mental status.    Oytu675.1 WBC 10.8 AMS worsened-resp failure and bradycardia-now transferred to ICU. Obtunded   101.1, O2 15 L high flow, increased from admit but weaning slowly today, oriented only to self.  LP attempted at bedside but unable to obtain due to agitation.  Neurology recommended MRI/MRA.    AF, O2 1 L oxymask, somnolent today and remains confused.    Review of Systems:  Review of Systems   Unable to perform ROS: Mental status change       Hemodynamics:  Temp (24hrs), Av.7 °C (98 °F), Min:36.3 °C (97.3 °F), Max:37 °C (98.6 °F)  Temperature: 36.3 °C (97.3 °F)  Pulse  Av.1  Min: 44  Max: 86   Blood Pressure : 137/66       Physical Exam:  Physical Exam  Constitutional:       Appearance: Normal appearance.   Cardiovascular:      Rate and Rhythm: Normal rate and regular rhythm.   Pulmonary:      Effort: Pulmonary effort is normal.      Breath sounds: Normal breath sounds.   Abdominal:      General: Abdomen is flat. Bowel sounds are normal.      Palpations: Abdomen is soft.   Musculoskeletal: Normal range of motion.      Right lower leg: Edema present.      Left lower leg: Edema present.   Skin:     General: Skin is warm and dry.   Neurological:      Comments: Confused          Meds:    Current Facility-Administered Medications:   •  amLODIPine  •  QUEtiapine  •  ipratropium-albuterol  •  famotidine  •  enalaprilat  •  heparin  •  ipratropium-albuterol  •  haloperidol lactate  •  [COMPLETED] piperacillin-tazobactam **AND** piperacillin-tazobactam  •  SITagliptin  •  micafungin  •  clotrimazole  •  latanoprost  •  polyethylene glycol/lytes  •  simvastatin  •  tamsulosin  •  acetaminophen  •  ondansetron  •  ondansetron  •  senna-docusate **AND**  polyethylene glycol/lytes **AND** magnesium hydroxide **AND** bisacodyl  •  Respiratory Care per Protocol  •  Influenza Vaccine High-Dose pf    Labs:  Recent Labs     11/25/19  0440 11/27/19 0421   WBC 8.0 7.4   RBC 3.09* 3.15*   HEMOGLOBIN 9.3* 9.6*   HEMATOCRIT 29.7* 30.0*   MCV 96.1 95.2   MCH 30.1 30.5   RDW 53.0* 52.2*   PLATELETCT 165 201   MPV 9.2 8.7*   NEUTSPOLYS 83.10*  --    LYMPHOCYTES 5.90*  --    MONOCYTES 7.10  --    EOSINOPHILS 2.50  --    BASOPHILS 0.90  --      Recent Labs     11/25/19  0400 11/27/19 0421   SODIUM 141 143   POTASSIUM 4.4 4.6   CHLORIDE 107 105   CO2 25 31   GLUCOSE 77 110*   BUN 27* 19     Recent Labs     11/24/19  1054 11/25/19 0400 11/27/19 0421   ALBUMIN 2.80*  --   --    TOTPROTEIN 5.8*  --   --    CREATININE  --  1.83* 1.74*       Imaging:  Ct-renal Colic Evaluation(a/p W/o)    Result Date: 11/22/2019 11/22/2019 6:59 PM HISTORY/REASON FOR EXAM:  abnormal labs Cr- and urine infection r/o hydronephrosis. TECHNIQUE/EXAM DESCRIPTION AND NUMBER OF VIEWS: CT scan renal/colic without contrast. 5 mm helical images of the abdomen and pelvis were obtained from the diaphragmatic domes through the pubic symphysis. Low dose optimization technique was utilized for this CT exam including automated exposure control and adjustment of the mA and/or kV according to patient size. COMPARISON: None. FINDINGS: Visualized lung bases show small bilateral pleural fluid collections with associated dependent atelectasis, worse on the LEFT. The liver is unremarkable. The spleen is unremarkable. Adrenal glands are unremarkable. The kidneys are unremarkable. Pancreas is atrophic. The gallbladder is unremarkable. Steen catheter within mildly distended bladder. Prostate is enlarged. Colonic diverticula noted. Increased small bowel and colonic gas.  Prominent fluid-filled small bowel loops in the RIGHT lower quadrant. No pneumoperitoneum. Moderate atherosclerotic calcification of abdominal aorta. Lumbar  spine degenerative changes. T12 vertebral hemangioma. T-spine wall edema.     1.  Dilated fluid-filled small bowel loops in RIGHT lower quadrant concerning for developing obstruction. 2.  No evidence of bowel perforation. 3.  Colonic diverticulosis without evidence for diverticulitis. 4.  No renal stone or hydronephrosis. 5.  Appendix is not visualized. 6.  Enlarged prostate. 7.  Small bilateral pleural effusions with associated atelectasis. 8.  Colonic diverticulosis without evidence for diverticulitis.    [id number] View    Result Date: 11/25/2019 11/25/2019 3:01 PM HISTORY/REASON FOR EXAM:  Abdominal pain. TECHNIQUE/EXAM DESCRIPTION AND NUMBER OF VIEWS:  1 view(s) of the abdomen. COMPARISON: None FINDINGS: AP view of the abdomen demonstrates scattered loops of air-filled bowel. Small bowel loops measure up to 4.9 cm. Phleboliths are in the pelvis. There are arterial calcifications. Degenerative changes are in the spine.     Diffuse bowel distention. Findings are suggestive of ileus. Early obstruction could have a similar appearance.    Dx-chest-limited (1 View)    Result Date: 11/26/2019 11/26/2019 3:12 AM HISTORY/REASON FOR EXAM:  Shortness of Breath TECHNIQUE/EXAM DESCRIPTION AND NUMBER OF VIEWS: Single portable view of the chest. COMPARISON: Yesterday FINDINGS: HEART: Stable size. There is atherosclerotic calcification in the aortic arch. LUNGS: Lung volumes are low. There are perihilar opacities. There are bibasilar opacities. PLEURA: No effusion or pneumothorax.     Bibasilar and perihilar underinflation atelectasis which could obscure an additional process. This is unchanged.    Dx-chest-limited (1 View)    Result Date: 11/25/2019 11/25/2019 2:08 AM HISTORY/REASON FOR EXAM:  Respiratory failure TECHNIQUE/EXAM DESCRIPTION AND NUMBER OF VIEWS: Single portable view of the chest. COMPARISON: 11/24/2019 FINDINGS: LUNGS: Slight interval worsening in opacities in the right mid/lower lung. Unchanged opacity in  the left lung base. No significant pleural effusions. PNEUMOTHORAX: None. LINES AND TUBES: None. MEDIASTINUM: Stable cardiac silhouette. MUSCULOSKELETAL STRUCTURES: Unchanged.     1.  Interval worsening of pulmonary opacities in the right mid/lower lung. 2.  Unchanged left basilar atelectasis and/or consolidation. No significant pleural effusions.    Dx-chest-portable (1 View)    Result Date: 11/24/2019 11/24/2019 10:53 AM HISTORY/REASON FOR EXAM: Shortness of Breath. TECHNIQUE/EXAM DESCRIPTION AND NUMBER OF VIEWS: Single AP view of the chest. COMPARISON: 11/21/2019 FINDINGS: Lungs: Large volumes with reticular predominantly increased opacity is similar to comparison. There is also some hazy opacity at the bases especially on the left where there is some hemidiaphragm elevation Pleura:  Small layering effusions are likely Heart and mediastinum: There is stable cardiac silhouette enlargement.     Similar reticular opacities suspicious for edema and there are likely small layering effusions More focal basilar opacity is more likely from atelectasis than consolidation    Dx-chest-portable (1 View)    Result Date: 11/21/2019 11/21/2019 4:49 AM HISTORY/REASON FOR EXAM: Possible sepsis. TECHNIQUE/EXAM DESCRIPTION:  Single AP view of the chest. COMPARISON: None FINDINGS: Overlying cardiac leads are present. Cardiomegaly is observed. Atherosclerotic calcification of the aorta is noted.  The central  pulmonary vasculature appears prominent and indistinct. The lungs appear well expanded bilaterally.  Diffuse scattered hazy pulmonary parenchymal opacities are seen. The lung bases are partially excluded. The bony structures appear age-appropriate.     1.  Pulmonary edema and/or infiltrates are identified, which are stable since the prior exam. 2.  Cardiomegaly 3.  Atherosclerosis    Ec-echocardiogram Complete W/o Cont    Result Date: 11/23/2019  Transthoracic Echo Report Echocardiography Laboratory CONCLUSIONS No prior  study is available for comparison. Technically difficult study. Normal left ventricular chamber size. Grossly preserved left ventricular systolic function. The right ventricle was normal in size and function. Mild aortic stenosis. NIR JOSHI Exam Date:         2019                    13:42 Exam Location:     Inpatient Priority:          Routine Ordering Physician:        KRISTINA GALEANA Referring Physician:       [id number] Sonographer:               Tessie Hill RDCS Age:    79     Gender:    M MRN:    [id number] :    1940 BSA:    2.27   Ht (in):    79     Wt (lb):    198 Exam Type:     Complete Indications:     Shortness of breath ICD Codes:       R06.02 CPT Codes:       57193 BP:   124    /   55     HR:   55 Technical Quality:       Technically difficult study -                          adequate information is obtained MEASUREMENTS  (Male / Female) Normal Values 2D ECHO LV Diastolic Diameter PLAX        5 cm                  4.2 - 5.9 / 3.9 - 5.3 cm LV Systolic Diameter PLAX         2.6 cm                2.1 - 4.0 cm IVS Diastolic Thickness           1.1 cm                LVPW Diastolic Thickness          1.3 cm                LVOT Diameter                     1.7 cm                LV Ejection Fraction MOD 2C       67.1 %                DOPPLER AV Peak Velocity                  2.2 m/s               AV Peak Gradient                  19 mmHg               AV Mean Gradient                  10.9 mmHg             LVOT Peak Velocity                1.1 m/s               AV Area Cont Eq vti               0.97 cm???              Mitral E Point Velocity           0.61 m/s              Mitral E to A Ratio               1.7                   MV Pressure Half Time             27.9 ms               MV Area PHT                       7.9 cm???               MV Deceleration Time              96.4 ms               * Indicates values subject to auto-interpretation LV EF:        % FINDINGS Left Ventricle  Normal left ventricular chamber size. Mild concentric left ventricular hypertrophy. Grossly preserved left ventricular systolic function. Unable to assess wall motion. Indeterminate diastolic function. Right Ventricle The right ventricle was normal in size and function. Right Atrium The right atrium is normal in size. Inferior vena cava is not well visualized. Left Atrium The left atrium is normal in size. Left atrial volume index is 22 mL/sq m. Mitral Valve The mitral valve is not well visualized. No stenosis or regurgitation seen. Aortic Valve The aortic valve is not well visualized. Calcified aortic valve leaflets. Mild aortic stenosis. Transvalvular gradients are - Peak: 20 mmHg, Mean: 12 mmHg. Dimensionless index is (0.39). No aortic insufficiency. Tricuspid Valve Structurally normal tricuspid valve without significant stenosis or regurgitation. Pulmonic Valve Structurally normal pulmonic valve without significant stenosis or regurgitation. Pericardium Normal pericardium without effusion. Aorta The aortic root is normal. Ascending aorta not well visualized. Marcelo Smith MD (Electronically Signed) Final Date:     23 November 2019                 16:53      Micro:  Results     Procedure Component Value Units Date/Time    BLOOD CULTURE [[id number]]  (Abnormal) Collected:  11/21/19 0441    Order Status:  Completed Specimen:  Blood from Peripheral Updated:  11/26/19 0832     Significant Indicator POS     Source BLD     Site PERIPHERAL     Culture Result Growth detected by Bactec instrument. 11/22/2019  14:54      Pseudomonas aeruginosa  See previous culture for sensitivity report.  P.aeruginosa may develop resistance during prolonged therapy  with all antibiotics. Isolates that are initially susceptible  may become resistant within three to four days after  initiation of therapy. Testing of repeat isolates may be  warranted.        Candida glabrata    Narrative:       CALL  [facility]  171 tel. [phone],  CALLED  171  "tel. [phone] 11/22/2019, 16:12, RB PERF. RESULTS CALLED  TO:[id number]  Per Hospital Policy: Only change Specimen Src: to \"Line\" if  specified by physician order.  Left Forearm/Arm    BLOOD CULTURE [[id number]] Collected:  11/25/19 0800    Order Status:  Completed Specimen:  Blood from Peripheral Updated:  11/26/19 0732     Significant Indicator NEG     Source BLD     Site PERIPHERAL     Culture Result No Growth  Note: Blood cultures are incubated for 5 days and  are monitored continuously.Positive blood cultures  are called to the RN and reported as soon as  they are identified.      Narrative:       Collected By:[id number] YUKO MCCOY  Per Hospital Policy: Only change Specimen Src: to \"Line\" if  specified by physician order.  Right Forearm/Arm    BLOOD CULTURE [[id number]] Collected:  11/25/19 0800    Order Status:  Completed Specimen:  Blood from Peripheral Updated:  11/26/19 0732     Significant Indicator NEG     Source BLD     Site PERIPHERAL     Culture Result No Growth  Note: Blood cultures are incubated for 5 days and  are monitored continuously.Positive blood cultures  are called to the RN and reported as soon as  they are identified.      Narrative:       Collected By:[id number] YUKO MCCOY  Per Hospital Policy: Only change Specimen Src: to \"Line\" if  specified by physician order.  Left Forearm/Arm    Blood Culture [[id number]] Collected:  11/24/19 1300    Order Status:  Completed Specimen:  Blood from Peripheral Updated:  11/25/19 0845     Significant Indicator NEG     Source BLD     Site PERIPHERAL     Culture Result No Growth  Note: Blood cultures are incubated for 5 days and  are monitored continuously.Positive blood cultures  are called to the RN and reported as soon as  they are identified.      Narrative:       Collected By:[id number] CAMRON MCCOY  From different peripheral sites, if not done within the last  24 hours (Per Hospital Policy: Only change specimen source to  \"Line\" if specified by " "physician order)  Right Forearm/Arm    Blood Culture [[id number]] Collected:  11/24/19 1300    Order Status:  Completed Specimen:  Blood from Peripheral Updated:  11/25/19 0845     Significant Indicator NEG     Source BLD     Site PERIPHERAL     Culture Result No Growth  Note: Blood cultures are incubated for 5 days and  are monitored continuously.Positive blood cultures  are called to the RN and reported as soon as  they are identified.      Narrative:       Collected By:[id number] CAMRON MCCOY  From different peripheral sites, if not done within the last  24 hours (Per Hospital Policy: Only change specimen source to  \"Line\" if specified by physician order)  Left Forearm/Arm    Urinalysis [[id number]]  (Abnormal) Collected:  11/24/19 1920    Order Status:  Completed Specimen:  Urine, Steen Cath Updated:  11/24/19 2328     Color Yellow     Character Clear     Specific Gravity 1.010     Ph 5.0     Glucose Negative mg/dL      Ketones Negative mg/dL      Protein Negative mg/dL      Bilirubin Negative     Urobilinogen, Urine 0.2     Nitrite Negative     Leukocyte Esterase Moderate     Occult Blood Large     Micro Urine Req Microscopic    Narrative:       Collected By:[id number] BESSY ALONSO  If not done within the last 24 hours    MRSA By PCR (Amp) [[id number]] Collected:  11/24/19 1730    Order Status:  Completed Specimen:  Respirate from Nares Updated:  11/24/19 2121     Significant Indicator NEG     Source RESP     Site NARES     MRSA PCR Negative for MRSA by PCR.    Narrative:       Collected By:[id number] CAMRON MCCOY  Per [employer] Protocol  Collected By:[id number] CAMRON MCCOY    URINALYSIS [[id number]] Collected:  11/24/19 1920    Order Status:  Canceled Specimen:  Urine, Steen Cath     URINALYSIS [[id number]] Collected:  11/24/19 1920    Order Status:  Canceled Specimen:  Urine, Steen Cath     HSV 1/2 By PCR(Herpes)+[id number] [[id number]]     Order Status:  Canceled Specimen:  Genital from Blood     CSF " "Culture [[id number]]     Order Status:  Canceled Specimen:  CSF from Tap     BLOOD CULTURE [[id number]]  (Abnormal)  (Susceptibility) Collected:  11/21/19 0441    Order Status:  Completed Specimen:  Blood from Peripheral Updated:  11/24/19 0930     Significant Indicator POS     Source BLD     Site PERIPHERAL     Culture Result Growth detected by Bactec instrument. 11/22/2019  06:34      Pseudomonas aeruginosa  P.aeruginosa may develop resistance during prolonged therapy  with all antibiotics. Isolates that are initially susceptible  may become resistant within three to four days after  initiation of therapy. Testing of repeat isolates may be  warranted.      Narrative:       CALL  [facility]  171 tel. [phone],  CALLED  171 tel. [phone] 11/22/2019, 06:38, RB PERF. RESULTS CALLED  TO:[id number]  Per Hospital Policy: Only change Specimen Src: to \"Line\" if  specified by physician order.  Right AC    Susceptibility     Pseudomonas aeruginosa (1)     Antibiotic Interpretation Microscan Method Status    Ceftazidime Sensitive 4 mcg/mL GIRISH Final    Ciprofloxacin Sensitive <=1 mcg/mL GIRISH Final    Cefepime Sensitive <=2 mcg/mL GIRISH Final    Amikacin Sensitive <=16 mcg/mL GIRISH Final    Gentamicin Sensitive <=4 mcg/mL GIRISH Final    Tobramycin Sensitive <=4 mcg/mL GIRISH Final    Meropenem Sensitive <=1 mcg/mL GIRISH Final    Pip/Tazobactam Sensitive <=16 mcg/mL GIRISH Final                   URINE CULTURE(NEW) [[id number]]  (Abnormal)  (Susceptibility) Collected:  11/21/19 0633    Order Status:  Completed Specimen:  Urine Updated:  11/23/19 0823     Significant Indicator POS     Source UR     Site -     Culture Result -      Pseudomonas aeruginosa  ,000 cfu/mL  P.aeruginosa may develop resistance during prolonged therapy  with all antibiotics. Isolates that are initially susceptible  may become resistant within three to four days after  initiation of therapy. Testing of repeat isolates may be  warranted.      Narrative:       Indication for " culture:->Septic Shock: Persistent  hypotension,  Lactic acid > 4, vasopressors/inotropes started  Indication for culture:->Septic Shock: Persistent    Susceptibility     Pseudomonas aeruginosa (1)     Antibiotic Interpretation Microscan Method Status    Ceftazidime Sensitive 4 mcg/mL GIRISH Final    Ciprofloxacin Sensitive <=1 mcg/mL GIRISH Final    Cefepime Sensitive <=2 mcg/mL GIRISH Final    Amikacin Sensitive <=16 mcg/mL GIRISH Final    Gentamicin Sensitive <=4 mcg/mL GIRISH Final    Tobramycin Sensitive <=4 mcg/mL GIRISH Final    Levofloxacin Sensitive <=2 mcg/mL GIRISH Final    Meropenem Sensitive <=1 mcg/mL GIRISH Final    Pip/Tazobactam Sensitive <=16 mcg/mL GIRISH Final                   URINALYSIS [[id number]]  (Abnormal) Collected:  11/21/19 0633    Order Status:  Completed Specimen:  Urine Updated:  11/21/19 0716     Color Yellow     Character Cloudy     Specific Gravity 1.012     Ph 6.0     Glucose Negative mg/dL      Ketones Negative mg/dL      Protein Negative mg/dL      Bilirubin Negative     Urobilinogen, Urine 0.2     Nitrite Negative     Leukocyte Esterase Large     Occult Blood Moderate     Micro Urine Req Microscopic    Narrative:       Indication for culture:->Septic Shock: Persistent  hypotension,  Lactic acid > 4, vasopressors/inotropes started    Influenza A/B By PCR (Adult - Flu Only) [[id number]] Collected:  11/21/19 0525    Order Status:  Completed Specimen:  Respirate from Nasopharyngeal Updated:  11/21/19 0615     Influenza virus A RNA Negative     Influenza virus B, PCR Negative    Culture Respiratory W/ GRM STN [[id number]] Collected:  11/21/19 0000    Order Status:  Canceled Specimen:  Sputum     BLOOD CULTURE [[id number]] Collected:  11/21/19 0000    Order Status:  Canceled Specimen:  Other from Peripheral     BLOOD CULTURE [[id number]] Collected:  11/21/19 0000    Order Status:  Canceled Specimen:  Other from Peripheral     Influenza By PCR, A/B [[id number]] Collected:  11/21/19 0000    Order Status:  Canceled  Specimen:  Respirate from Nasopharyngeal           Assessment:  Active Hospital Problems    Diagnosis   • *UTI (urinary tract infection) [N39.0]   • Candidemia ([facility]) [B37.7]   • Acute respiratory failure with hypoxia, RAD, PNA ([facility]) [J96.01]   • Bacteremia due to Pseudomonas [R78.81]   • CAP (community acquired pneumonia) [J18.9]   • Altered mental status [R41.82]   • Leukocytosis [D72.829]   • Acute renal failure (ARF) ([facility]) [N17.9]   • Reactive airway disease with wheezing with acute exacerbation [J45.901]   • Normocytic normochromic anemia [D64.9]   • Hypocalcemia [E83.51]   • Toxic metabolic encephalopathy [G92]     Interval 24 hour assessment:      AF, O2 RA  Labs reviewed  Imaging personally reviewed both images and report.  Micro reviewed    Pt continued on zosyn and micafungin. He is more awake today and responsive but still confused.        79-year-old white male admitted on 11/21/2019 due to altered mental status.  He had concern for   acute-on-chronic renal failure, urinary tract infection as well as CAP.  His renal failure was thought to be due to a malfunctioning Steen catheter and a new catheter was placed with significant urinary retention up over 1500 mL.  Urine culture was showing  Gram-negative rods and blood cultures showing Gram-negative rods, so infectious disease was consulted for antibiotic recommendations and management.  Rapid response was called due to concern for paresthesia of the distal extremities and he was transferred to the ICU.  Blood cultures on 11/21 with growth of yeast as well as Pseudomonas.      Plan:  UTI and Gram-negative bacteremia - PSAR   Fevers, improved   Urine and Blood cxs +PSAR     --- Continue Zosyn for PSAR UTI and bacteremia, possible aspiration pneumonia  --- F/up repeat blood cultures - NGTD      Candidemia, C glabrata unclear etiology, patient without central line, significant urinary retention but did not grow from urine culture.  CT concerning for developing  obstruction but no obvious bowel perforation.  BCx 11/21+ C glabrata      --- Ophthalmology evaluate for Candida endophthalmitis as patient is encephalopathic and unable to voice if there are any vision changes  --- Recommend SARAVANAN if feasible as TTE unrevealing  --- Continue micafungin for now, once we have susceptibilities may be able to de-escalate to fluconazole.  After SARAVANAN can give final antibiotic course.  Please discuss with cardiology, SARAVANAN preferred in the setting of candidemia but it felt to be unsafe for this 79-year-old then please comment.     Encephalopathy -still mildly confused may be at baseline  Plan had been for LP but this was not completed  MRA/ MRI considered but not done      Hypoxemia, pulmonary edema - improved      Acute renal failure-ongoing but improved from admit  Multifactorial including his sepsis, urinary tract infection, and obstructed Steen catheter.    His Steen catheter has since been replaced.    Antibiotics will need to be dose adjusted accordingly  CT neg for obstruction or stone  Monitor his urinary function carefully.      Ileus vs bowel obstr  Appreciate surgery eval       Discussed with internal medicine, Dr. Gutierrez.  ID will follow.

## 2019-11-27 NOTE — CARE PLAN
Problem: Oxygenation:  Goal: Maintain adequate oxygenation dependent on patient condition  11/27/2019 0038 by Stephany Smith, RRT  Outcome: PROGRESSING AS EXPECTED  11/26/2019 2248 by Stephany Smith, RRT  Outcome: PROGRESSING AS EXPECTED     Problem: Hyperinflation:  Goal: Prevent or improve atelectasis  11/27/2019 0038 by Stephany Smith, RRT  Outcome: PROGRESSING AS EXPECTED  11/26/2019 2248 by Stephany Smith, RRT  Outcome: PROGRESSING AS EXPECTED     Problem: Bronchoconstriction:  Goal: Improve in air movement and diminished wheezing  11/27/2019 0038 by Stephany Smith, RRT  Outcome: PROGRESSING AS EXPECTED  11/26/2019 2248 by Stephany Smith, RRT  Outcome: PROGRESSING AS EXPECTED   Duo QID

## 2019-11-27 NOTE — CARE PLAN
Problem: Oxygenation:  Goal: Maintain adequate oxygenation dependent on patient condition  Outcome: PROGRESSING AS EXPECTED     Problem: Hyperinflation:  Goal: Prevent or improve atelectasis  Outcome: PROGRESSING AS EXPECTED     Problem: Bronchoconstriction:  Goal: Improve in air movement and diminished wheezing  Outcome: PROGRESSING AS EXPECTED     Oxymask 4L  Duo QID

## 2019-11-27 NOTE — PROGRESS NOTES
Progress Note               Author: Shellie Polk Date & Time created: 11/26/2019  4:32 PM     Interval History:  Improving today. Comfortable. Denies nausea or vomiting. No BM recorded.     Review of Systems:  Review of Systems   Constitutional: Negative for chills and fever.   Gastrointestinal: Negative for abdominal pain, nausea and vomiting.       Physical Exam:  Physical Exam  Constitutional:       Appearance: He is not ill-appearing.   HENT:      Head: Normocephalic and atraumatic.      Mouth/Throat:      Mouth: Mucous membranes are moist.   Pulmonary:      Effort: Pulmonary effort is normal. No respiratory distress.   Abdominal:      General: Abdomen is flat.      Palpations: Abdomen is soft.      Tenderness: There is no tenderness. There is no guarding.   Skin:     General: Skin is warm and dry.   Neurological:      General: No focal deficit present.      Mental Status: He is alert.      Comments: oriented x 1, more interactive today         Labs:  Recent Labs     11/24/19  0847 11/24/19  1054 11/24/19  1505   UDOXT32R 7.44 7.44  --    [id number] 34.3 32.8  --    PLGDQ455F 59.7* 85.8  --    DVQA8YND 90.3* 95.9  --    ARTHCO3 23 22  --    B8RWIQIXG 2.0 5.0  --    ARTBE -1 -2  --    ISTATAPH  --   --  7.394*   ISTATAPCO2  --   --  37.6*   ISTATAPO2  --   --  89*   ISTATATCO2  --   --  24   EDYRLZQ4KWC  --   --  97   ISTATARTHCO3  --   --  23.0   ISTATARTBE  --   --  -2   ISTATTEMP  --   --  98.3 F   ISTATFIO2  --   --  60   ISTATSPEC  --   --  Arterial   ISTATAPHTC  --   --  7.396*   JAOOVXOG8UH  --   --  88*         Recent Labs     11/24/19  0255 11/24/19  0827 11/25/19  0400   SODIUM 136 134* 141   POTASSIUM 4.5 4.4 4.4   CHLORIDE 105 107 107   CO2 24 22 25   BUN 34* 31* 27*   CREATININE 1.95* 1.84* 1.83*   CALCIUM 8.0* 8.3* 8.2*     Recent Labs     11/24/19  0255 11/24/19  0827 11/25/19  0400   ALTSGPT  --  9  --    ASTSGOT  --  32  --    ALKPHOSPHAT  --  43  --    TBILIRUBIN  --  0.7  --    LIPASE   --  13  --    GLUCOSE 94 114* 77     Recent Labs     19  0440   RBC 3.08* 2.97* 3.09*   HEMOGLOBIN 9.3* 8.9* 9.3*   HEMATOCRIT 29.3* 28.3* 29.7*   PLATELETCT 172 172 165   PROTHROMBTM  --  18.5*  --    INR  --  1.50*  --      Recent Labs     19  0440   WBC 11.8*  --  10.8 8.0   NEUTSPOLYS  --   --  88.00* 83.10*   LYMPHOCYTES  --   --  4.60* 5.90*   MONOCYTES  --   --  6.20 7.10   EOSINOPHILS  --   --  0.20 2.50   BASOPHILS  --   --  0.30 0.90   ASTSGOT  --  32  --   --    ALTSGPT  --  9  --   --    ALKPHOSPHAT  --  43  --   --    TBILIRUBIN  --  0.7  --   --      Hemodynamics:  Temp (24hrs), Av.6 °C (97.9 °F), Min:36.2 °C (97.1 °F), Max:37 °C (98.6 °F)  Temperature: 37 °C (98.6 °F)  Pulse  Av.4  Min: 44  Max: 86   Blood Pressure : 147/66     Respiratory:    Respiration: (!) 22, Pulse Oximetry: 97 %, O2 Daily Delivery Respiratory : OxyMask     Given By:: Mask, Work Of Breathing / Effort: Mild  RUL Breath Sounds: Diminished, RML Breath Sounds: Diminished, RLL Breath Sounds: Diminished, STANLEY Breath Sounds: Diminished, LLL Breath Sounds: Diminished  Fluids:    Intake/Output Summary (Last 24 hours) at 2019 1632  Last data filed at 2019 1600  Gross per 24 hour   Intake 750 ml   Output 3635 ml   Net -2885 ml     Weight: 93.4 kg (205 lb 14.6 oz)  GI/Nutrition:  Orders Placed This Encounter   Procedures   • Diet Order Clear Liquid     Standing Status:   Standing     Number of Occurrences:   1     Order Specific Question:   Diet:     Answer:   Clear Liquid [10]     Medical Decision Making, by Problem:  Active Hospital Problems    Diagnosis   • *UTI (urinary tract infection) [N39.0]   • Candidemia ([facility]) [B37.7]   • Acute respiratory failure with hypoxia, RAD, PNA ([facility]) [J96.01]   • Bacteremia due to Pseudomonas [R78.81]   • CAP (community acquired pneumonia) [J18.9]   • Altered mental status [R41.82]   • Leukocytosis  [D72.829]   • Acute renal failure (ARF) (HCC) [N17.9]   • Reactive airway disease with wheezing with acute exacerbation [J45.901]   • Normocytic normochromic anemia [D64.9]   • Hypocalcemia [E83.51]   • Toxic metabolic encephalopathy [G92]       Plan:  Abdominal XRay yesterday shows diffused dilation of bowel. There is air and stool in the colon. This is consistent with an ileus, likely due to critical illness.   No indication for surgical intervention. Will sign off. Please call with any questions or changes in status.     Quality-Core Measures

## 2019-11-27 NOTE — CARE PLAN
Problem: Safety  Goal: Will remain free from injury  Note:   Pt AO x 2. Call light within reach and use of call light reinforced, treaded socks in place, bed in lowest position and locked. Bed alarm on.  Hourly rounding in progress      Problem: Bowel/Gastric:  Goal: Normal bowel function is maintained or improved  Flowsheets (Taken 11/26/2019 2000)  Last BM: 11/22/19  Note:   Last BM 11/22. Bowel protocol in place. Will escalate as needed.

## 2019-11-27 NOTE — CARE PLAN
Problem: Venous Thromboembolism (VTW)/Deep Vein Thrombosis (DVT) Prevention:  Goal: Patient will participate in Venous Thrombosis (VTE)/Deep Vein Thrombosis (DVT)Prevention Measures  Outcome: PROGRESSING AS EXPECTED  Intervention: Assess and monitor for anticoagulation complications  Note:   Assessed for complications.  Intervention: Ensure patient wears graduated elastic stockings (ROGER hose) and/or SCDs, if ordered, when in bed or chair (Remove at least once per shift for skin check)  Note:   Pt wearing SCDs when in bed.     Problem: Fluid Volume:  Goal: Will maintain balanced intake and output  Outcome: PROGRESSING AS EXPECTED  Intervention: Monitor, educate, and encourage compliance with therapeutic intake of liquids  Note:   Pt's fluid intake is adequate and pt's urine output is 100/hr.

## 2019-11-27 NOTE — CARE PLAN
Problem: Oxygenation:  Goal: Maintain adequate oxygenation dependent on patient condition  Outcome: PROGRESSING AS EXPECTED   Pt on 1L oxymask, will continue to titrate as tolerated.    Problem: Bronchoconstriction:  Goal: Improve in air movement and diminished wheezing  Outcome: PROGRESSING AS EXPECTED   Duoneb QID.

## 2019-11-28 PROBLEM — R00.1 BRADYCARDIA: Status: ACTIVE | Noted: 2019-11-28

## 2019-11-28 PROCEDURE — 700102 HCHG RX REV CODE 250 W/ 637 OVERRIDE(OP): Performed by: HOSPITALIST

## 2019-11-28 PROCEDURE — 770006 HCHG ROOM/CARE - MED/SURG/GYN SEMI*

## 2019-11-28 PROCEDURE — 700111 HCHG RX REV CODE 636 W/ 250 OVERRIDE (IP): Performed by: HOSPITALIST

## 2019-11-28 PROCEDURE — 700111 HCHG RX REV CODE 636 W/ 250 OVERRIDE (IP): Performed by: INTERNAL MEDICINE

## 2019-11-28 PROCEDURE — A9270 NON-COVERED ITEM OR SERVICE: HCPCS | Performed by: HOSPITALIST

## 2019-11-28 PROCEDURE — A9270 NON-COVERED ITEM OR SERVICE: HCPCS | Performed by: INTERNAL MEDICINE

## 2019-11-28 PROCEDURE — 700102 HCHG RX REV CODE 250 W/ 637 OVERRIDE(OP): Performed by: INTERNAL MEDICINE

## 2019-11-28 PROCEDURE — 3E02340 INTRODUCTION OF INFLUENZA VACCINE INTO MUSCLE, PERCUTANEOUS APPROACH: ICD-10-PCS | Performed by: HOSPITALIST

## 2019-11-28 PROCEDURE — 90471 IMMUNIZATION ADMIN: CPT

## 2019-11-28 PROCEDURE — 90662 IIV NO PRSV INCREASED AG IM: CPT | Performed by: HOSPITALIST

## 2019-11-28 PROCEDURE — 99232 SBSQ HOSP IP/OBS MODERATE 35: CPT | Performed by: INTERNAL MEDICINE

## 2019-11-28 PROCEDURE — 700105 HCHG RX REV CODE 258: Performed by: INTERNAL MEDICINE

## 2019-11-28 RX ADMIN — INFLUENZA A VIRUS A/MICHIGAN/45/2015 X-275 (H1N1) ANTIGEN (FORMALDEHYDE INACTIVATED), INFLUENZA A VIRUS A/SINGAPORE/INFIMH-16-0019/2016 IVR-186 (H3N2) ANTIGEN (FORMALDEHYDE INACTIVATED), AND INFLUENZA B VIRUS B/MARYLAND/15/2016 BX-69A (A B/COLORADO/6/2017-LIKE VIRUS) ANTIGEN (FORMALDEHYDE INACTIVATED) 0.5 ML: 60; 60; 60 INJECTION, SUSPENSION INTRAMUSCULAR at 06:58

## 2019-11-28 RX ADMIN — PIPERACILLIN AND TAZOBACTAM 4.5 G: 4; .5 INJECTION, POWDER, LYOPHILIZED, FOR SOLUTION INTRAVENOUS; PARENTERAL at 13:32

## 2019-11-28 RX ADMIN — MICAFUNGIN SODIUM 100 MG: 20 INJECTION, POWDER, LYOPHILIZED, FOR SOLUTION INTRAVENOUS at 06:50

## 2019-11-28 RX ADMIN — HEPARIN SODIUM 5000 UNITS: 5000 INJECTION INTRAVENOUS; SUBCUTANEOUS at 21:25

## 2019-11-28 RX ADMIN — SIMVASTATIN 10 MG: 20 TABLET, FILM COATED ORAL at 21:25

## 2019-11-28 RX ADMIN — PIPERACILLIN AND TAZOBACTAM 4.5 G: 4; .5 INJECTION, POWDER, LYOPHILIZED, FOR SOLUTION INTRAVENOUS; PARENTERAL at 04:29

## 2019-11-28 RX ADMIN — POLYETHYLENE GLYCOL 3350 1 PACKET: 17 POWDER, FOR SOLUTION ORAL at 04:33

## 2019-11-28 RX ADMIN — SENNOSIDES AND DOCUSATE SODIUM 2 TABLET: 8.6; 5 TABLET ORAL at 04:34

## 2019-11-28 RX ADMIN — TAMSULOSIN HYDROCHLORIDE 0.4 MG: 0.4 CAPSULE ORAL at 04:33

## 2019-11-28 RX ADMIN — FAMOTIDINE 20 MG: 10 INJECTION INTRAVENOUS at 04:34

## 2019-11-28 RX ADMIN — HEPARIN SODIUM 5000 UNITS: 5000 INJECTION INTRAVENOUS; SUBCUTANEOUS at 04:34

## 2019-11-28 RX ADMIN — CLOTRIMAZOLE 1 APPLICATION: 10 CREAM TOPICAL at 18:00

## 2019-11-28 RX ADMIN — LATANOPROST 1 DROP: 50 SOLUTION OPHTHALMIC at 22:13

## 2019-11-28 RX ADMIN — HEPARIN SODIUM 5000 UNITS: 5000 INJECTION INTRAVENOUS; SUBCUTANEOUS at 13:32

## 2019-11-28 RX ADMIN — AMLODIPINE BESYLATE 10 MG: 10 TABLET ORAL at 04:34

## 2019-11-28 RX ADMIN — PIPERACILLIN AND TAZOBACTAM 4.5 G: 4; .5 INJECTION, POWDER, LYOPHILIZED, FOR SOLUTION INTRAVENOUS; PARENTERAL at 21:21

## 2019-11-28 RX ADMIN — SITAGLIPTIN 50 MG: 50 TABLET, FILM COATED ORAL at 09:02

## 2019-11-28 RX ADMIN — QUETIAPINE FUMARATE 50 MG: 25 TABLET ORAL at 17:59

## 2019-11-28 RX ADMIN — CLOTRIMAZOLE 1 APPLICATION: 10 CREAM TOPICAL at 06:42

## 2019-11-28 ASSESSMENT — ENCOUNTER SYMPTOMS
DIZZINESS: 0
WEAKNESS: 0
FALLS: 0
CHILLS: 0
DIARRHEA: 0
SHORTNESS OF BREATH: 0
COUGH: 0
VOMITING: 0
FEVER: 0
DEPRESSION: 0
CONSTIPATION: 0
NERVOUS/ANXIOUS: 0
BLURRED VISION: 0
ABDOMINAL PAIN: 0
SORE THROAT: 0
NAUSEA: 0
PALPITATIONS: 0

## 2019-11-28 NOTE — PROGRESS NOTES
Pt transferred to [email or web address] by this RN and CCT via wheelchair on 2L silicone nasal cannula with all personal belongings, chart and medications. This RN called two contacts to leave message notifying them of new room placement. Report given to Nighat COREAS who assumed pt care.

## 2019-11-28 NOTE — PROGRESS NOTES
"Assumed care of patient at 1700 . Received report from RN. Patient is AOX2 . Assessment complete. Labs reviewed.Patient and RN discussed plan of care. Patient questions answered. Patient needs are met at this time. Bed in lowest and locked position. Upper bed rails up.  Call light is within reach. Hourly rounding in place. /68   Pulse (!) 54   Temp 36.1 °C (97 °F) (Temporal)   Resp 19   Ht 2.007 m (6' 7\")   Wt 102.2 kg (225 lb 5 oz)   SpO2 96%   BMI 25.38 kg/m²     "

## 2019-11-28 NOTE — PROGRESS NOTES
"Assumed care of patient at 0700 . Received report from RN. Patient is AOX1 . Assessment complete. Labs reviewed.Patient and RN discussed plan of care. Patient questions answered. Patient needs are met at this time. Bed in lowest and locked position. Upper bed rails up.  Call light is within reach. Hourly rounding in place. /57   Pulse (!) 50   Temp 36.5 °C (97.7 °F) (Temporal)   Resp 18   Ht 2.007 m (6' 7\")   Wt 102.2 kg (225 lb 5 oz)   SpO2 97%   BMI 25.38 kg/m²     "

## 2019-11-28 NOTE — PROGRESS NOTES
Report received from LYUDMILA Pagan. Assumed care at 1900, assessment complete. Pt is A & O x 1, alert to self.  Pt c/o 6/10 BLE pain, pain meds given see MAR. Fall precautions and appropriate signs in place. Pt oriented to unit routine, call light/phone system and RN extension number provided. Pt educated regarding fall precautions. Bed alarm in use. Pt denies any additional needs at this time. Call light within reach. Pt has an indwelling catheter in place, flowing into drainage bag. Pt is re-oriented as needed, 2L O2 via NC.

## 2019-11-28 NOTE — CONSULTS
Spoke to the attending hospitalist.  Infectious disease has recommended a SARAVANAN to rule out cardiac source of endocarditis.    Patient in house without critical features for this.  On a non-telemetry floor, defer to Monday based on staffing.  This is okay clinically.  We will keep on her last.

## 2019-11-28 NOTE — CARE PLAN
Problem: Safety  Goal: Will remain free from falls  Outcome: PROGRESSING AS EXPECTED  Note:   Pt will remain free from falls during stay, pt is re-oriented as needed, bed alarm on, frequent checks. Pt is encouraged to use call light.      Problem: Skin Integrity  Goal: Risk for impaired skin integrity will decrease  Outcome: PROGRESSING AS EXPECTED  Note:   Pt skin integrity will be maintained during stay, pt is q2h turns, uses mepilex. Low air loss mattress with bed extender was ordered.

## 2019-11-28 NOTE — PROGRESS NOTES
· 2 RN skin check complete. Done with Latasha  · Devices in place oxygen and sawyer.  · Skin assessed under devices yes.  · Confirmed pressure ulcers found on sacrum  ·    · The following interventions are in place bed in lowest position, call light within reach and bed alarm on.

## 2019-11-28 NOTE — PROGRESS NOTES
[facility] Medicine Daily Progress Note    Date of Service  11/28/2019    Chief Complaint  79 y.o. male admitted 11/21/2019 with confusion    Hospital Course    Mr. Randy Baets is a 79 y.o. male with a past medical history significant for insulin dependent diabetes mellitus, hypertension, and urinary retention with a chronic indwelling Steen catheter who presented with altered mental status on 11/21/2019.  Imaging was consistent with a community acquired pneumonia.  Both blood and urine cultures grew Pseudomonas.  Blood cultures also grew candida glabrata.  Ophthalmology was consulted and ruled out endophthalmitis.  He will follow up as outpatient for pseudophakia, cataract, and dematochalasis.  A transthoracic echocardiogram was not suggestive of endocarditis.  Infectious disease recommended a transesophageal echocardiogram, which cannot be performed until Monday 12/2/2019 because of the holiday weekend.  Source of candidemia is unclear as he has no lines and candida did not grow from urine culture.      On 11/24/2019 the patient became more confused with increased oxygen requirement.  A rapid response was called and he was transferred to the ICU on high flow nasal cannula.  He has since improved and transferred to the general medical floor.      There was concern for peripheral paresthesias and a neurology consult was placed.  An MRI/MRA and LP was recommended but the patient decompensated and was transferred to the ICU.  He now denies having any paresthesias which can be worked up as an outpatient per neurology recommendations if symptoms return.      Nephrology was consulted for acute renal failure.  His creatinine has improved and he is producing adequate urine.  A CT renal colic evaluation demonstrated normal kidneys.     General surgery was consulted for an ileus, but the patient is now having bowel movements with adequate bowel sounds.           Interval Problem Update  Patient was seen and examined at  bedside.  There were no acute events overnight.  I have personally reviewed vitals, labs, and imaging.  Patient denies pain, shortness of breath, paresthesias, fever.  The patient has dementia at baseline and is a poor historian and very sleepy.  I consulted Cardiology for a Transthoracic echocardiogram today, which cannot be performed until 12/2/2019.        Consultants/Specialty  Cardiology  Nephrology  Infectious Disease    Code Status  FULL    Disposition  PT recommends subacute rehab.  He is from assisted living and has a guardian.      Review of Systems  Review of Systems   Constitutional: Negative for chills and fever.        Patient denies complaints but is a poor historian and only oriented to person   HENT: Negative for sore throat.    Eyes: Negative for blurred vision.   Respiratory: Negative for cough and shortness of breath.    Cardiovascular: Negative for chest pain, palpitations and leg swelling.   Gastrointestinal: Negative for abdominal pain, constipation, diarrhea, nausea and vomiting.   Genitourinary: Negative for dysuria, frequency and urgency.   Musculoskeletal: Negative for falls.   Skin: Negative for rash.   Neurological: Negative for dizziness and weakness.   Psychiatric/Behavioral: Negative for depression and suicidal ideas. The patient is not nervous/anxious.    All other systems reviewed and are negative.       Physical Exam  Temp:  [36.1 °C (97 °F)-36.6 °C (97.8 °F)] 36.2 °C (97.1 °F)  Pulse:  [44-74] 44  Resp:  [18] 18  BP: (125-159)/(57-76) 125/72  SpO2:  [88 %-98 %] 98 %    Physical Exam  Vitals signs and nursing note reviewed.   Constitutional:       General: He is not in acute distress.     Appearance: Normal appearance.   HENT:      Head: Normocephalic and atraumatic.      Nose: Nose normal.      Mouth/Throat:      Mouth: Mucous membranes are dry.      Pharynx: Oropharynx is clear.   Eyes:      Extraocular Movements: Extraocular movements intact.      Conjunctiva/sclera:  Conjunctivae normal.   Neck:      Musculoskeletal: Normal range of motion and neck supple.   Cardiovascular:      Rate and Rhythm: Regular rhythm. Bradycardia present.      Pulses: Normal pulses.      Heart sounds: Normal heart sounds. No murmur. No friction rub. No gallop.    Pulmonary:      Effort: Pulmonary effort is normal. No respiratory distress.      Breath sounds: Normal breath sounds. No wheezing or rales.   Chest:      Chest wall: No tenderness.   Abdominal:      General: Abdomen is flat. Bowel sounds are normal. There is no distension.      Palpations: Abdomen is soft. There is no mass.      Tenderness: There is no tenderness. There is no guarding.   Musculoskeletal: Normal range of motion.   Skin:     General: Skin is warm.      Capillary Refill: Capillary refill takes less than 2 seconds.   Neurological:      General: No focal deficit present.      Mental Status: He is alert. Mental status is at baseline.      Cranial Nerves: No cranial nerve deficit.      Motor: No weakness.   Psychiatric:         Mood and Affect: Mood normal.         Behavior: Behavior normal.         Fluids    Intake/Output Summary (Last 24 hours) at 11/28/2019 1355  Last data filed at 11/28/2019 1200  Gross per 24 hour   Intake 600 ml   Output 1400 ml   Net -800 ml       Laboratory  Recent Labs     11/27/19  0421   WBC 7.4   RBC 3.15*   HEMOGLOBIN 9.6*   HEMATOCRIT 30.0*   MCV 95.2   MCH 30.5   MCHC 32.0*   RDW 52.2*   PLATELETCT 201   MPV 8.7*     Recent Labs     11/27/19  0421   SODIUM 143   POTASSIUM 4.6   CHLORIDE 105   CO2 31   GLUCOSE 110*   BUN 19   CREATININE 1.74*   CALCIUM 8.2*                   Imaging  DX-CHEST-LIMITED (1 VIEW)   Final Result      Bibasilar and perihilar underinflation atelectasis which could obscure an additional process. This is unchanged.      [id number] VIEW   Final Result      Diffuse bowel distention. Findings are suggestive of ileus. Early obstruction could have a similar appearance.       DX-CHEST-LIMITED (1 VIEW)   Final Result      1.  Interval worsening of pulmonary opacities in the right mid/lower lung.   2.  Unchanged left basilar atelectasis and/or consolidation. No significant pleural effusions.      DX-CHEST-PORTABLE (1 VIEW)   Final Result      Similar reticular opacities suspicious for edema and there are likely small layering effusions      More focal basilar opacity is more likely from atelectasis than consolidation      EC-ECHOCARDIOGRAM COMPLETE W/O CONT   Final Result      CT-RENAL COLIC EVALUATION(A/P W/O)   Final Result      1.  Dilated fluid-filled small bowel loops in RIGHT lower quadrant concerning for developing obstruction.   2.  No evidence of bowel perforation.   3.  Colonic diverticulosis without evidence for diverticulitis.   4.  No renal stone or hydronephrosis.   5.  Appendix is not visualized.   6.  Enlarged prostate.   7.  Small bilateral pleural effusions with associated atelectasis.   8.  Colonic diverticulosis without evidence for diverticulitis.      DX-CHEST-PORTABLE (1 VIEW)   Final Result         1.  Pulmonary edema and/or infiltrates are identified, which are stable since the prior exam.   2.  Cardiomegaly   3.  Atherosclerosis           Assessment/Plan  * UTI (urinary tract infection)- (present on admission)  Assessment & Plan  Urinary tract infection associated with chronic indwelling Steen catheter  Complicated urinary tract infection with associated encephalopathy  Appreciate infectious disease consultation, continuing Zosyn    Candidemia (HCC)- (present on admission)  Assessment & Plan  Appreciate ID recommendations  Continuing micafungin day 6/14    Bacteremia due to Pseudomonas- (present on admission)  Assessment & Plan  Infectious disease following, continue Zosyn    Acute respiratory failure with hypoxia, RAD, PNA (HCC)- (present on admission)  Assessment & Plan  Improved    Altered mental status- (present on admission)  Assessment & Plan  Patient has  baseline dementia  He was quite confused earlier in stay but this has improved daily; getting seroquel for sleep  Suspect altered mental status related to infection and sepsis      CAP (community acquired pneumonia)- (present on admission)  Assessment & Plan  Pseudomonas; continue zosyn per ID  Rt protocol  Respiratory status continues to improve        Bradycardia- (present on admission)  Assessment & Plan  Patient has been bradycardic, but seems to be asymptomatic.  Normotensive.  Hold AV zoraida blockers.    Acute renal failure (ARF) (HCC)- (present on admission)  Assessment & Plan  Baseline creatinine is unknown; likely was 2/2 urinary retention/sawyer obstruction; had 1500ml in bladder prior to resolution  Avoid nephrotoxins; improving creatinine    Leukocytosis- (present on admission)  Assessment & Plan  Secondary to the current pneumonia and urinary tract infection   Resolved    Reactive airway disease with wheezing with acute exacerbation- (present on admission)  Assessment & Plan  RT protocol    Hypocalcemia- (present on admission)  Assessment & Plan  Corrected normal    Normocytic normochromic anemia- (present on admission)  Assessment & Plan  Monitor H&H if drops below 7 or 21 transfuse.   Rising 9.6 today    Gastrointestinal prophylaxis: Famotidine  Antibiotics: Micafungin last dose 12/5.  Zosyn last dose 12/4   Diet: Regular  Code status: FULL  Prognosis: Guarded  Risk: The Patient is at HIGH risk for inpatient complications and decompensation secondary to his multiple cormorbidities including Pseudomonase bacteremia and Candidemia with concern for endocarditis, Pseudomonas urinary tract infection, hypoxic respiratory failure, altered mental status    I discussed the plan of care with bedside RN as well as on multidisciplinary rounds    Time spent > 30 minutes.  > 50% of the time was spend providing counseling and coordination of care       VTE prophylaxis: Heparin

## 2019-11-29 PROBLEM — G92.8 TOXIC METABOLIC ENCEPHALOPATHY: Status: RESOLVED | Noted: 2019-11-24 | Resolved: 2019-11-29

## 2019-11-29 PROBLEM — D72.829 LEUKOCYTOSIS: Status: RESOLVED | Noted: 2019-11-21 | Resolved: 2019-11-29

## 2019-11-29 PROBLEM — J45.901 REACTIVE AIRWAY DISEASE WITH WHEEZING WITH ACUTE EXACERBATION: Status: RESOLVED | Noted: 2019-11-22 | Resolved: 2019-11-29

## 2019-11-29 LAB
ANION GAP SERPL CALC-SCNC: 5 MMOL/L (ref 0–11.9)
BACTERIA BLD CULT: NORMAL
BACTERIA BLD CULT: NORMAL
BASOPHILS # BLD AUTO: 1.1 % (ref 0–1.8)
BASOPHILS # BLD: 0.09 K/UL (ref 0–0.12)
BUN SERPL-MCNC: 23 MG/DL (ref 8–22)
CALCIUM SERPL-MCNC: 8.5 MG/DL (ref 8.5–10.5)
CHLORIDE SERPL-SCNC: 109 MMOL/L (ref 96–112)
CO2 SERPL-SCNC: 27 MMOL/L (ref 20–33)
CREAT SERPL-MCNC: 1.96 MG/DL (ref 0.5–1.4)
EOSINOPHIL # BLD AUTO: 0.34 K/UL (ref 0–0.51)
EOSINOPHIL NFR BLD: 4.1 % (ref 0–6.9)
ERYTHROCYTE [DISTWIDTH] IN BLOOD BY AUTOMATED COUNT: 55.1 FL (ref 35.9–50)
GLUCOSE SERPL-MCNC: 133 MG/DL (ref 65–99)
HCT VFR BLD AUTO: 31.9 % (ref 42–52)
HGB BLD-MCNC: 9.7 G/DL (ref 14–18)
IMM GRANULOCYTES # BLD AUTO: 0.08 K/UL (ref 0–0.11)
IMM GRANULOCYTES NFR BLD AUTO: 1 % (ref 0–0.9)
LYMPHOCYTES # BLD AUTO: 0.79 K/UL (ref 1–4.8)
LYMPHOCYTES NFR BLD: 9.4 % (ref 22–41)
MAGNESIUM SERPL-MCNC: 2 MG/DL (ref 1.5–2.5)
MCH RBC QN AUTO: 30 PG (ref 27–33)
MCHC RBC AUTO-ENTMCNC: 30.4 G/DL (ref 33.7–35.3)
MCV RBC AUTO: 98.8 FL (ref 81.4–97.8)
MONOCYTES # BLD AUTO: 0.62 K/UL (ref 0–0.85)
MONOCYTES NFR BLD AUTO: 7.4 % (ref 0–13.4)
NEUTROPHILS # BLD AUTO: 6.45 K/UL (ref 1.82–7.42)
NEUTROPHILS NFR BLD: 77 % (ref 44–72)
NRBC # BLD AUTO: 0 K/UL
NRBC BLD-RTO: 0 /100 WBC
PHOSPHATE SERPL-MCNC: 3.1 MG/DL (ref 2.5–4.5)
PLATELET # BLD AUTO: 226 K/UL (ref 164–446)
PMV BLD AUTO: 9 FL (ref 9–12.9)
POTASSIUM SERPL-SCNC: 4.6 MMOL/L (ref 3.6–5.5)
RBC # BLD AUTO: 3.23 M/UL (ref 4.7–6.1)
SIGNIFICANT IND 70042: NORMAL
SIGNIFICANT IND 70042: NORMAL
SITE SITE: NORMAL
SITE SITE: NORMAL
SODIUM SERPL-SCNC: 141 MMOL/L (ref 135–145)
SOURCE SOURCE: NORMAL
SOURCE SOURCE: NORMAL
WBC # BLD AUTO: 8.4 K/UL (ref 4.8–10.8)

## 2019-11-29 PROCEDURE — 700102 HCHG RX REV CODE 250 W/ 637 OVERRIDE(OP): Performed by: INTERNAL MEDICINE

## 2019-11-29 PROCEDURE — 83735 ASSAY OF MAGNESIUM: CPT

## 2019-11-29 PROCEDURE — 700105 HCHG RX REV CODE 258: Performed by: INTERNAL MEDICINE

## 2019-11-29 PROCEDURE — 36415 COLL VENOUS BLD VENIPUNCTURE: CPT

## 2019-11-29 PROCEDURE — 80048 BASIC METABOLIC PNL TOTAL CA: CPT

## 2019-11-29 PROCEDURE — 85025 COMPLETE CBC W/AUTO DIFF WBC: CPT

## 2019-11-29 PROCEDURE — 99232 SBSQ HOSP IP/OBS MODERATE 35: CPT | Performed by: INTERNAL MEDICINE

## 2019-11-29 PROCEDURE — A9270 NON-COVERED ITEM OR SERVICE: HCPCS | Performed by: INTERNAL MEDICINE

## 2019-11-29 PROCEDURE — 700111 HCHG RX REV CODE 636 W/ 250 OVERRIDE (IP): Performed by: INTERNAL MEDICINE

## 2019-11-29 PROCEDURE — 770006 HCHG ROOM/CARE - MED/SURG/GYN SEMI*

## 2019-11-29 PROCEDURE — A9270 NON-COVERED ITEM OR SERVICE: HCPCS | Performed by: HOSPITALIST

## 2019-11-29 PROCEDURE — 84100 ASSAY OF PHOSPHORUS: CPT

## 2019-11-29 PROCEDURE — 700102 HCHG RX REV CODE 250 W/ 637 OVERRIDE(OP): Performed by: HOSPITALIST

## 2019-11-29 RX ORDER — M-VIT,TX,IRON,MINS/CALC/FOLIC 27MG-0.4MG
1 TABLET ORAL DAILY
Status: DISCONTINUED | OUTPATIENT
Start: 2019-11-29 | End: 2019-12-12 | Stop reason: HOSPADM

## 2019-11-29 RX ORDER — ASCORBIC ACID 500 MG
500 TABLET ORAL DAILY
Status: DISCONTINUED | OUTPATIENT
Start: 2019-11-29 | End: 2019-12-12 | Stop reason: HOSPADM

## 2019-11-29 RX ADMIN — POLYETHYLENE GLYCOL 3350 1 PACKET: 17 POWDER, FOR SOLUTION ORAL at 05:27

## 2019-11-29 RX ADMIN — HEPARIN SODIUM 5000 UNITS: 5000 INJECTION INTRAVENOUS; SUBCUTANEOUS at 05:25

## 2019-11-29 RX ADMIN — PIPERACILLIN AND TAZOBACTAM 4.5 G: 4; .5 INJECTION, POWDER, LYOPHILIZED, FOR SOLUTION INTRAVENOUS; PARENTERAL at 06:42

## 2019-11-29 RX ADMIN — HALOPERIDOL LACTATE 2 MG: 5 INJECTION, SOLUTION INTRAMUSCULAR at 22:54

## 2019-11-29 RX ADMIN — HEPARIN SODIUM 5000 UNITS: 5000 INJECTION INTRAVENOUS; SUBCUTANEOUS at 16:46

## 2019-11-29 RX ADMIN — SENNOSIDES AND DOCUSATE SODIUM 2 TABLET: 8.6; 5 TABLET ORAL at 05:24

## 2019-11-29 RX ADMIN — SITAGLIPTIN 50 MG: 50 TABLET, FILM COATED ORAL at 11:53

## 2019-11-29 RX ADMIN — MICAFUNGIN SODIUM 100 MG: 20 INJECTION, POWDER, LYOPHILIZED, FOR SOLUTION INTRAVENOUS at 05:20

## 2019-11-29 RX ADMIN — FAMOTIDINE 20 MG: 10 INJECTION INTRAVENOUS at 05:56

## 2019-11-29 RX ADMIN — ACETAMINOPHEN 650 MG: 325 TABLET, FILM COATED ORAL at 09:14

## 2019-11-29 RX ADMIN — OXYCODONE HYDROCHLORIDE AND ACETAMINOPHEN 500 MG: 500 TABLET ORAL at 09:13

## 2019-11-29 RX ADMIN — TAMSULOSIN HYDROCHLORIDE 0.4 MG: 0.4 CAPSULE ORAL at 05:24

## 2019-11-29 RX ADMIN — MULTIPLE VITAMINS W/ MINERALS TAB 1 TABLET: TAB at 09:14

## 2019-11-29 RX ADMIN — CLOTRIMAZOLE 1 APPLICATION: 10 CREAM TOPICAL at 05:32

## 2019-11-29 RX ADMIN — HEPARIN SODIUM 5000 UNITS: 5000 INJECTION INTRAVENOUS; SUBCUTANEOUS at 21:18

## 2019-11-29 RX ADMIN — AMLODIPINE BESYLATE 10 MG: 10 TABLET ORAL at 05:25

## 2019-11-29 ASSESSMENT — ENCOUNTER SYMPTOMS
SORE THROAT: 0
ABDOMINAL PAIN: 0
NAUSEA: 0
DIARRHEA: 0
SHORTNESS OF BREATH: 0
DIZZINESS: 0
VOMITING: 0
FALLS: 0
COUGH: 0
DEPRESSION: 0
FEVER: 0
BLURRED VISION: 0
CONSTIPATION: 0
PALPITATIONS: 0
WEAKNESS: 0
CHILLS: 0
NERVOUS/ANXIOUS: 0

## 2019-11-29 ASSESSMENT — COGNITIVE AND FUNCTIONAL STATUS - GENERAL
STANDING UP FROM CHAIR USING ARMS: A LOT
DAILY ACTIVITIY SCORE: 14
EATING MEALS: A LITTLE
MOBILITY SCORE: 10
WALKING IN HOSPITAL ROOM: TOTAL
CLIMB 3 TO 5 STEPS WITH RAILING: TOTAL
TOILETING: A LOT
SUGGESTED CMS G CODE MODIFIER MOBILITY: CL
TURNING FROM BACK TO SIDE WHILE IN FLAT BAD: A LOT
DRESSING REGULAR LOWER BODY CLOTHING: A LOT
MOVING TO AND FROM BED TO CHAIR: A LOT
DRESSING REGULAR UPPER BODY CLOTHING: A LOT
PERSONAL GROOMING: A LITTLE
MOVING FROM LYING ON BACK TO SITTING ON SIDE OF FLAT BED: A LOT
SUGGESTED CMS G CODE MODIFIER DAILY ACTIVITY: CK
HELP NEEDED FOR BATHING: A LOT

## 2019-11-29 NOTE — PROGRESS NOTES
Infectious Disease Progress Note    Author: Filiberto Knight M.D. Date & Time of service: 2019  12:08 PM       Chief Complaint:  UTI and Gram-negative sepsis   Candiemia     Interval History:  79-year-old male admitted with altered mental status.    Llse412.1 WBC 10.8 AMS worsened-resp failure and bradycardia-now transferred to ICU. Obtunded   101.1, O2 15 L high flow, increased from admit but weaning slowly today, oriented only to self.  LP attempted at bedside but unable to obtain due to agitation.  Neurology recommended MRI/MRA.    AF, O2 1 L oxymask, somnolent today and remains confused.   afebrile, white count 8.4.  Tolerating micafungin and Zosyn.  No new issues.    Review of Systems:  Review of Systems   Constitutional: Negative for chills and fever.   Gastrointestinal: Negative for abdominal pain, nausea and vomiting.   Skin: Negative for itching and rash.   All other systems reviewed and are negative.      Hemodynamics:  Temp (24hrs), Av.8 °C (98.2 °F), Min:36.2 °C (97.1 °F), Max:37.2 °C (99 °F)  Temperature: 36.2 °C (97.1 °F)  Pulse  Av  Min: 44  Max: 86   Blood Pressure : 130/69       Physical Exam:  Physical Exam  Vitals signs and nursing note reviewed.   Constitutional:       Comments: Elderly and frail   HENT:      Head: Normocephalic and atraumatic.   Eyes:      General: No scleral icterus.        Right eye: No discharge.         Left eye: No discharge.      Conjunctiva/sclera: Conjunctivae normal.      Pupils: Pupils are equal, round, and reactive to light.   Cardiovascular:      Rate and Rhythm: Normal rate and regular rhythm.      Heart sounds: No murmur.   Pulmonary:      Effort: Pulmonary effort is normal. No respiratory distress.      Breath sounds: No wheezing.   Abdominal:      General: Abdomen is flat. There is no distension.      Tenderness: There is no tenderness.   Musculoskeletal:         General: No swelling.   Skin:     General: Skin is warm.    Neurological:      Mental Status: He is alert.      Comments: Fluctuating mentation.  Oriented x2.  Moving all extremities.   Psychiatric:      Comments: Very pleasant         Meds:    Current Facility-Administered Medications:   •  therapeutic multivitamin-minerals  •  ascorbic acid  •  ipratropium-albuterol  •  amLODIPine  •  QUEtiapine  •  famotidine  •  enalaprilat  •  heparin  •  ipratropium-albuterol  •  haloperidol lactate  •  [COMPLETED] piperacillin-tazobactam **AND** piperacillin-tazobactam  •  SITagliptin  •  micafungin  •  clotrimazole  •  latanoprost  •  polyethylene glycol/lytes  •  simvastatin  •  tamsulosin  •  acetaminophen  •  ondansetron  •  ondansetron  •  senna-docusate **AND** polyethylene glycol/lytes **AND** magnesium hydroxide **AND** bisacodyl  •  Respiratory Care per Protocol    Labs:  Recent Labs     11/27/19 0421 11/29/19  0408   WBC 7.4 8.4   RBC 3.15* 3.23*   HEMOGLOBIN 9.6* 9.7*   HEMATOCRIT 30.0* 31.9*   MCV 95.2 98.8*   MCH 30.5 30.0   RDW 52.2* 55.1*   PLATELETCT 201 226   MPV 8.7* 9.0   NEUTSPOLYS  --  77.00*   LYMPHOCYTES  --  9.40*   MONOCYTES  --  7.40   EOSINOPHILS  --  4.10   BASOPHILS  --  1.10     Recent Labs     11/27/19  0421 11/29/19  0408   SODIUM 143 141   POTASSIUM 4.6 4.6   CHLORIDE 105 109   CO2 31 27   GLUCOSE 110* 133*   BUN 19 23*     Recent Labs     11/27/19 0421 11/29/19  0408   CREATININE 1.74* 1.96*       Imaging:  Ct-renal Colic Evaluation(a/p W/o)    Result Date: 11/22/2019 11/22/2019 6:59 PM HISTORY/REASON FOR EXAM:  abnormal labs Cr- and urine infection r/o hydronephrosis. TECHNIQUE/EXAM DESCRIPTION AND NUMBER OF VIEWS: CT scan renal/colic without contrast. 5 mm helical images of the abdomen and pelvis were obtained from the diaphragmatic domes through the pubic symphysis. Low dose optimization technique was utilized for this CT exam including automated exposure control and adjustment of the mA and/or kV according to patient size. COMPARISON: None.  FINDINGS: Visualized lung bases show small bilateral pleural fluid collections with associated dependent atelectasis, worse on the LEFT. The liver is unremarkable. The spleen is unremarkable. Adrenal glands are unremarkable. The kidneys are unremarkable. Pancreas is atrophic. The gallbladder is unremarkable. Steen catheter within mildly distended bladder. Prostate is enlarged. Colonic diverticula noted. Increased small bowel and colonic gas.  Prominent fluid-filled small bowel loops in the RIGHT lower quadrant. No pneumoperitoneum. Moderate atherosclerotic calcification of abdominal aorta. Lumbar spine degenerative changes. T12 vertebral hemangioma. T-spine wall edema.     1.  Dilated fluid-filled small bowel loops in RIGHT lower quadrant concerning for developing obstruction. 2.  No evidence of bowel perforation. 3.  Colonic diverticulosis without evidence for diverticulitis. 4.  No renal stone or hydronephrosis. 5.  Appendix is not visualized. 6.  Enlarged prostate. 7.  Small bilateral pleural effusions with associated atelectasis. 8.  Colonic diverticulosis without evidence for diverticulitis.    Vd-jwephts-5 View    Result Date: 11/25/2019 11/25/2019 3:01 PM HISTORY/REASON FOR EXAM:  Abdominal pain. TECHNIQUE/EXAM DESCRIPTION AND NUMBER OF VIEWS:  1 view(s) of the abdomen. COMPARISON: None FINDINGS: AP view of the abdomen demonstrates scattered loops of air-filled bowel. Small bowel loops measure up to 4.9 cm. Phleboliths are in the pelvis. There are arterial calcifications. Degenerative changes are in the spine.     Diffuse bowel distention. Findings are suggestive of ileus. Early obstruction could have a similar appearance.    Dx-chest-limited (1 View)    Result Date: 11/26/2019 11/26/2019 3:12 AM HISTORY/REASON FOR EXAM:  Shortness of Breath TECHNIQUE/EXAM DESCRIPTION AND NUMBER OF VIEWS: Single portable view of the chest. COMPARISON: Yesterday FINDINGS: HEART: Stable size. There is atherosclerotic  calcification in the aortic arch. LUNGS: Lung volumes are low. There are perihilar opacities. There are bibasilar opacities. PLEURA: No effusion or pneumothorax.     Bibasilar and perihilar underinflation atelectasis which could obscure an additional process. This is unchanged.    Dx-chest-limited (1 View)    Result Date: 11/25/2019 11/25/2019 2:08 AM HISTORY/REASON FOR EXAM:  Respiratory failure TECHNIQUE/EXAM DESCRIPTION AND NUMBER OF VIEWS: Single portable view of the chest. COMPARISON: 11/24/2019 FINDINGS: LUNGS: Slight interval worsening in opacities in the right mid/lower lung. Unchanged opacity in the left lung base. No significant pleural effusions. PNEUMOTHORAX: None. LINES AND TUBES: None. MEDIASTINUM: Stable cardiac silhouette. MUSCULOSKELETAL STRUCTURES: Unchanged.     1.  Interval worsening of pulmonary opacities in the right mid/lower lung. 2.  Unchanged left basilar atelectasis and/or consolidation. No significant pleural effusions.    Dx-chest-portable (1 View)    Result Date: 11/24/2019 11/24/2019 10:53 AM HISTORY/REASON FOR EXAM: Shortness of Breath. TECHNIQUE/EXAM DESCRIPTION AND NUMBER OF VIEWS: Single AP view of the chest. COMPARISON: 11/21/2019 FINDINGS: Lungs: Large volumes with reticular predominantly increased opacity is similar to comparison. There is also some hazy opacity at the bases especially on the left where there is some hemidiaphragm elevation Pleura:  Small layering effusions are likely Heart and mediastinum: There is stable cardiac silhouette enlargement.     Similar reticular opacities suspicious for edema and there are likely small layering effusions More focal basilar opacity is more likely from atelectasis than consolidation    Dx-chest-portable (1 View)    Result Date: 11/21/2019 11/21/2019 4:49 AM HISTORY/REASON FOR EXAM: Possible sepsis. TECHNIQUE/EXAM DESCRIPTION:  Single AP view of the chest. COMPARISON: None FINDINGS: Overlying cardiac leads are present.  Cardiomegaly is observed. Atherosclerotic calcification of the aorta is noted.  The central  pulmonary vasculature appears prominent and indistinct. The lungs appear well expanded bilaterally.  Diffuse scattered hazy pulmonary parenchymal opacities are seen. The lung bases are partially excluded. The bony structures appear age-appropriate.     1.  Pulmonary edema and/or infiltrates are identified, which are stable since the prior exam. 2.  Cardiomegaly 3.  Atherosclerosis    Ec-echocardiogram Complete W/o Cont    Result Date: 2019  Transthoracic Echo Report Echocardiography Laboratory CONCLUSIONS No prior study is available for comparison. Technically difficult study. Normal left ventricular chamber size. Grossly preserved left ventricular systolic function. The right ventricle was normal in size and function. Mild aortic stenosis. NIR JOSHI Exam Date:         2019                    13:42 Exam Location:     Inpatient Priority:          Routine Ordering Physician:        KRISTINA GALEANA Referring Physician:       [id number] Whiteside Sonographer:               Tessie Hill RDCS Age:    79     Gender:    M MRN:    [id number] :    1940 BSA:    2.27   Ht (in):    79     Wt (lb):    198 Exam Type:     Complete Indications:     Shortness of breath ICD Codes:       R06.02 CPT Codes:       00333 BP:   124    /   55     HR:   55 Technical Quality:       Technically difficult study -                          adequate information is obtained MEASUREMENTS  (Male / Female) Normal Values 2D ECHO LV Diastolic Diameter PLAX        5 cm                  4.2 - 5.9 / 3.9 - 5.3 cm LV Systolic Diameter PLAX         2.6 cm                2.1 - 4.0 cm IVS Diastolic Thickness           1.1 cm                LVPW Diastolic Thickness          1.3 cm                LVOT Diameter                     1.7 cm                LV Ejection Fraction MOD 2C       67.1 %                DOPPLER AV Peak Velocity                  2.2  m/s               AV Peak Gradient                  19 mmHg               AV Mean Gradient                  10.9 mmHg             LVOT Peak Velocity                1.1 m/s               AV Area Cont Eq vti               0.97 cm???              Mitral E Point Velocity           0.61 m/s              Mitral E to A Ratio               1.7                   MV Pressure Half Time             27.9 ms               MV Area PHT                       7.9 cm???               MV Deceleration Time              96.4 ms               * Indicates values subject to auto-interpretation LV EF:        % FINDINGS Left Ventricle Normal left ventricular chamber size. Mild concentric left ventricular hypertrophy. Grossly preserved left ventricular systolic function. Unable to assess wall motion. Indeterminate diastolic function. Right Ventricle The right ventricle was normal in size and function. Right Atrium The right atrium is normal in size. Inferior vena cava is not well visualized. Left Atrium The left atrium is normal in size. Left atrial volume index is 22 mL/sq m. Mitral Valve The mitral valve is not well visualized. No stenosis or regurgitation seen. Aortic Valve The aortic valve is not well visualized. Calcified aortic valve leaflets. Mild aortic stenosis. Transvalvular gradients are - Peak: 20 mmHg, Mean: 12 mmHg. Dimensionless index is (0.39). No aortic insufficiency. Tricuspid Valve Structurally normal tricuspid valve without significant stenosis or regurgitation. Pulmonic Valve Structurally normal pulmonic valve without significant stenosis or regurgitation. Pericardium Normal pericardium without effusion. Aorta The aortic root is normal. Ascending aorta not well visualized. aMrcelo Smith MD (Electronically Signed) Final Date:     23 November 2019                 16:53      Micro:  Results     Procedure Component Value Units Date/Time    FUNGAL [facility] INTERPRETATION [[id number]] Collected:  11/21/19 0441    Order Status:   Completed Specimen:  Blood Updated:  11/27/19 1535     Significant Indicator NEG     Source BLD     Site PERIPHERAL     Fungal GIRISH Interp --     GIRISH Interpretative Data:  -  Units: mcg/mL (micrograms/mL)  SDD: Susceptible-dose dependent (SDD category implies  clinical efficacy when higher than normal dosage of a drug  can be used and maximal possible blood level achieved.)  NS: Nonsusceptible (This category is used for organisms that  currently have only a susceptible interpretive category, but  not intermediate or resistant interpretive categories.)  None: No CLSI interpretive guidelines available at this time.  -  If an AMPHOTERICIN B GIRISH of >1 mcg/mL is obtained for Candida  spp., that isolate is likely resistant to AMPHOTERICIN B.  There are no CLSI breakpoints.  -  FLUCYTOSINE GIRISH breakpoints are based largely on historical  data and partially on the drug's pharmacokinetics.  -  For FLUCONAZOLE, the guidelines are based on extensive  experience with mucosal and invasive infections due to  Candida spp. When an isolate is identified as Candida  glabrata and the GIRISH is <32mcg/mL, patients should receive  a maximum dosage regimen of FLUCONAZOLE.  Isolates of Candida  krusei are assumed to be intrinsically resistant to  FLUCONAZOLE and their MICs should not be interpreted using  this scale.  -  For ITRACONAZOLE, the data is based entirely on experience  with mucosal infections, and data supporting breakpoints for  invasive infections due to Candida spp. are not available.  -  For ANIDULAFUNGIN, CASPOFUNGIN, MICAFUNGIN, and VORICONZOLE  the data are based substantially on experience with  non-neutropenic patients with candidemia, and their clinical  relevance in other settings is uncertain.  -  For POSACONAZOLE the majority of isolates are inhibited by  <1 mcg/mL.  However, data are not yet available to indicate  a correlation between GIRISH and outcome of treatment with this  agent.  -  Susceptible Dose Dependent (SDD)  "applies to FLUCONAZOLE and  ITRACONAZOLE. Susceptible is dependent on achieving the  maximum possible blood level.  -  The Sensititre YeastOne Susceptibility plates have been  validated for use at [facility]. These  plates are designed for Research Use Only. However, there are  CLSI approved documents for interpretive criteria for  5-FLUCYTOSINE, FLUCONAZOLE, ITRACONAZOLE, VORICONAZOLE, and  the ECHINOCANDINS. As with any in vitro susceptibility  testing method, the results of testing should be correlated  with the patient's clinical response to prescribed therapy.      Narrative:       CALL  Seals  171 tel. [phone],  CALLED  171 tel. [phone] 11/22/2019, 16:12, RB PERF. RESULTS CALLED  TO:[id number]  Per Hospital Policy: Only change Specimen Src: to \"Line\" if  specified by physician order.  Left Forearm/Arm    BLOOD CULTURE [[id number]]  (Abnormal)  (Susceptibility) Collected:  11/21/19 0441    Order Status:  Completed Specimen:  Blood from Peripheral Updated:  11/27/19 1534     Significant Indicator POS     Source BLD     Site PERIPHERAL     Culture Result Growth detected by Bactec instrument. 11/22/2019  14:54      Pseudomonas aeruginosa  See previous culture for sensitivity report.  P.aeruginosa may develop resistance during prolonged therapy  with all antibiotics. Isolates that are initially susceptible  may become resistant within three to four days after  initiation of therapy. Testing of repeat isolates may be  warranted.  See previous culture for sensitivity report.        Candida glabrata    Narrative:       CALL  Seals  171 tel. [phone],  CALLED  171 tel. [phone] 11/22/2019, 16:12, RB PERF. RESULTS CALLED  TO:[id number]  Per Hospital Policy: Only change Specimen Src: to \"Line\" if  specified by physician order.  Left Forearm/Arm    Susceptibility     Candida glabrata (2)     Antibiotic Interpretation Microscan Method Status    Amphoter B 24hr No Interpretation 0.5 mcg/mL GIRISH Final    " "Anidulafungin 24hr Sensitive <0.015 mcg/mL GIRISH Final    Caspofungin 24hr Sensitive 0.03 mcg/mL GIRISH Final    Fluconazole 24hr Susceptible Dose-Dependent 8 mcg/mL GIRISH Final    Micafungin 24hr Sensitive 0.015 mcg/mL GIRISH Final                   BLOOD CULTURE [[id number]] Collected:  11/25/19 0800    Order Status:  Completed Specimen:  Blood from Peripheral Updated:  11/26/19 0732     Significant Indicator NEG     Source BLD     Site PERIPHERAL     Culture Result No Growth  Note: Blood cultures are incubated for 5 days and  are monitored continuously.Positive blood cultures  are called to the RN and reported as soon as  they are identified.      Narrative:       Collected By:[id number] YUKO MCCOY  Per Hospital Policy: Only change Specimen Src: to \"Line\" if  specified by physician order.  Right Forearm/Arm    BLOOD CULTURE [[id number]] Collected:  11/25/19 0800    Order Status:  Completed Specimen:  Blood from Peripheral Updated:  11/26/19 0732     Significant Indicator NEG     Source BLD     Site PERIPHERAL     Culture Result No Growth  Note: Blood cultures are incubated for 5 days and  are monitored continuously.Positive blood cultures  are called to the RN and reported as soon as  they are identified.      Narrative:       Collected By:[id number] YUKO MCCOY  Per Hospital Policy: Only change Specimen Src: to \"Line\" if  specified by physician order.  Left Forearm/Arm    Blood Culture [[id number]] Collected:  11/24/19 1300    Order Status:  Completed Specimen:  Blood from Peripheral Updated:  11/25/19 0845     Significant Indicator NEG     Source BLD     Site PERIPHERAL     Culture Result No Growth  Note: Blood cultures are incubated for 5 days and  are monitored continuously.Positive blood cultures  are called to the RN and reported as soon as  they are identified.      Narrative:       Collected By:[id number] CAMRON MCCOY  From different peripheral sites, if not done within the last  24 hours (Per Hospital " "Policy: Only change specimen source to  \"Line\" if specified by physician order)  Right Forearm/Arm    Blood Culture [[id number]] Collected:  11/24/19 1300    Order Status:  Completed Specimen:  Blood from Peripheral Updated:  11/25/19 0845     Significant Indicator NEG     Source BLD     Site PERIPHERAL     Culture Result No Growth  Note: Blood cultures are incubated for 5 days and  are monitored continuously.Positive blood cultures  are called to the RN and reported as soon as  they are identified.      Narrative:       Collected By:[id number] CAMRON MCCOY  From different peripheral sites, if not done within the last  24 hours (Per Hospital Policy: Only change specimen source to  \"Line\" if specified by physician order)  Left Forearm/Arm    Urinalysis [[id number]]  (Abnormal) Collected:  11/24/19 1920    Order Status:  Completed Specimen:  Urine, Steen Cath Updated:  11/24/19 2328     Color Yellow     Character Clear     Specific Gravity 1.010     Ph 5.0     Glucose Negative mg/dL      Ketones Negative mg/dL      Protein Negative mg/dL      Bilirubin Negative     Urobilinogen, Urine 0.2     Nitrite Negative     Leukocyte Esterase Moderate     Occult Blood Large     Micro Urine Req Microscopic    Narrative:       Collected By:[id number] [facility]  If not done within the last 24 hours    MRSA By PCR (Amp) [[id number]] Collected:  11/24/19 1730    Order Status:  Completed Specimen:  Respirate from Nares Updated:  11/24/19 2121     Significant Indicator NEG     Source RESP     Site NARES     MRSA PCR Negative for MRSA by PCR.    Narrative:       Collected By:[id number] CAMRON MCCOY  Per [employer] Protocol  Collected By:[id number] CAMRON MCCOY    URINALYSIS [[id number]] Collected:  11/24/19 1920    Order Status:  Canceled Specimen:  Urine, Steen Cath     URINALYSIS [[id number]] Collected:  11/24/19 1920    Order Status:  Canceled Specimen:  Urine, Steen Cath     HSV 1/2 By PCR(Herpes)+[id number] [[id number]]     " "Order Status:  Canceled Specimen:  Genital from Blood     CSF Culture [[id number]]     Order Status:  Canceled Specimen:  CSF from Tap     BLOOD CULTURE [[id number]]  (Abnormal)  (Susceptibility) Collected:  11/21/19 0441    Order Status:  Completed Specimen:  Blood from Peripheral Updated:  11/24/19 0930     Significant Indicator POS     Source BLD     Site PERIPHERAL     Culture Result Growth detected by Bactec instrument. 11/22/2019  06:34      Pseudomonas aeruginosa  P.aeruginosa may develop resistance during prolonged therapy  with all antibiotics. Isolates that are initially susceptible  may become resistant within three to four days after  initiation of therapy. Testing of repeat isolates may be  warranted.      Narrative:       CALL  Seals  171 tel. [phone],  CALLED  171 tel. [phone] 11/22/2019, 06:38, RB PERF. RESULTS CALLED  TO:[id number]  Per Hospital Policy: Only change Specimen Src: to \"Line\" if  specified by physician order.  Right AC    Susceptibility     Pseudomonas aeruginosa (1)     Antibiotic Interpretation Microscan Method Status    Ceftazidime Sensitive 4 mcg/mL GIRISH Final    Ciprofloxacin Sensitive <=1 mcg/mL GIRISH Final    Cefepime Sensitive <=2 mcg/mL GIRISH Final    Amikacin Sensitive <=16 mcg/mL GIRISH Final    Gentamicin Sensitive <=4 mcg/mL GIRISH Final    Tobramycin Sensitive <=4 mcg/mL GIRISH Final    Meropenem Sensitive <=1 mcg/mL GIRISH Final    Pip/Tazobactam Sensitive <=16 mcg/mL GIRISH Final                   URINE CULTURE(NEW) [[id number]]  (Abnormal)  (Susceptibility) Collected:  11/21/19 0633    Order Status:  Completed Specimen:  Urine Updated:  11/23/19 0823     Significant Indicator POS     Source UR     Site -     Culture Result -      Pseudomonas aeruginosa  ,000 cfu/mL  P.aeruginosa may develop resistance during prolonged therapy  with all antibiotics. Isolates that are initially susceptible  may become resistant within three to four days after  initiation of therapy. Testing of repeat " isolates may be  warranted.      Narrative:       Indication for culture:->Septic Shock: Persistent  hypotension,  Lactic acid > 4, vasopressors/inotropes started  Indication for culture:->Septic Shock: Persistent    Susceptibility     Pseudomonas aeruginosa (1)     Antibiotic Interpretation Microscan Method Status    Ceftazidime Sensitive 4 mcg/mL GIRISH Final    Ciprofloxacin Sensitive <=1 mcg/mL GIRISH Final    Cefepime Sensitive <=2 mcg/mL GIRISH Final    Amikacin Sensitive <=16 mcg/mL GIRISH Final    Gentamicin Sensitive <=4 mcg/mL GIRISH Final    Tobramycin Sensitive <=4 mcg/mL GIRISH Final    Levofloxacin Sensitive <=2 mcg/mL GIRISH Final    Meropenem Sensitive <=1 mcg/mL GIRISH Final    Pip/Tazobactam Sensitive <=16 mcg/mL GIRISH Final                         Assessment:  79-year-old white male admitted on 11/21/2019 due to altered mental status.  He had concern for acute-on-chronic renal failure, urinary tract infection as well as CAP.  His renal failure was thought to be due to a malfunctioning Steen catheter and a new catheter was placed with significant urinary retention up over 1500 mL.  Urine culture was showing  Gram-negative rods and blood cultures showing Gram-negative rods, so infectious disease was consulted for antibiotic recommendations and management.  Rapid response was called due to concern for paresthesia of the distal extremities and he was transferred to the ICU.  Blood cultures on 11/21 with growth of yeast as well as Pseudomonas.      Plan:  UTI and Gram-negative bacteremia - PSAR   Fevers, improved   Urine and Blood cxs +PSAR  --- Continue Zosyn for PSAR UTI and bacteremia, possible aspiration pneumonia  --- F/up repeat blood cultures - NGTD   --- Stop date 12/7/2019     Candidemia, C glabrata unclear etiology, patient without central line, significant urinary retention but did not grow from urine culture.  CT concerning for developing obstruction but no obvious bowel perforation.  BCx 11/21+ C glabrata -dose  dependent fluconazole susceptible     --- Ophthalmology performed dilated exam on 11/25, no evidence of endophthalmitis  --- Recommend SARAVANAN if feasible as TTE unrevealing  --- Continue micafungin for now.  After SARAVANAN can give final antibiotic course.  Please discuss with cardiology, SARAVANAN preferred in the setting of candidemia but it felt to be unsafe for this 79-year-old then please comment.     Encephalopathy -still mildly confused may be at baseline  Plan had been for LP but this was not completed  MRA/ MRI considered but not done      Hypoxemia, pulmonary edema - improved      Acute renal failure-ongoing but improved from admit  Multifactorial including his sepsis, urinary tract infection, and obstructed Steen catheter.    His Steen catheter has since been replaced.    Antibiotics will need to be dose adjusted accordingly  CT neg for obstruction or stone  Monitor his urinary function carefully.      Ileus vs bowel obstr  Appreciate surgery eval    Discussed with primary, Dr. Lazcano.  ID will follow.  Please call with questions

## 2019-11-29 NOTE — PROGRESS NOTES
Report received from LYUDMILA Disla. Assumed care at 1900, assessment complete. Pt is A & O x 1, alert to self, pt is confused and is re-oriented as needed. Unable to follow commands well.  Pt denies having any pain at this time. Fall precautions and appropriate signs in place. Pt oriented to unit routine, call light/phone system and RN extension number provided. Pt educated regarding fall precautions. Bed alarm in use. Pt denies any additional needs at this time. Call light within reach. Pt is readjusted to comfort, sawyer in place flowing to drainage bag.

## 2019-11-29 NOTE — DIETARY
Nutrition Services: Update   Day 8 of admit.    Pt is currently on a regular diet and per chart, pt with variable PO intake ranging from refused - 100%.  Per MD progress notes, pt noted with baseline dementia though experienced some worsening AMS, which has since been improving.      -A&Ox1.    -Concern for an ileus this admit, though he is now having bowel movements with adequate bowel sounds.  -Infectious disease has recommended a SARAVANAN to rule out cardiac source of endocarditis.   -LBM: 11/27.  -Labs and meds reviewed.  -Per Wound team note from 11/22, pt noted with stage 2 pressure ulcer to coccyx.    -Wt has been stable this past week.    Recommendations/Plan:  1. Boost Plus BID, Magic Cups TID.  2. Encourage intake of meals and supplements.  3. Consider 1:1 feeding assist/supervision.  4. Document intake of all meals as % taken in ADL's to provide interdisciplinary communication across all shifts.   5. Monitor weight.  6. Daily Vitamin Therapy for wound healing: Multivitamin with minerals and 500 mg Vitamin C.    RD following

## 2019-11-29 NOTE — PROGRESS NOTES
Hospital Medicine Daily Progress Note    Date of Service  11/29/2019    Chief Complaint  79 y.o. male admitted 11/21/2019 with confusion    Hospital Course    Mr. Randy Bates is a 79 y.o. male with a past medical history significant for insulin dependent diabetes mellitus, hypertension, and urinary retention with a chronic indwelling Steen catheter who presented with altered mental status on 11/21/2019.  Imaging was consistent with a community acquired pneumonia.  Both blood and urine cultures grew Pseudomonas.  Blood cultures also grew candida glabrata.  Ophthalmology was consulted and ruled out endophthalmitis.  He will follow up as outpatient for pseudophakia, cataract, and dematochalasis.  A transthoracic echocardiogram was not suggestive of endocarditis.  Infectious disease recommended a transesophageal echocardiogram, which cannot be performed until Monday 12/2/2019 because of the holiday weekend.  Source of candidemia is unclear as he has no lines and candida did not grow from urine culture.      On 11/24/2019 the patient became more confused with increased oxygen requirement.  A rapid response was called and he was transferred to the ICU on high flow nasal cannula.  He has since improved and transferred to the general medical floor.      Nephrology was consulted for acute renal failure.  His creatinine has improved and he is producing adequate urine output.  A CT renal colic evaluation demonstrated normal kidneys.     There was concern for peripheral paresthesias and a neurology consult was placed.  An MRI/MRA and LP was recommended but the patient decompensated and was transferred to the ICU.  He now denies having any paresthesias which can be worked up as an outpatient per neurology recommendations if symptoms return.      General surgery was consulted for an ileus, but the patient is now having bowel movements with adequate bowel sounds.           Interval Problem Update  Patient was seen and examined  at bedside.  There were no acute events overnight.  I have personally reviewed vitals, labs, and imaging.  Patient denies pain, shortness of breath, paresthesias, fever.  The patient has dementia at baseline and is a poor historian.  He reports he lost his wallet, credit cards, and clothes.  He does not know how or where he lost it, where he is, or how he got to the hospital.  He does report right hip pain today      Consultants/Specialty  Cardiology  Nephrology  Infectious Disease    Code Status  FULL    Disposition  PT recommends subacute rehab.  He is from assisted living and has a guardian.      Review of Systems  Review of Systems   Constitutional: Negative for chills and fever.        Patient denies complaints but is a poor historian and only oriented to person   HENT: Negative for sore throat.    Eyes: Negative for blurred vision.   Respiratory: Negative for cough and shortness of breath.    Cardiovascular: Negative for chest pain, palpitations and leg swelling.   Gastrointestinal: Negative for abdominal pain, constipation, diarrhea, nausea and vomiting.   Genitourinary: Negative for dysuria, frequency and urgency.   Musculoskeletal: Positive for joint pain (right hip pain). Negative for falls.   Skin: Negative for rash.   Neurological: Negative for dizziness and weakness.   Psychiatric/Behavioral: Negative for depression and suicidal ideas. The patient is not nervous/anxious.    All other systems reviewed and are negative.       Physical Exam  Temp:  [36.2 °C (97.1 °F)-37.2 °C (99 °F)] 36.2 °C (97.1 °F)  Pulse:  [54-84] 54  Resp:  [14-18] 14  BP: (113-145)/(62-69) 130/69  SpO2:  [96 %-98 %] 96 %    Physical Exam  Vitals signs and nursing note reviewed.   Constitutional:       General: He is not in acute distress.     Appearance: Normal appearance.   HENT:      Head: Normocephalic and atraumatic.      Nose: Nose normal.      Mouth/Throat:      Mouth: Mucous membranes are dry.      Pharynx: Oropharynx is  clear.   Eyes:      Extraocular Movements: Extraocular movements intact.      Conjunctiva/sclera: Conjunctivae normal.   Neck:      Musculoskeletal: Normal range of motion and neck supple.   Cardiovascular:      Rate and Rhythm: Regular rhythm. Bradycardia present.      Pulses: Normal pulses.      Heart sounds: Normal heart sounds. No murmur. No friction rub. No gallop.    Pulmonary:      Effort: Pulmonary effort is normal. No respiratory distress.      Breath sounds: Normal breath sounds. No wheezing or rales.   Chest:      Chest wall: No tenderness.   Abdominal:      General: Abdomen is flat. Bowel sounds are normal. There is no distension.      Palpations: Abdomen is soft. There is no mass.      Tenderness: There is no tenderness. There is no guarding.   Musculoskeletal: Normal range of motion.         General: Tenderness (right hip) present.   Skin:     General: Skin is warm.      Capillary Refill: Capillary refill takes less than 2 seconds.   Neurological:      General: No focal deficit present.      Mental Status: He is alert. Mental status is at baseline.      Cranial Nerves: No cranial nerve deficit.      Motor: No weakness.   Psychiatric:         Mood and Affect: Mood normal.         Behavior: Behavior normal.         Fluids    Intake/Output Summary (Last 24 hours) at 11/29/2019 1341  Last data filed at 11/29/2019 0500  Gross per 24 hour   Intake 720 ml   Output 1600 ml   Net -880 ml       Laboratory  Recent Labs     11/27/19  0421 11/29/19  0408   WBC 7.4 8.4   RBC 3.15* 3.23*   HEMOGLOBIN 9.6* 9.7*   HEMATOCRIT 30.0* 31.9*   MCV 95.2 98.8*   MCH 30.5 30.0   MCHC 32.0* 30.4*   RDW 52.2* 55.1*   PLATELETCT 201 226   MPV 8.7* 9.0     Recent Labs     11/27/19  0421 11/29/19  0408   SODIUM 143 141   POTASSIUM 4.6 4.6   CHLORIDE 105 109   CO2 31 27   GLUCOSE 110* 133*   BUN 19 23*   CREATININE 1.74* 1.96*   CALCIUM 8.2* 8.5                   Imaging  DX-CHEST-LIMITED (1 VIEW)   Final Result      Bibasilar and  perihilar underinflation atelectasis which could obscure an additional process. This is unchanged.      [id number] VIEW   Final Result      Diffuse bowel distention. Findings are suggestive of ileus. Early obstruction could have a similar appearance.      DX-CHEST-LIMITED (1 VIEW)   Final Result      1.  Interval worsening of pulmonary opacities in the right mid/lower lung.   2.  Unchanged left basilar atelectasis and/or consolidation. No significant pleural effusions.      DX-CHEST-PORTABLE (1 VIEW)   Final Result      Similar reticular opacities suspicious for edema and there are likely small layering effusions      More focal basilar opacity is more likely from atelectasis than consolidation      EC-ECHOCARDIOGRAM COMPLETE W/O CONT   Final Result      CT-RENAL COLIC EVALUATION(A/P W/O)   Final Result      1.  Dilated fluid-filled small bowel loops in RIGHT lower quadrant concerning for developing obstruction.   2.  No evidence of bowel perforation.   3.  Colonic diverticulosis without evidence for diverticulitis.   4.  No renal stone or hydronephrosis.   5.  Appendix is not visualized.   6.  Enlarged prostate.   7.  Small bilateral pleural effusions with associated atelectasis.   8.  Colonic diverticulosis without evidence for diverticulitis.      DX-CHEST-PORTABLE (1 VIEW)   Final Result         1.  Pulmonary edema and/or infiltrates are identified, which are stable since the prior exam.   2.  Cardiomegaly   3.  Atherosclerosis           Assessment/Plan  * UTI (urinary tract infection)- (present on admission)  Assessment & Plan  Urinary tract infection associated with chronic indwelling Steen catheter  Complicated urinary tract infection with associated encephalopathy  Appreciate infectious disease consultation, continuing Zosyn stop date 12/7    Candidemia (HCC)- (present on admission)  Assessment & Plan  Appreciate ID recommendations  Continuing micafungin stop date 12/5    Bacteremia due to Pseudomonas-  (present on admission)  Assessment & Plan  Infectious disease following, continue Zosyn stop date 12/7  SARAVANAN on 12/2    Acute respiratory failure with hypoxia, RAD, PNA (HCC)- (present on admission)  Assessment & Plan  On 1-2L per NC  Continue to wean supplemental oxygen as above.    Altered mental status- (present on admission)  Assessment & Plan  Patient has baseline dementia and is likely close to baseline  Neuro checks.  Monitor mental status    CAP (community acquired pneumonia)- (present on admission)  Assessment & Plan  Pseudomonas; continue zosyn per ID.  Stop date 12/7  Rt protocol  Wean oxygen as tolerated      Bradycardia- (present on admission)  Assessment & Plan  Patient has been bradycardic, but seems to be asymptomatic.  Normotensive.  Hold AV zoraida blockers.    Acute renal failure (ARF) (HCC)- (present on admission)  Assessment & Plan  Likely was 2/2 urinary retention/sawyer obstruction  Avoid nephrotoxins; improving creatinine  Kidney function likely close to baseline  He is producing adequate urine output    Hypocalcemia- (present on admission)  Assessment & Plan  Corrected normal    Normocytic normochromic anemia- (present on admission)  Assessment & Plan  Asymptomatic  No active signs of bleeding  Transfuse to maintain hemoglobin > 7    Gastrointestinal prophylaxis: Famotidine  Antibiotics: Micafungin last dose 12/5.  Zosyn last dose 12/7   Diet: Regular  Code status: FULL  Prognosis: Guarded  Risk: The Patient is at HIGH risk for inpatient complications and decompensation secondary to his multiple cormorbidities including Pseudomonase bacteremia and Candidemia with concern for endocarditis, Pseudomonas urinary tract infection, hypoxic respiratory failure, altered mental status    I discussed the plan of care with bedside RN as well as on multidisciplinary rounds    Time spent > 30 minutes.  > 50% of the time was spend providing counseling and coordination of care       VTE prophylaxis:  Heparin

## 2019-11-29 NOTE — CARE PLAN
Problem: Nutritional:  Goal: Achieve adequate nutritional intake  Description  Patient will consume 50% of meals/supplements consistently   Outcome: PROGRESSING SLOWER THAN EXPECTED

## 2019-11-29 NOTE — PROGRESS NOTES
The patients next door neighbor came to drop off a DVD player and its  in a case next to the window in [address]

## 2019-11-29 NOTE — CARE PLAN
Problem: Safety  Goal: Will remain free from falls  Outcome: PROGRESSING AS EXPECTED  Note:   Pt will remain free from falls during stay, pt is on a bed alarm, fall precautions in place. Pt is re-oriented when needed and checked on frequently.      Problem: Psychosocial Needs:  Goal: Level of anxiety will decrease  Outcome: PROGRESSING AS EXPECTED  Note:   Pt anxiety levels will decrease during stay, pt has been calm during shift and re-oriented.

## 2019-11-30 LAB
ANION GAP SERPL CALC-SCNC: 5 MMOL/L (ref 0–11.9)
BACTERIA BLD CULT: NORMAL
BACTERIA BLD CULT: NORMAL
BASOPHILS # BLD AUTO: 0.8 % (ref 0–1.8)
BASOPHILS # BLD: 0.07 K/UL (ref 0–0.12)
BUN SERPL-MCNC: 23 MG/DL (ref 8–22)
CALCIUM SERPL-MCNC: 8.9 MG/DL (ref 8.5–10.5)
CHLORIDE SERPL-SCNC: 107 MMOL/L (ref 96–112)
CO2 SERPL-SCNC: 27 MMOL/L (ref 20–33)
CREAT SERPL-MCNC: 1.98 MG/DL (ref 0.5–1.4)
EOSINOPHIL # BLD AUTO: 0.43 K/UL (ref 0–0.51)
EOSINOPHIL NFR BLD: 4.7 % (ref 0–6.9)
ERYTHROCYTE [DISTWIDTH] IN BLOOD BY AUTOMATED COUNT: 53 FL (ref 35.9–50)
GLUCOSE SERPL-MCNC: 109 MG/DL (ref 65–99)
HCT VFR BLD AUTO: 34.3 % (ref 42–52)
HGB BLD-MCNC: 10.8 G/DL (ref 14–18)
IMM GRANULOCYTES # BLD AUTO: 0.14 K/UL (ref 0–0.11)
IMM GRANULOCYTES NFR BLD AUTO: 1.5 % (ref 0–0.9)
LYMPHOCYTES # BLD AUTO: 0.69 K/UL (ref 1–4.8)
LYMPHOCYTES NFR BLD: 7.5 % (ref 22–41)
MAGNESIUM SERPL-MCNC: 2 MG/DL (ref 1.5–2.5)
MCH RBC QN AUTO: 30.3 PG (ref 27–33)
MCHC RBC AUTO-ENTMCNC: 31.5 G/DL (ref 33.7–35.3)
MCV RBC AUTO: 96.1 FL (ref 81.4–97.8)
MONOCYTES # BLD AUTO: 0.67 K/UL (ref 0–0.85)
MONOCYTES NFR BLD AUTO: 7.3 % (ref 0–13.4)
NEUTROPHILS # BLD AUTO: 7.23 K/UL (ref 1.82–7.42)
NEUTROPHILS NFR BLD: 78.2 % (ref 44–72)
NRBC # BLD AUTO: 0 K/UL
NRBC BLD-RTO: 0 /100 WBC
PHOSPHATE SERPL-MCNC: 2.9 MG/DL (ref 2.5–4.5)
PLATELET # BLD AUTO: 247 K/UL (ref 164–446)
PMV BLD AUTO: 8.8 FL (ref 9–12.9)
POTASSIUM SERPL-SCNC: 4.5 MMOL/L (ref 3.6–5.5)
RBC # BLD AUTO: 3.57 M/UL (ref 4.7–6.1)
SIGNIFICANT IND 70042: NORMAL
SIGNIFICANT IND 70042: NORMAL
SITE SITE: NORMAL
SITE SITE: NORMAL
SODIUM SERPL-SCNC: 139 MMOL/L (ref 135–145)
SOURCE SOURCE: NORMAL
SOURCE SOURCE: NORMAL
VIT B1 BLD-MCNC: 65 NMOL/L (ref 70–180)
WBC # BLD AUTO: 9.2 K/UL (ref 4.8–10.8)

## 2019-11-30 PROCEDURE — 80048 BASIC METABOLIC PNL TOTAL CA: CPT

## 2019-11-30 PROCEDURE — 700102 HCHG RX REV CODE 250 W/ 637 OVERRIDE(OP): Performed by: HOSPITALIST

## 2019-11-30 PROCEDURE — 700105 HCHG RX REV CODE 258: Performed by: INTERNAL MEDICINE

## 2019-11-30 PROCEDURE — A9270 NON-COVERED ITEM OR SERVICE: HCPCS | Performed by: INTERNAL MEDICINE

## 2019-11-30 PROCEDURE — 84100 ASSAY OF PHOSPHORUS: CPT

## 2019-11-30 PROCEDURE — 36415 COLL VENOUS BLD VENIPUNCTURE: CPT

## 2019-11-30 PROCEDURE — 700102 HCHG RX REV CODE 250 W/ 637 OVERRIDE(OP): Performed by: INTERNAL MEDICINE

## 2019-11-30 PROCEDURE — 700111 HCHG RX REV CODE 636 W/ 250 OVERRIDE (IP): Performed by: INTERNAL MEDICINE

## 2019-11-30 PROCEDURE — 85025 COMPLETE CBC W/AUTO DIFF WBC: CPT

## 2019-11-30 PROCEDURE — 770006 HCHG ROOM/CARE - MED/SURG/GYN SEMI*

## 2019-11-30 PROCEDURE — 83735 ASSAY OF MAGNESIUM: CPT

## 2019-11-30 PROCEDURE — A9270 NON-COVERED ITEM OR SERVICE: HCPCS | Performed by: HOSPITALIST

## 2019-11-30 PROCEDURE — 99232 SBSQ HOSP IP/OBS MODERATE 35: CPT | Performed by: INTERNAL MEDICINE

## 2019-11-30 RX ADMIN — HEPARIN SODIUM 5000 UNITS: 5000 INJECTION INTRAVENOUS; SUBCUTANEOUS at 13:37

## 2019-11-30 RX ADMIN — AMLODIPINE BESYLATE 10 MG: 10 TABLET ORAL at 05:19

## 2019-11-30 RX ADMIN — HEPARIN SODIUM 5000 UNITS: 5000 INJECTION INTRAVENOUS; SUBCUTANEOUS at 05:22

## 2019-11-30 RX ADMIN — SITAGLIPTIN 50 MG: 50 TABLET, FILM COATED ORAL at 08:55

## 2019-11-30 RX ADMIN — POLYETHYLENE GLYCOL 3350 1 PACKET: 17 POWDER, FOR SOLUTION ORAL at 05:20

## 2019-11-30 RX ADMIN — ACETAMINOPHEN 650 MG: 325 TABLET, FILM COATED ORAL at 00:28

## 2019-11-30 RX ADMIN — SENNOSIDES AND DOCUSATE SODIUM 2 TABLET: 8.6; 5 TABLET ORAL at 05:17

## 2019-11-30 RX ADMIN — SIMVASTATIN 10 MG: 20 TABLET, FILM COATED ORAL at 21:50

## 2019-11-30 RX ADMIN — MICAFUNGIN SODIUM 100 MG: 20 INJECTION, POWDER, LYOPHILIZED, FOR SOLUTION INTRAVENOUS at 05:14

## 2019-11-30 RX ADMIN — MULTIPLE VITAMINS W/ MINERALS TAB 1 TABLET: TAB at 05:18

## 2019-11-30 RX ADMIN — CLOTRIMAZOLE 1 APPLICATION: 10 CREAM TOPICAL at 05:40

## 2019-11-30 RX ADMIN — PIPERACILLIN AND TAZOBACTAM 4.5 G: 4; .5 INJECTION, POWDER, LYOPHILIZED, FOR SOLUTION INTRAVENOUS; PARENTERAL at 00:10

## 2019-11-30 RX ADMIN — TAMSULOSIN HYDROCHLORIDE 0.4 MG: 0.4 CAPSULE ORAL at 05:19

## 2019-11-30 RX ADMIN — OXYCODONE HYDROCHLORIDE AND ACETAMINOPHEN 500 MG: 500 TABLET ORAL at 05:18

## 2019-11-30 RX ADMIN — CLOTRIMAZOLE 1 APPLICATION: 10 CREAM TOPICAL at 16:44

## 2019-11-30 RX ADMIN — PIPERACILLIN AND TAZOBACTAM 4.5 G: 4; .5 INJECTION, POWDER, LYOPHILIZED, FOR SOLUTION INTRAVENOUS; PARENTERAL at 16:44

## 2019-11-30 RX ADMIN — HEPARIN SODIUM 5000 UNITS: 5000 INJECTION INTRAVENOUS; SUBCUTANEOUS at 21:50

## 2019-11-30 RX ADMIN — PIPERACILLIN AND TAZOBACTAM 4.5 G: 4; .5 INJECTION, POWDER, LYOPHILIZED, FOR SOLUTION INTRAVENOUS; PARENTERAL at 07:35

## 2019-11-30 RX ADMIN — FAMOTIDINE 20 MG: 10 INJECTION INTRAVENOUS at 05:24

## 2019-11-30 ASSESSMENT — ENCOUNTER SYMPTOMS
FALLS: 0
COUGH: 0
DIZZINESS: 0
DEPRESSION: 0
SORE THROAT: 0
VOMITING: 0
CHILLS: 0
FEVER: 0
DIARRHEA: 0
NERVOUS/ANXIOUS: 0
SHORTNESS OF BREATH: 0
WEAKNESS: 0
PALPITATIONS: 0
NAUSEA: 0
CONSTIPATION: 0
BLURRED VISION: 0
ABDOMINAL PAIN: 0

## 2019-11-30 NOTE — PROGRESS NOTES
Report received from LYUDMILA Longo . Assumed care at 1900, assessment complete. Pt is A & O x 1, alert to self.  Pt denies having any pain at this time. Fall precautions and appropriate signs in place. Pt oriented to unit routine, call light/phone system and RN extension number provided. Pt educated regarding fall precautions. Bed alarm in use. Pt denies any additional needs at this time. Call light within reach. Pt is currently lying down, movie playing.

## 2019-11-30 NOTE — CARE PLAN
Problem: Communication  Goal: The ability to communicate needs accurately and effectively will improve  Outcome: PROGRESSING SLOWER THAN EXPECTED   Pt educated on use of call light for assistance.    Problem: Respiratory:  Goal: Respiratory status will improve  Outcome: PROGRESSING SLOWER THAN EXPECTED   Pt remains on 2L NC.

## 2019-11-30 NOTE — PROGRESS NOTES
Care assumed from LYUDMILA Lees @ 0700. Pt A/Ox1. Pt resting in bed. Assessment complete. Meds administered per MAR. Bed lowered and locked. Call light within reach. Pt instructed to call for help. Bed alarm set. Hourly rounding complete. Continue to monitor.

## 2019-11-30 NOTE — CARE PLAN
Problem: Safety  Goal: Will remain free from injury  Outcome: PROGRESSING AS EXPECTED  Note:   Pt will remain free from injury during stay, pt is on a bed alarm, re-oriented to surroundings. Helped back into bed, explained what equipment in room is used for.      Problem: Psychosocial Needs:  Goal: Level of anxiety will decrease  Outcome: PROGRESSING AS EXPECTED  Note:   Pt will have anxiety levels decrease during stay, pt is re-oriented to surroundings, pt is reassured, and de-escalating techniques done.

## 2019-12-01 LAB
ANION GAP SERPL CALC-SCNC: 7 MMOL/L (ref 0–11.9)
BASOPHILS # BLD AUTO: 1 % (ref 0–1.8)
BASOPHILS # BLD: 0.08 K/UL (ref 0–0.12)
BUN SERPL-MCNC: 29 MG/DL (ref 8–22)
CALCIUM SERPL-MCNC: 8.8 MG/DL (ref 8.5–10.5)
CHLORIDE SERPL-SCNC: 107 MMOL/L (ref 96–112)
CO2 SERPL-SCNC: 26 MMOL/L (ref 20–33)
CREAT SERPL-MCNC: 2.38 MG/DL (ref 0.5–1.4)
EOSINOPHIL # BLD AUTO: 0.33 K/UL (ref 0–0.51)
EOSINOPHIL NFR BLD: 4.1 % (ref 0–6.9)
ERYTHROCYTE [DISTWIDTH] IN BLOOD BY AUTOMATED COUNT: 54.3 FL (ref 35.9–50)
GLUCOSE SERPL-MCNC: 102 MG/DL (ref 65–99)
HCT VFR BLD AUTO: 31.5 % (ref 42–52)
HGB BLD-MCNC: 9.7 G/DL (ref 14–18)
IMM GRANULOCYTES # BLD AUTO: 0.17 K/UL (ref 0–0.11)
IMM GRANULOCYTES NFR BLD AUTO: 2.1 % (ref 0–0.9)
LYMPHOCYTES # BLD AUTO: 0.67 K/UL (ref 1–4.8)
LYMPHOCYTES NFR BLD: 8.4 % (ref 22–41)
MAGNESIUM SERPL-MCNC: 2.1 MG/DL (ref 1.5–2.5)
MCH RBC QN AUTO: 29.4 PG (ref 27–33)
MCHC RBC AUTO-ENTMCNC: 30.8 G/DL (ref 33.7–35.3)
MCV RBC AUTO: 95.5 FL (ref 81.4–97.8)
MONOCYTES # BLD AUTO: 0.63 K/UL (ref 0–0.85)
MONOCYTES NFR BLD AUTO: 7.9 % (ref 0–13.4)
NEUTROPHILS # BLD AUTO: 6.14 K/UL (ref 1.82–7.42)
NEUTROPHILS NFR BLD: 76.5 % (ref 44–72)
NRBC # BLD AUTO: 0 K/UL
NRBC BLD-RTO: 0 /100 WBC
PHOSPHATE SERPL-MCNC: 3.7 MG/DL (ref 2.5–4.5)
PLATELET # BLD AUTO: 257 K/UL (ref 164–446)
PMV BLD AUTO: 8.9 FL (ref 9–12.9)
POTASSIUM SERPL-SCNC: 4.4 MMOL/L (ref 3.6–5.5)
RBC # BLD AUTO: 3.3 M/UL (ref 4.7–6.1)
SODIUM SERPL-SCNC: 140 MMOL/L (ref 135–145)
WBC # BLD AUTO: 8 K/UL (ref 4.8–10.8)

## 2019-12-01 PROCEDURE — 36415 COLL VENOUS BLD VENIPUNCTURE: CPT

## 2019-12-01 PROCEDURE — 700105 HCHG RX REV CODE 258: Performed by: INTERNAL MEDICINE

## 2019-12-01 PROCEDURE — A9270 NON-COVERED ITEM OR SERVICE: HCPCS | Performed by: HOSPITALIST

## 2019-12-01 PROCEDURE — 99232 SBSQ HOSP IP/OBS MODERATE 35: CPT | Performed by: INTERNAL MEDICINE

## 2019-12-01 PROCEDURE — 700102 HCHG RX REV CODE 250 W/ 637 OVERRIDE(OP): Performed by: INTERNAL MEDICINE

## 2019-12-01 PROCEDURE — 80048 BASIC METABOLIC PNL TOTAL CA: CPT

## 2019-12-01 PROCEDURE — 700111 HCHG RX REV CODE 636 W/ 250 OVERRIDE (IP): Performed by: INTERNAL MEDICINE

## 2019-12-01 PROCEDURE — 83735 ASSAY OF MAGNESIUM: CPT

## 2019-12-01 PROCEDURE — A9270 NON-COVERED ITEM OR SERVICE: HCPCS | Performed by: INTERNAL MEDICINE

## 2019-12-01 PROCEDURE — 770006 HCHG ROOM/CARE - MED/SURG/GYN SEMI*

## 2019-12-01 PROCEDURE — 85025 COMPLETE CBC W/AUTO DIFF WBC: CPT

## 2019-12-01 PROCEDURE — 84100 ASSAY OF PHOSPHORUS: CPT

## 2019-12-01 PROCEDURE — 700102 HCHG RX REV CODE 250 W/ 637 OVERRIDE(OP): Performed by: HOSPITALIST

## 2019-12-01 PROCEDURE — B24BZZ4 ULTRASONOGRAPHY OF HEART WITH AORTA, TRANSESOPHAGEAL: ICD-10-PCS | Performed by: INTERNAL MEDICINE

## 2019-12-01 RX ADMIN — AMLODIPINE BESYLATE 10 MG: 10 TABLET ORAL at 04:48

## 2019-12-01 RX ADMIN — OXYCODONE HYDROCHLORIDE AND ACETAMINOPHEN 500 MG: 500 TABLET ORAL at 04:46

## 2019-12-01 RX ADMIN — MULTIPLE VITAMINS W/ MINERALS TAB 1 TABLET: TAB at 04:46

## 2019-12-01 RX ADMIN — LATANOPROST 1 DROP: 50 SOLUTION OPHTHALMIC at 00:11

## 2019-12-01 RX ADMIN — FAMOTIDINE 20 MG: 10 INJECTION INTRAVENOUS at 04:47

## 2019-12-01 RX ADMIN — SIMVASTATIN 10 MG: 20 TABLET, FILM COATED ORAL at 20:01

## 2019-12-01 RX ADMIN — PIPERACILLIN AND TAZOBACTAM 4.5 G: 4; .5 INJECTION, POWDER, LYOPHILIZED, FOR SOLUTION INTRAVENOUS; PARENTERAL at 07:31

## 2019-12-01 RX ADMIN — SITAGLIPTIN 50 MG: 50 TABLET, FILM COATED ORAL at 04:46

## 2019-12-01 RX ADMIN — PIPERACILLIN AND TAZOBACTAM 4.5 G: 4; .5 INJECTION, POWDER, LYOPHILIZED, FOR SOLUTION INTRAVENOUS; PARENTERAL at 15:53

## 2019-12-01 RX ADMIN — CLOTRIMAZOLE 1 APPLICATION: 10 CREAM TOPICAL at 12:00

## 2019-12-01 RX ADMIN — HEPARIN SODIUM 5000 UNITS: 5000 INJECTION INTRAVENOUS; SUBCUTANEOUS at 14:20

## 2019-12-01 RX ADMIN — TAMSULOSIN HYDROCHLORIDE 0.4 MG: 0.4 CAPSULE ORAL at 04:46

## 2019-12-01 RX ADMIN — HEPARIN SODIUM 5000 UNITS: 5000 INJECTION INTRAVENOUS; SUBCUTANEOUS at 04:47

## 2019-12-01 RX ADMIN — HEPARIN SODIUM 5000 UNITS: 5000 INJECTION INTRAVENOUS; SUBCUTANEOUS at 21:52

## 2019-12-01 RX ADMIN — PIPERACILLIN AND TAZOBACTAM 4.5 G: 4; .5 INJECTION, POWDER, LYOPHILIZED, FOR SOLUTION INTRAVENOUS; PARENTERAL at 00:11

## 2019-12-01 RX ADMIN — LATANOPROST 1 DROP: 50 SOLUTION OPHTHALMIC at 20:01

## 2019-12-01 RX ADMIN — MICAFUNGIN SODIUM 100 MG: 20 INJECTION, POWDER, LYOPHILIZED, FOR SOLUTION INTRAVENOUS at 06:00

## 2019-12-01 ASSESSMENT — ENCOUNTER SYMPTOMS
SHORTNESS OF BREATH: 0
BLURRED VISION: 0
VOMITING: 0
FEVER: 0
ABDOMINAL PAIN: 0
NERVOUS/ANXIOUS: 0
CHILLS: 0
DIARRHEA: 0
PALPITATIONS: 0
SORE THROAT: 0
CONSTIPATION: 0
DIZZINESS: 0
WEAKNESS: 0
FALLS: 0
NAUSEA: 0
COUGH: 0
DEPRESSION: 0

## 2019-12-01 NOTE — PROGRESS NOTES
Bedside report received from Marcia COREAS . Assumed care of pt at 1950. Pt is resting at this time with equal chest rise and no signs of distress. Plan of care discussed with pt. Pt is A&O x 1, knowing self. Pt is on 2 L NC. Bed alarm is on, bed in lowest position, bed rails up x 2, belongings and call light within reach. Hourly rounding in place.

## 2019-12-01 NOTE — CARE PLAN
Problem: Safety  Goal: Will remain free from falls  Outcome: PROGRESSING AS EXPECTED  Bed is locked and in lowest position. Call light and table within reach. Non-skid socks on. Bed alarm on.        Problem: Venous Thromboembolism (VTW)/Deep Vein Thrombosis (DVT) Prevention:  Goal: Patient will participate in Venous Thrombosis (VTE)/Deep Vein Thrombosis (DVT)Prevention Measures  Outcome: PROGRESSING AS EXPECTED   Pt is using SCDs and receiving Heparin injections.

## 2019-12-01 NOTE — PROGRESS NOTES
Infectious Disease Progress Note    Author: Filiberto Knight M.D. Date & Time of service: 2019  10:15 AM       Chief Complaint:  UTI and Gram-negative sepsis   Candiemia     Interval History:  79-year-old male admitted with altered mental status.    Hoqz109.1 WBC 10.8 AMS worsened-resp failure and bradycardia-now transferred to ICU. Obtunded   101.1, O2 15 L high flow, increased from admit but weaning slowly today, oriented only to self.  LP attempted at bedside but unable to obtain due to agitation.  Neurology recommended MRI/MRA.    AF, O2 1 L oxymask, somnolent today and remains confused.   afebrile, white count 8.4.  Tolerating micafungin and Zosyn.  No new issues.   afebrile, white count 8000.  Creatinine appears to be trending back up.  Patient sitting up in chair, notes no issues, has no complaints    Review of Systems:  Review of Systems   Constitutional: Negative for chills and fever.   Gastrointestinal: Negative for abdominal pain, nausea and vomiting.   Skin: Negative for itching and rash.   All other systems reviewed and are negative.    Hemodynamics:  Temp (24hrs), Av.8 °C (98.2 °F), Min:36.7 °C (98 °F), Max:37 °C (98.6 °F)  Temperature: 36.7 °C (98.1 °F)  Pulse  Av.9  Min: 44  Max: 86   Blood Pressure : 120/60       Physical Exam:  Physical Exam  Vitals signs and nursing note reviewed.   Constitutional:       Comments: Elderly and frail   HENT:      Head: Normocephalic and atraumatic.   Eyes:      General: No scleral icterus.        Right eye: No discharge.         Left eye: No discharge.      Conjunctiva/sclera: Conjunctivae normal.      Pupils: Pupils are equal, round, and reactive to light.   Cardiovascular:      Rate and Rhythm: Normal rate and regular rhythm.      Heart sounds: No murmur.   Pulmonary:      Effort: Pulmonary effort is normal. No respiratory distress.      Breath sounds: No wheezing.   Abdominal:      General: Abdomen is flat. There is no  distension.      Tenderness: There is no tenderness.   Genitourinary:     Comments: Steen catheter in place  Musculoskeletal:         General: No swelling.   Skin:     General: Skin is warm.   Neurological:      Mental Status: He is alert.      Comments: Fluctuating mentation.  Oriented x2.  Moving all extremities.   Psychiatric:      Comments: Very pleasant         Meds:    Current Facility-Administered Medications:   •  therapeutic multivitamin-minerals  •  ascorbic acid  •  [COMPLETED] piperacillin-tazobactam **AND** piperacillin-tazobactam  •  ipratropium-albuterol  •  amLODIPine  •  famotidine  •  enalaprilat  •  heparin  •  ipratropium-albuterol  •  haloperidol lactate  •  SITagliptin  •  micafungin  •  clotrimazole  •  latanoprost  •  polyethylene glycol/lytes  •  simvastatin  •  tamsulosin  •  acetaminophen  •  ondansetron  •  ondansetron  •  senna-docusate **AND** polyethylene glycol/lytes **AND** magnesium hydroxide **AND** bisacodyl  •  Respiratory Care per Protocol    Labs:  Recent Labs     11/29/19 0408 11/30/19 0050 12/01/19  0034   WBC 8.4 9.2 8.0   RBC 3.23* 3.57* 3.30*   HEMOGLOBIN 9.7* 10.8* 9.7*   HEMATOCRIT 31.9* 34.3* 31.5*   MCV 98.8* 96.1 95.5   MCH 30.0 30.3 29.4   RDW 55.1* 53.0* 54.3*   PLATELETCT 226 247 257   MPV 9.0 8.8* 8.9*   NEUTSPOLYS 77.00* 78.20* 76.50*   LYMPHOCYTES 9.40* 7.50* 8.40*   MONOCYTES 7.40 7.30 7.90   EOSINOPHILS 4.10 4.70 4.10   BASOPHILS 1.10 0.80 1.00     Recent Labs     11/29/19 0408 11/30/19 0050 12/01/19  0034   SODIUM 141 139 140   POTASSIUM 4.6 4.5 4.4   CHLORIDE 109 107 107   CO2 27 27 26   GLUCOSE 133* 109* 102*   BUN 23* 23* 29*     Recent Labs     11/29/19 0408 11/30/19 0050 12/01/19  0034   CREATININE 1.96* 1.98* 2.38*       Imaging:  Ct-renal Colic Evaluation(a/p W/o)    Result Date: 11/22/2019 11/22/2019 6:59 PM HISTORY/REASON FOR EXAM:  abnormal labs Cr- and urine infection r/o hydronephrosis. TECHNIQUE/EXAM DESCRIPTION AND NUMBER OF VIEWS: CT  scan renal/colic without contrast. 5 mm helical images of the abdomen and pelvis were obtained from the diaphragmatic domes through the pubic symphysis. Low dose optimization technique was utilized for this CT exam including automated exposure control and adjustment of the mA and/or kV according to patient size. COMPARISON: None. FINDINGS: Visualized lung bases show small bilateral pleural fluid collections with associated dependent atelectasis, worse on the LEFT. The liver is unremarkable. The spleen is unremarkable. Adrenal glands are unremarkable. The kidneys are unremarkable. Pancreas is atrophic. The gallbladder is unremarkable. Steen catheter within mildly distended bladder. Prostate is enlarged. Colonic diverticula noted. Increased small bowel and colonic gas.  Prominent fluid-filled small bowel loops in the RIGHT lower quadrant. No pneumoperitoneum. Moderate atherosclerotic calcification of abdominal aorta. Lumbar spine degenerative changes. T12 vertebral hemangioma. T-spine wall edema.     1.  Dilated fluid-filled small bowel loops in RIGHT lower quadrant concerning for developing obstruction. 2.  No evidence of bowel perforation. 3.  Colonic diverticulosis without evidence for diverticulitis. 4.  No renal stone or hydronephrosis. 5.  Appendix is not visualized. 6.  Enlarged prostate. 7.  Small bilateral pleural effusions with associated atelectasis. 8.  Colonic diverticulosis without evidence for diverticulitis.    [id number] View    Result Date: 11/25/2019 11/25/2019 3:01 PM HISTORY/REASON FOR EXAM:  Abdominal pain. TECHNIQUE/EXAM DESCRIPTION AND NUMBER OF VIEWS:  1 view(s) of the abdomen. COMPARISON: None FINDINGS: AP view of the abdomen demonstrates scattered loops of air-filled bowel. Small bowel loops measure up to 4.9 cm. Phleboliths are in the pelvis. There are arterial calcifications. Degenerative changes are in the spine.     Diffuse bowel distention. Findings are suggestive of ileus. Early  obstruction could have a similar appearance.    Dx-chest-limited (1 View)    Result Date: 11/26/2019 11/26/2019 3:12 AM HISTORY/REASON FOR EXAM:  Shortness of Breath TECHNIQUE/EXAM DESCRIPTION AND NUMBER OF VIEWS: Single portable view of the chest. COMPARISON: Yesterday FINDINGS: HEART: Stable size. There is atherosclerotic calcification in the aortic arch. LUNGS: Lung volumes are low. There are perihilar opacities. There are bibasilar opacities. PLEURA: No effusion or pneumothorax.     Bibasilar and perihilar underinflation atelectasis which could obscure an additional process. This is unchanged.    Dx-chest-limited (1 View)    Result Date: 11/25/2019 11/25/2019 2:08 AM HISTORY/REASON FOR EXAM:  Respiratory failure TECHNIQUE/EXAM DESCRIPTION AND NUMBER OF VIEWS: Single portable view of the chest. COMPARISON: 11/24/2019 FINDINGS: LUNGS: Slight interval worsening in opacities in the right mid/lower lung. Unchanged opacity in the left lung base. No significant pleural effusions. PNEUMOTHORAX: None. LINES AND TUBES: None. MEDIASTINUM: Stable cardiac silhouette. MUSCULOSKELETAL STRUCTURES: Unchanged.     1.  Interval worsening of pulmonary opacities in the right mid/lower lung. 2.  Unchanged left basilar atelectasis and/or consolidation. No significant pleural effusions.    Dx-chest-portable (1 View)    Result Date: 11/24/2019 11/24/2019 10:53 AM HISTORY/REASON FOR EXAM: Shortness of Breath. TECHNIQUE/EXAM DESCRIPTION AND NUMBER OF VIEWS: Single AP view of the chest. COMPARISON: 11/21/2019 FINDINGS: Lungs: Large volumes with reticular predominantly increased opacity is similar to comparison. There is also some hazy opacity at the bases especially on the left where there is some hemidiaphragm elevation Pleura:  Small layering effusions are likely Heart and mediastinum: There is stable cardiac silhouette enlargement.     Similar reticular opacities suspicious for edema and there are likely small layering effusions More  focal basilar opacity is more likely from atelectasis than consolidation    Dx-chest-portable (1 View)    Result Date: 2019 4:49 AM HISTORY/REASON FOR EXAM: Possible sepsis. TECHNIQUE/EXAM DESCRIPTION:  Single AP view of the chest. COMPARISON: None FINDINGS: Overlying cardiac leads are present. Cardiomegaly is observed. Atherosclerotic calcification of the aorta is noted.  The central  pulmonary vasculature appears prominent and indistinct. The lungs appear well expanded bilaterally.  Diffuse scattered hazy pulmonary parenchymal opacities are seen. The lung bases are partially excluded. The bony structures appear age-appropriate.     1.  Pulmonary edema and/or infiltrates are identified, which are stable since the prior exam. 2.  Cardiomegaly 3.  Atherosclerosis    Ec-echocardiogram Complete W/o Cont    Result Date: 2019  Transthoracic Echo Report Echocardiography Laboratory CONCLUSIONS No prior study is available for comparison. Technically difficult study. Normal left ventricular chamber size. Grossly preserved left ventricular systolic function. The right ventricle was normal in size and function. Mild aortic stenosis. NIR JOSHI Exam Date:         2019                    13:42 Exam Location:     Inpatient Priority:          Routine Ordering Physician:        KRISTINA GALEANA Referring Physician:       [id number] Whiteside Sonographer:               Tessie Hill RDCS Age:    79     Gender:    M MRN:    [id number] :    1940 BSA:    2.27   Ht (in):    79     Wt (lb):    198 Exam Type:     Complete Indications:     Shortness of breath ICD Codes:       R06.02 CPT Codes:       85377 BP:   124    /   55     HR:   55 Technical Quality:       Technically difficult study -                          adequate information is obtained MEASUREMENTS  (Male / Female) Normal Values 2D ECHO LV Diastolic Diameter PLAX        5 cm                  4.2 - 5.9 / 3.9 - 5.3 cm LV Systolic Diameter  PLAX         2.6 cm                2.1 - 4.0 cm IVS Diastolic Thickness           1.1 cm                LVPW Diastolic Thickness          1.3 cm                LVOT Diameter                     1.7 cm                LV Ejection Fraction MOD 2C       67.1 %                DOPPLER AV Peak Velocity                  2.2 m/s               AV Peak Gradient                  19 mmHg               AV Mean Gradient                  10.9 mmHg             LVOT Peak Velocity                1.1 m/s               AV Area Cont Eq vti               0.97 cm???              Mitral E Point Velocity           0.61 m/s              Mitral E to A Ratio               1.7                   MV Pressure Half Time             27.9 ms               MV Area PHT                       7.9 cm???               MV Deceleration Time              96.4 ms               * Indicates values subject to auto-interpretation LV EF:        % FINDINGS Left Ventricle Normal left ventricular chamber size. Mild concentric left ventricular hypertrophy. Grossly preserved left ventricular systolic function. Unable to assess wall motion. Indeterminate diastolic function. Right Ventricle The right ventricle was normal in size and function. Right Atrium The right atrium is normal in size. Inferior vena cava is not well visualized. Left Atrium The left atrium is normal in size. Left atrial volume index is 22 mL/sq m. Mitral Valve The mitral valve is not well visualized. No stenosis or regurgitation seen. Aortic Valve The aortic valve is not well visualized. Calcified aortic valve leaflets. Mild aortic stenosis. Transvalvular gradients are - Peak: 20 mmHg, Mean: 12 mmHg. Dimensionless index is (0.39). No aortic insufficiency. Tricuspid Valve Structurally normal tricuspid valve without significant stenosis or regurgitation. Pulmonic Valve Structurally normal pulmonic valve without significant stenosis or regurgitation. Pericardium Normal pericardium without effusion. Aorta  "The aortic root is normal. Ascending aorta not well visualized. Marcelo Smith MD (Electronically Signed) Final Date:     23 November 2019                 16:53      Micro:  Results     Procedure Component Value Units Date/Time    BLOOD CULTURE [[id number]] Collected:  11/25/19 0800    Order Status:  Completed Specimen:  Blood from Peripheral Updated:  11/30/19 1100     Significant Indicator NEG     Source BLD     Site PERIPHERAL     Culture Result No growth after 5 days of incubation.    Narrative:       Collected By:[id number] YUKO MCCOY  Per Hospital Policy: Only change Specimen Src: to \"Line\" if  specified by physician order.  Right Forearm/Arm    BLOOD CULTURE [[id number]] Collected:  11/25/19 0800    Order Status:  Completed Specimen:  Blood from Peripheral Updated:  11/30/19 1100     Significant Indicator NEG     Source BLD     Site PERIPHERAL     Culture Result No growth after 5 days of incubation.    Narrative:       Collected By:[id number] YUKO LOPEZ.  Per Hospital Policy: Only change Specimen Src: to \"Line\" if  specified by physician order.  Left Forearm/Arm    Blood Culture [[id number]] Collected:  11/24/19 1300    Order Status:  Completed Specimen:  Blood from Peripheral Updated:  11/29/19 1500     Significant Indicator NEG     Source BLD     Site PERIPHERAL     Culture Result No growth after 5 days of incubation.    Narrative:       Collected By:[id number] CAMRON MCCOY  From different peripheral sites, if not done within the last  24 hours (Per Hospital Policy: Only change specimen source to  \"Line\" if specified by physician order)  Left Forearm/Arm    Blood Culture [[id number]] Collected:  11/24/19 1300    Order Status:  Completed Specimen:  Blood from Peripheral Updated:  11/29/19 1500     Significant Indicator NEG     Source BLD     Site PERIPHERAL     Culture Result No growth after 5 days of incubation.    Narrative:       Collected By:[id number] CAMRON MCCOY  From different peripheral " "sites, if not done within the last  24 hours (Per Hospital Policy: Only change specimen source to  \"Line\" if specified by physician order)  Right Forearm/Arm    FUNGAL GIRISH INTERPRETATION [[id number]] Collected:  11/21/19 0441    Order Status:  Completed Specimen:  Blood Updated:  11/27/19 1535     Significant Indicator NEG     Source BLD     Site PERIPHERAL     Fungal GIRISH Interp --     GIRISH Interpretative Data:  -  Units: mcg/mL (micrograms/mL)  SDD: Susceptible-dose dependent (SDD category implies  clinical efficacy when higher than normal dosage of a drug  can be used and maximal possible blood level achieved.)  NS: Nonsusceptible (This category is used for organisms that  currently have only a susceptible interpretive category, but  not intermediate or resistant interpretive categories.)  None: No CLSI interpretive guidelines available at this time.  -  If an AMPHOTERICIN B GIRISH of >1 mcg/mL is obtained for Candida  spp., that isolate is likely resistant to AMPHOTERICIN B.  There are no CLSI breakpoints.  -  FLUCYTOSINE GIRISH breakpoints are based largely on historical  data and partially on the drug's pharmacokinetics.  -  For FLUCONAZOLE, the guidelines are based on extensive  experience with mucosal and invasive infections due to  Candida spp. When an isolate is identified as Candida  glabrata and the GIRISH is <32mcg/mL, patients should receive  a maximum dosage regimen of FLUCONAZOLE.  Isolates of Candida  krusei are assumed to be intrinsically resistant to  FLUCONAZOLE and their MICs should not be interpreted using  this scale.  -  For ITRACONAZOLE, the data is based entirely on experience  with mucosal infections, and data supporting breakpoints for  invasive infections due to Candida spp. are not available.  -  For ANIDULAFUNGIN, CASPOFUNGIN, MICAFUNGIN, and VORICONZOLE  the data are based substantially on experience with  non-neutropenic patients with candidemia, and their clinical  relevance in other settings " "is uncertain.  -  For POSACONAZOLE the majority of isolates are inhibited by  <1 mcg/mL.  However, data are not yet available to indicate  a correlation between GIRISH and outcome of treatment with this  agent.  -  Susceptible Dose Dependent (SDD) applies to FLUCONAZOLE and  ITRACONAZOLE. Susceptible is dependent on achieving the  maximum possible blood level.  -  The Sensititre YeastOne Susceptibility plates have been  validated for use at [facility]. These  plates are designed for Research Use Only. However, there are  CLSI approved documents for interpretive criteria for  5-FLUCYTOSINE, FLUCONAZOLE, ITRACONAZOLE, VORICONAZOLE, and  the ECHINOCANDINS. As with any in vitro susceptibility  testing method, the results of testing should be correlated  with the patient's clinical response to prescribed therapy.      Narrative:       CALL  Seals  171 tel. [phone],  CALLED  171 tel. [phone] 11/22/2019, 16:12, RB PERF. RESULTS CALLED  TO:[id number]  Per Hospital Policy: Only change Specimen Src: to \"Line\" if  specified by physician order.  Left Forearm/Arm    BLOOD CULTURE [[id number]]  (Abnormal)  (Susceptibility) Collected:  11/21/19 0441    Order Status:  Completed Specimen:  Blood from Peripheral Updated:  11/27/19 1534     Significant Indicator POS     Source BLD     Site PERIPHERAL     Culture Result Growth detected by Bactec instrument. 11/22/2019  14:54      Pseudomonas aeruginosa  See previous culture for sensitivity report.  P.aeruginosa may develop resistance during prolonged therapy  with all antibiotics. Isolates that are initially susceptible  may become resistant within three to four days after  initiation of therapy. Testing of repeat isolates may be  warranted.  See previous culture for sensitivity report.        Candida glabrata    Narrative:       CALL  Se[facility]  171 tel. [phone],  CALLED  171 tel. [phone] 11/22/2019, 16:12, RB PERF. RESULTS CALLED  TO:[id number]  Per Hospital Policy: " "Only change Specimen Src: to \"Line\" if  specified by physician order.  Left Forearm/Arm    Susceptibility     Candida glabrata (2)     Antibiotic Interpretation Microscan Method Status    Amphoter B 24hr No Interpretation 0.5 mcg/mL GIRISH Final    Anidulafungin 24hr Sensitive <0.015 mcg/mL GIRISH Final    Caspofungin 24hr Sensitive 0.03 mcg/mL GIRISH Final    Fluconazole 24hr Susceptible Dose-Dependent 8 mcg/mL GIRISH Final    Micafungin 24hr Sensitive 0.015 mcg/mL GIRISH Final                   Urinalysis [[id number]]  (Abnormal) Collected:  11/24/19 1920    Order Status:  Completed Specimen:  Urine, Steen Cath Updated:  11/24/19 2328     Color Yellow     Character Clear     Specific Gravity 1.010     Ph 5.0     Glucose Negative mg/dL      Ketones Negative mg/dL      Protein Negative mg/dL      Bilirubin Negative     Urobilinogen, Urine 0.2     Nitrite Negative     Leukocyte Esterase Moderate     Occult Blood Large     Micro Urine Req Microscopic    Narrative:       Collected By:[id number] BESSYTANIA ALONSO  If not done within the last 24 hours    MRSA By PCR (Amp) [[id number]] Collected:  11/24/19 1730    Order Status:  Completed Specimen:  Respirate from Nares Updated:  11/24/19 2121     Significant Indicator NEG     Source RESP     Site NARES     MRSA PCR Negative for MRSA by PCR.    Narrative:       Collected By:[id number] CAMRON MCCOY  Per [employer] Protocol  Collected By:[id number] CAMRON MCCOY    URINALYSIS [[id number]] Collected:  11/24/19 1920    Order Status:  Canceled Specimen:  Urine, Steen Cath     URINALYSIS [[id number]] Collected:  11/24/19 1920    Order Status:  Canceled Specimen:  Urine, Steen Cath     HSV 1/2 By PCR(Herpes)+[id number] [[id number]]     Order Status:  Canceled Specimen:  Genital from Blood     CSF Culture [[id number]]     Order Status:  Canceled Specimen:  CSF from Tap           Assessment:  79-year-old white male admitted on 11/21/2019 due to altered mental status.  He had concern for " acute-on-chronic renal failure, urinary tract infection as well as CAP.  His renal failure was thought to be due to a malfunctioning Steen catheter and a new catheter was placed with significant urinary retention up over 1500 mL.  Urine culture was showing  Gram-negative rods and blood cultures showing Gram-negative rods, so infectious disease was consulted for antibiotic recommendations and management.  Rapid response was called due to concern for paresthesia of the distal extremities and he was transferred to the ICU.  Blood cultures on 11/21 with growth of yeast as well as Pseudomonas.      Plan:  UTI and Gram-negative bacteremia - PSAR   Fevers, improved   Urine and Blood cxs +PSAR  --- Continue Zosyn for PSAR UTI and bacteremia, possible aspiration pneumonia  --- F/up repeat blood cultures - NGTD   --- Stop date 12/7/2019     Candidemia, C glabrata unclear etiology, patient without central line, significant urinary retention but did not grow from urine culture.  CT concerning for developing obstruction but no obvious bowel perforation.  BCx 11/21+ C glabrata -dose dependent fluconazole susceptible     --- Ophthalmology performed dilated exam on 11/25, no evidence of endophthalmitis  --- Recommend SARAVANAN as TTE unrevealing  --- Continue micafungin for now.  After SARAVANAN can give final antibiotic course.  Please discuss with cardiology, SARAVANAN preferred in the setting of candidemia but it felt to be unsafe for this 79-year-old then please comment.     Encephalopathy -still mildly confused may be at baseline  Plan had been for LP but this was not completed  MRA/ MRI considered but not done      Hypoxemia, pulmonary edema - improved      Acute renal failure-ongoing but improved from admit  Multifactorial including his sepsis, urinary tract infection, and obstructed Steen catheter.    His Steen catheter has since been replaced.    Antibiotics will need to be dose adjusted accordingly  CT neg for obstruction or stone  Monitor  his renal function and urine output carefully.  Creatinine improved initially, but appears to be trending back up.  Uncertain baseline.  Monitor.  If any worsening tomorrow, may need to decrease frequency of the Zosyn      Ileus vs bowel obstr  Appreciate surgery eval    Discussed with primary, Dr. Lazcano.  ID will follow.  Please call with questions

## 2019-12-01 NOTE — CARE PLAN
Problem: Communication  Goal: The ability to communicate needs accurately and effectively will improve  Outcome: PROGRESSING AS EXPECTED     Problem: Venous Thromboembolism (VTW)/Deep Vein Thrombosis (DVT) Prevention:  Goal: Patient will participate in Venous Thrombosis (VTE)/Deep Vein Thrombosis (DVT)Prevention Measures  Outcome: PROGRESSING AS EXPECTED     Problem: Bowel/Gastric:  Goal: Will not experience complications related to bowel motility  Outcome: PROGRESSING AS EXPECTED

## 2019-12-01 NOTE — PROGRESS NOTES
[facility] Medicine Daily Progress Note    Date of Service  12/1/2019    Chief Complaint  79 y.o. male admitted 11/21/2019 with confusion    Hospital Course    Mr. Randy Bates is a 79 y.o. male with a past medical history significant for insulin dependent diabetes mellitus, hypertension, and urinary retention with a chronic indwelling Steen catheter who presented with altered mental status on 11/21/2019.  Imaging was consistent with a community acquired pneumonia.  Both blood and urine cultures grew Pseudomonas.  Blood cultures also grew candida glabrata.  Ophthalmology was consulted and ruled out endophthalmitis.  He will follow up as outpatient for pseudophakia, cataract, and dematochalasis.  A transthoracic echocardiogram was not suggestive of endocarditis.  Infectious disease recommended a transesophageal echocardiogram, which cannot be performed until Monday 12/2/2019 because of the holiday weekend.  Source of candidemia is unclear as he has no lines and candida did not grow from urine culture.  Stop date for Zosyn is 12/7 and Micafungin is 12/5.  These dates may change if the patient has endocarditis on SARAVANAN 12/2.    On 11/24/2019 the patient became more confused with increased oxygen requirement.  A rapid response was called and he was transferred to the ICU on high flow nasal cannula.  He has since improved and transferred to the general medical floor.      Nephrology was consulted for acute renal failure.  His creatinine has improved and he is producing adequate urine output.  A CT renal colic evaluation demonstrated normal kidneys.     There was concern for peripheral paresthesias and a neurology consult was placed.  An MRI/MRA and LP was recommended but the patient decompensated and was transferred to the ICU.  He now denies having any paresthesias which can be worked up as an outpatient per neurology recommendations if symptoms return.      General surgery was consulted for an ileus, but the patient is  now having bowel movements with adequate bowel sounds.           Interval Problem Update  Patient was seen and examined at bedside.  There were no acute events overnight.  I have personally reviewed vitals, labs, and imaging.  Patient denies pain, shortness of breath, paresthesias, fever.  The patient has dementia at baseline.  Denies hip pain.  Creatinine bumped to 2.38 today.  Continues to have adequate urine output with chronic sawyer which has been changed this admission 11/21.    Consultants/Specialty  Cardiology  Nephrology  Infectious Disease    Code Status  FULL    Disposition  PT recommends subacute rehab.  He is from assisted living and has a guardian.      Review of Systems  Review of Systems   Constitutional: Negative for chills and fever.        Patient denies complaints but is a poor historian and only oriented to person   HENT: Negative for sore throat.    Eyes: Negative for blurred vision.   Respiratory: Negative for cough and shortness of breath.    Cardiovascular: Negative for chest pain, palpitations and leg swelling.   Gastrointestinal: Negative for abdominal pain, constipation, diarrhea, nausea and vomiting.   Genitourinary: Negative for dysuria, frequency and urgency.   Musculoskeletal: Negative for falls and joint pain (denies hip pain).   Skin: Negative for rash.   Neurological: Negative for dizziness and weakness.   Psychiatric/Behavioral: Negative for depression and suicidal ideas. The patient is not nervous/anxious.    All other systems reviewed and are negative.       Physical Exam  Temp:  [36.7 °C (98 °F)-37 °C (98.6 °F)] 36.7 °C (98.1 °F)  Pulse:  [50-61] 52  Resp:  [15-18] 15  BP: (107-121)/(55-60) 120/60  SpO2:  [94 %-98 %] 94 %    Physical Exam  Vitals signs and nursing note reviewed.   Constitutional:       General: He is not in acute distress.     Appearance: Normal appearance.   HENT:      Head: Normocephalic and atraumatic.      Nose: Nose normal.      Mouth/Throat:      Mouth:  Mucous membranes are dry.      Pharynx: Oropharynx is clear.   Eyes:      Extraocular Movements: Extraocular movements intact.      Conjunctiva/sclera: Conjunctivae normal.   Neck:      Musculoskeletal: Normal range of motion and neck supple.   Cardiovascular:      Rate and Rhythm: Regular rhythm. Bradycardia present.      Pulses: Normal pulses.      Heart sounds: Normal heart sounds. No murmur. No friction rub. No gallop.    Pulmonary:      Effort: Pulmonary effort is normal. No respiratory distress.      Breath sounds: Normal breath sounds. No wheezing or rales.   Chest:      Chest wall: No tenderness.   Abdominal:      General: Abdomen is flat. Bowel sounds are normal. There is no distension.      Palpations: Abdomen is soft. There is no mass.      Tenderness: There is no tenderness. There is no guarding.   Musculoskeletal: Normal range of motion.         General: Tenderness (hip) present.   Skin:     General: Skin is warm.      Capillary Refill: Capillary refill takes less than 2 seconds.   Neurological:      General: No focal deficit present.      Mental Status: He is alert. Mental status is at baseline.      Cranial Nerves: No cranial nerve deficit.      Motor: No weakness.   Psychiatric:         Mood and Affect: Mood normal.         Behavior: Behavior normal.         Fluids    Intake/Output Summary (Last 24 hours) at 12/1/2019 1315  Last data filed at 12/1/2019 0900  Gross per 24 hour   Intake 360 ml   Output 100 ml   Net 260 ml       Laboratory  Recent Labs     11/29/19 0408 11/30/19 0050 12/01/19  0034   WBC 8.4 9.2 8.0   RBC 3.23* 3.57* 3.30*   HEMOGLOBIN 9.7* 10.8* 9.7*   HEMATOCRIT 31.9* 34.3* 31.5*   MCV 98.8* 96.1 95.5   MCH 30.0 30.3 29.4   MCHC 30.4* 31.5* 30.8*   RDW 55.1* 53.0* 54.3*   PLATELETCT 226 247 257   MPV 9.0 8.8* 8.9*     Recent Labs     11/29/19 0408 11/30/19 0050 12/01/19  0034   SODIUM 141 139 140   POTASSIUM 4.6 4.5 4.4   CHLORIDE 109 107 107   CO2 27 27 26   GLUCOSE 133* 109*  102*   BUN 23* 23* 29*   CREATININE 1.96* 1.98* 2.38*   CALCIUM 8.5 8.9 8.8                   Imaging  DX-CHEST-LIMITED (1 VIEW)   Final Result      Bibasilar and perihilar underinflation atelectasis which could obscure an additional process. This is unchanged.      [id number] VIEW   Final Result      Diffuse bowel distention. Findings are suggestive of ileus. Early obstruction could have a similar appearance.      DX-CHEST-LIMITED (1 VIEW)   Final Result      1.  Interval worsening of pulmonary opacities in the right mid/lower lung.   2.  Unchanged left basilar atelectasis and/or consolidation. No significant pleural effusions.      DX-CHEST-PORTABLE (1 VIEW)   Final Result      Similar reticular opacities suspicious for edema and there are likely small layering effusions      More focal basilar opacity is more likely from atelectasis than consolidation      EC-ECHOCARDIOGRAM COMPLETE W/O CONT   Final Result      CT-RENAL COLIC EVALUATION(A/P W/O)   Final Result      1.  Dilated fluid-filled small bowel loops in RIGHT lower quadrant concerning for developing obstruction.   2.  No evidence of bowel perforation.   3.  Colonic diverticulosis without evidence for diverticulitis.   4.  No renal stone or hydronephrosis.   5.  Appendix is not visualized.   6.  Enlarged prostate.   7.  Small bilateral pleural effusions with associated atelectasis.   8.  Colonic diverticulosis without evidence for diverticulitis.      DX-CHEST-PORTABLE (1 VIEW)   Final Result         1.  Pulmonary edema and/or infiltrates are identified, which are stable since the prior exam.   2.  Cardiomegaly   3.  Atherosclerosis           Assessment/Plan  * UTI (urinary tract infection)- (present on admission)  Assessment & Plan  Urinary tract infection associated with chronic indwelling Steen catheter  Complicated urinary tract infection with associated encephalopathy  Appreciate infectious disease consultation, continuing Zosyn stop date  12/7    Candidemia (HCC)- (present on admission)  Assessment & Plan  Appreciate ID recommendations  Continuing micafungin stop date 12/5    Bacteremia due to Pseudomonas- (present on admission)  Assessment & Plan  Infectious disease following, continue Zosyn for CAP, bacteremia, and UTI stop date 12/7  SARAVANAN on 12/2 to rule out endocarditis    Acute respiratory failure with hypoxia, RAD, PNA (HCC)- (present on admission)  Assessment & Plan  On 1-2L per NC  Continue to wean supplemental oxygen as above.    Altered mental status- (present on admission)  Assessment & Plan  Patient has baseline dementia and is likely close to baseline being oriented only to person  Neuro checks.  Monitor mental status    CAP (community acquired pneumonia)- (present on admission)  Assessment & Plan  Pseudomonas; continue zosyn per ID.  Stop date 12/7  Rt protocol  Wean oxygen as tolerated      Bradycardia- (present on admission)  Assessment & Plan  Patient has been bradycardic, but seems to be asymptomatic.  Normotensive.  Hold AV zoraida blockers.    Acute renal failure (ARF) ([facility])- (present on admission)  Assessment & Plan  Likely was 2/2 urinary retention/sawyer obstruction  Avoid nephrotoxins  Kidney function had improved to 1.98, but bumped to 2.38  He is producing adequate urine output    Hypocalcemia- (present on admission)  Assessment & Plan  Corrected normal    Normocytic normochromic anemia- (present on admission)  Assessment & Plan  Asymptomatic  No active signs of bleeding  Transfuse to maintain hemoglobin > 7    Gastrointestinal prophylaxis: Famotidine  Antibiotics: Micafungin last dose 12/5.  Zosyn last dose 12/7   Diet: Regular  Code status: FULL  Prognosis: Guarded  Risk: The Patient is at HIGH risk for inpatient complications and decompensation secondary to his multiple cormorbidities including Pseudomonas bacteremia and Candidemia with concern for endocarditis, Pseudomonas urinary tract infection with chronic sawyer requiring  IV antibiotics, hypoxic respiratory failure, altered mental status    I discussed the plan of care with bedside RN as well as on multidisciplinary rounds    Time spent > 30 minutes.  > 50% of the time was spend providing counseling and coordination of care       VTE prophylaxis: Heparin

## 2019-12-01 NOTE — PROGRESS NOTES
Hospital Medicine Daily Progress Note    Date of Service  11/30/2019    Chief Complaint  79 y.o. male admitted 11/21/2019 with confusion    Hospital Course    Mr. Randy Bates is a 79 y.o. male with a past medical history significant for insulin dependent diabetes mellitus, hypertension, and urinary retention with a chronic indwelling Steen catheter who presented with altered mental status on 11/21/2019.  Imaging was consistent with a community acquired pneumonia.  Both blood and urine cultures grew Pseudomonas.  Blood cultures also grew candida glabrata.  Ophthalmology was consulted and ruled out endophthalmitis.  He will follow up as outpatient for pseudophakia, cataract, and dematochalasis.  A transthoracic echocardiogram was not suggestive of endocarditis.  Infectious disease recommended a transesophageal echocardiogram, which cannot be performed until Monday 12/2/2019 because of the holiday weekend.  Source of candidemia is unclear as he has no lines and candida did not grow from urine culture.      On 11/24/2019 the patient became more confused with increased oxygen requirement.  A rapid response was called and he was transferred to the ICU on high flow nasal cannula.  He has since improved and transferred to the general medical floor.      Nephrology was consulted for acute renal failure.  His creatinine has improved and he is producing adequate urine output.  A CT renal colic evaluation demonstrated normal kidneys.     There was concern for peripheral paresthesias and a neurology consult was placed.  An MRI/MRA and LP was recommended but the patient decompensated and was transferred to the ICU.  He now denies having any paresthesias which can be worked up as an outpatient per neurology recommendations if symptoms return.      General surgery was consulted for an ileus, but the patient is now having bowel movements with adequate bowel sounds.           Interval Problem Update  Patient was seen and examined  at bedside.  There were no acute events overnight.  I have personally reviewed vitals, labs, and imaging.  Patient denies pain, shortness of breath, paresthesias, fever.  The patient has dementia at baseline and is a poor historian.  Complains of left hip pain today, which was right sided yesterday.  He states it is intermittent and not too bad.    Quetiapine was started in the ICU for agitation.  The patient is now pleasantly demented.  Will discontinue quetiapine.      Consultants/Specialty  Cardiology  Nephrology  Infectious Disease    Code Status  FULL    Disposition  PT recommends subacute rehab.  He is from assisted living and has a guardian.      Review of Systems  Review of Systems   Constitutional: Negative for chills and fever.        Patient denies complaints but is a poor historian and only oriented to person   HENT: Negative for sore throat.    Eyes: Negative for blurred vision.   Respiratory: Negative for cough and shortness of breath.    Cardiovascular: Negative for chest pain, palpitations and leg swelling.   Gastrointestinal: Negative for abdominal pain, constipation, diarrhea, nausea and vomiting.   Genitourinary: Negative for dysuria, frequency and urgency.   Musculoskeletal: Positive for joint pain (hip pain). Negative for falls.   Skin: Negative for rash.   Neurological: Negative for dizziness and weakness.   Psychiatric/Behavioral: Negative for depression and suicidal ideas. The patient is not nervous/anxious.    All other systems reviewed and are negative.       Physical Exam  Temp:  [36.8 °C (98.3 °F)-37.1 °C (98.8 °F)] 36.9 °C (98.5 °F)  Pulse:  [57-60] 60  Resp:  [17-18] 17  BP: (145-150)/(74-85) 145/75  SpO2:  [95 %-96 %] 96 %    Physical Exam  Vitals signs and nursing note reviewed.   Constitutional:       General: He is not in acute distress.     Appearance: Normal appearance.   HENT:      Head: Normocephalic and atraumatic.      Nose: Nose normal.      Mouth/Throat:      Mouth: Mucous  membranes are dry.      Pharynx: Oropharynx is clear.   Eyes:      Extraocular Movements: Extraocular movements intact.      Conjunctiva/sclera: Conjunctivae normal.   Neck:      Musculoskeletal: Normal range of motion and neck supple.   Cardiovascular:      Rate and Rhythm: Regular rhythm. Bradycardia present.      Pulses: Normal pulses.      Heart sounds: Normal heart sounds. No murmur. No friction rub. No gallop.    Pulmonary:      Effort: Pulmonary effort is normal. No respiratory distress.      Breath sounds: Normal breath sounds. No wheezing or rales.   Chest:      Chest wall: No tenderness.   Abdominal:      General: Abdomen is flat. Bowel sounds are normal. There is no distension.      Palpations: Abdomen is soft. There is no mass.      Tenderness: There is no tenderness. There is no guarding.   Musculoskeletal: Normal range of motion.         General: Tenderness (hip) present.   Skin:     General: Skin is warm.      Capillary Refill: Capillary refill takes less than 2 seconds.   Neurological:      General: No focal deficit present.      Mental Status: He is alert. Mental status is at baseline.      Cranial Nerves: No cranial nerve deficit.      Motor: No weakness.   Psychiatric:         Mood and Affect: Mood normal.         Behavior: Behavior normal.         Fluids    Intake/Output Summary (Last 24 hours) at 11/30/2019 1608  Last data filed at 11/30/2019 1400  Gross per 24 hour   Intake 870 ml   Output 600 ml   Net 270 ml       Laboratory  Recent Labs     11/29/19  0408 11/30/19  0050   WBC 8.4 9.2   RBC 3.23* 3.57*   HEMOGLOBIN 9.7* 10.8*   HEMATOCRIT 31.9* 34.3*   MCV 98.8* 96.1   MCH 30.0 30.3   MCHC 30.4* 31.5*   RDW 55.1* 53.0*   PLATELETCT 226 247   MPV 9.0 8.8*     Recent Labs     11/29/19  0408 11/30/19  0050   SODIUM 141 139   POTASSIUM 4.6 4.5   CHLORIDE 109 107   CO2 27 27   GLUCOSE 133* 109*   BUN 23* 23*   CREATININE 1.96* 1.98*   CALCIUM 8.5 8.9                   Imaging  DX-CHEST-LIMITED (1  VIEW)   Final Result      Bibasilar and perihilar underinflation atelectasis which could obscure an additional process. This is unchanged.      [id number] VIEW   Final Result      Diffuse bowel distention. Findings are suggestive of ileus. Early obstruction could have a similar appearance.      DX-CHEST-LIMITED (1 VIEW)   Final Result      1.  Interval worsening of pulmonary opacities in the right mid/lower lung.   2.  Unchanged left basilar atelectasis and/or consolidation. No significant pleural effusions.      DX-CHEST-PORTABLE (1 VIEW)   Final Result      Similar reticular opacities suspicious for edema and there are likely small layering effusions      More focal basilar opacity is more likely from atelectasis than consolidation      EC-ECHOCARDIOGRAM COMPLETE W/O CONT   Final Result      CT-RENAL COLIC EVALUATION(A/P W/O)   Final Result      1.  Dilated fluid-filled small bowel loops in RIGHT lower quadrant concerning for developing obstruction.   2.  No evidence of bowel perforation.   3.  Colonic diverticulosis without evidence for diverticulitis.   4.  No renal stone or hydronephrosis.   5.  Appendix is not visualized.   6.  Enlarged prostate.   7.  Small bilateral pleural effusions with associated atelectasis.   8.  Colonic diverticulosis without evidence for diverticulitis.      DX-CHEST-PORTABLE (1 VIEW)   Final Result         1.  Pulmonary edema and/or infiltrates are identified, which are stable since the prior exam.   2.  Cardiomegaly   3.  Atherosclerosis           Assessment/Plan  * UTI (urinary tract infection)- (present on admission)  Assessment & Plan  Urinary tract infection associated with chronic indwelling Steen catheter  Complicated urinary tract infection with associated encephalopathy  Appreciate infectious disease consultation, continuing Zosyn stop date 12/7    Candidemia (HCC)- (present on admission)  Assessment & Plan  Appreciate ID recommendations  Continuing micafungin stop date  12/5    Bacteremia due to Pseudomonas- (present on admission)  Assessment & Plan  Infectious disease following, continue Zosyn stop date 12/7  SARAVANAN on 12/2    Acute respiratory failure with hypoxia, RAD, PNA (HCC)- (present on admission)  Assessment & Plan  On 1-2L per NC  Continue to wean supplemental oxygen as above.    Altered mental status- (present on admission)  Assessment & Plan  Patient has baseline dementia and is likely close to baseline  Neuro checks.  Monitor mental status    CAP (community acquired pneumonia)- (present on admission)  Assessment & Plan  Pseudomonas; continue zosyn per ID.  Stop date 12/7  Rt protocol  Wean oxygen as tolerated      Bradycardia- (present on admission)  Assessment & Plan  Patient has been bradycardic, but seems to be asymptomatic.  Normotensive.  Hold AV zoraida blockers.    Acute renal failure (ARF) (HCC)- (present on admission)  Assessment & Plan  Likely was 2/2 urinary retention/sawyer obstruction  Avoid nephrotoxins; improving creatinine  Kidney function likely close to baseline  He is producing adequate urine output    Hypocalcemia- (present on admission)  Assessment & Plan  Corrected normal    Normocytic normochromic anemia- (present on admission)  Assessment & Plan  Asymptomatic  No active signs of bleeding  Transfuse to maintain hemoglobin > 7    Gastrointestinal prophylaxis: Famotidine  Antibiotics: Micafungin last dose 12/5.  Zosyn last dose 12/7   Diet: Regular  Code status: FULL  Prognosis: Guarded  Risk: The Patient is at HIGH risk for inpatient complications and decompensation secondary to his multiple cormorbidities including Pseudomonas bacteremia and Candidemia with concern for endocarditis, Pseudomonas urinary tract infection, hypoxic respiratory failure, altered mental status    I discussed the plan of care with bedside RN as well as on multidisciplinary rounds    Time spent > 30 minutes.  > 50% of the time was spend providing counseling and coordination of  care       VTE prophylaxis: Heparin

## 2019-12-02 LAB
ANION GAP SERPL CALC-SCNC: 6 MMOL/L (ref 0–11.9)
BASOPHILS # BLD AUTO: 1.2 % (ref 0–1.8)
BASOPHILS # BLD: 0.09 K/UL (ref 0–0.12)
BUN SERPL-MCNC: 33 MG/DL (ref 8–22)
CALCIUM SERPL-MCNC: 8.7 MG/DL (ref 8.5–10.5)
CHLORIDE SERPL-SCNC: 108 MMOL/L (ref 96–112)
CO2 SERPL-SCNC: 26 MMOL/L (ref 20–33)
CREAT SERPL-MCNC: 2.77 MG/DL (ref 0.5–1.4)
EOSINOPHIL # BLD AUTO: 0.33 K/UL (ref 0–0.51)
EOSINOPHIL NFR BLD: 4.6 % (ref 0–6.9)
ERYTHROCYTE [DISTWIDTH] IN BLOOD BY AUTOMATED COUNT: 54 FL (ref 35.9–50)
GLUCOSE SERPL-MCNC: 99 MG/DL (ref 65–99)
HCT VFR BLD AUTO: 29.7 % (ref 42–52)
HGB BLD-MCNC: 9.3 G/DL (ref 14–18)
IMM GRANULOCYTES # BLD AUTO: 0.11 K/UL (ref 0–0.11)
IMM GRANULOCYTES NFR BLD AUTO: 1.5 % (ref 0–0.9)
LYMPHOCYTES # BLD AUTO: 0.8 K/UL (ref 1–4.8)
LYMPHOCYTES NFR BLD: 11.1 % (ref 22–41)
MAGNESIUM SERPL-MCNC: 2.1 MG/DL (ref 1.5–2.5)
MCH RBC QN AUTO: 30.1 PG (ref 27–33)
MCHC RBC AUTO-ENTMCNC: 31.3 G/DL (ref 33.7–35.3)
MCV RBC AUTO: 96.1 FL (ref 81.4–97.8)
MONOCYTES # BLD AUTO: 0.57 K/UL (ref 0–0.85)
MONOCYTES NFR BLD AUTO: 7.9 % (ref 0–13.4)
NEUTROPHILS # BLD AUTO: 5.32 K/UL (ref 1.82–7.42)
NEUTROPHILS NFR BLD: 73.7 % (ref 44–72)
NRBC # BLD AUTO: 0 K/UL
NRBC BLD-RTO: 0 /100 WBC
PHOSPHATE SERPL-MCNC: 4 MG/DL (ref 2.5–4.5)
PLATELET # BLD AUTO: 242 K/UL (ref 164–446)
PMV BLD AUTO: 9 FL (ref 9–12.9)
POTASSIUM SERPL-SCNC: 4.3 MMOL/L (ref 3.6–5.5)
RBC # BLD AUTO: 3.09 M/UL (ref 4.7–6.1)
SODIUM SERPL-SCNC: 140 MMOL/L (ref 135–145)
WBC # BLD AUTO: 7.2 K/UL (ref 4.8–10.8)
WNV IGM SER QL IA: NORMAL

## 2019-12-02 PROCEDURE — 700111 HCHG RX REV CODE 636 W/ 250 OVERRIDE (IP): Performed by: INTERNAL MEDICINE

## 2019-12-02 PROCEDURE — 97530 THERAPEUTIC ACTIVITIES: CPT

## 2019-12-02 PROCEDURE — 80048 BASIC METABOLIC PNL TOTAL CA: CPT

## 2019-12-02 PROCEDURE — 99232 SBSQ HOSP IP/OBS MODERATE 35: CPT | Performed by: INTERNAL MEDICINE

## 2019-12-02 PROCEDURE — A9270 NON-COVERED ITEM OR SERVICE: HCPCS | Performed by: INTERNAL MEDICINE

## 2019-12-02 PROCEDURE — 700102 HCHG RX REV CODE 250 W/ 637 OVERRIDE(OP): Performed by: INTERNAL MEDICINE

## 2019-12-02 PROCEDURE — 700105 HCHG RX REV CODE 258: Performed by: INTERNAL MEDICINE

## 2019-12-02 PROCEDURE — 700102 HCHG RX REV CODE 250 W/ 637 OVERRIDE(OP): Performed by: HOSPITALIST

## 2019-12-02 PROCEDURE — 99233 SBSQ HOSP IP/OBS HIGH 50: CPT | Performed by: INTERNAL MEDICINE

## 2019-12-02 PROCEDURE — 84100 ASSAY OF PHOSPHORUS: CPT

## 2019-12-02 PROCEDURE — 83735 ASSAY OF MAGNESIUM: CPT

## 2019-12-02 PROCEDURE — A9270 NON-COVERED ITEM OR SERVICE: HCPCS | Performed by: HOSPITALIST

## 2019-12-02 PROCEDURE — 85025 COMPLETE CBC W/AUTO DIFF WBC: CPT

## 2019-12-02 PROCEDURE — 36415 COLL VENOUS BLD VENIPUNCTURE: CPT

## 2019-12-02 PROCEDURE — 770006 HCHG ROOM/CARE - MED/SURG/GYN SEMI*

## 2019-12-02 RX ADMIN — CLOTRIMAZOLE 1 APPLICATION: 10 CREAM TOPICAL at 18:00

## 2019-12-02 RX ADMIN — PIPERACILLIN AND TAZOBACTAM 3.38 G: 3; .375 INJECTION, POWDER, LYOPHILIZED, FOR SOLUTION INTRAVENOUS; PARENTERAL at 17:04

## 2019-12-02 RX ADMIN — MICAFUNGIN SODIUM 100 MG: 20 INJECTION, POWDER, LYOPHILIZED, FOR SOLUTION INTRAVENOUS at 06:37

## 2019-12-02 RX ADMIN — CLOTRIMAZOLE 1 APPLICATION: 10 CREAM TOPICAL at 05:06

## 2019-12-02 RX ADMIN — LATANOPROST 1 DROP: 50 SOLUTION OPHTHALMIC at 21:42

## 2019-12-02 RX ADMIN — FAMOTIDINE 20 MG: 10 INJECTION INTRAVENOUS at 05:06

## 2019-12-02 RX ADMIN — PIPERACILLIN AND TAZOBACTAM 4.5 G: 4; .5 INJECTION, POWDER, LYOPHILIZED, FOR SOLUTION INTRAVENOUS; PARENTERAL at 10:30

## 2019-12-02 RX ADMIN — HEPARIN SODIUM 5000 UNITS: 5000 INJECTION INTRAVENOUS; SUBCUTANEOUS at 21:42

## 2019-12-02 RX ADMIN — HALOPERIDOL LACTATE 5 MG: 5 INJECTION, SOLUTION INTRAMUSCULAR at 10:53

## 2019-12-02 RX ADMIN — HEPARIN SODIUM 5000 UNITS: 5000 INJECTION INTRAVENOUS; SUBCUTANEOUS at 05:06

## 2019-12-02 RX ADMIN — PIPERACILLIN AND TAZOBACTAM 4.5 G: 4; .5 INJECTION, POWDER, LYOPHILIZED, FOR SOLUTION INTRAVENOUS; PARENTERAL at 00:03

## 2019-12-02 ASSESSMENT — ENCOUNTER SYMPTOMS
ABDOMINAL PAIN: 0
SHORTNESS OF BREATH: 0
PALPITATIONS: 0
CONSTIPATION: 0
NERVOUS/ANXIOUS: 0
DIARRHEA: 0
NAUSEA: 0
CHILLS: 0
DEPRESSION: 0
COUGH: 0
BLURRED VISION: 0
FEVER: 0
FALLS: 0
SORE THROAT: 0
VOMITING: 0
WEAKNESS: 0
DIZZINESS: 0

## 2019-12-02 ASSESSMENT — COGNITIVE AND FUNCTIONAL STATUS - GENERAL
SUGGESTED CMS G CODE MODIFIER MOBILITY: CK
CLIMB 3 TO 5 STEPS WITH RAILING: TOTAL
WALKING IN HOSPITAL ROOM: A LITTLE
STANDING UP FROM CHAIR USING ARMS: A LITTLE
MOVING TO AND FROM BED TO CHAIR: A LITTLE
MOBILITY SCORE: 15
MOVING FROM LYING ON BACK TO SITTING ON SIDE OF FLAT BED: UNABLE

## 2019-12-02 ASSESSMENT — GAIT ASSESSMENTS
ASSISTIVE DEVICE: FRONT WHEEL WALKER
GAIT LEVEL OF ASSIST: MINIMAL ASSIST
DISTANCE (FEET): 70
DEVIATION: SHUFFLED GAIT

## 2019-12-02 NOTE — PROGRESS NOTES
Hospital Medicine Daily Progress Note    Date of Service  12/2/2019    Chief Complaint  79 y.o. male admitted 11/21/2019 with confusion    Hospital Course    Mr. Randy Bates is a 79 y.o. male with a past medical history significant for insulin dependent diabetes mellitus, hypertension, and urinary retention with a chronic indwelling Steen catheter who presented with altered mental status on 11/21/2019.  Imaging was consistent with a community acquired pneumonia.  Both blood and urine cultures grew Pseudomonas.  Blood cultures also grew candida glabrata.  Ophthalmology was consulted and ruled out endophthalmitis.  He will follow up as outpatient for pseudophakia, cataract, and dematochalasis.  A transthoracic echocardiogram was not suggestive of endocarditis.  Infectious disease recommended a transesophageal echocardiogram, which cannot be performed until Monday 12/2/2019 because of the holiday weekend.  Source of candidemia is unclear as he has no lines and candida did not grow from urine culture.  Stop date for Zosyn is 12/7 and Micafungin is 12/5.  These dates may change if the patient has endocarditis on SARAVANAN 12/2.    On 11/24/2019 the patient became more confused with increased oxygen requirement.  A rapid response was called and he was transferred to the ICU on high flow nasal cannula.  He has since improved and transferred to the general medical floor.      Nephrology was consulted for acute renal failure.  His creatinine has improved and he is producing adequate urine output.  A CT renal colic evaluation demonstrated normal kidneys.     There was concern for peripheral paresthesias and a neurology consult was placed.  An MRI/MRA and LP was recommended but the patient decompensated and was transferred to the ICU.  He now denies having any paresthesias which can be worked up as an outpatient per neurology recommendations if symptoms return.      General surgery was consulted for an ileus, but the patient is  now having bowel movements with adequate bowel sounds.           Interval Problem Update  Patient was seen and examined at bedside.  There were no acute events overnight.  I have personally reviewed vitals, labs, and imaging.  Patient denies pain, shortness of breath, paresthesias, fever.  The patient has dementia at baseline.  Denies hip pain.  Creatinine bumped to 2.77 today.  Continues to have adequate urine output with chronic sawyer which has been changed this admission 11/21.  He has had adequate PO intake.  He was supposed to go for a SARAVANAN today, but drank something this morning and will go tomorrow.    I did speak with his cousin and POA today Carrie to update her and talk to her about discharge options.    Consultants/Specialty  Cardiology  Nephrology  Infectious Disease    Code Status  FULL    Disposition  PT recommends subacute rehab.  He is from assisted living and has a guardian.      Review of Systems  Review of Systems   Constitutional: Negative for chills and fever.        Patient denies complaints but is a poor historian and only oriented to person   HENT: Negative for sore throat.    Eyes: Negative for blurred vision.   Respiratory: Negative for cough and shortness of breath.    Cardiovascular: Negative for chest pain, palpitations and leg swelling.   Gastrointestinal: Negative for abdominal pain, constipation, diarrhea, nausea and vomiting.   Genitourinary: Negative for dysuria, frequency and urgency.   Musculoskeletal: Negative for falls and joint pain (denies hip pain).   Skin: Negative for rash.   Neurological: Negative for dizziness and weakness.   Psychiatric/Behavioral: Negative for depression and suicidal ideas. The patient is not nervous/anxious.    All other systems reviewed and are negative.       Physical Exam  Temp:  [36.8 °C (98.2 °F)-37.3 °C (99.1 °F)] 36.8 °C (98.2 °F)  Pulse:  [48-56] 48  Resp:  [17-18] 17  BP: (106-120)/(46-65) 116/65  SpO2:  [95 %-99 %] 95 %    Physical Exam  Vitals  signs and nursing note reviewed.   Constitutional:       General: He is not in acute distress.     Appearance: Normal appearance.   HENT:      Head: Normocephalic and atraumatic.      Nose: Nose normal.      Mouth/Throat:      Mouth: Mucous membranes are dry.      Pharynx: Oropharynx is clear.   Eyes:      Extraocular Movements: Extraocular movements intact.      Conjunctiva/sclera: Conjunctivae normal.   Neck:      Musculoskeletal: Normal range of motion and neck supple.   Cardiovascular:      Rate and Rhythm: Regular rhythm. Bradycardia present.      Pulses: Normal pulses.      Heart sounds: Normal heart sounds. No murmur. No friction rub. No gallop.    Pulmonary:      Effort: Pulmonary effort is normal. No respiratory distress.      Breath sounds: Normal breath sounds. No wheezing or rales.   Chest:      Chest wall: No tenderness.   Abdominal:      General: Abdomen is flat. Bowel sounds are normal. There is no distension.      Palpations: Abdomen is soft. There is no mass.      Tenderness: There is no tenderness. There is no guarding.   Genitourinary:     Comments: Chronic sawyer  Musculoskeletal: Normal range of motion.   Skin:     General: Skin is warm.      Capillary Refill: Capillary refill takes less than 2 seconds.   Neurological:      General: No focal deficit present.      Mental Status: He is alert. Mental status is at baseline.      Cranial Nerves: No cranial nerve deficit.      Motor: No weakness.   Psychiatric:         Mood and Affect: Mood normal.         Behavior: Behavior normal.         Fluids    Intake/Output Summary (Last 24 hours) at 12/2/2019 1522  Last data filed at 12/2/2019 0900  Gross per 24 hour   Intake 480 ml   Output 200 ml   Net 280 ml       Laboratory  Recent Labs     11/30/19  0050 12/01/19  0034 12/02/19  0053   WBC 9.2 8.0 7.2   RBC 3.57* 3.30* 3.09*   HEMOGLOBIN 10.8* 9.7* 9.3*   HEMATOCRIT 34.3* 31.5* 29.7*   MCV 96.1 95.5 96.1   MCH 30.3 29.4 30.1   MCHC 31.5* 30.8* 31.3*   RDW  53.0* 54.3* 54.0*   PLATELETCT 247 257 242   MPV 8.8* 8.9* 9.0     Recent Labs     11/30/19  0050 12/01/19  0034 12/02/19  0053   SODIUM 139 140 140   POTASSIUM 4.5 4.4 4.3   CHLORIDE 107 107 108   CO2 27 26 26   GLUCOSE 109* 102* 99   BUN 23* 29* 33*   CREATININE 1.98* 2.38* 2.77*   CALCIUM 8.9 8.8 8.7                   Imaging  DX-CHEST-LIMITED (1 VIEW)   Final Result      Bibasilar and perihilar underinflation atelectasis which could obscure an additional process. This is unchanged.      [id number] VIEW   Final Result      Diffuse bowel distention. Findings are suggestive of ileus. Early obstruction could have a similar appearance.      DX-CHEST-LIMITED (1 VIEW)   Final Result      1.  Interval worsening of pulmonary opacities in the right mid/lower lung.   2.  Unchanged left basilar atelectasis and/or consolidation. No significant pleural effusions.      DX-CHEST-PORTABLE (1 VIEW)   Final Result      Similar reticular opacities suspicious for edema and there are likely small layering effusions      More focal basilar opacity is more likely from atelectasis than consolidation      EC-ECHOCARDIOGRAM COMPLETE W/O CONT   Final Result      CT-RENAL COLIC EVALUATION(A/P W/O)   Final Result      1.  Dilated fluid-filled small bowel loops in RIGHT lower quadrant concerning for developing obstruction.   2.  No evidence of bowel perforation.   3.  Colonic diverticulosis without evidence for diverticulitis.   4.  No renal stone or hydronephrosis.   5.  Appendix is not visualized.   6.  Enlarged prostate.   7.  Small bilateral pleural effusions with associated atelectasis.   8.  Colonic diverticulosis without evidence for diverticulitis.      DX-CHEST-PORTABLE (1 VIEW)   Final Result         1.  Pulmonary edema and/or infiltrates are identified, which are stable since the prior exam.   2.  Cardiomegaly   3.  Atherosclerosis      EC-SARAVANAN W/O CONT    (Results Pending)        Assessment/Plan  * UTI (urinary tract infection)-  (present on admission)  Assessment & Plan  Urinary tract infection associated with chronic indwelling Sawyer catheter changed 11/21  Complicated urinary tract infection with associated encephalopathy  Appreciate infectious disease consultation, continuing Zosyn stop date 12/7    Candidemia ([facility])- (present on admission)  Assessment & Plan  Appreciate ID recommendations  Continuing micafungin stop date 12/5    Bacteremia due to Pseudomonas- (present on admission)  Assessment & Plan  Infectious disease following, continue Zosyn for CAP, bacteremia, and UTI stop date 12/7  SARAVANAN on 12/3 to rule out endocarditis    Acute respiratory failure with hypoxia, RAD, PNA (HCC)- (present on admission)  Assessment & Plan  On 1-2L per NC  Continue to wean supplemental oxygen as above.    Altered mental status- (present on admission)  Assessment & Plan  Patient has baseline dementia and is likely close to baseline being oriented only to person  Neuro checks.  Monitor mental status    I did speak to his cousin and guardian Carrie today.  She reports seeing a slight decline over the past 6 months, but he is much more confused with decreased memory now.  He is very pleasant.      CAP (community acquired pneumonia)- (present on admission)  Assessment & Plan  Pseudomonas; continue zosyn per ID.  Stop date 12/7  Rt protocol  Wean oxygen as tolerated      Bradycardia- (present on admission)  Assessment & Plan  Patient has been bradycardic, but seems to be asymptomatic.  Normotensive.  Hold AV zoraida blockers.    Acute renal failure (ARF) ([facility])- (present on admission)  Assessment & Plan  Likely was 2/2 urinary retention/sawyer obstruction  Avoid nephrotoxins  Kidney function had improved to 1.98, but bumped to 2.77  He is producing adequate urine output  CT renal colic on 11/22 showed normal kidneys  Will get urine electrolytes    Hypocalcemia- (present on admission)  Assessment & Plan  Corrected normal    Normocytic normochromic anemia- (present  on admission)  Assessment & Plan  Asymptomatic  No active signs of bleeding  Transfuse to maintain hemoglobin > 7    Gastrointestinal prophylaxis: Famotidine  Antibiotics: Micafungin last dose 12/5.  Zosyn last dose 12/7   Diet: Regular  Code status: FULL  Prognosis: Guarded  Risk: The Patient is at HIGH risk for inpatient complications and decompensation secondary to his multiple cormorbidities including Pseudomonas bacteremia and Candidemia with concern for endocarditis, Pseudomonas urinary tract infection with chronic sawyer requiring IV antibiotics, hypoxic respiratory failure, altered mental status, and acute renal failure    I discussed the plan of care with bedside RN as well as on multidisciplinary rounds    Time spent > 30 minutes.  > 50% of the time was spend providing counseling and coordination of care       VTE prophylaxis: Heparin

## 2019-12-02 NOTE — CARE PLAN
Problem: Communication  Goal: The ability to communicate needs accurately and effectively will improve  Outcome: PROGRESSING AS EXPECTED  Pt is able to express needs verbally, although confused.     Problem: Pain Management  Goal: Pain level will decrease to patient's comfort goal  Outcome: PROGRESSING AS EXPECTED   Pt reports having no pain or discomfort.

## 2019-12-02 NOTE — PROGRESS NOTES
Infectious Disease Progress Note    Author: Flor Patel M.D. Date & Time of service: 2019  2:08 PM       Chief Complaint:  UTI and Gram-negative sepsis   Candiemia     Interval History:  79-year-old male admitted with altered mental status.    [id number] WBC 10.8 AMS worsened-resp failure and bradycardia-now transferred to ICU. Obtunded   101.1, O2 15 L high flow, increased from admit but weaning slowly today, oriented only to self.  LP attempted at bedside but unable to obtain due to agitation.  Neurology recommended MRI/MRA.    AF, O2 1 L oxymask, somnolent today and remains confused.   afebrile, white count 8.4.  Tolerating micafungin and Zosyn.  No new issues.   afebrile, white count 8000.  Creatinine appears to be trending back up.  Patient sitting up in chair, notes no issues, has no complaints   AF WBC 7.2 awake, not oriented. Denies CP or resp complaint    Review of Systems:  Review of Systems   Constitutional: Negative for chills and fever.   Respiratory: Negative for shortness of breath.    Cardiovascular: Negative for chest pain.   Gastrointestinal: Negative for abdominal pain, nausea and vomiting.   Skin: Negative for itching and rash.   All other systems reviewed and are negative.    Hemodynamics:  Temp (24hrs), Av °C (98.6 °F), Min:36.8 °C (98.2 °F), Max:37.3 °C (99.1 °F)  Temperature: 36.8 °C (98.2 °F)  Pulse  Av.8  Min: 44  Max: 86   Blood Pressure : 116/65       Physical Exam:  Physical Exam  Vitals signs and nursing note reviewed.   Constitutional:       General: He is not in acute distress.     Appearance: He is not ill-appearing, toxic-appearing or diaphoretic.      Comments: Elderly and frail   HENT:      Head: Normocephalic and atraumatic.   Eyes:      General: No scleral icterus.     Conjunctiva/sclera: Conjunctivae normal.      Pupils: Pupils are equal, round, and reactive to light.   Neck:      Musculoskeletal: Neck supple.   Cardiovascular:       Rate and Rhythm: Normal rate.      Heart sounds: No murmur.   Pulmonary:      Effort: Pulmonary effort is normal. No respiratory distress.      Breath sounds: No stridor. No wheezing or rhonchi.   Abdominal:      General: Abdomen is flat. There is no distension.      Tenderness: There is no tenderness. There is no guarding or rebound.   Genitourinary:     Comments: Steen catheter in place  Musculoskeletal:         General: No swelling.   Skin:     General: Skin is warm.   Neurological:      Mental Status: He is alert. He is disoriented.      Comments: Fluctuating mentation.  Oriented x2.  Moving all extremities.   Psychiatric:         Mood and Affect: Mood normal.         Meds:    Current Facility-Administered Medications:   •  therapeutic multivitamin-minerals  •  ascorbic acid  •  [COMPLETED] piperacillin-tazobactam **AND** piperacillin-tazobactam  •  ipratropium-albuterol  •  amLODIPine  •  famotidine  •  enalaprilat  •  heparin  •  ipratropium-albuterol  •  haloperidol lactate  •  SITagliptin  •  micafungin  •  clotrimazole  •  latanoprost  •  polyethylene glycol/lytes  •  simvastatin  •  tamsulosin  •  acetaminophen  •  ondansetron  •  ondansetron  •  senna-docusate **AND** polyethylene glycol/lytes **AND** magnesium hydroxide **AND** bisacodyl  •  Respiratory Care per Protocol    Labs:  Recent Labs     11/30/19 0050 12/01/19 0034 12/02/19 0053   WBC 9.2 8.0 7.2   RBC 3.57* 3.30* 3.09*   HEMOGLOBIN 10.8* 9.7* 9.3*   HEMATOCRIT 34.3* 31.5* 29.7*   MCV 96.1 95.5 96.1   MCH 30.3 29.4 30.1   RDW 53.0* 54.3* 54.0*   PLATELETCT 247 257 242   MPV 8.8* 8.9* 9.0   NEUTSPOLYS 78.20* 76.50* 73.70*   LYMPHOCYTES 7.50* 8.40* 11.10*   MONOCYTES 7.30 7.90 7.90   EOSINOPHILS 4.70 4.10 4.60   BASOPHILS 0.80 1.00 1.20     Recent Labs     11/30/19  0050 12/01/19 0034 12/02/19 0053   SODIUM 139 140 140   POTASSIUM 4.5 4.4 4.3   CHLORIDE 107 107 108   CO2 27 26 26   GLUCOSE 109* 102* 99   BUN 23* 29* 33*     Recent Labs      11/30/19  0050 12/01/19  0034 12/02/19  0053   CREATININE 1.98* 2.38* 2.77*       Imaging:  Ct-renal Colic Evaluation(a/p W/o)    Result Date: 11/22/2019 11/22/2019 6:59 PM HISTORY/REASON FOR EXAM:  abnormal labs Cr- and urine infection r/o hydronephrosis. TECHNIQUE/EXAM DESCRIPTION AND NUMBER OF VIEWS: CT scan renal/colic without contrast. 5 mm helical images of the abdomen and pelvis were obtained from the diaphragmatic domes through the pubic symphysis. Low dose optimization technique was utilized for this CT exam including automated exposure control and adjustment of the mA and/or kV according to patient size. COMPARISON: None. FINDINGS: Visualized lung bases show small bilateral pleural fluid collections with associated dependent atelectasis, worse on the LEFT. The liver is unremarkable. The spleen is unremarkable. Adrenal glands are unremarkable. The kidneys are unremarkable. Pancreas is atrophic. The gallbladder is unremarkable. Steen catheter within mildly distended bladder. Prostate is enlarged. Colonic diverticula noted. Increased small bowel and colonic gas.  Prominent fluid-filled small bowel loops in the RIGHT lower quadrant. No pneumoperitoneum. Moderate atherosclerotic calcification of abdominal aorta. Lumbar spine degenerative changes. T12 vertebral hemangioma. T-spine wall edema.     1.  Dilated fluid-filled small bowel loops in RIGHT lower quadrant concerning for developing obstruction. 2.  No evidence of bowel perforation. 3.  Colonic diverticulosis without evidence for diverticulitis. 4.  No renal stone or hydronephrosis. 5.  Appendix is not visualized. 6.  Enlarged prostate. 7.  Small bilateral pleural effusions with associated atelectasis. 8.  Colonic diverticulosis without evidence for diverticulitis.    Mv-[id number]-2 View    Result Date: 11/25/2019 11/25/2019 3:01 PM HISTORY/REASON FOR EXAM:  Abdominal pain. TECHNIQUE/EXAM DESCRIPTION AND NUMBER OF VIEWS:  1 view(s) of the abdomen.  COMPARISON: None FINDINGS: AP view of the abdomen demonstrates scattered loops of air-filled bowel. Small bowel loops measure up to 4.9 cm. Phleboliths are in the pelvis. There are arterial calcifications. Degenerative changes are in the spine.     Diffuse bowel distention. Findings are suggestive of ileus. Early obstruction could have a similar appearance.    Dx-chest-limited (1 View)    Result Date: 11/26/2019 11/26/2019 3:12 AM HISTORY/REASON FOR EXAM:  Shortness of Breath TECHNIQUE/EXAM DESCRIPTION AND NUMBER OF VIEWS: Single portable view of the chest. COMPARISON: Yesterday FINDINGS: HEART: Stable size. There is atherosclerotic calcification in the aortic arch. LUNGS: Lung volumes are low. There are perihilar opacities. There are bibasilar opacities. PLEURA: No effusion or pneumothorax.     Bibasilar and perihilar underinflation atelectasis which could obscure an additional process. This is unchanged.    Dx-chest-limited (1 View)    Result Date: 11/25/2019 11/25/2019 2:08 AM HISTORY/REASON FOR EXAM:  Respiratory failure TECHNIQUE/EXAM DESCRIPTION AND NUMBER OF VIEWS: Single portable view of the chest. COMPARISON: 11/24/2019 FINDINGS: LUNGS: Slight interval worsening in opacities in the right mid/lower lung. Unchanged opacity in the left lung base. No significant pleural effusions. PNEUMOTHORAX: None. LINES AND TUBES: None. MEDIASTINUM: Stable cardiac silhouette. MUSCULOSKELETAL STRUCTURES: Unchanged.     1.  Interval worsening of pulmonary opacities in the right mid/lower lung. 2.  Unchanged left basilar atelectasis and/or consolidation. No significant pleural effusions.    Dx-chest-portable (1 View)    Result Date: 11/24/2019 11/24/2019 10:53 AM HISTORY/REASON FOR EXAM: Shortness of Breath. TECHNIQUE/EXAM DESCRIPTION AND NUMBER OF VIEWS: Single AP view of the chest. COMPARISON: 11/21/2019 FINDINGS: Lungs: Large volumes with reticular predominantly increased opacity is similar to comparison. There is also  some hazy opacity at the bases especially on the left where there is some hemidiaphragm elevation Pleura:  Small layering effusions are likely Heart and mediastinum: There is stable cardiac silhouette enlargement.     Similar reticular opacities suspicious for edema and there are likely small layering effusions More focal basilar opacity is more likely from atelectasis than consolidation    Dx-chest-portable (1 View)    Result Date: 2019 4:49 AM HISTORY/REASON FOR EXAM: Possible sepsis. TECHNIQUE/EXAM DESCRIPTION:  Single AP view of the chest. COMPARISON: None FINDINGS: Overlying cardiac leads are present. Cardiomegaly is observed. Atherosclerotic calcification of the aorta is noted.  The central  pulmonary vasculature appears prominent and indistinct. The lungs appear well expanded bilaterally.  Diffuse scattered hazy pulmonary parenchymal opacities are seen. The lung bases are partially excluded. The bony structures appear age-appropriate.     1.  Pulmonary edema and/or infiltrates are identified, which are stable since the prior exam. 2.  Cardiomegaly 3.  Atherosclerosis    Ec-echocardiogram Complete W/o Cont    Result Date: 2019  Transthoracic Echo Report Echocardiography Laboratory CONCLUSIONS No prior study is available for comparison. Technically difficult study. Normal left ventricular chamber size. Grossly preserved left ventricular systolic function. The right ventricle was normal in size and function. Mild aortic stenosis. NIR JOSHI Exam Date:         2019                    13:42 Exam Location:     Inpatient Priority:          Routine Ordering Physician:        KRISTINA GALEANA Referring Physician:       [id number] Sonographer:               Tessie Hill RDCS Age:    79     Gender:    M MRN:    [id number] :    1940 BSA:    2.27   Ht (in):    79     Wt (lb):    198 Exam Type:     Complete Indications:     Shortness of breath ICD Codes:       R06.02 CPT  Codes:       44939 BP:   124    /   55     HR:   55 Technical Quality:       Technically difficult study -                          adequate information is obtained MEASUREMENTS  (Male / Female) Normal Values 2D ECHO LV Diastolic Diameter PLAX        5 cm                  4.2 - 5.9 / 3.9 - 5.3 cm LV Systolic Diameter PLAX         2.6 cm                2.1 - 4.0 cm IVS Diastolic Thickness           1.1 cm                LVPW Diastolic Thickness          1.3 cm                LVOT Diameter                     1.7 cm                LV Ejection Fraction MOD 2C       67.1 %                DOPPLER AV Peak Velocity                  2.2 m/s               AV Peak Gradient                  19 mmHg               AV Mean Gradient                  10.9 mmHg             LVOT Peak Velocity                1.1 m/s               AV Area Cont Eq vti               0.97 cm???              Mitral E Point Velocity           0.61 m/s              Mitral E to A Ratio               1.7                   MV Pressure Half Time             27.9 ms               MV Area PHT                       7.9 cm???               MV Deceleration Time              96.4 ms               * Indicates values subject to auto-interpretation LV EF:        % FINDINGS Left Ventricle Normal left ventricular chamber size. Mild concentric left ventricular hypertrophy. Grossly preserved left ventricular systolic function. Unable to assess wall motion. Indeterminate diastolic function. Right Ventricle The right ventricle was normal in size and function. Right Atrium The right atrium is normal in size. Inferior vena cava is not well visualized. Left Atrium The left atrium is normal in size. Left atrial volume index is 22 mL/sq m. Mitral Valve The mitral valve is not well visualized. No stenosis or regurgitation seen. Aortic Valve The aortic valve is not well visualized. Calcified aortic valve leaflets. Mild aortic stenosis. Transvalvular gradients are - Peak: 20 mmHg,  "Mean: 12 mmHg. Dimensionless index is (0.39). No aortic insufficiency. Tricuspid Valve Structurally normal tricuspid valve without significant stenosis or regurgitation. Pulmonic Valve Structurally normal pulmonic valve without significant stenosis or regurgitation. Pericardium Normal pericardium without effusion. Aorta The aortic root is normal. Ascending aorta not well visualized. Marcelo Smith MD (Electronically Signed) Final Date:     23 November 2019                 16:53      Micro:  Results     Procedure Component Value Units Date/Time    BLOOD CULTURE [[id number]] Collected:  11/25/19 0800    Order Status:  Completed Specimen:  Blood from Peripheral Updated:  11/30/19 1100     Significant Indicator NEG     Source BLD     Site PERIPHERAL     Culture Result No growth after 5 days of incubation.    Narrative:       Collected By:[id number] YUKO MCCOY  Per Hospital Policy: Only change Specimen Src: to \"Line\" if  specified by physician order.  Right Forearm/Arm    BLOOD CULTURE [[id number]] Collected:  11/25/19 0800    Order Status:  Completed Specimen:  Blood from Peripheral Updated:  11/30/19 1100     Significant Indicator NEG     Source BLD     Site PERIPHERAL     Culture Result No growth after 5 days of incubation.    Narrative:       Collected By:[id number] YUKO MCCOY  Per Hospital Policy: Only change Specimen Src: to \"Line\" if  specified by physician order.  Left Forearm/Arm    Blood Culture [[id number]] Collected:  11/24/19 1300    Order Status:  Completed Specimen:  Blood from Peripheral Updated:  11/29/19 1500     Significant Indicator NEG     Source BLD     Site PERIPHERAL     Culture Result No growth after 5 days of incubation.    Narrative:       Collected By:[id number] CAMRON MCCOY  From different peripheral sites, if not done within the last  24 hours (Per Hospital Policy: Only change specimen source to  \"Line\" if specified by physician order)  Left Forearm/Arm    Blood Culture [[id number]] " "Collected:  11/24/19 1300    Order Status:  Completed Specimen:  Blood from Peripheral Updated:  11/29/19 1500     Significant Indicator NEG     Source BLD     Site PERIPHERAL     Culture Result No growth after 5 days of incubation.    Narrative:       Collected By:[id number] CAMRON MCCOY  From different peripheral sites, if not done within the last  24 hours (Per Hospital Policy: Only change specimen source to  \"Line\" if specified by physician order)  Right Forearm/Arm    FUNGAL GIRISH INTERPRETATION [[id number]] Collected:  11/21/19 0441    Order Status:  Completed Specimen:  Blood Updated:  11/27/19 1535     Significant Indicator NEG     Source BLD     Site PERIPHERAL     Fungal GIRISH Interp --     GIRISH Interpretative Data:  -  Units: mcg/mL (micrograms/mL)  SDD: Susceptible-dose dependent (SDD category implies  clinical efficacy when higher than normal dosage of a drug  can be used and maximal possible blood level achieved.)  NS: Nonsusceptible (This category is used for organisms that  currently have only a susceptible interpretive category, but  not intermediate or resistant interpretive categories.)  None: No CLSI interpretive guidelines available at this time.  -  If an AMPHOTERICIN B GIRISH of >1 mcg/mL is obtained for Candida  spp., that isolate is likely resistant to AMPHOTERICIN B.  There are no CLSI breakpoints.  -  FLUCYTOSINE GIRISH breakpoints are based largely on historical  data and partially on the drug's pharmacokinetics.  -  For FLUCONAZOLE, the guidelines are based on extensive  experience with mucosal and invasive infections due to  Candida spp. When an isolate is identified as Candida  glabrata and the GIRISH is <32mcg/mL, patients should receive  a maximum dosage regimen of FLUCONAZOLE.  Isolates of Candida  krusei are assumed to be intrinsically resistant to  FLUCONAZOLE and their MICs should not be interpreted using  this scale.  -  For ITRACONAZOLE, the data is based entirely on experience  with " "mucosal infections, and data supporting breakpoints for  invasive infections due to Candida spp. are not available.  -  For ANIDULAFUNGIN, CASPOFUNGIN, MICAFUNGIN, and VORICONZOLE  the data are based substantially on experience with  non-neutropenic patients with candidemia, and their clinical  relevance in other settings is uncertain.  -  For POSACONAZOLE the majority of isolates are inhibited by  <1 mcg/mL.  However, data are not yet available to indicate  a correlation between GIRISH and outcome of treatment with this  agent.  -  Susceptible Dose Dependent (SDD) applies to FLUCONAZOLE and  ITRACONAZOLE. Susceptible is dependent on achieving the  maximum possible blood level.  -  The Sensititre YeastOne Susceptibility plates have been  validated for use at [facility]. These  plates are designed for Research Use Only. However, there are  CLSI approved documents for interpretive criteria for  5-FLUCYTOSINE, FLUCONAZOLE, ITRACONAZOLE, VORICONAZOLE, and  the ECHINOCANDINS. As with any in vitro susceptibility  testing method, the results of testing should be correlated  with the patient's clinical response to prescribed therapy.      Narrative:       CALL  [facility]  171 tel. [phone],  CALLED  171 tel. [phone] 11/22/2019, 16:12, RB PERF. RESULTS CALLED  TO:[id number]  Per Hospital Policy: Only change Specimen Src: to \"Line\" if  specified by physician order.  Left Forearm/Arm    BLOOD CULTURE [[id number]]  (Abnormal)  (Susceptibility) Collected:  11/21/19 0441    Order Status:  Completed Specimen:  Blood from Peripheral Updated:  11/27/19 1534     Significant Indicator POS     Source BLD     Site PERIPHERAL     Culture Result Growth detected by Bactec instrument. 11/22/2019  14:54      Pseudomonas aeruginosa  See previous culture for sensitivity report.  P.aeruginosa may develop resistance during prolonged therapy  with all antibiotics. Isolates that are initially susceptible  may become resistant within " "three to four days after  initiation of therapy. Testing of repeat isolates may be  warranted.  See previous culture for sensitivity report.        Candida glabrata    Narrative:       CALL  [facility]  171 tel. [phone],  CALLED  171 tel. [phone] 11/22/2019, 16:12, RB PERF. RESULTS CALLED  TO:[id number]  Per Hospital Policy: Only change Specimen Src: to \"Line\" if  specified by physician order.  Left Forearm/Arm    Susceptibility     Candida glabrata (2)     Antibiotic Interpretation Microscan Method Status    Amphoter B 24hr No Interpretation 0.5 mcg/mL GIRISH Final    Anidulafungin 24hr Sensitive <0.015 mcg/mL GIRISH Final    Caspofungin 24hr Sensitive 0.03 mcg/mL GIRISH Final    Fluconazole 24hr Susceptible Dose-Dependent 8 mcg/mL GIRISH Final    Micafungin 24hr Sensitive 0.015 mcg/mL GIRISH Final                         Assessment:  79-year-old white male admitted on 11/21/2019 due to altered mental status.  He had concern for acute-on-chronic renal failure, urinary tract infection as well as CAP.  His renal failure was thought to be due to a malfunctioning Steen catheter and a new catheter was placed with significant urinary retention up over 1500 mL.  Urine culture was showing  Gram-negative rods and blood cultures showing Gram-negative rods, so infectious disease was consulted for antibiotic recommendations and management.  Rapid response was called due to concern for paresthesia of the distal extremities and he was transferred to the ICU.  Blood cultures on 11/21 with growth of yeast as well as Pseudomonas.      Plan:  UTI and Gram-negative sepsis - PSAR   Afebrile  No leukocytosis  Urine and Blood cxs +PSAR  Continue Zosyn for PSAR UTI and bacteremia, possible aspiration pneumonia  F/up repeat blood cultures - NGTD   Stop date 12/7/2019     Candidemia, additional work  C glabrata unclear etiology, patient without central line, significant urinary retention but did not grow from urine culture.  CT concerning for developing " obstruction but no obvious bowel perforation.  BCx 11/21+ C glabrata -dose dependent fluconazole susceptible   Ophthalmology performed dilated exam on 11/25, no evidence of endophthalmitis  --- Recommend SARAVANAN as TTE unrevealing   Continue micafungin for now.  After SARAVANAN can give final antibiotic course.  Please discuss with cardiology, SARAVANAN preferred in the setting of candidemia but it felt to be unsafe for this 79-year-old then please comment.     Encephalopathy -still mildly confused may be at baseline  Plan had been for LP but this was not completed  MRA/ MRI considered but not done      Hypoxemia, pulmonary edema - improved      Acute renal failure-persistent  Multifactorial including his sepsis, urinary tract infection, and obstructed Steen catheter.    His Steen catheter has since been replaced.    Antibiotics will need to be dose adjusted accordingly  CT neg for obstruction or stone  Monitor his renal function and urine output carefully-decrease dose Zosyn.       Ileus vs bowel obstr  Appreciate surgery eval

## 2019-12-02 NOTE — PROGRESS NOTES
Bedside report received from Kathi COREAS . Assumed care of pt at 1845. Pt is resting at this time with equal chest rise and no signs of distress. Plan of care discussed with pt. Pt is A&O x 1, knowing self. Pt is on 2 L NC. Bed alarm is on, bed in lowest position, bed rails up x 2, belongings and call light within reach. Hourly rounding in place.

## 2019-12-03 ENCOUNTER — APPOINTMENT (OUTPATIENT)
Dept: CARDIOLOGY | Facility: MEDICAL CENTER | Age: 79
DRG: 698 | End: 2019-12-03
Attending: NURSE PRACTITIONER
Payer: MEDICARE

## 2019-12-03 PROBLEM — R73.9 HYPERGLYCEMIA: Status: ACTIVE | Noted: 2019-12-03

## 2019-12-03 PROBLEM — E83.51 HYPOCALCEMIA: Status: RESOLVED | Noted: 2019-11-21 | Resolved: 2019-12-03

## 2019-12-03 LAB
ANION GAP SERPL CALC-SCNC: 8 MMOL/L (ref 0–11.9)
BASOPHILS # BLD AUTO: 1.4 % (ref 0–1.8)
BASOPHILS # BLD: 0.11 K/UL (ref 0–0.12)
BUN SERPL-MCNC: 31 MG/DL (ref 8–22)
CALCIUM SERPL-MCNC: 8.6 MG/DL (ref 8.5–10.5)
CHLORIDE SERPL-SCNC: 109 MMOL/L (ref 96–112)
CO2 SERPL-SCNC: 25 MMOL/L (ref 20–33)
CREAT SERPL-MCNC: 2.88 MG/DL (ref 0.5–1.4)
EOSINOPHIL # BLD AUTO: 0.33 K/UL (ref 0–0.51)
EOSINOPHIL NFR BLD: 4.3 % (ref 0–6.9)
ERYTHROCYTE [DISTWIDTH] IN BLOOD BY AUTOMATED COUNT: 55.6 FL (ref 35.9–50)
GLUCOSE BLD-MCNC: 107 MG/DL (ref 65–99)
GLUCOSE SERPL-MCNC: 84 MG/DL (ref 65–99)
HCT VFR BLD AUTO: 30.4 % (ref 42–52)
HGB BLD-MCNC: 9.3 G/DL (ref 14–18)
IMM GRANULOCYTES # BLD AUTO: 0.11 K/UL (ref 0–0.11)
IMM GRANULOCYTES NFR BLD AUTO: 1.4 % (ref 0–0.9)
LV EJECT FRACT  99904: 55
LYMPHOCYTES # BLD AUTO: 0.78 K/UL (ref 1–4.8)
LYMPHOCYTES NFR BLD: 10.2 % (ref 22–41)
MAGNESIUM SERPL-MCNC: 2.1 MG/DL (ref 1.5–2.5)
MCH RBC QN AUTO: 29.6 PG (ref 27–33)
MCHC RBC AUTO-ENTMCNC: 30.6 G/DL (ref 33.7–35.3)
MCV RBC AUTO: 96.8 FL (ref 81.4–97.8)
MONOCYTES # BLD AUTO: 0.66 K/UL (ref 0–0.85)
MONOCYTES NFR BLD AUTO: 8.6 % (ref 0–13.4)
NEUTROPHILS # BLD AUTO: 5.67 K/UL (ref 1.82–7.42)
NEUTROPHILS NFR BLD: 74.1 % (ref 44–72)
NRBC # BLD AUTO: 0 K/UL
NRBC BLD-RTO: 0 /100 WBC
PHOSPHATE SERPL-MCNC: 4.6 MG/DL (ref 2.5–4.5)
PLATELET # BLD AUTO: 238 K/UL (ref 164–446)
PMV BLD AUTO: 8.9 FL (ref 9–12.9)
POTASSIUM SERPL-SCNC: 4.1 MMOL/L (ref 3.6–5.5)
RBC # BLD AUTO: 3.14 M/UL (ref 4.7–6.1)
SODIUM SERPL-SCNC: 142 MMOL/L (ref 135–145)
WBC # BLD AUTO: 7.7 K/UL (ref 4.8–10.8)

## 2019-12-03 PROCEDURE — 80048 BASIC METABOLIC PNL TOTAL CA: CPT

## 2019-12-03 PROCEDURE — 93312 ECHO TRANSESOPHAGEAL: CPT | Mod: 26 | Performed by: INTERNAL MEDICINE

## 2019-12-03 PROCEDURE — 700102 HCHG RX REV CODE 250 W/ 637 OVERRIDE(OP): Performed by: HOSPITALIST

## 2019-12-03 PROCEDURE — 700111 HCHG RX REV CODE 636 W/ 250 OVERRIDE (IP): Performed by: INTERNAL MEDICINE

## 2019-12-03 PROCEDURE — 99233 SBSQ HOSP IP/OBS HIGH 50: CPT | Performed by: INTERNAL MEDICINE

## 2019-12-03 PROCEDURE — 85025 COMPLETE CBC W/AUTO DIFF WBC: CPT

## 2019-12-03 PROCEDURE — A9270 NON-COVERED ITEM OR SERVICE: HCPCS | Performed by: HOSPITALIST

## 2019-12-03 PROCEDURE — 700105 HCHG RX REV CODE 258: Performed by: INTERNAL MEDICINE

## 2019-12-03 PROCEDURE — 97535 SELF CARE MNGMENT TRAINING: CPT

## 2019-12-03 PROCEDURE — 700101 HCHG RX REV CODE 250

## 2019-12-03 PROCEDURE — 97530 THERAPEUTIC ACTIVITIES: CPT

## 2019-12-03 PROCEDURE — 700111 HCHG RX REV CODE 636 W/ 250 OVERRIDE (IP)

## 2019-12-03 PROCEDURE — 36415 COLL VENOUS BLD VENIPUNCTURE: CPT

## 2019-12-03 PROCEDURE — 93325 DOPPLER ECHO COLOR FLOW MAPG: CPT

## 2019-12-03 PROCEDURE — 84100 ASSAY OF PHOSPHORUS: CPT

## 2019-12-03 PROCEDURE — 770006 HCHG ROOM/CARE - MED/SURG/GYN SEMI*

## 2019-12-03 PROCEDURE — 700102 HCHG RX REV CODE 250 W/ 637 OVERRIDE(OP): Performed by: INTERNAL MEDICINE

## 2019-12-03 PROCEDURE — 83735 ASSAY OF MAGNESIUM: CPT

## 2019-12-03 PROCEDURE — 82962 GLUCOSE BLOOD TEST: CPT

## 2019-12-03 PROCEDURE — 160002 HCHG RECOVERY MINUTES (STAT)

## 2019-12-03 RX ORDER — SODIUM CHLORIDE 9 MG/ML
INJECTION, SOLUTION INTRAVENOUS CONTINUOUS
Status: DISCONTINUED | OUTPATIENT
Start: 2019-12-03 | End: 2019-12-03

## 2019-12-03 RX ORDER — ONDANSETRON 2 MG/ML
4 INJECTION INTRAMUSCULAR; INTRAVENOUS
Status: DISCONTINUED | OUTPATIENT
Start: 2019-12-03 | End: 2019-12-03 | Stop reason: HOSPADM

## 2019-12-03 RX ORDER — SODIUM CHLORIDE, SODIUM LACTATE, POTASSIUM CHLORIDE, CALCIUM CHLORIDE 600; 310; 30; 20 MG/100ML; MG/100ML; MG/100ML; MG/100ML
INJECTION, SOLUTION INTRAVENOUS CONTINUOUS
Status: DISCONTINUED | OUTPATIENT
Start: 2019-12-03 | End: 2019-12-03

## 2019-12-03 RX ORDER — SODIUM CHLORIDE, SODIUM LACTATE, POTASSIUM CHLORIDE, CALCIUM CHLORIDE 600; 310; 30; 20 MG/100ML; MG/100ML; MG/100ML; MG/100ML
INJECTION, SOLUTION INTRAVENOUS CONTINUOUS
Status: DISCONTINUED | OUTPATIENT
Start: 2019-12-03 | End: 2019-12-05

## 2019-12-03 RX ADMIN — HEPARIN SODIUM 5000 UNITS: 5000 INJECTION INTRAVENOUS; SUBCUTANEOUS at 20:55

## 2019-12-03 RX ADMIN — HEPARIN SODIUM 5000 UNITS: 5000 INJECTION INTRAVENOUS; SUBCUTANEOUS at 05:04

## 2019-12-03 RX ADMIN — SENNOSIDES AND DOCUSATE SODIUM 2 TABLET: 8.6; 5 TABLET ORAL at 18:17

## 2019-12-03 RX ADMIN — PIPERACILLIN AND TAZOBACTAM 4.5 G: 4; .5 INJECTION, POWDER, LYOPHILIZED, FOR SOLUTION INTRAVENOUS; PARENTERAL at 18:17

## 2019-12-03 RX ADMIN — SIMVASTATIN 10 MG: 20 TABLET, FILM COATED ORAL at 20:55

## 2019-12-03 RX ADMIN — SODIUM CHLORIDE, POTASSIUM CHLORIDE, SODIUM LACTATE AND CALCIUM CHLORIDE: 600; 310; 30; 20 INJECTION, SOLUTION INTRAVENOUS at 18:13

## 2019-12-03 RX ADMIN — LATANOPROST 1 DROP: 50 SOLUTION OPHTHALMIC at 20:55

## 2019-12-03 RX ADMIN — FAMOTIDINE 20 MG: 10 INJECTION INTRAVENOUS at 05:04

## 2019-12-03 RX ADMIN — SODIUM CHLORIDE, POTASSIUM CHLORIDE, SODIUM LACTATE AND CALCIUM CHLORIDE: 600; 310; 30; 20 INJECTION, SOLUTION INTRAVENOUS at 08:10

## 2019-12-03 RX ADMIN — PIPERACILLIN AND TAZOBACTAM 3.38 G: 3; .375 INJECTION, POWDER, LYOPHILIZED, FOR SOLUTION INTRAVENOUS; PARENTERAL at 03:50

## 2019-12-03 RX ADMIN — CLOTRIMAZOLE 1 APPLICATION: 10 CREAM TOPICAL at 18:00

## 2019-12-03 RX ADMIN — MICAFUNGIN SODIUM 100 MG: 20 INJECTION, POWDER, LYOPHILIZED, FOR SOLUTION INTRAVENOUS at 08:51

## 2019-12-03 ASSESSMENT — ENCOUNTER SYMPTOMS
FEVER: 0
VOMITING: 0
SHORTNESS OF BREATH: 0
COUGH: 0
CHILLS: 0
HEADACHES: 0
ABDOMINAL PAIN: 0
NAUSEA: 0
MYALGIAS: 0
DIZZINESS: 0

## 2019-12-03 ASSESSMENT — COGNITIVE AND FUNCTIONAL STATUS - GENERAL
DRESSING REGULAR UPPER BODY CLOTHING: A LITTLE
EATING MEALS: A LITTLE
SUGGESTED CMS G CODE MODIFIER DAILY ACTIVITY: CK
TOILETING: A LOT
DRESSING REGULAR LOWER BODY CLOTHING: A LITTLE
PERSONAL GROOMING: A LITTLE
HELP NEEDED FOR BATHING: A LOT
DAILY ACTIVITIY SCORE: 16

## 2019-12-03 NOTE — OR NURSING
1243 Patient arrived to unit, awake and alert.  Report from anesthesia and RN.  Patient denies pain at this time.  Updated on plan of care, verbalized understanding.  1310 Patient tolerating oral intake.  1338 Report given to Dale COREAS.

## 2019-12-03 NOTE — OR NURSING
1243 Patient arrived to unit, awake and alert.  Report from anesthesia and RN.  Patient denies pain at this time.  Updated on plan of care, verbalized understanding.  1315 Patient tolerating oral intake.  1338 Report given Dale RN.  1400 Patient transferred to [place] [id number] via [facility] with transport.

## 2019-12-03 NOTE — CARE PLAN
Problem: Infection  Goal: Will remain free from infection  Outcome: PROGRESSING AS EXPECTED     Problem: Skin Integrity  Goal: Risk for impaired skin integrity will decrease  Outcome: PROGRESSING AS EXPECTED

## 2019-12-03 NOTE — PROGRESS NOTES
Hospital Medicine Daily Progress Note    Date of Service  12/3/2019    Chief Complaint  79 y.o. male admitted 11/21/2019 with AMS    Hospital Course    79-year-old man with diabetes, hypertension who presented from [facility] who was brought in for altered mental status.  Patient was admitted for pneumonia and UTI with LOLA.  He had a Steen catheter that was replaced.  Nephrology was consulted, his LOLA improved with treatment of UTI and urinary retention.  Blood cultures and urine culture were positive for Pseudomonas; blood also grew Ijeoma glabrata.  Dr. Wyman with ophthalmology showed no evidence of endophthlamitis on dilated exam 11/25.  Unclear source of candidemia.  SARAVANAN on 12/3 was negative for endocarditis.  Infectious disease recommended continuing Zosyn and micafungin until 12/7/2019.      Interval Problem Update  Creat worsening  Patient complains of urge to urinate.  SARAVANAN negative for endocarditis    Consultants/Specialty  Critical care  General surgeon  Ophthalmology  Neurology  Nephrology  Infectious disease  Cardiology    Code Status  Full Code      Disposition  From [facility]  Evaluate for SNF versus memory care    Review of Systems  Review of Systems   Constitutional: Negative for fever and malaise/fatigue.   Respiratory: Negative for cough and shortness of breath.    Cardiovascular: Negative for chest pain.   Gastrointestinal: Negative for abdominal pain and vomiting.   Genitourinary: Positive for dysuria and urgency.   Musculoskeletal: Negative for myalgias.   Skin: Negative for itching.   Neurological: Negative for dizziness and headaches.        Physical Exam  Temp:  [37 °C (98.6 °F)-37.2 °C (99 °F)] 37.1 °C (98.7 °F)  Pulse:  [43-56] 51  Resp:  [14-19] 19  BP: (123-157)/(53-78) 157/70  SpO2:  [85 %-100 %] 99 %    Physical Exam  Vitals signs and nursing note reviewed.   Constitutional:       General: He is not in acute distress.     Appearance: He is not ill-appearing.      Comments: Thin, frail    HENT:      Head: Normocephalic.      Mouth/Throat:      Mouth: Mucous membranes are moist.   Eyes:      General:         Right eye: No discharge.         Left eye: No discharge.   Cardiovascular:      Rate and Rhythm: Normal rate and regular rhythm.   Pulmonary:      Effort: No respiratory distress.      Breath sounds: No wheezing or rales.   Abdominal:      General: Abdomen is flat.      Palpations: Abdomen is soft.      Tenderness: There is no tenderness.   Musculoskeletal:      Comments: Diffuse mild muscle atrophy   Skin:     General: Skin is warm and dry.   Neurological:      Mental Status: He is alert.      Comments: Oriented to self         Fluids    Intake/Output Summary (Last 24 hours) at 12/3/2019 1746  Last data filed at 12/3/2019 0415  Gross per 24 hour   Intake --   Output 200 ml   Net -200 ml       Laboratory  Recent Labs     12/01/19  0034 12/02/19 0053 12/03/19  0346   WBC 8.0 7.2 7.7   RBC 3.30* 3.09* 3.14*   HEMOGLOBIN 9.7* 9.3* 9.3*   HEMATOCRIT 31.5* 29.7* 30.4*   MCV 95.5 96.1 96.8   MCH 29.4 30.1 29.6   MCHC 30.8* 31.3* 30.6*   RDW 54.3* 54.0* 55.6*   PLATELETCT 257 242 238   MPV 8.9* 9.0 8.9*     Recent Labs     12/01/19  0034 12/02/19 0053 12/03/19  0346   SODIUM 140 140 142   POTASSIUM 4.4 4.3 4.1   CHLORIDE 107 108 109   CO2 26 26 25   GLUCOSE 102* 99 84   BUN 29* 33* 31*   CREATININE 2.38* 2.77* 2.88*   CALCIUM 8.8 8.7 8.6                   Imaging  EC-SARAVANAN W/O CONT   Final Result      DX-CHEST-LIMITED (1 VIEW)   Final Result      Bibasilar and perihilar underinflation atelectasis which could obscure an additional process. This is unchanged.      [id number] VIEW   Final Result      Diffuse bowel distention. Findings are suggestive of ileus. Early obstruction could have a similar appearance.      DX-CHEST-LIMITED (1 VIEW)   Final Result      1.  Interval worsening of pulmonary opacities in the right mid/lower lung.   2.  Unchanged left basilar atelectasis and/or consolidation. No  significant pleural effusions.      DX-CHEST-PORTABLE (1 VIEW)   Final Result      Similar reticular opacities suspicious for edema and there are likely small layering effusions      More focal basilar opacity is more likely from atelectasis than consolidation      EC-ECHOCARDIOGRAM COMPLETE W/O CONT   Final Result      CT-RENAL COLIC EVALUATION(A/P W/O)   Final Result      1.  Dilated fluid-filled small bowel loops in RIGHT lower quadrant concerning for developing obstruction.   2.  No evidence of bowel perforation.   3.  Colonic diverticulosis without evidence for diverticulitis.   4.  No renal stone or hydronephrosis.   5.  Appendix is not visualized.   6.  Enlarged prostate.   7.  Small bilateral pleural effusions with associated atelectasis.   8.  Colonic diverticulosis without evidence for diverticulitis.      DX-CHEST-PORTABLE (1 VIEW)   Final Result         1.  Pulmonary edema and/or infiltrates are identified, which are stable since the prior exam.   2.  Cardiomegaly   3.  Atherosclerosis           Assessment/Plan  * UTI (urinary tract infection)- (present on admission)  Assessment & Plan  Urinary tract infection associated with chronic indwelling Steen catheter changed 11/21  Complicated urinary tract infection with associated encephalopathy  Appreciate infectious disease consultation, continuing Zosyn stop date 12/7    Candidemia (HCC)- (present on admission)  Assessment & Plan  Appreciate ID recommendations  Seen by ophthalmology, negative endophthlamitis  Continuing micafungin stop date 12/7    Bacteremia due to Pseudomonas- (present on admission)  Assessment & Plan  Infectious disease following, continue Zosyn for CAP, bacteremia, and UTI stop date 12/7  SARAVANAN on 12/3 negative for endocarditis    Acute respiratory failure with hypoxia, RAD, PNA (HCC)- (present on admission)  Assessment & Plan  Resolving, wean off O2 as tolerated    Altered mental status- (present on admission)  Assessment & Plan  Patient  has baseline dementia and is likely close to baseline being oriented only to person  Neuro checks.  Monitor mental status      CAP (community acquired pneumonia)- (present on admission)  Assessment & Plan  Pseudomonas; continue zosyn per ID.  Stop date 12/7  Rt protocol  Wean oxygen as tolerated      Bradycardia- (present on admission)  Assessment & Plan  Patient has been bradycardic, but seems to be asymptomatic.  Normotensive.  Hold AV zoraida blockers.    Acute renal failure (ARF) (HCC)- (present on admission)  Assessment & Plan  Likely was 2/2 urinary retention/sawyer obstruction  Kidney function had improved to 1.98, but now worsening  I stopped Jardiance, avoid nephrotoxins  CT renal colic on 11/22 showed normal kidneys  Check UA, urine sodium and creatinine  Trial IV fluids  Monitor urine output  Recheck BMP tomorrow  Renal diet  If worsening may need to reconsult nephrology    Hyperglycemia  Assessment & Plan  Jardiance was on medication list as well as insulin sliding scale  A1c does not show diabetes  Glucose has been low, start Jardiance  Monitor glucose after meals  Hypoglycemia protocol    Normocytic normochromic anemia- (present on admission)  Assessment & Plan  Asymptomatic  No active signs of bleeding  Transfuse to maintain hemoglobin > 7       VTE prophylaxis: heparin

## 2019-12-03 NOTE — THERAPY
"Occupational Therapy Treatment completed with focus on ADLs, ADL transfers, patient education and cognition.  Functional Status:  ModA sit>stand, Liliana stand>sit, Liliana LB dress, CGA standing oral care, limited by cognition/impaired memory  Plan of Care: Will benefit from Occupational Therapy 3 times per week  Discharge Recommendations:  Equipment Will Continue to Assess for Equipment Needs. Post-acute therapy 24hr supervison/assist, recommend memory care facility.    See \"Rehab Therapy-Acute\" Patient Summary Report for complete documentation.     Pt agreeable to functional activities. Pt required assist to stand from w/c height, completed standing oral care with CGA. Pt able to self propel in a straight line seated in w/c using BLEs. Pt requires verbal cues/reminders to lock/unlock breaks throughout session 2/2 impaired memory. Pt primarily limited by cognition, requires assist/supervision with all aspects of self-care d/t decreased task initiation and sequencing. Pt will require 24hr supervision/assist upon d/c, would benefit from continued OT to increase functional independence however retention of taught strategies is limited by impaired memory.   "

## 2019-12-03 NOTE — PROGRESS NOTES
Infectious Disease Progress Note    Author: Flor Patel M.D. Date & Time of service: 12/3/2019  12:44 PM       Chief Complaint:  UTI and Gram-negative sepsis   Candiemia     Interval History:  79-year-old male admitted with altered mental status.    Ujjx635.1 WBC 10.8 AMS worsened-resp failure and bradycardia-now transferred to ICU. Obtunded   101.1, O2 15 L high flow, increased from admit but weaning slowly today, oriented only to self.  LP attempted at bedside but unable to obtain due to agitation.  Neurology recommended MRI/MRA.    AF, O2 1 L oxymask, somnolent today and remains confused.   afebrile, white count 8.4.  Tolerating micafungin and Zosyn.  No new issues.   afebrile, white count 8000.  Creatinine appears to be trending back up.  Patient sitting up in chair, notes no issues, has no complaints   AF WBC 7.2 awake, not oriented. Denies CP or resp complaint  12/3 AF WBC 7.7 up to chair-SARAVANAN done today No new complaints    Review of Systems:  Review of Systems   Constitutional: Negative for chills and fever.   Respiratory: Negative for shortness of breath.    Cardiovascular: Negative for chest pain and leg swelling.   Gastrointestinal: Negative for abdominal pain, nausea and vomiting.   Skin: Negative for itching and rash.   All other systems reviewed and are negative.    Hemodynamics:  Temp (24hrs), Av.2 °C (98.9 °F), Min:37 °C (98.6 °F), Max:37.3 °C (99.2 °F)  Temperature: 37 °C (98.6 °F)  Pulse  Av.6  Min: 43  Max: 86   Blood Pressure : 135/53       Physical Exam:  Physical Exam  Vitals signs and nursing note reviewed.   Constitutional:       General: He is not in acute distress.     Appearance: He is not ill-appearing, toxic-appearing or diaphoretic.   HENT:      Nose: No rhinorrhea.   Eyes:      Extraocular Movements: Extraocular movements intact.      Pupils: Pupils are equal, round, and reactive to light.   Cardiovascular:      Rate and Rhythm: Bradycardia  present.   Pulmonary:      Effort: Pulmonary effort is normal. No respiratory distress.      Breath sounds: No stridor. No wheezing or rhonchi.   Abdominal:      General: There is no distension.      Palpations: Abdomen is soft.   Skin:     General: Skin is warm.   Neurological:      Mental Status: He is alert.      Motor: Weakness present.   Psychiatric:         Mood and Affect: Mood normal.         Meds:    Current Facility-Administered Medications:   •  LR  •  [COMPLETED] piperacillin-tazobactam **AND** [MAR Hold] piperacillin-tazobactam  •  [MAR Hold] therapeutic multivitamin-minerals  •  [MAR Hold] ascorbic acid  •  [MAR Hold] ipratropium-albuterol  •  [MAR Hold] amLODIPine  •  [MAR Hold] famotidine  •  [MAR Hold] enalaprilat  •  [MAR Hold] heparin  •  [MAR Hold] ipratropium-albuterol  •  [MAR Hold] haloperidol lactate  •  [MAR Hold] SITagliptin  •  [MAR Hold] micafungin  •  [MAR Hold] clotrimazole  •  [MAR Hold] latanoprost  •  [MAR Hold] polyethylene glycol/lytes  •  [MAR Hold] simvastatin  •  [MAR Hold] tamsulosin  •  [MAR Hold] acetaminophen  •  [MAR Hold] ondansetron  •  [MAR Hold] ondansetron  •  [MAR Hold] senna-docusate **AND** [MAR Hold] polyethylene glycol/lytes **AND** [MAR Hold] magnesium hydroxide **AND** [MAR Hold] bisacodyl  •  [MAR Hold] Respiratory Care per Protocol    Labs:  Recent Labs     12/01/19  0034 12/02/19  0053 12/03/19  0346   WBC 8.0 7.2 7.7   RBC 3.30* 3.09* 3.14*   HEMOGLOBIN 9.7* 9.3* 9.3*   HEMATOCRIT 31.5* 29.7* 30.4*   MCV 95.5 96.1 96.8   MCH 29.4 30.1 29.6   RDW 54.3* 54.0* 55.6*   PLATELETCT 257 242 238   MPV 8.9* 9.0 8.9*   NEUTSPOLYS 76.50* 73.70* 74.10*   LYMPHOCYTES 8.40* 11.10* 10.20*   MONOCYTES 7.90 7.90 8.60   EOSINOPHILS 4.10 4.60 4.30   BASOPHILS 1.00 1.20 1.40     Recent Labs     12/01/19  0034 12/02/19  0053 12/03/19  0346   SODIUM 140 140 142   POTASSIUM 4.4 4.3 4.1   CHLORIDE 107 108 109   CO2 26 26 25   GLUCOSE 102* 99 84   BUN 29* 33* 31*     Recent Labs      12/01/19  0034 12/02/19  0053 12/03/19  0346   CREATININE 2.38* 2.77* 2.88*       Imaging:  Ct-renal Colic Evaluation(a/p W/o)    Result Date: 11/22/2019 11/22/2019 6:59 PM HISTORY/REASON FOR EXAM:  abnormal labs Cr- and urine infection r/o hydronephrosis. TECHNIQUE/EXAM DESCRIPTION AND NUMBER OF VIEWS: CT scan renal/colic without contrast. 5 mm helical images of the abdomen and pelvis were obtained from the diaphragmatic domes through the pubic symphysis. Low dose optimization technique was utilized for this CT exam including automated exposure control and adjustment of the mA and/or kV according to patient size. COMPARISON: None. FINDINGS: Visualized lung bases show small bilateral pleural fluid collections with associated dependent atelectasis, worse on the LEFT. The liver is unremarkable. The spleen is unremarkable. Adrenal glands are unremarkable. The kidneys are unremarkable. Pancreas is atrophic. The gallbladder is unremarkable. Steen catheter within mildly distended bladder. Prostate is enlarged. Colonic diverticula noted. Increased small bowel and colonic gas.  Prominent fluid-filled small bowel loops in the RIGHT lower quadrant. No pneumoperitoneum. Moderate atherosclerotic calcification of abdominal aorta. Lumbar spine degenerative changes. T12 vertebral hemangioma. T-spine wall edema.     1.  Dilated fluid-filled small bowel loops in RIGHT lower quadrant concerning for developing obstruction. 2.  No evidence of bowel perforation. 3.  Colonic diverticulosis without evidence for diverticulitis. 4.  No renal stone or hydronephrosis. 5.  Appendix is not visualized. 6.  Enlarged prostate. 7.  Small bilateral pleural effusions with associated atelectasis. 8.  Colonic diverticulosis without evidence for diverticulitis.    Ql-[id number]-3 View    Result Date: 11/25/2019 11/25/2019 3:01 PM HISTORY/REASON FOR EXAM:  Abdominal pain. TECHNIQUE/EXAM DESCRIPTION AND NUMBER OF VIEWS:  1 view(s) of the abdomen.  COMPARISON: None FINDINGS: AP view of the abdomen demonstrates scattered loops of air-filled bowel. Small bowel loops measure up to 4.9 cm. Phleboliths are in the pelvis. There are arterial calcifications. Degenerative changes are in the spine.     Diffuse bowel distention. Findings are suggestive of ileus. Early obstruction could have a similar appearance.    Dx-chest-limited (1 View)    Result Date: 11/26/2019 11/26/2019 3:12 AM HISTORY/REASON FOR EXAM:  Shortness of Breath TECHNIQUE/EXAM DESCRIPTION AND NUMBER OF VIEWS: Single portable view of the chest. COMPARISON: Yesterday FINDINGS: HEART: Stable size. There is atherosclerotic calcification in the aortic arch. LUNGS: Lung volumes are low. There are perihilar opacities. There are bibasilar opacities. PLEURA: No effusion or pneumothorax.     Bibasilar and perihilar underinflation atelectasis which could obscure an additional process. This is unchanged.    Dx-chest-limited (1 View)    Result Date: 11/25/2019 11/25/2019 2:08 AM HISTORY/REASON FOR EXAM:  Respiratory failure TECHNIQUE/EXAM DESCRIPTION AND NUMBER OF VIEWS: Single portable view of the chest. COMPARISON: 11/24/2019 FINDINGS: LUNGS: Slight interval worsening in opacities in the right mid/lower lung. Unchanged opacity in the left lung base. No significant pleural effusions. PNEUMOTHORAX: None. LINES AND TUBES: None. MEDIASTINUM: Stable cardiac silhouette. MUSCULOSKELETAL STRUCTURES: Unchanged.     1.  Interval worsening of pulmonary opacities in the right mid/lower lung. 2.  Unchanged left basilar atelectasis and/or consolidation. No significant pleural effusions.    Dx-chest-portable (1 View)    Result Date: 11/24/2019 11/24/2019 10:53 AM HISTORY/REASON FOR EXAM: Shortness of Breath. TECHNIQUE/EXAM DESCRIPTION AND NUMBER OF VIEWS: Single AP view of the chest. COMPARISON: 11/21/2019 FINDINGS: Lungs: Large volumes with reticular predominantly increased opacity is similar to comparison. There is also  some hazy opacity at the bases especially on the left where there is some hemidiaphragm elevation Pleura:  Small layering effusions are likely Heart and mediastinum: There is stable cardiac silhouette enlargement.     Similar reticular opacities suspicious for edema and there are likely small layering effusions More focal basilar opacity is more likely from atelectasis than consolidation    Dx-chest-portable (1 View)    Result Date: 2019 4:49 AM HISTORY/REASON FOR EXAM: Possible sepsis. TECHNIQUE/EXAM DESCRIPTION:  Single AP view of the chest. COMPARISON: None FINDINGS: Overlying cardiac leads are present. Cardiomegaly is observed. Atherosclerotic calcification of the aorta is noted.  The central  pulmonary vasculature appears prominent and indistinct. The lungs appear well expanded bilaterally.  Diffuse scattered hazy pulmonary parenchymal opacities are seen. The lung bases are partially excluded. The bony structures appear age-appropriate.     1.  Pulmonary edema and/or infiltrates are identified, which are stable since the prior exam. 2.  Cardiomegaly 3.  Atherosclerosis    Ec-echocardiogram Complete W/o Cont    Result Date: 2019  Transthoracic Echo Report Echocardiography Laboratory CONCLUSIONS No prior study is available for comparison. Technically difficult study. Normal left ventricular chamber size. Grossly preserved left ventricular systolic function. The right ventricle was normal in size and function. Mild aortic stenosis. NIR JOSHI Exam Date:         2019                    13:42 Exam Location:     Inpatient Priority:          Routine Ordering Physician:        KRISTINA GALEANA Referring Physician:       [id number] Sonographer:               Tessie Hill RDCS Age:    79     Gender:    M MRN:    [id number] :    1940 BSA:    2.27   Ht (in):    79     Wt (lb):    198 Exam Type:     Complete Indications:     Shortness of breath ICD Codes:       R06.02 CPT  Codes:       87778 BP:   124    /   55     HR:   55 Technical Quality:       Technically difficult study -                          adequate information is obtained MEASUREMENTS  (Male / Female) Normal Values 2D ECHO LV Diastolic Diameter PLAX        5 cm                  4.2 - 5.9 / 3.9 - 5.3 cm LV Systolic Diameter PLAX         2.6 cm                2.1 - 4.0 cm IVS Diastolic Thickness           1.1 cm                LVPW Diastolic Thickness          1.3 cm                LVOT Diameter                     1.7 cm                LV Ejection Fraction MOD 2C       67.1 %                DOPPLER AV Peak Velocity                  2.2 m/s               AV Peak Gradient                  19 mmHg               AV Mean Gradient                  10.9 mmHg             LVOT Peak Velocity                1.1 m/s               AV Area Cont Eq vti               0.97 cm???              Mitral E Point Velocity           0.61 m/s              Mitral E to A Ratio               1.7                   MV Pressure Half Time             27.9 ms               MV Area PHT                       7.9 cm???               MV Deceleration Time              96.4 ms               * Indicates values subject to auto-interpretation LV EF:        % FINDINGS Left Ventricle Normal left ventricular chamber size. Mild concentric left ventricular hypertrophy. Grossly preserved left ventricular systolic function. Unable to assess wall motion. Indeterminate diastolic function. Right Ventricle The right ventricle was normal in size and function. Right Atrium The right atrium is normal in size. Inferior vena cava is not well visualized. Left Atrium The left atrium is normal in size. Left atrial volume index is 22 mL/sq m. Mitral Valve The mitral valve is not well visualized. No stenosis or regurgitation seen. Aortic Valve The aortic valve is not well visualized. Calcified aortic valve leaflets. Mild aortic stenosis. Transvalvular gradients are - Peak: 20 mmHg,  "Mean: 12 mmHg. Dimensionless index is (0.39). No aortic insufficiency. Tricuspid Valve Structurally normal tricuspid valve without significant stenosis or regurgitation. Pulmonic Valve Structurally normal pulmonic valve without significant stenosis or regurgitation. Pericardium Normal pericardium without effusion. Aorta The aortic root is normal. Ascending aorta not well visualized. Marcelo Smith MD (Electronically Signed) Final Date:     23 November 2019                 16:53      Micro:  Results     Procedure Component Value Units Date/Time    URINALYSIS [[id number]]     Order Status:  No result Specimen:  Urine     BLOOD CULTURE [[id number]] Collected:  11/25/19 0800    Order Status:  Completed Specimen:  Blood from Peripheral Updated:  11/30/19 1100     Significant Indicator NEG     Source BLD     Site PERIPHERAL     Culture Result No growth after 5 days of incubation.    Narrative:       Collected By:[id number] YUKO MCCOY  Per Hospital Policy: Only change Specimen Src: to \"Line\" if  specified by physician order.  Right Forearm/Arm    BLOOD CULTURE [[id number]] Collected:  11/25/19 0800    Order Status:  Completed Specimen:  Blood from Peripheral Updated:  11/30/19 1100     Significant Indicator NEG     Source BLD     Site PERIPHERAL     Culture Result No growth after 5 days of incubation.    Narrative:       Collected By:[id number] YUKO MCCOY  Per Hospital Policy: Only change Specimen Src: to \"Line\" if  specified by physician order.  Left Forearm/Arm    Blood Culture [[id number]] Collected:  11/24/19 1300    Order Status:  Completed Specimen:  Blood from Peripheral Updated:  11/29/19 1500     Significant Indicator NEG     Source BLD     Site PERIPHERAL     Culture Result No growth after 5 days of incubation.    Narrative:       Collected By:[id number] CAMRON MCCOY  From different peripheral sites, if not done within the last  24 hours (Per Hospital Policy: Only change specimen source to  \"Line\" if " "specified by physician order)  Left Forearm/Arm    Blood Culture [[id number]] Collected:  11/24/19 1300    Order Status:  Completed Specimen:  Blood from Peripheral Updated:  11/29/19 1500     Significant Indicator NEG     Source BLD     Site PERIPHERAL     Culture Result No growth after 5 days of incubation.    Narrative:       Collected By:[id number] CAMRON MCCOY  From different peripheral sites, if not done within the last  24 hours (Per Hospital Policy: Only change specimen source to  \"Line\" if specified by physician order)  Right Forearm/Arm    FUNGAL GIRISH INTERPRETATION [[id number]] Collected:  11/21/19 0441    Order Status:  Completed Specimen:  Blood Updated:  11/27/19 1535     Significant Indicator NEG     Source BLD     Site PERIPHERAL     Fungal GIRISH Interp --     GIRISH Interpretative Data:  -  Units: mcg/mL (micrograms/mL)  SDD: Susceptible-dose dependent (SDD category implies  clinical efficacy when higher than normal dosage of a drug  can be used and maximal possible blood level achieved.)  NS: Nonsusceptible (This category is used for organisms that  currently have only a susceptible interpretive category, but  not intermediate or resistant interpretive categories.)  None: No CLSI interpretive guidelines available at this time.  -  If an AMPHOTERICIN B GIRISH of >1 mcg/mL is obtained for Candida  spp., that isolate is likely resistant to AMPHOTERICIN B.  There are no CLSI breakpoints.  -  FLUCYTOSINE GIRISH breakpoints are based largely on historical  data and partially on the drug's pharmacokinetics.  -  For FLUCONAZOLE, the guidelines are based on extensive  experience with mucosal and invasive infections due to  Candida spp. When an isolate is identified as Candida  glabrata and the GIRISH is <32mcg/mL, patients should receive  a maximum dosage regimen of FLUCONAZOLE.  Isolates of Candida  krusei are assumed to be intrinsically resistant to  FLUCONAZOLE and their MICs should not be interpreted using  this " "scale.  -  For ITRACONAZOLE, the data is based entirely on experience  with mucosal infections, and data supporting breakpoints for  invasive infections due to Candida spp. are not available.  -  For ANIDULAFUNGIN, CASPOFUNGIN, MICAFUNGIN, and VORICONZOLE  the data are based substantially on experience with  non-neutropenic patients with candidemia, and their clinical  relevance in other settings is uncertain.  -  For POSACONAZOLE the majority of isolates are inhibited by  <1 mcg/mL.  However, data are not yet available to indicate  a correlation between GIRISH and outcome of treatment with this  agent.  -  Susceptible Dose Dependent (SDD) applies to FLUCONAZOLE and  ITRACONAZOLE. Susceptible is dependent on achieving the  maximum possible blood level.  -  The Sensititre YeastOne Susceptibility plates have been  validated for use at [facility]. These  plates are designed for Research Use Only. However, there are  CLSI approved documents for interpretive criteria for  5-FLUCYTOSINE, FLUCONAZOLE, ITRACONAZOLE, VORICONAZOLE, and  the ECHINOCANDINS. As with any in vitro susceptibility  testing method, the results of testing should be correlated  with the patient's clinical response to prescribed therapy.      Narrative:       CALL  [facility]  171 tel. [phone],  CALLED  171 tel. [phone] 11/22/2019, 16:12, RB PERF. RESULTS CALLED  TO:[id number]  Per Hospital Policy: Only change Specimen Src: to \"Line\" if  specified by physician order.  Left Forearm/Arm    BLOOD CULTURE [[id number]]  (Abnormal)  (Susceptibility) Collected:  11/21/19 0441    Order Status:  Completed Specimen:  Blood from Peripheral Updated:  11/27/19 1534     Significant Indicator POS     Source BLD     Site PERIPHERAL     Culture Result Growth detected by Bactec instrument. 11/22/2019  14:54      Pseudomonas aeruginosa  See previous culture for sensitivity report.  P.aeruginosa may develop resistance during prolonged therapy  with all " "antibiotics. Isolates that are initially susceptible  may become resistant within three to four days after  initiation of therapy. Testing of repeat isolates may be  warranted.  See previous culture for sensitivity report.        Candida glabrata    Narrative:       CALL  [facility]  171 tel. [phone],  CALLED  171 tel. [phone] 11/22/2019, 16:12, RB PERF. RESULTS CALLED  TO:[id number]  Per Hospital Policy: Only change Specimen Src: to \"Line\" if  specified by physician order.  Left Forearm/Arm    Susceptibility     Candida glabrata (2)     Antibiotic Interpretation Microscan Method Status    Amphoter B 24hr No Interpretation 0.5 mcg/mL GIRISH Final    Anidulafungin 24hr Sensitive <0.015 mcg/mL GIRISH Final    Caspofungin 24hr Sensitive 0.03 mcg/mL GIRISH Final    Fluconazole 24hr Susceptible Dose-Dependent 8 mcg/mL GIRISH Final    Micafungin 24hr Sensitive 0.015 mcg/mL GIRISH Final                         Assessment:  79-year-old white male admitted on 11/21/2019 due to altered mental status.  He had concern for acute-on-chronic renal failure, urinary tract infection as well as CAP.  His renal failure was thought to be due to a malfunctioning Steen catheter and a new catheter was placed with significant urinary retention up over 1500 mL.  Urine culture was showing  Gram-negative rods and blood cultures showing Gram-negative rods, so infectious disease was consulted for antibiotic recommendations and management.  Rapid response was called due to concern for paresthesia of the distal extremities and he was transferred to the ICU.  Blood cultures on 11/21 with growth of yeast as well as Pseudomonas.      Plan:  UTI and Gram-negative sepsis - PSAR   Afebrile  No leukocytosis  Urine and Blood cxs +PSAR 11/21  Repeat blood cultures - No growth 11/24 and 11/25  Continue Zosyn for PSAR UTI and bacteremia, possible aspiration pneumonia  Stop date 12/7/2019     Candidemia, on treatment  C glabrata unclear etiology, patient without central " line, significant urinary retention but did not grow from urine culture.  CT concerning for developing obstruction but no obvious bowel perforation.  BCx 11/21+ C glabrata -dose dependent fluconazole susceptible  Ophthalmology performed dilated exam on 11/25, no evidence of endophthalmitis  SARAVANAN neg for IE  Continue micafungin   Stop date 12/7/2019     Encephalopathy -still mildly confused may be at baseline  Plan had been for LP but this was not completed  MRA/ MRI considered but not done      Hypoxemia, pulmonary edema - improved      Acute renal failure-persistent  Multifactorial including his sepsis, urinary tract infection, and obstructed Steen catheter.    His Steen catheter has since been replaced.    Antibiotics will need to be dose adjusted accordingly  CT neg for obstruction or stone  Monitor his renal function and urine output carefully-decrease dose Zosyn.       I have performed a physical exam and reviewed and updated ROS as of today.  In review of yesterday's note dated  12/2/2019, there are no changes except as documented above.    DW Cards

## 2019-12-03 NOTE — THERAPY
"Physical Therapy Treatment completed.   Bed Mobility:  Supine to Sit: (found up in chair)  Transfers: Sit to Stand: Minimal Assist  Gait: Level Of Assist: Minimal Assist with Front-Wheel Walker       Plan of Care: Will benefit from Physical Therapy 2 times per week  Discharge Recommendations: Equipment: Will Continue to Assess for Equipment Needs. Post-acute therapy Discharge to a transitional care facility for continued skilled therapy services.    Pt appears to be most limited by cognition and requires min A during x70' of gait with FWW for stability and direction. Pt demonstrates poor safety awareness, often trying to toss walker aside and grasp counter or handrails instead. He requires repeated cueing, usually eventual manual cueing, to improve his safety during a task. While here, PT will follow to progress pt's gait distance, improve stability and activity tolerance. Anticipate requirement for long term memory care.      See \"Rehab Therapy-Acute\" Patient Summary Report for complete documentation.       "

## 2019-12-03 NOTE — CARE PLAN
Problem: Nutritional:  Goal: Achieve adequate nutritional intake  Description  Patient will consume 50% of meals/supplements consistently   Outcome: MET   Pt taking % of most meals (and supplements documented)

## 2019-12-04 ENCOUNTER — HOSPITAL ENCOUNTER (INPATIENT)
Facility: REHABILITATION | Age: 79
End: 2019-12-04
Attending: PHYSICAL MEDICINE & REHABILITATION | Admitting: PHYSICAL MEDICINE & REHABILITATION
Payer: MEDICARE

## 2019-12-04 LAB
ANION GAP SERPL CALC-SCNC: 6 MMOL/L (ref 0–11.9)
APPEARANCE UR: CLEAR
BACTERIA #/AREA URNS HPF: NEGATIVE /HPF
BASOPHILS # BLD AUTO: 1 % (ref 0–1.8)
BASOPHILS # BLD: 0.08 K/UL (ref 0–0.12)
BILIRUB UR QL STRIP.AUTO: NEGATIVE
BUN SERPL-MCNC: 31 MG/DL (ref 8–22)
CALCIUM SERPL-MCNC: 8.2 MG/DL (ref 8.5–10.5)
CHLORIDE SERPL-SCNC: 109 MMOL/L (ref 96–112)
CO2 SERPL-SCNC: 25 MMOL/L (ref 20–33)
COLOR UR: YELLOW
CREAT SERPL-MCNC: 2.85 MG/DL (ref 0.5–1.4)
CREAT UR-MCNC: 82.1 MG/DL
CREAT UR-MCNC: 82.6 MG/DL
EOSINOPHIL # BLD AUTO: 0.22 K/UL (ref 0–0.51)
EOSINOPHIL NFR BLD: 2.7 % (ref 0–6.9)
EPI CELLS #/AREA URNS HPF: NEGATIVE /HPF
ERYTHROCYTE [DISTWIDTH] IN BLOOD BY AUTOMATED COUNT: 55.1 FL (ref 35.9–50)
GLUCOSE BLD-MCNC: 142 MG/DL (ref 65–99)
GLUCOSE BLD-MCNC: 94 MG/DL (ref 65–99)
GLUCOSE BLD-MCNC: 99 MG/DL (ref 65–99)
GLUCOSE SERPL-MCNC: 143 MG/DL (ref 65–99)
GLUCOSE UR STRIP.AUTO-MCNC: NEGATIVE MG/DL
HCT VFR BLD AUTO: 29.3 % (ref 42–52)
HGB BLD-MCNC: 9.2 G/DL (ref 14–18)
HYALINE CASTS #/AREA URNS LPF: ABNORMAL /LPF
IMM GRANULOCYTES # BLD AUTO: 0.08 K/UL (ref 0–0.11)
IMM GRANULOCYTES NFR BLD AUTO: 1 % (ref 0–0.9)
KETONES UR STRIP.AUTO-MCNC: NEGATIVE MG/DL
LEUKOCYTE ESTERASE UR QL STRIP.AUTO: ABNORMAL
LYMPHOCYTES # BLD AUTO: 0.69 K/UL (ref 1–4.8)
LYMPHOCYTES NFR BLD: 8.4 % (ref 22–41)
MAGNESIUM SERPL-MCNC: 2 MG/DL (ref 1.5–2.5)
MCH RBC QN AUTO: 30.4 PG (ref 27–33)
MCHC RBC AUTO-ENTMCNC: 31.4 G/DL (ref 33.7–35.3)
MCV RBC AUTO: 96.7 FL (ref 81.4–97.8)
MICRO URNS: ABNORMAL
MICROALBUMIN UR-MCNC: 4.9 MG/DL
MICROALBUMIN/CREAT UR: 60 MG/G (ref 0–30)
MONOCYTES # BLD AUTO: 0.69 K/UL (ref 0–0.85)
MONOCYTES NFR BLD AUTO: 8.4 % (ref 0–13.4)
NEUTROPHILS # BLD AUTO: 6.47 K/UL (ref 1.82–7.42)
NEUTROPHILS NFR BLD: 78.5 % (ref 44–72)
NITRITE UR QL STRIP.AUTO: NEGATIVE
NRBC # BLD AUTO: 0 K/UL
NRBC BLD-RTO: 0 /100 WBC
PH UR STRIP.AUTO: 5.5 [PH] (ref 5–8)
PHOSPHATE SERPL-MCNC: 3.9 MG/DL (ref 2.5–4.5)
PLATELET # BLD AUTO: 226 K/UL (ref 164–446)
PMV BLD AUTO: 8.7 FL (ref 9–12.9)
POTASSIUM SERPL-SCNC: 4.1 MMOL/L (ref 3.6–5.5)
PROT UR QL STRIP: NEGATIVE MG/DL
RBC # BLD AUTO: 3.03 M/UL (ref 4.7–6.1)
RBC # URNS HPF: ABNORMAL /HPF
RBC UR QL AUTO: ABNORMAL
SODIUM SERPL-SCNC: 140 MMOL/L (ref 135–145)
SODIUM UR-SCNC: 94 MMOL/L
SP GR UR STRIP.AUTO: 1.02
UROBILINOGEN UR STRIP.AUTO-MCNC: 0.2 MG/DL
WBC # BLD AUTO: 8.2 K/UL (ref 4.8–10.8)
WBC #/AREA URNS HPF: ABNORMAL /HPF

## 2019-12-04 PROCEDURE — 97116 GAIT TRAINING THERAPY: CPT

## 2019-12-04 PROCEDURE — 85025 COMPLETE CBC W/AUTO DIFF WBC: CPT

## 2019-12-04 PROCEDURE — 36415 COLL VENOUS BLD VENIPUNCTURE: CPT

## 2019-12-04 PROCEDURE — 700111 HCHG RX REV CODE 636 W/ 250 OVERRIDE (IP): Performed by: INTERNAL MEDICINE

## 2019-12-04 PROCEDURE — 84100 ASSAY OF PHOSPHORUS: CPT

## 2019-12-04 PROCEDURE — 99232 SBSQ HOSP IP/OBS MODERATE 35: CPT | Performed by: INTERNAL MEDICINE

## 2019-12-04 PROCEDURE — 700105 HCHG RX REV CODE 258: Performed by: INTERNAL MEDICINE

## 2019-12-04 PROCEDURE — 700102 HCHG RX REV CODE 250 W/ 637 OVERRIDE(OP): Performed by: INTERNAL MEDICINE

## 2019-12-04 PROCEDURE — 770006 HCHG ROOM/CARE - MED/SURG/GYN SEMI*

## 2019-12-04 PROCEDURE — 82570 ASSAY OF URINE CREATININE: CPT

## 2019-12-04 PROCEDURE — 99233 SBSQ HOSP IP/OBS HIGH 50: CPT | Performed by: INTERNAL MEDICINE

## 2019-12-04 PROCEDURE — 81001 URINALYSIS AUTO W/SCOPE: CPT

## 2019-12-04 PROCEDURE — A9270 NON-COVERED ITEM OR SERVICE: HCPCS | Performed by: HOSPITALIST

## 2019-12-04 PROCEDURE — 700102 HCHG RX REV CODE 250 W/ 637 OVERRIDE(OP): Performed by: HOSPITALIST

## 2019-12-04 PROCEDURE — 97530 THERAPEUTIC ACTIVITIES: CPT

## 2019-12-04 PROCEDURE — 99222 1ST HOSP IP/OBS MODERATE 55: CPT | Performed by: PHYSICAL MEDICINE & REHABILITATION

## 2019-12-04 PROCEDURE — 84300 ASSAY OF URINE SODIUM: CPT

## 2019-12-04 PROCEDURE — 82043 UR ALBUMIN QUANTITATIVE: CPT

## 2019-12-04 PROCEDURE — 83735 ASSAY OF MAGNESIUM: CPT

## 2019-12-04 PROCEDURE — A9270 NON-COVERED ITEM OR SERVICE: HCPCS | Performed by: INTERNAL MEDICINE

## 2019-12-04 PROCEDURE — 80048 BASIC METABOLIC PNL TOTAL CA: CPT

## 2019-12-04 PROCEDURE — 82962 GLUCOSE BLOOD TEST: CPT

## 2019-12-04 RX ADMIN — PIPERACILLIN AND TAZOBACTAM 4.5 G: 4; .5 INJECTION, POWDER, LYOPHILIZED, FOR SOLUTION INTRAVENOUS; PARENTERAL at 17:46

## 2019-12-04 RX ADMIN — HEPARIN SODIUM 5000 UNITS: 5000 INJECTION INTRAVENOUS; SUBCUTANEOUS at 13:26

## 2019-12-04 RX ADMIN — CLOTRIMAZOLE 1 APPLICATION: 10 CREAM TOPICAL at 05:13

## 2019-12-04 RX ADMIN — TAMSULOSIN HYDROCHLORIDE 0.4 MG: 0.4 CAPSULE ORAL at 05:12

## 2019-12-04 RX ADMIN — PIPERACILLIN AND TAZOBACTAM 4.5 G: 4; .5 INJECTION, POWDER, LYOPHILIZED, FOR SOLUTION INTRAVENOUS; PARENTERAL at 10:31

## 2019-12-04 RX ADMIN — FAMOTIDINE 20 MG: 10 INJECTION INTRAVENOUS at 05:13

## 2019-12-04 RX ADMIN — HALOPERIDOL LACTATE 5 MG: 5 INJECTION, SOLUTION INTRAMUSCULAR at 02:08

## 2019-12-04 RX ADMIN — CLOTRIMAZOLE 1 APPLICATION: 10 CREAM TOPICAL at 17:43

## 2019-12-04 RX ADMIN — MULTIPLE VITAMINS W/ MINERALS TAB 1 TABLET: TAB at 05:12

## 2019-12-04 RX ADMIN — POLYETHYLENE GLYCOL 3350 1 PACKET: 17 POWDER, FOR SOLUTION ORAL at 05:12

## 2019-12-04 RX ADMIN — MICAFUNGIN SODIUM 100 MG: 20 INJECTION, POWDER, LYOPHILIZED, FOR SOLUTION INTRAVENOUS at 06:00

## 2019-12-04 RX ADMIN — AMLODIPINE BESYLATE 10 MG: 10 TABLET ORAL at 05:13

## 2019-12-04 RX ADMIN — OXYCODONE HYDROCHLORIDE AND ACETAMINOPHEN 500 MG: 500 TABLET ORAL at 05:13

## 2019-12-04 RX ADMIN — SENNOSIDES AND DOCUSATE SODIUM 2 TABLET: 8.6; 5 TABLET ORAL at 05:12

## 2019-12-04 RX ADMIN — SODIUM CHLORIDE, POTASSIUM CHLORIDE, SODIUM LACTATE AND CALCIUM CHLORIDE: 600; 310; 30; 20 INJECTION, SOLUTION INTRAVENOUS at 10:34

## 2019-12-04 RX ADMIN — HEPARIN SODIUM 5000 UNITS: 5000 INJECTION INTRAVENOUS; SUBCUTANEOUS at 05:13

## 2019-12-04 RX ADMIN — PIPERACILLIN AND TAZOBACTAM 4.5 G: 4; .5 INJECTION, POWDER, LYOPHILIZED, FOR SOLUTION INTRAVENOUS; PARENTERAL at 01:59

## 2019-12-04 ASSESSMENT — COGNITIVE AND FUNCTIONAL STATUS - GENERAL
WALKING IN HOSPITAL ROOM: A LITTLE
MOVING FROM LYING ON BACK TO SITTING ON SIDE OF FLAT BED: UNABLE
MOBILITY SCORE: 14
STANDING UP FROM CHAIR USING ARMS: A LITTLE
CLIMB 3 TO 5 STEPS WITH RAILING: A LOT
TURNING FROM BACK TO SIDE WHILE IN FLAT BAD: A LITTLE
MOVING TO AND FROM BED TO CHAIR: A LOT
SUGGESTED CMS G CODE MODIFIER MOBILITY: CL

## 2019-12-04 ASSESSMENT — ENCOUNTER SYMPTOMS
ABDOMINAL PAIN: 0
VOMITING: 0
MYALGIAS: 0
FEVER: 0
CHILLS: 0
SHORTNESS OF BREATH: 0
NAUSEA: 0
MYALGIAS: 1

## 2019-12-04 ASSESSMENT — GAIT ASSESSMENTS
DISTANCE (FEET): 80
ASSISTIVE DEVICE: FRONT WHEEL WALKER
DEVIATION: OTHER (COMMENT)
GAIT LEVEL OF ASSIST: MINIMAL ASSIST

## 2019-12-04 NOTE — PROGRESS NOTES
Infectious Disease Progress Note    Author: Flor Patel M.D. Date & Time of service: 2019  2:34 PM       Chief Complaint:  UTI and Gram-negative sepsis   Candiemia     Interval History:  79-year-old male admitted with altered mental status.    [id number] WBC 10.8 AMS worsened-resp failure and bradycardia-now transferred to ICU. Obtunded   101.1, O2 15 L high flow, increased from admit but weaning slowly today, oriented only to self.  LP attempted at bedside but unable to obtain due to agitation.  Neurology recommended MRI/MRA.    AF, O2 1 L oxymask, somnolent today and remains confused.   afebrile, white count 8.4.  Tolerating micafungin and Zosyn.  No new issues.   afebrile, white count 8000.  Creatinine appears to be trending back up.  Patient sitting up in chair, notes no issues, has no complaints   AF WBC 7.2 awake, not oriented. Denies CP or resp complaint  12/3 AF WBC 7.7 up to chair-SARAVANAN done today No new complaints   AF WBC 8.2  wants to walk again-up to wheelchair-NAD. No new complaints    Review of Systems:  Review of Systems   Constitutional: Negative for chills and fever.   Respiratory: Negative for shortness of breath.    Cardiovascular: Negative for chest pain and leg swelling.   Gastrointestinal: Negative for abdominal pain, nausea and vomiting.   Musculoskeletal: Positive for myalgias.   Skin: Negative for itching and rash.   All other systems reviewed and are negative.    Hemodynamics:  Temp (24hrs), Av.9 °C (98.5 °F), Min:36.6 °C (97.9 °F), Max:37.2 °C (99 °F)  Temperature: 36.6 °C (97.9 °F)  Pulse  Av.4  Min: 43  Max: 86   Blood Pressure : 139/68       Physical Exam:  Physical Exam  Vitals signs and nursing note reviewed.   Constitutional:       General: He is not in acute distress.     Appearance: He is not toxic-appearing or diaphoretic.      Comments: Chronically ill   HENT:      Nose: No congestion.      Mouth/Throat:      Mouth: Mucous  membranes are moist.   Eyes:      General: No scleral icterus.     Extraocular Movements: Extraocular movements intact.      Pupils: Pupils are equal, round, and reactive to light.   Neck:      Musculoskeletal: Neck supple. No neck rigidity.   Cardiovascular:      Rate and Rhythm: Bradycardia present.      Heart sounds: No murmur.   Pulmonary:      Effort: Pulmonary effort is normal. No respiratory distress.      Breath sounds: No stridor. No wheezing or rhonchi.   Abdominal:      General: There is no distension.      Palpations: Abdomen is soft.      Tenderness: There is no tenderness.   Lymphadenopathy:      Cervical: No cervical adenopathy.   Skin:     General: Skin is warm.   Neurological:      General: No focal deficit present.      Mental Status: He is alert.      Motor: Weakness present.   Psychiatric:         Mood and Affect: Mood normal.         Meds:    Current Facility-Administered Medications:   •  [START ON 12/5/2019] micafungin  •  [COMPLETED] piperacillin-tazobactam **AND** piperacillin-tazobactam  •  LR  •  insulin regular  •  Notify **AND** glucose **AND** dextrose 10% bolus  •  therapeutic multivitamin-minerals  •  ascorbic acid  •  ipratropium-albuterol  •  amLODIPine  •  famotidine  •  enalaprilat  •  heparin  •  ipratropium-albuterol  •  haloperidol lactate  •  clotrimazole  •  latanoprost  •  polyethylene glycol/lytes  •  simvastatin  •  tamsulosin  •  acetaminophen  •  ondansetron  •  ondansetron  •  senna-docusate **AND** polyethylene glycol/lytes **AND** magnesium hydroxide **AND** bisacodyl  •  Respiratory Care per Protocol    Labs:  Recent Labs     12/02/19  0053 12/03/19  0346 12/04/19  0102   WBC 7.2 7.7 8.2   RBC 3.09* 3.14* 3.03*   HEMOGLOBIN 9.3* 9.3* 9.2*   HEMATOCRIT 29.7* 30.4* 29.3*   MCV 96.1 96.8 96.7   MCH 30.1 29.6 30.4   RDW 54.0* 55.6* 55.1*   PLATELETCT 242 238 226   MPV 9.0 8.9* 8.7*   NEUTSPOLYS 73.70* 74.10* 78.50*   LYMPHOCYTES 11.10* 10.20* 8.40*   MONOCYTES 7.90 8.60  8.40   EOSINOPHILS 4.60 4.30 2.70   BASOPHILS 1.20 1.40 1.00     Recent Labs     12/02/19  0053 12/03/19  0346 12/04/19  0102   SODIUM 140 142 140   POTASSIUM 4.3 4.1 4.1   CHLORIDE 108 109 109   CO2 26 25 25   GLUCOSE 99 84 143*   BUN 33* 31* 31*     Recent Labs     12/02/19 0053 12/03/19  0346 12/04/19  0102   CREATININE 2.77* 2.88* 2.85*       Imaging:  Ct-renal Colic Evaluation(a/p W/o)    Result Date: 11/22/2019 11/22/2019 6:59 PM HISTORY/REASON FOR EXAM:  abnormal labs Cr- and urine infection r/o hydronephrosis. TECHNIQUE/EXAM DESCRIPTION AND NUMBER OF VIEWS: CT scan renal/colic without contrast. 5 mm helical images of the abdomen and pelvis were obtained from the diaphragmatic domes through the pubic symphysis. Low dose optimization technique was utilized for this CT exam including automated exposure control and adjustment of the mA and/or kV according to patient size. COMPARISON: None. FINDINGS: Visualized lung bases show small bilateral pleural fluid collections with associated dependent atelectasis, worse on the LEFT. The liver is unremarkable. The spleen is unremarkable. Adrenal glands are unremarkable. The kidneys are unremarkable. Pancreas is atrophic. The gallbladder is unremarkable. Steen catheter within mildly distended bladder. Prostate is enlarged. Colonic diverticula noted. Increased small bowel and colonic gas.  Prominent fluid-filled small bowel loops in the RIGHT lower quadrant. No pneumoperitoneum. Moderate atherosclerotic calcification of abdominal aorta. Lumbar spine degenerative changes. T12 vertebral hemangioma. T-spine wall edema.     1.  Dilated fluid-filled small bowel loops in RIGHT lower quadrant concerning for developing obstruction. 2.  No evidence of bowel perforation. 3.  Colonic diverticulosis without evidence for diverticulitis. 4.  No renal stone or hydronephrosis. 5.  Appendix is not visualized. 6.  Enlarged prostate. 7.  Small bilateral pleural effusions with associated  atelectasis. 8.  Colonic diverticulosis without evidence for diverticulitis.    [id number] View    Result Date: 11/25/2019 11/25/2019 3:01 PM HISTORY/REASON FOR EXAM:  Abdominal pain. TECHNIQUE/EXAM DESCRIPTION AND NUMBER OF VIEWS:  1 view(s) of the abdomen. COMPARISON: None FINDINGS: AP view of the abdomen demonstrates scattered loops of air-filled bowel. Small bowel loops measure up to 4.9 cm. Phleboliths are in the pelvis. There are arterial calcifications. Degenerative changes are in the spine.     Diffuse bowel distention. Findings are suggestive of ileus. Early obstruction could have a similar appearance.    Dx-chest-limited (1 View)    Result Date: 11/26/2019 11/26/2019 3:12 AM HISTORY/REASON FOR EXAM:  Shortness of Breath TECHNIQUE/EXAM DESCRIPTION AND NUMBER OF VIEWS: Single portable view of the chest. COMPARISON: Yesterday FINDINGS: HEART: Stable size. There is atherosclerotic calcification in the aortic arch. LUNGS: Lung volumes are low. There are perihilar opacities. There are bibasilar opacities. PLEURA: No effusion or pneumothorax.     Bibasilar and perihilar underinflation atelectasis which could obscure an additional process. This is unchanged.    Dx-chest-limited (1 View)    Result Date: 11/25/2019 11/25/2019 2:08 AM HISTORY/REASON FOR EXAM:  Respiratory failure TECHNIQUE/EXAM DESCRIPTION AND NUMBER OF VIEWS: Single portable view of the chest. COMPARISON: 11/24/2019 FINDINGS: LUNGS: Slight interval worsening in opacities in the right mid/lower lung. Unchanged opacity in the left lung base. No significant pleural effusions. PNEUMOTHORAX: None. LINES AND TUBES: None. MEDIASTINUM: Stable cardiac silhouette. MUSCULOSKELETAL STRUCTURES: Unchanged.     1.  Interval worsening of pulmonary opacities in the right mid/lower lung. 2.  Unchanged left basilar atelectasis and/or consolidation. No significant pleural effusions.    Dx-chest-portable (1 View)    Result Date: 11/24/2019 11/24/2019 10:53 AM  HISTORY/REASON FOR EXAM: Shortness of Breath. TECHNIQUE/EXAM DESCRIPTION AND NUMBER OF VIEWS: Single AP view of the chest. COMPARISON: 11/21/2019 FINDINGS: Lungs: Large volumes with reticular predominantly increased opacity is similar to comparison. There is also some hazy opacity at the bases especially on the left where there is some hemidiaphragm elevation Pleura:  Small layering effusions are likely Heart and mediastinum: There is stable cardiac silhouette enlargement.     Similar reticular opacities suspicious for edema and there are likely small layering effusions More focal basilar opacity is more likely from atelectasis than consolidation    Dx-chest-portable (1 View)    Result Date: 11/21/2019 11/21/2019 4:49 AM HISTORY/REASON FOR EXAM: Possible sepsis. TECHNIQUE/EXAM DESCRIPTION:  Single AP view of the chest. COMPARISON: None FINDINGS: Overlying cardiac leads are present. Cardiomegaly is observed. Atherosclerotic calcification of the aorta is noted.  The central  pulmonary vasculature appears prominent and indistinct. The lungs appear well expanded bilaterally.  Diffuse scattered hazy pulmonary parenchymal opacities are seen. The lung bases are partially excluded. The bony structures appear age-appropriate.     1.  Pulmonary edema and/or infiltrates are identified, which are stable since the prior exam. 2.  Cardiomegaly 3.  Atherosclerosis    Ec-echocardiogram Complete W/o Cont    Result Date: 11/23/2019  Transthoracic Echo Report Echocardiography Laboratory CONCLUSIONS No prior study is available for comparison. Technically difficult study. Normal left ventricular chamber size. Grossly preserved left ventricular systolic function. The right ventricle was normal in size and function. Mild aortic stenosis. NIR JOSHI Exam Date:         11/23/2019                    13:42 Exam Location:     Inpatient Priority:          Routine Ordering Physician:        KRISTINA GALEANA Referring Physician:        [id number], KERVIN Sonographer:               Tessie Hill RDCS Age:    79     Gender:    M MRN:    [id number] :    1940 BSA:    2.27   Ht (in):    79     Wt (lb):    198 Exam Type:     Complete Indications:     Shortness of breath ICD Codes:       R06.02 CPT Codes:       02632 BP:   124    /   55     HR:   55 Technical Quality:       Technically difficult study -                          adequate information is obtained MEASUREMENTS  (Male / Female) Normal Values 2D ECHO LV Diastolic Diameter PLAX        5 cm                  4.2 - 5.9 / 3.9 - 5.3 cm LV Systolic Diameter PLAX         2.6 cm                2.1 - 4.0 cm IVS Diastolic Thickness           1.1 cm                LVPW Diastolic Thickness          1.3 cm                LVOT Diameter                     1.7 cm                LV Ejection Fraction MOD 2C       67.1 %                DOPPLER AV Peak Velocity                  2.2 m/s               AV Peak Gradient                  19 mmHg               AV Mean Gradient                  10.9 mmHg             LVOT Peak Velocity                1.1 m/s               AV Area Cont Eq vti               0.97 cm???              Mitral E Point Velocity           0.61 m/s              Mitral E to A Ratio               1.7                   MV Pressure Half Time             27.9 ms               MV Area PHT                       7.9 cm???               MV Deceleration Time              96.4 ms               * Indicates values subject to auto-interpretation LV EF:        % FINDINGS Left Ventricle Normal left ventricular chamber size. Mild concentric left ventricular hypertrophy. Grossly preserved left ventricular systolic function. Unable to assess wall motion. Indeterminate diastolic function. Right Ventricle The right ventricle was normal in size and function. Right Atrium The right atrium is normal in size. Inferior vena cava is not well visualized. Left Atrium The left atrium is normal in size. Left atrial volume  "index is 22 mL/sq m. Mitral Valve The mitral valve is not well visualized. No stenosis or regurgitation seen. Aortic Valve The aortic valve is not well visualized. Calcified aortic valve leaflets. Mild aortic stenosis. Transvalvular gradients are - Peak: 20 mmHg, Mean: 12 mmHg. Dimensionless index is (0.39). No aortic insufficiency. Tricuspid Valve Structurally normal tricuspid valve without significant stenosis or regurgitation. Pulmonic Valve Structurally normal pulmonic valve without significant stenosis or regurgitation. Pericardium Normal pericardium without effusion. Aorta The aortic root is normal. Ascending aorta not well visualized. Marcelo Smith MD (Electronically Signed) Final Date:     23 November 2019                 16:53      Micro:  Results     Procedure Component Value Units Date/Time    URINALYSIS [[id number]]  (Abnormal) Collected:  12/04/19 0620    Order Status:  Completed Specimen:  Urine, Steen Cath Updated:  12/04/19 0815     Color Yellow     Character Clear     Specific Gravity 1.016     Ph 5.5     Glucose Negative mg/dL      Ketones Negative mg/dL      Protein Negative mg/dL      Bilirubin Negative     Urobilinogen, Urine 0.2     Nitrite Negative     Leukocyte Esterase Small     Occult Blood Moderate     Micro Urine Req Microscopic    Narrative:       Collected By:[id number] HENRI LOPEZ    BLOOD CULTURE [[id number]] Collected:  11/25/19 0800    Order Status:  Completed Specimen:  Blood from Peripheral Updated:  11/30/19 1100     Significant Indicator NEG     Source BLD     Site PERIPHERAL     Culture Result No growth after 5 days of incubation.    Narrative:       Collected By:[id number] YUKO MCCOY  Per Hospital Policy: Only change Specimen Src: to \"Line\" if  specified by physician order.  Right Forearm/Arm    BLOOD CULTURE [[id number]] Collected:  11/25/19 0800    Order Status:  Completed Specimen:  Blood from Peripheral Updated:  11/30/19 1100     Significant Indicator NEG     Source " "BLD     Site PERIPHERAL     Culture Result No growth after 5 days of incubation.    Narrative:       Collected By:[id number] YUKO LOPEZ.  Per Hospital Policy: Only change Specimen Src: to \"Line\" if  specified by physician order.  Left Forearm/Arm    Blood Culture [[id number]] Collected:  11/24/19 1300    Order Status:  Completed Specimen:  Blood from Peripheral Updated:  11/29/19 1500     Significant Indicator NEG     Source BLD     Site PERIPHERAL     Culture Result No growth after 5 days of incubation.    Narrative:       Collected By:[id number] CAMRON MCCOY  From different peripheral sites, if not done within the last  24 hours (Per Hospital Policy: Only change specimen source to  \"Line\" if specified by physician order)  Left Forearm/Arm    Blood Culture [[id number]] Collected:  11/24/19 1300    Order Status:  Completed Specimen:  Blood from Peripheral Updated:  11/29/19 1500     Significant Indicator NEG     Source BLD     Site PERIPHERAL     Culture Result No growth after 5 days of incubation.    Narrative:       Collected By:[id number] CAMRON PETIT LAnish  From different peripheral sites, if not done within the last  24 hours (Per Hospital Policy: Only change specimen source to  \"Line\" if specified by physician order)  Right Forearm/Arm    FUNGAL GIRISH INTERPRETATION [[id number]] Collected:  11/21/19 0441    Order Status:  Completed Specimen:  Blood Updated:  11/27/19 1535     Significant Indicator NEG     Source BLD     Site PERIPHERAL     Fungal GIRISH Interp --     GIRISH Interpretative Data:  -  Units: mcg/mL (micrograms/mL)  SDD: Susceptible-dose dependent (SDD category implies  clinical efficacy when higher than normal dosage of a drug  can be used and maximal possible blood level achieved.)  NS: Nonsusceptible (This category is used for organisms that  currently have only a susceptible interpretive category, but  not intermediate or resistant interpretive categories.)  None: No CLSI interpretive guidelines " available at this time.  -  If an AMPHOTERICIN B GIRISH of >1 mcg/mL is obtained for Candida  spp., that isolate is likely resistant to AMPHOTERICIN B.  There are no CLSI breakpoints.  -  FLUCYTOSINE GIRISH breakpoints are based largely on historical  data and partially on the drug's pharmacokinetics.  -  For FLUCONAZOLE, the guidelines are based on extensive  experience with mucosal and invasive infections due to  Candida spp. When an isolate is identified as Candida  glabrata and the GIRISH is <32mcg/mL, patients should receive  a maximum dosage regimen of FLUCONAZOLE.  Isolates of Candida  krusei are assumed to be intrinsically resistant to  FLUCONAZOLE and their MICs should not be interpreted using  this scale.  -  For ITRACONAZOLE, the data is based entirely on experience  with mucosal infections, and data supporting breakpoints for  invasive infections due to Candida spp. are not available.  -  For ANIDULAFUNGIN, CASPOFUNGIN, MICAFUNGIN, and VORICONZOLE  the data are based substantially on experience with  non-neutropenic patients with candidemia, and their clinical  relevance in other settings is uncertain.  -  For POSACONAZOLE the majority of isolates are inhibited by  <1 mcg/mL.  However, data are not yet available to indicate  a correlation between GIRISH and outcome of treatment with this  agent.  -  Susceptible Dose Dependent (SDD) applies to FLUCONAZOLE and  ITRACONAZOLE. Susceptible is dependent on achieving the  maximum possible blood level.  -  The Sensititre YeastOne Susceptibility plates have been  validated for use at [facility]. These  plates are designed for Research Use Only. However, there are  CLSI approved documents for interpretive criteria for  5-FLUCYTOSINE, FLUCONAZOLE, ITRACONAZOLE, VORICONAZOLE, and  the ECHINOCANDINS. As with any in vitro susceptibility  testing method, the results of testing should be correlated  with the patient's clinical response to prescribed therapy.    "   Narrative:       CALL  Seals  171 tel. [phone],  CALLED  171 tel. [phone] 11/22/2019, 16:12, RB PERF. RESULTS CALLED  TO:[id number]  Per Hospital Policy: Only change Specimen Src: to \"Line\" if  specified by physician order.  Left Forearm/Arm    BLOOD CULTURE [[id number]]  (Abnormal)  (Susceptibility) Collected:  11/21/19 0441    Order Status:  Completed Specimen:  Blood from Peripheral Updated:  11/27/19 1534     Significant Indicator POS     Source BLD     Site PERIPHERAL     Culture Result Growth detected by Bactec instrument. 11/22/2019  14:54      Pseudomonas aeruginosa  See previous culture for sensitivity report.  P.aeruginosa may develop resistance during prolonged therapy  with all antibiotics. Isolates that are initially susceptible  may become resistant within three to four days after  initiation of therapy. Testing of repeat isolates may be  warranted.  See previous culture for sensitivity report.        Candida glabrata    Narrative:       CALL  Seals  171 tel. [phone],  CALLED  171 tel. [phone] 11/22/2019, 16:12, RB PERF. RESULTS CALLED  TO:[id number]  Per Hospital Policy: Only change Specimen Src: to \"Line\" if  specified by physician order.  Left Forearm/Arm    Susceptibility     Candida glabrata (2)     Antibiotic Interpretation Microscan Method Status    Amphoter B 24hr No Interpretation 0.5 mcg/mL GIRISH Final    Anidulafungin 24hr Sensitive <0.015 mcg/mL GIRISH Final    Caspofungin 24hr Sensitive 0.03 mcg/mL GIRISH Final    Fluconazole 24hr Susceptible Dose-Dependent 8 mcg/mL GIRISH Final    Micafungin 24hr Sensitive 0.015 mcg/mL GIRISH Final                         Assessment:  79-year-old white male admitted on 11/21/2019 due to altered mental status.  He had concern for acute-on-chronic renal failure, urinary tract infection as well as CAP.  His renal failure was thought to be due to a malfunctioning Steen catheter and a new catheter was placed with significant urinary retention up over 1500 mL.  Urine " culture was showing  Gram-negative rods and blood cultures showing Gram-negative rods, so infectious disease was consulted for antibiotic recommendations and management.  Rapid response was called due to concern for paresthesia of the distal extremities and he was transferred to the ICU.  Blood cultures on 11/21 with growth of yeast as well as Pseudomonas.      Plan:  UTI and Gram-negative sepsis - PSAR On treatment  Afebrile  No leukocytosis  Urine and Blood cxs +PSAR 11/21  Repeat blood cultures - No growth 11/24 and 11/25  Continue Zosyn for PSAR UTI and bacteremia, possible aspiration pneumonia  Stop date 12/7/2019     Candidemia, on tratment  C glabrata unclear etiology, patient without central line, significant urinary retention but did not grow from urine culture.    CT concerning for developing obstruction but no obvious bowel perforation.  BCx 11/21+ C glabrata -dose dependent fluconazole susceptible  Ophthalmology performed dilated exam on 11/25, no evidence of endophthalmitis  SARAVANAN neg for IE  Continue micafungin   Stop date 12/7/2019     Encephalopathy   Plan had been for LP but this was not completed  MRA/ MRI considered but not done      Hypoxemia, pulmonary edema - improved      Acute renal failure-persistent  Multifactorial including his sepsis, urinary tract infection, and obstructed Steen catheter.    His Steen catheter has since been replaced.    Antibiotics will need to be dose adjusted accordingly  CT neg for obstruction or stone  Monitor his renal function and urine output carefully-dose Zosyn decreased.       I have performed a physical exam and reviewed and updated ROS as of today.  In review of yesterday's note dated 12/3/2019 , there are no changes except as documented above.            OTILIA Pharm

## 2019-12-04 NOTE — ASSESSMENT & PLAN NOTE
-Per his home medication list, patient has been on Jardiance and SSI  -HgbA1c this hospitalization 5.8  -Episodes of hypoglycemia and LOLA.  Jardiance and SSI have been discontinued  -Follow labs as clinically indicated

## 2019-12-04 NOTE — THERAPY
"Physical Therapy Treatment completed.   Bed Mobility:  Supine to Sit: (received in chair)  Transfers: Sit to Stand: Moderate Assist(progressed to CGA with cueing for mechanics; see below)  Gait: Level Of Assist: Minimal Assist(mostly CGA: Min A for optimal NILAY/gait mechanics) with Front-Wheel Walker       Plan of Care: Will benefit from Physical Therapy 2 times per week  Discharge Recommendations: Equipment: Will Continue to Assess for Equipment Needs. See below    Pt presents similar to last visit. He essentially required min A throughout ambulatory activity CGA to mod A for sit<>stands, dependent on cueing and facilitation. He needs consistent cueing throughout and this is likely baseline at this time 2' to dementia. If current cognition is baseline, anticipate that he would require 24/7 Spv and assist with mobility for safety. He would benefit from procedural/working memory training in stable environment for optimal carryover. Will continue to visit, though recs are for long term placement/ 24/7 assist currently as cognitive issues are > functional deficits.        See \"Rehab Therapy-Acute\" Patient Summary Report for complete documentation.       "

## 2019-12-04 NOTE — CONSULTS
Physical Medicine and Rehabilitation Consultation         Initial Consult      Date of Consultation: 12/4/2019  Consulting provider: Erich Alba MD  Reason for consultation: assess for acute inpatient rehab appropriateness  LOS: 13 Day(s)    Chief complaint: Medical debility in the setting of altered mental status    HPI: The patient is a 79 y.o. right hand dominant male with a past medical history of hypertension, chronic indwelling Steen catheter, type 2 diabetes;  who presented on 11/21/2019  4:30 AM with altered mental status, hypoxia, community-acquired pneumonia, renal failure, UTI.  On work-up patient found to be in urosepsis with Pseudomonas in his urine and blood.  Patient is being followed by infectious disease and continues to be on Zosyn with a stop date of 12/7/2019.  Patient also on micafungin for candidemia with a stop date of 12/7/2019. The patient currently reports that he is doing well.  He is not oriented.  Plan after discharge is to go back to [facility] care.  Patient endorses 3 toe amputations, paresthesias on the bottom of his feet, and presbycusis.    ROS:  Pertinent positives are listed in HPI, all other systems reviewed and are negative    Social Hx:  Randy will return to [facility] care after discharge.  He denies tobacco alcohol drugs.     Current level of function:  The patient was evaluated by acute care Physical Therapy and Occupational Therapy; currently requiring minimal assistance for mobility and moderate assistance for ADLs.     PMH:  Hypertension, diabetes, chronic indwelling catheter    PSH:  No past surgical history on file.    FHX:  Non-pertinent to today's issues    Medications:  Current Facility-Administered Medications   Medication Dose   • piperacillin-tazobactam (ZOSYN) 4.5 g in  mL IVPB  4.5 g   • lactated ringers infusion     • insulin regular (HUMULIN R) injection 1-6 Units  1-6 Units   • glucose 4 g chewable tablet 16 g  16 g    And   • DEXTROSE  "10% BOLUS 250 mL  250 mL   • therapeutic multivitamin-minerals (THERAGRAN-M) tablet 1 Tab  1 Tab   • ascorbic acid tablet 500 mg  500 mg   • ipratropium-albuterol (DUONEB) nebulizer solution  3 mL   • amLODIPine (NORVASC) tablet 10 mg  10 mg   • famotidine (PEPCID) injection 20 mg  20 mg   • enalaprilat (VASOTEC) injection 1.25 mg  1.25 mg   • heparin injection 5,000 Units  5,000 Units   • ipratropium-albuterol (DUONEB) nebulizer solution  3 mL   • haloperidol lactate (HALDOL) injection 2-5 mg  2-5 mg   • micafungin (MYCAMINE) 100 mg in D5W 100 mL ivpb  100 mg   • clotrimazole (LOTRIMIN) 1 % cream 1 Application  1 Application   • latanoprost (XALATAN) 0.005 % ophthalmic solution 1 Drop  1 Drop   • polyethylene glycol/lytes (MIRALAX) PACKET 1 Packet  1 Packet   • simvastatin (ZOCOR) tablet 10 mg  10 mg   • tamsulosin (FLOMAX) capsule 0.4 mg  0.4 mg   • acetaminophen (TYLENOL) tablet 650 mg  650 mg   • ondansetron (ZOFRAN) syringe/vial injection 4 mg  4 mg   • ondansetron (ZOFRAN ODT) dispertab 4 mg  4 mg   • senna-docusate (PERICOLACE or SENOKOT S) 8.6-50 MG per tablet 2 Tab  2 Tab    And   • polyethylene glycol/lytes (MIRALAX) PACKET 1 Packet  1 Packet    And   • magnesium hydroxide (MILK OF MAGNESIA) suspension 30 mL  30 mL    And   • bisacodyl (DULCOLAX) suppository 10 mg  10 mg   • Respiratory Care per Protocol         Allergies:  No Known Allergies    Physical Exam:  Vitals: /68   Pulse (!) 51   Temp 36.6 °C (97.9 °F) (Temporal)   Resp 18   Ht 2.007 m (6' 7\")   Wt 102.2 kg (225 lb 5 oz)   SpO2 94%   Gen: NAD  Head: NC/AT  Eyes/ Nose/ Mouth: PERRLA, moist mucous membranes  Cardio: RRR, no mumurs  Pulm: CTAB, with normal respiratory effort  Abd: Soft NTND, active bowel sounds,   Ext: No peripheral edema. No calf tenderness. No clubbing/cyanosis.  Patient has amputations of 3 toes; he is missing the second and third digit on the right foot and the second digit on the left foot.    Mental status: answers " questions appropriately follows commands.  States the year is 1992.  Is able to state he is 79 years old, and was born 1940, but is unable to perform addition of 38 and 79, states the answer is 90.  Speech: fluent, no aphasia or dysarthria    CRANIAL NERVES:  2,3: visual acuity grossly intact, PERRL  3,4,6: EOMI bilaterally, no nystagmus or diplopia  5: sensation intact to light touch bilaterally and symmetric  7: no facial asymmetry  8: hearing grossly intact  9,10: symmetric palate elevation  11: SCM/Trapezius strength 5/5 bilaterally  12: tongue protrudes midline    Motor:      Shoulder flexors:  Bilateral -  5/5  Elbow flexors:  Bilateral -  5/5  Wrist extensors:  Bilateral -  5/5  Elbow extensors:  Bilateral -  5/5  Finger flexors:  Bilateral -  5/5  Finger abductors:  Bilateral -  5/5  Hip flexors:  Bilateral -  5/5  Knee ext:  Bilateral -  5/5  Dorsiflexors:  Bilateral -  2/5  EHL:  Bilateral -  2/5  Plantar flexors:  Bilateral -  5/5     Sensory:   intact to light touch through out  intact to proprioception at bilateral great toes  Negative double simultaneous touch bilaterally UE and LE    DTRs: 2+ in bilateral biceps, triceps, brachioradialis, 2+ in bilateral patellar and achilles tendons  No clonus at bilateral ankles  Negative babinski b/l  Negative Hobson b/l     Tone: no spasticity noted, no cogwheeling noted    Coordination:   intact finger to nose bilaterally  intact fine motor with fingers bilaterally  intact heel to shin bilaterally    Labs: Reviewed and significant for   Recent Labs     12/02/19  0053 12/03/19  0346 12/04/19  0102   RBC 3.09* 3.14* 3.03*   HEMOGLOBIN 9.3* 9.3* 9.2*   HEMATOCRIT 29.7* 30.4* 29.3*   PLATELETCT 242 238 226     Recent Labs     12/02/19  0053 12/03/19  0346 12/04/19  0102   SODIUM 140 142 140   POTASSIUM 4.3 4.1 4.1   CHLORIDE 108 109 109   CO2 26 25 25   GLUCOSE 99 84 143*   BUN 33* 31* 31*   CREATININE 2.77* 2.88* 2.85*   CALCIUM 8.7 8.6 8.2*     Recent Results  (from the past 24 hour(s))   EC-SARAVANAN W/O CONT    Collection Time: 12/03/19 12:43 PM   Result Value Ref Range    Left Ventrical Ejection Fraction 55    ACCU-CHEK GLUCOSE    Collection Time: 12/03/19  8:54 PM   Result Value Ref Range    Glucose - Accu-Ck 107 (H) 65 - 99 mg/dL   CBC WITH DIFFERENTIAL    Collection Time: 12/04/19  1:02 AM   Result Value Ref Range    WBC 8.2 4.8 - 10.8 K/uL    RBC 3.03 (L) 4.70 - 6.10 M/uL    Hemoglobin 9.2 (L) 14.0 - 18.0 g/dL    Hematocrit 29.3 (L) 42.0 - 52.0 %    MCV 96.7 81.4 - 97.8 fL    MCH 30.4 27.0 - 33.0 pg    MCHC 31.4 (L) 33.7 - 35.3 g/dL    RDW 55.1 (H) 35.9 - 50.0 fL    Platelet Count 226 164 - 446 K/uL    MPV 8.7 (L) 9.0 - 12.9 fL    Neutrophils-Polys 78.50 (H) 44.00 - 72.00 %    Lymphocytes 8.40 (L) 22.00 - 41.00 %    Monocytes 8.40 0.00 - 13.40 %    Eosinophils 2.70 0.00 - 6.90 %    Basophils 1.00 0.00 - 1.80 %    Immature Granulocytes 1.00 (H) 0.00 - 0.90 %    Nucleated RBC 0.00 /100 WBC    Neutrophils (Absolute) 6.47 1.82 - 7.42 K/uL    Lymphs (Absolute) 0.69 (L) 1.00 - 4.80 K/uL    Monos (Absolute) 0.69 0.00 - 0.85 K/uL    Eos (Absolute) 0.22 0.00 - 0.51 K/uL    Baso (Absolute) 0.08 0.00 - 0.12 K/uL    Immature Granulocytes (abs) 0.08 0.00 - 0.11 K/uL    NRBC (Absolute) 0.00 K/uL   Basic Metabolic Panel    Collection Time: 12/04/19  1:02 AM   Result Value Ref Range    Sodium 140 135 - 145 mmol/L    Potassium 4.1 3.6 - 5.5 mmol/L    Chloride 109 96 - 112 mmol/L    Co2 25 20 - 33 mmol/L    Glucose 143 (H) 65 - 99 mg/dL    Bun 31 (H) 8 - 22 mg/dL    Creatinine 2.85 (H) 0.50 - 1.40 mg/dL    Calcium 8.2 (L) 8.5 - 10.5 mg/dL    Anion Gap 6.0 0.0 - 11.9   MAGNESIUM    Collection Time: 12/04/19  1:02 AM   Result Value Ref Range    Magnesium 2.0 1.5 - 2.5 mg/dL   PHOSPHORUS    Collection Time: 12/04/19  1:02 AM   Result Value Ref Range    Phosphorus 3.9 2.5 - 4.5 mg/dL   ESTIMATED GFR    Collection Time: 12/04/19  1:02 AM   Result Value Ref Range    GFR If  26  (A) >60 mL/min/1.73 m 2    GFR If Non African American 21 (A) >60 mL/min/1.73 m 2   URINALYSIS    Collection Time: 12/04/19  6:20 AM   Result Value Ref Range    Color Yellow     Character Clear     Specific Gravity 1.016 <1.035    Ph 5.5 5.0 - 8.0    Glucose Negative Negative mg/dL    Ketones Negative Negative mg/dL    Protein Negative Negative mg/dL    Bilirubin Negative Negative    Urobilinogen, Urine 0.2 Negative    Nitrite Negative Negative    Leukocyte Esterase Small (A) Negative    Occult Blood Moderate (A) Negative    Micro Urine Req Microscopic    MICROALBUMIN CREAT RATIO URINE    Collection Time: 12/04/19  6:20 AM   Result Value Ref Range    Creatinine, Urine 82.10 mg/dL    Microalbumin, Urine Random 4.9 mg/dL    Micro Alb Creat Ratio 60 (H) 0 - 30 mg/g   Urine Sodium Random    Collection Time: 12/04/19  6:20 AM   Result Value Ref Range    Sodium, Urine -per volume 94 mmol/L   Urine Creatinine Random    Collection Time: 12/04/19  6:20 AM   Result Value Ref Range    Creatinine, Random Urine 82.60 mg/dL   URINE MICROSCOPIC (W/UA)    Collection Time: 12/04/19  6:20 AM   Result Value Ref Range    WBC 5-10 (A) /hpf    RBC  (A) /hpf    Bacteria Negative None /hpf    Epithelial Cells Negative /hpf    Hyaline Cast 0-2 /lpf   ACCU-CHEK GLUCOSE    Collection Time: 12/04/19  9:02 AM   Result Value Ref Range    Glucose - Accu-Ck 99 65 - 99 mg/dL       Imaging:   SARAVANAN on 12/3/2019  LVEF estimated to be 55%.  No significant valve or Doppler abnormality.  No evidence of endocarditis.  Small calcified atheromatous plaque in the ascending aorta.  Compared to prior transthoracic echo 11/23/2019, no significant change.    ASSESSMENT:  Patient is a 79 y.o. male admitted with altered mental status, hypoxia, community-acquired pneumonia, renal failure, UTI, bacteremia now on IV antibiotics, and improving.    Rehabilitation: Impaired ADLs and mobility  Patient is a good candidate for inpatient rehab based on needs for PT,  OT, and speech therapy.  Patient has a good discharge situation which will be home to [place] memory care unit.   Barriers to transfer include: Insurance authorization, TCCs to verify disposition, medical clearance and bed availability     Medical debility/bacteremia  -Micafungin 100 mg every 24 hours IV-End 12/7/2019 at midnight  -Zosyn 4.5 g every 8 hours IV -End 12/8/2019 at 10 AM  -Continue PT OT while in-house  -Recommend speech consult for cognitive evaluation and management strategies  -Anticipate transfer to inpatient rehab pending bed availability    Hyperlipidemia  -Simvastatin 10 mg nightly    Urinary retention  -Indwelling Steen catheter  -Flomax 0.4 mg daily    Tinea pedis  - Lotrimin twice daily to feet    GI prophylaxis  - Famotidine 20 mg injection daily    Pain  -Tylenol 650 mg every 6 hours as needed    Hypertension  -Amlodipine 10 mg daily    Type II diabetes  -Management per primary team  -Insulin sliding scale    Glaucoma  - Latanoprost 0.005% 1 drop nightly both eyes    DVT Prophylaxis:   -Heparin 5K 3 times daily    Discussed with pt, summarized hospitalization and care, options for next step of care  Labs reviewed   Discussed with team about recommendations     Thank you for allowing us to participate in the care of this patient.       Discussed with patient about recommendations for and plan for rehabilitation as follows. Patient with multiple co-morbidities(including but not limited to bacteremia hypertension, diabetes, glaucoma, anemia); with cognitive deficits and functional deficits in mobility/self-care, and Moderate de-conditioning.     Pre-morbidly, this patient lived in a memory care unit. The patient was evaluated by acute care Physical Therapy and Occupational Therapy; currently requiring minimal assistance for mobility and moderate assistance for ADLs, also with ongoing cognitive deficits.     The patient is a(n) Very Good candidate for an acute inpatient rehabilitation program  with a coordinated program of care at an intensity and frequency not available at a lower level of care.     Note: if patient continues to progress while waiting for medical clearance, and no longer requires 2 of of 3 therapy services (PT/OT/SLP) at a CGA/Minimal assistance level, patient will no longer need acute inpatient rehabilitation.    This recommendation is substantiated by the patient's current medical condition with intervention and assessment of medical issues requiring an acute level of care for patient's safety and maximum outcome. A coordinated program of care will be provided by an interdisciplinary team including physical therapy, occupational therapy, speech language pathology, hospitalist, physiatry and rehab nursing. Rehab goals include improved cognition, mobility, self-care management, strength and conditioning/endurance, pain management, bowel and bladder management, mood and affect, and safety with independent home management including caregiver training.     Estimated length of stay is approximately 10-12 days. Rehab potential: Very Good. Disposition: to pre-morbid independent living setting with supportive care of patient's community resources. We will continue to follow with you in anticipation of discharge to acute inpatient rehabilitation when medically stable to do so at the discretion of his  attending physician. Thank you for allowing us to participate in his care. Please call with any questions regarding this recommendation.    Everardo Ornelas, DO   Physical Medicine and Rehabilitation   12/4/2019

## 2019-12-04 NOTE — CARE PLAN
Problem: Communication  Goal: The ability to communicate needs accurately and effectively will improve  Outcome: PROGRESSING AS EXPECTED     Problem: Safety  Goal: Will remain free from injury  Outcome: PROGRESSING AS EXPECTED     Problem: Bowel/Gastric:  Goal: Normal bowel function is maintained or improved  Outcome: PROGRESSING AS EXPECTED     Problem: Respiratory:  Goal: Respiratory status will improve  Outcome: PROGRESSING AS EXPECTED

## 2019-12-04 NOTE — CARE PLAN
Problem: Safety  Goal: Will remain free from falls  Note:   Bed alarm in use, bed in lowest and locked position, non-skid socks in place, call light in reach.     Problem: Knowledge Deficit  Goal: Knowledge of disease process/condition, treatment plan, diagnostic tests, and medications will improve  Note:   Patient educated about prescribed medications.

## 2019-12-04 NOTE — PROGRESS NOTES
Hospital Medicine Daily Progress Note    Date of Service  12/4/2019    Chief Complaint  79 y.o. male admitted 11/21/2019 with AMS    Hospital Course    79-year-old man with diabetes, hypertension who presented from [facility] who was brought in for altered mental status.  Patient was admitted for pneumonia and UTI with LOLA.  He had a Steen catheter that was replaced.  Nephrology was consulted, his LOLA improved with treatment of UTI and urinary retention.  Blood cultures and urine culture were positive for Pseudomonas; blood also grew Ijeoma glabrata.  Dr. Wyman with ophthalmology showed no evidence of endophthlamitis on dilated exam 11/25.  Unclear source of candidemia.  SARAVANAN on 12/3 was negative for endocarditis.  Infectious disease recommended continuing Zosyn and micafungin until 12/7/2019.      Interval Problem Update  Creat slightly decreased  Patient remains confused, has not complaints. Denies dysuria    Consultants/Specialty  Critical care  General surgeon  Ophthalmology  Neurology  Nephrology  Infectious disease  Cardiology    Code Status  Full Code      Disposition  From [facility]  Evaluate for SNF versus memory care vs acute rehab  Does not have PICC    Review of Systems  Review of Systems   Unable to perform ROS: Mental acuity   Constitutional: Negative for malaise/fatigue.   Respiratory: Negative for shortness of breath.    Cardiovascular: Negative for chest pain.   Gastrointestinal: Negative for abdominal pain.   Genitourinary: Negative for dysuria and urgency.   Musculoskeletal: Negative for myalgias.        Physical Exam  Temp:  [36.6 °C (97.9 °F)-37.2 °C (99 °F)] 36.6 °C (97.9 °F)  Pulse:  [51] 51  Resp:  [18] 18  BP: (129-139)/(66-68) 139/68  SpO2:  [94 %-96 %] 94 %    Physical Exam  Vitals signs and nursing note reviewed.   Constitutional:       General: He is not in acute distress.     Appearance: He is not ill-appearing.      Comments: Thin, frail   HENT:      Head: Normocephalic.       Mouth/Throat:      Mouth: Mucous membranes are moist.   Eyes:      General:         Right eye: No discharge.         Left eye: No discharge.   Cardiovascular:      Rate and Rhythm: Normal rate and regular rhythm.   Pulmonary:      Effort: No respiratory distress.      Breath sounds: No wheezing or rales.   Abdominal:      General: Abdomen is flat.      Palpations: Abdomen is soft.      Tenderness: There is no tenderness.   Musculoskeletal:      Comments: Diffuse mild muscle atrophy   Skin:     General: Skin is warm and dry.   Neurological:      Mental Status: He is alert.      Comments: Oriented to self only         Fluids    Intake/Output Summary (Last 24 hours) at 12/4/2019 1537  Last data filed at 12/4/2019 1200  Gross per 24 hour   Intake 480 ml   Output 200 ml   Net 280 ml       Laboratory  Recent Labs     12/02/19  0053 12/03/19  0346 12/04/19  0102   WBC 7.2 7.7 8.2   RBC 3.09* 3.14* 3.03*   HEMOGLOBIN 9.3* 9.3* 9.2*   HEMATOCRIT 29.7* 30.4* 29.3*   MCV 96.1 96.8 96.7   MCH 30.1 29.6 30.4   MCHC 31.3* 30.6* 31.4*   RDW 54.0* 55.6* 55.1*   PLATELETCT 242 238 226   MPV 9.0 8.9* 8.7*     Recent Labs     12/02/19  0053 12/03/19 0346 12/04/19  0102   SODIUM 140 142 140   POTASSIUM 4.3 4.1 4.1   CHLORIDE 108 109 109   CO2 26 25 25   GLUCOSE 99 84 143*   BUN 33* 31* 31*   CREATININE 2.77* 2.88* 2.85*   CALCIUM 8.7 8.6 8.2*                   Imaging  EC-SARAVANAN W/O CONT   Final Result      DX-CHEST-LIMITED (1 VIEW)   Final Result      Bibasilar and perihilar underinflation atelectasis which could obscure an additional process. This is unchanged.      [id number] VIEW   Final Result      Diffuse bowel distention. Findings are suggestive of ileus. Early obstruction could have a similar appearance.      DX-CHEST-LIMITED (1 VIEW)   Final Result      1.  Interval worsening of pulmonary opacities in the right mid/lower lung.   2.  Unchanged left basilar atelectasis and/or consolidation. No significant pleural effusions.       DX-CHEST-PORTABLE (1 VIEW)   Final Result      Similar reticular opacities suspicious for edema and there are likely small layering effusions      More focal basilar opacity is more likely from atelectasis than consolidation      EC-ECHOCARDIOGRAM COMPLETE W/O CONT   Final Result      CT-RENAL COLIC EVALUATION(A/P W/O)   Final Result      1.  Dilated fluid-filled small bowel loops in RIGHT lower quadrant concerning for developing obstruction.   2.  No evidence of bowel perforation.   3.  Colonic diverticulosis without evidence for diverticulitis.   4.  No renal stone or hydronephrosis.   5.  Appendix is not visualized.   6.  Enlarged prostate.   7.  Small bilateral pleural effusions with associated atelectasis.   8.  Colonic diverticulosis without evidence for diverticulitis.      DX-CHEST-PORTABLE (1 VIEW)   Final Result         1.  Pulmonary edema and/or infiltrates are identified, which are stable since the prior exam.   2.  Cardiomegaly   3.  Atherosclerosis           Assessment/Plan  * Candidemia (HCC)- (present on admission)  Assessment & Plan  Appreciate ID recommendations  Seen by ophthalmology, negative endophthlamitis  Continuing micafungin stop date 12/7    Bacteremia due to Pseudomonas- (present on admission)  Assessment & Plan  Infectious disease following, continue Zosyn for CAP, bacteremia, and UTI stop date 12/7  SARAVANAN on 12/3 negative for endocarditis    Acute respiratory failure with hypoxia, RAD, PNA (HCC)- (present on admission)  Assessment & Plan  Resolving, wean off O2 as tolerated    Altered mental status- (present on admission)  Assessment & Plan  Patient has baseline dementia and is likely close to baseline being oriented only to person  Neuro checks.  Monitor mental status      UTI (urinary tract infection)- (present on admission)  Assessment & Plan  Urinary tract infection associated with chronic indwelling Steen catheter changed 11/21  Complicated urinary tract infection with associated  encephalopathy  Appreciate infectious disease consultation, continuing Zosyn stop date 12/7    CAP (community acquired pneumonia)- (present on admission)  Assessment & Plan  Pseudomonas; continue zosyn per ID.  Stop date 12/7  Rt protocol  Wean oxygen as tolerated      Bradycardia- (present on admission)  Assessment & Plan  Patient has been bradycardic, but seems to be asymptomatic.  Normotensive.  Hold AV zoraida blockers.    Acute renal failure (ARF) (HCC)- (present on admission)  Assessment & Plan  Likely was 2/2 urinary retention/sawyer obstruction  Kidney function had improved to 1.98, but now worsening  I stopped Jardiance, avoid nephrotoxins  CT renal colic on 11/22 showed normal kidneys  FeNa 2.3%  Trial IV fluids  Monitor urine output  Creat may have plataued recheck BMP tomorrow  Renal diet  If worsening may need to reconsult nephrology    Hyperglycemia  Assessment & Plan  Jardiance was on medication list as well as insulin sliding scale  A1c does not show diabetes  Glucose has been low, start Jardiance  Monitor glucose after meals  Hypoglycemia protocol    Normocytic normochromic anemia- (present on admission)  Assessment & Plan  Asymptomatic  No active signs of bleeding  Transfuse to maintain hemoglobin > 7       VTE prophylaxis: heparin

## 2019-12-04 NOTE — PROGRESS NOTES
Received report from night RN, assumed care at 0700. Pt oriented to self only. Pt laying in bed, no signs/symptoms of distress noted. Pt running IVF per MAR. Bed alarm in use, bed in lowest and locked position, non-skid socks in place. POC discussed, communication board updated. Call light in reach, hourly rounding in place.

## 2019-12-05 LAB
ALBUMIN SERPL BCP-MCNC: 3.3 G/DL (ref 3.2–4.9)
ALBUMIN/GLOB SERPL: 1.1 G/DL
ALP SERPL-CCNC: 44 U/L (ref 30–99)
ALT SERPL-CCNC: 8 U/L (ref 2–50)
ANION GAP SERPL CALC-SCNC: 7 MMOL/L (ref 0–11.9)
AST SERPL-CCNC: 16 U/L (ref 12–45)
BASOPHILS # BLD AUTO: 1 % (ref 0–1.8)
BASOPHILS # BLD: 0.09 K/UL (ref 0–0.12)
BILIRUB SERPL-MCNC: 0.6 MG/DL (ref 0.1–1.5)
BUN SERPL-MCNC: 32 MG/DL (ref 8–22)
CALCIUM SERPL-MCNC: 8.7 MG/DL (ref 8.5–10.5)
CHLORIDE SERPL-SCNC: 109 MMOL/L (ref 96–112)
CO2 SERPL-SCNC: 26 MMOL/L (ref 20–33)
CREAT SERPL-MCNC: 3.08 MG/DL (ref 0.5–1.4)
EOSINOPHIL # BLD AUTO: 0.39 K/UL (ref 0–0.51)
EOSINOPHIL NFR BLD: 4.3 % (ref 0–6.9)
ERYTHROCYTE [DISTWIDTH] IN BLOOD BY AUTOMATED COUNT: 57 FL (ref 35.9–50)
GLOBULIN SER CALC-MCNC: 2.9 G/DL (ref 1.9–3.5)
GLUCOSE BLD-MCNC: 123 MG/DL (ref 65–99)
GLUCOSE BLD-MCNC: 86 MG/DL (ref 65–99)
GLUCOSE BLD-MCNC: 88 MG/DL (ref 65–99)
GLUCOSE BLD-MCNC: 96 MG/DL (ref 65–99)
GLUCOSE BLD-MCNC: 99 MG/DL (ref 65–99)
GLUCOSE SERPL-MCNC: 95 MG/DL (ref 65–99)
HCT VFR BLD AUTO: 31.1 % (ref 42–52)
HGB BLD-MCNC: 9.6 G/DL (ref 14–18)
IMM GRANULOCYTES # BLD AUTO: 0.06 K/UL (ref 0–0.11)
IMM GRANULOCYTES NFR BLD AUTO: 0.7 % (ref 0–0.9)
LYMPHOCYTES # BLD AUTO: 0.63 K/UL (ref 1–4.8)
LYMPHOCYTES NFR BLD: 6.9 % (ref 22–41)
MCH RBC QN AUTO: 30 PG (ref 27–33)
MCHC RBC AUTO-ENTMCNC: 30.9 G/DL (ref 33.7–35.3)
MCV RBC AUTO: 97.2 FL (ref 81.4–97.8)
MONOCYTES # BLD AUTO: 0.64 K/UL (ref 0–0.85)
MONOCYTES NFR BLD AUTO: 7 % (ref 0–13.4)
NEUTROPHILS # BLD AUTO: 7.32 K/UL (ref 1.82–7.42)
NEUTROPHILS NFR BLD: 80.1 % (ref 44–72)
NRBC # BLD AUTO: 0 K/UL
NRBC BLD-RTO: 0 /100 WBC
PLATELET # BLD AUTO: 240 K/UL (ref 164–446)
PMV BLD AUTO: 8.9 FL (ref 9–12.9)
POTASSIUM SERPL-SCNC: 4.5 MMOL/L (ref 3.6–5.5)
PROT SERPL-MCNC: 6.2 G/DL (ref 6–8.2)
RBC # BLD AUTO: 3.2 M/UL (ref 4.7–6.1)
SODIUM SERPL-SCNC: 142 MMOL/L (ref 135–145)
WBC # BLD AUTO: 9.1 K/UL (ref 4.8–10.8)

## 2019-12-05 PROCEDURE — 700111 HCHG RX REV CODE 636 W/ 250 OVERRIDE (IP): Performed by: INTERNAL MEDICINE

## 2019-12-05 PROCEDURE — 700102 HCHG RX REV CODE 250 W/ 637 OVERRIDE(OP): Performed by: INTERNAL MEDICINE

## 2019-12-05 PROCEDURE — A9270 NON-COVERED ITEM OR SERVICE: HCPCS | Performed by: HOSPITALIST

## 2019-12-05 PROCEDURE — 82962 GLUCOSE BLOOD TEST: CPT

## 2019-12-05 PROCEDURE — 94760 N-INVAS EAR/PLS OXIMETRY 1: CPT

## 2019-12-05 PROCEDURE — 99233 SBSQ HOSP IP/OBS HIGH 50: CPT | Performed by: INTERNAL MEDICINE

## 2019-12-05 PROCEDURE — A9270 NON-COVERED ITEM OR SERVICE: HCPCS | Performed by: INTERNAL MEDICINE

## 2019-12-05 PROCEDURE — 770006 HCHG ROOM/CARE - MED/SURG/GYN SEMI*

## 2019-12-05 PROCEDURE — 700102 HCHG RX REV CODE 250 W/ 637 OVERRIDE(OP): Performed by: HOSPITALIST

## 2019-12-05 PROCEDURE — 36415 COLL VENOUS BLD VENIPUNCTURE: CPT

## 2019-12-05 PROCEDURE — 700105 HCHG RX REV CODE 258: Performed by: INTERNAL MEDICINE

## 2019-12-05 PROCEDURE — 80053 COMPREHEN METABOLIC PANEL: CPT

## 2019-12-05 PROCEDURE — 85025 COMPLETE CBC W/AUTO DIFF WBC: CPT

## 2019-12-05 RX ORDER — SODIUM CHLORIDE 450 MG/100ML
INJECTION, SOLUTION INTRAVENOUS CONTINUOUS
Status: DISCONTINUED | OUTPATIENT
Start: 2019-12-05 | End: 2019-12-09

## 2019-12-05 RX ADMIN — CLOTRIMAZOLE 1 APPLICATION: 10 CREAM TOPICAL at 08:24

## 2019-12-05 RX ADMIN — PIPERACILLIN AND TAZOBACTAM 4.5 G: 4; .5 INJECTION, POWDER, LYOPHILIZED, FOR SOLUTION INTRAVENOUS; PARENTERAL at 02:34

## 2019-12-05 RX ADMIN — HEPARIN SODIUM 5000 UNITS: 5000 INJECTION INTRAVENOUS; SUBCUTANEOUS at 22:57

## 2019-12-05 RX ADMIN — HEPARIN SODIUM 5000 UNITS: 5000 INJECTION INTRAVENOUS; SUBCUTANEOUS at 14:18

## 2019-12-05 RX ADMIN — MULTIPLE VITAMINS W/ MINERALS TAB 1 TABLET: TAB at 12:07

## 2019-12-05 RX ADMIN — LATANOPROST 1 DROP: 50 SOLUTION OPHTHALMIC at 22:58

## 2019-12-05 RX ADMIN — FAMOTIDINE 20 MG: 10 INJECTION INTRAVENOUS at 06:37

## 2019-12-05 RX ADMIN — CLOTRIMAZOLE 1 APPLICATION: 10 CREAM TOPICAL at 17:07

## 2019-12-05 RX ADMIN — TAMSULOSIN HYDROCHLORIDE 0.4 MG: 0.4 CAPSULE ORAL at 06:36

## 2019-12-05 RX ADMIN — SIMVASTATIN 10 MG: 20 TABLET, FILM COATED ORAL at 22:58

## 2019-12-05 RX ADMIN — LATANOPROST 1 DROP: 50 SOLUTION OPHTHALMIC at 00:28

## 2019-12-05 RX ADMIN — SENNOSIDES AND DOCUSATE SODIUM 2 TABLET: 8.6; 5 TABLET ORAL at 19:08

## 2019-12-05 RX ADMIN — HEPARIN SODIUM 5000 UNITS: 5000 INJECTION INTRAVENOUS; SUBCUTANEOUS at 00:27

## 2019-12-05 RX ADMIN — PIPERACILLIN AND TAZOBACTAM 4.5 G: 4; .5 INJECTION, POWDER, LYOPHILIZED, FOR SOLUTION INTRAVENOUS; PARENTERAL at 09:07

## 2019-12-05 RX ADMIN — SODIUM CHLORIDE: 4.5 INJECTION, SOLUTION INTRAVENOUS at 15:59

## 2019-12-05 RX ADMIN — SIMVASTATIN 10 MG: 20 TABLET, FILM COATED ORAL at 00:27

## 2019-12-05 RX ADMIN — SENNOSIDES AND DOCUSATE SODIUM 2 TABLET: 8.6; 5 TABLET ORAL at 06:37

## 2019-12-05 RX ADMIN — POLYETHYLENE GLYCOL 3350 1 PACKET: 17 POWDER, FOR SOLUTION ORAL at 06:38

## 2019-12-05 RX ADMIN — OXYCODONE HYDROCHLORIDE AND ACETAMINOPHEN 500 MG: 500 TABLET ORAL at 06:37

## 2019-12-05 RX ADMIN — HEPARIN SODIUM 5000 UNITS: 5000 INJECTION INTRAVENOUS; SUBCUTANEOUS at 06:37

## 2019-12-05 RX ADMIN — PIPERACILLIN AND TAZOBACTAM 3.38 G: 3; .375 INJECTION, POWDER, LYOPHILIZED, FOR SOLUTION INTRAVENOUS; PARENTERAL at 17:04

## 2019-12-05 RX ADMIN — AMLODIPINE BESYLATE 10 MG: 10 TABLET ORAL at 06:38

## 2019-12-05 RX ADMIN — MICAFUNGIN SODIUM 100 MG: 20 INJECTION, POWDER, LYOPHILIZED, FOR SOLUTION INTRAVENOUS at 06:36

## 2019-12-05 ASSESSMENT — COGNITIVE AND FUNCTIONAL STATUS - GENERAL
DAILY ACTIVITIY SCORE: 19
STANDING UP FROM CHAIR USING ARMS: A LITTLE
PERSONAL GROOMING: A LOT
MOBILITY SCORE: 19
CLIMB 3 TO 5 STEPS WITH RAILING: A LOT
SUGGESTED CMS G CODE MODIFIER MOBILITY: CK
SUGGESTED CMS G CODE MODIFIER DAILY ACTIVITY: CK
WALKING IN HOSPITAL ROOM: A LOT
TOILETING: A LITTLE
HELP NEEDED FOR BATHING: A LOT

## 2019-12-05 ASSESSMENT — ENCOUNTER SYMPTOMS
VOMITING: 0
MYALGIAS: 0
FEVER: 0
DIZZINESS: 0
ABDOMINAL PAIN: 0
SHORTNESS OF BREATH: 0
NAUSEA: 0
MYALGIAS: 1
HEADACHES: 0
CHILLS: 0

## 2019-12-05 NOTE — PROGRESS NOTES
Infectious Disease Progress Note    Author: Flor Patel M.D. Date & Time of service: 2019  12:45 PM       Chief Complaint:  UTI and Gram-negative sepsis   Candiemia     Interval History:  79-year-old male admitted with altered mental status.    Wupt765.1 WBC 10.8 AMS worsened-resp failure and bradycardia-now transferred to ICU. Obtunded   101.1, O2 15 L high flow, increased from admit but weaning slowly today, oriented only to self.  LP attempted at bedside but unable to obtain due to agitation.  Neurology recommended MRI/MRA.    AF, O2 1 L oxymask, somnolent today and remains confused.   afebrile, white count 8.4.  Tolerating micafungin and Zosyn.  No new issues.   afebrile, white count 8000.  Creatinine appears to be trending back up.  Patient sitting up in chair, notes no issues, has no complaints   AF WBC 7.2 awake, not oriented. Denies CP or resp complaint  12/3 AF WBC 7.7 up to chair-SARAVANAN done today No new complaints   AF WBC 8.2  wants to walk again-up to wheelchair-NAD. No new complaints   AF WBC 9.1 no new complaints-up to wheelchair-drinking coffee and juice    Review of Systems:  Review of Systems   Constitutional: Negative for chills and fever.   Respiratory: Negative for shortness of breath.    Cardiovascular: Negative for chest pain and leg swelling.   Gastrointestinal: Negative for abdominal pain, nausea and vomiting.   Genitourinary: Negative for dysuria.   Musculoskeletal: Positive for myalgias.   Skin: Negative for itching and rash.   All other systems reviewed and are negative.    Hemodynamics:  Temp (24hrs), Av.7 °C (98.1 °F), Min:36.7 °C (98.1 °F), Max:36.7 °C (98.1 °F)  Temperature: 36.7 °C (98.1 °F)  Pulse  Av.3  Min: 43  Max: 86   Blood Pressure : 145/70       Physical Exam:  Physical Exam  Vitals signs and nursing note reviewed.   Constitutional:       General: He is not in acute distress.     Appearance: He is not toxic-appearing or  diaphoretic.      Comments: Chronically ill   HENT:      Nose: No congestion.      Mouth/Throat:      Mouth: Mucous membranes are moist.   Eyes:      General: No scleral icterus.     Extraocular Movements: Extraocular movements intact.      Pupils: Pupils are equal, round, and reactive to light.   Neck:      Musculoskeletal: Neck supple. No neck rigidity.   Cardiovascular:      Rate and Rhythm: Bradycardia present.      Heart sounds: No murmur.   Pulmonary:      Effort: Pulmonary effort is normal. No respiratory distress.      Breath sounds: No stridor. No wheezing or rhonchi.   Abdominal:      General: There is no distension.      Palpations: Abdomen is soft.      Tenderness: There is no right CVA tenderness, left CVA tenderness, guarding or rebound.   Lymphadenopathy:      Cervical: No cervical adenopathy.   Skin:     General: Skin is warm.      Coloration: Skin is not jaundiced.      Findings: Bruising present.   Neurological:      General: No focal deficit present.      Mental Status: He is alert.      Motor: Weakness present.   Psychiatric:         Mood and Affect: Mood normal.         Meds:    Current Facility-Administered Medications:   •  [COMPLETED] piperacillin-tazobactam **AND** piperacillin-tazobactam  •  micafungin  •  LR  •  insulin regular  •  Notify **AND** glucose **AND** dextrose 10% bolus  •  therapeutic multivitamin-minerals  •  ascorbic acid  •  amLODIPine  •  famotidine  •  enalaprilat  •  heparin  •  ipratropium-albuterol  •  haloperidol lactate  •  clotrimazole  •  latanoprost  •  polyethylene glycol/lytes  •  simvastatin  •  tamsulosin  •  acetaminophen  •  ondansetron  •  ondansetron  •  senna-docusate **AND** polyethylene glycol/lytes **AND** magnesium hydroxide **AND** bisacodyl  •  Respiratory Care per Protocol    Labs:  Recent Labs     12/03/19  0346 12/04/19  0102 12/05/19  0237   WBC 7.7 8.2 9.1   RBC 3.14* 3.03* 3.20*   HEMOGLOBIN 9.3* 9.2* 9.6*   HEMATOCRIT 30.4* 29.3* 31.1*   MCV  96.8 96.7 97.2   MCH 29.6 30.4 30.0   RDW 55.6* 55.1* 57.0*   PLATELETCT 238 226 240   MPV 8.9* 8.7* 8.9*   NEUTSPOLYS 74.10* 78.50* 80.10*   LYMPHOCYTES 10.20* 8.40* 6.90*   MONOCYTES 8.60 8.40 7.00   EOSINOPHILS 4.30 2.70 4.30   BASOPHILS 1.40 1.00 1.00     Recent Labs     12/03/19 0346 12/04/19 0102 12/05/19  0237   SODIUM 142 140 142   POTASSIUM 4.1 4.1 4.5   CHLORIDE 109 109 109   CO2 25 25 26   GLUCOSE 84 143* 95   BUN 31* 31* 32*     Recent Labs     12/03/19 0346 12/04/19 0102 12/05/19 0237   ALBUMIN  --   --  3.3   TBILIRUBIN  --   --  0.6   ALKPHOSPHAT  --   --  44   TOTPROTEIN  --   --  6.2   ALTSGPT  --   --  8   ASTSGOT  --   --  16   CREATININE 2.88* 2.85* 3.08*       Imaging:  Ct-renal Colic Evaluation(a/p W/o)    Result Date: 11/22/2019 11/22/2019 6:59 PM HISTORY/REASON FOR EXAM:  abnormal labs Cr- and urine infection r/o hydronephrosis. TECHNIQUE/EXAM DESCRIPTION AND NUMBER OF VIEWS: CT scan renal/colic without contrast. 5 mm helical images of the abdomen and pelvis were obtained from the diaphragmatic domes through the pubic symphysis. Low dose optimization technique was utilized for this CT exam including automated exposure control and adjustment of the mA and/or kV according to patient size. COMPARISON: None. FINDINGS: Visualized lung bases show small bilateral pleural fluid collections with associated dependent atelectasis, worse on the LEFT. The liver is unremarkable. The spleen is unremarkable. Adrenal glands are unremarkable. The kidneys are unremarkable. Pancreas is atrophic. The gallbladder is unremarkable. Steen catheter within mildly distended bladder. Prostate is enlarged. Colonic diverticula noted. Increased small bowel and colonic gas.  Prominent fluid-filled small bowel loops in the RIGHT lower quadrant. No pneumoperitoneum. Moderate atherosclerotic calcification of abdominal aorta. Lumbar spine degenerative changes. T12 vertebral hemangioma. T-spine wall edema.     1.  Dilated  fluid-filled small bowel loops in RIGHT lower quadrant concerning for developing obstruction. 2.  No evidence of bowel perforation. 3.  Colonic diverticulosis without evidence for diverticulitis. 4.  No renal stone or hydronephrosis. 5.  Appendix is not visualized. 6.  Enlarged prostate. 7.  Small bilateral pleural effusions with associated atelectasis. 8.  Colonic diverticulosis without evidence for diverticulitis.    [id number] View    Result Date: 11/25/2019 11/25/2019 3:01 PM HISTORY/REASON FOR EXAM:  Abdominal pain. TECHNIQUE/EXAM DESCRIPTION AND NUMBER OF VIEWS:  1 view(s) of the abdomen. COMPARISON: None FINDINGS: AP view of the abdomen demonstrates scattered loops of air-filled bowel. Small bowel loops measure up to 4.9 cm. Phleboliths are in the pelvis. There are arterial calcifications. Degenerative changes are in the spine.     Diffuse bowel distention. Findings are suggestive of ileus. Early obstruction could have a similar appearance.    Dx-chest-limited (1 View)    Result Date: 11/26/2019 11/26/2019 3:12 AM HISTORY/REASON FOR EXAM:  Shortness of Breath TECHNIQUE/EXAM DESCRIPTION AND NUMBER OF VIEWS: Single portable view of the chest. COMPARISON: Yesterday FINDINGS: HEART: Stable size. There is atherosclerotic calcification in the aortic arch. LUNGS: Lung volumes are low. There are perihilar opacities. There are bibasilar opacities. PLEURA: No effusion or pneumothorax.     Bibasilar and perihilar underinflation atelectasis which could obscure an additional process. This is unchanged.    Dx-chest-limited (1 View)    Result Date: 11/25/2019 11/25/2019 2:08 AM HISTORY/REASON FOR EXAM:  Respiratory failure TECHNIQUE/EXAM DESCRIPTION AND NUMBER OF VIEWS: Single portable view of the chest. COMPARISON: 11/24/2019 FINDINGS: LUNGS: Slight interval worsening in opacities in the right mid/lower lung. Unchanged opacity in the left lung base. No significant pleural effusions. PNEUMOTHORAX: None. LINES AND  TUBES: None. MEDIASTINUM: Stable cardiac silhouette. MUSCULOSKELETAL STRUCTURES: Unchanged.     1.  Interval worsening of pulmonary opacities in the right mid/lower lung. 2.  Unchanged left basilar atelectasis and/or consolidation. No significant pleural effusions.    Dx-chest-portable (1 View)    Result Date: 11/24/2019 11/24/2019 10:53 AM HISTORY/REASON FOR EXAM: Shortness of Breath. TECHNIQUE/EXAM DESCRIPTION AND NUMBER OF VIEWS: Single AP view of the chest. COMPARISON: 11/21/2019 FINDINGS: Lungs: Large volumes with reticular predominantly increased opacity is similar to comparison. There is also some hazy opacity at the bases especially on the left where there is some hemidiaphragm elevation Pleura:  Small layering effusions are likely Heart and mediastinum: There is stable cardiac silhouette enlargement.     Similar reticular opacities suspicious for edema and there are likely small layering effusions More focal basilar opacity is more likely from atelectasis than consolidation    Dx-chest-portable (1 View)    Result Date: 11/21/2019 11/21/2019 4:49 AM HISTORY/REASON FOR EXAM: Possible sepsis. TECHNIQUE/EXAM DESCRIPTION:  Single AP view of the chest. COMPARISON: None FINDINGS: Overlying cardiac leads are present. Cardiomegaly is observed. Atherosclerotic calcification of the aorta is noted.  The central  pulmonary vasculature appears prominent and indistinct. The lungs appear well expanded bilaterally.  Diffuse scattered hazy pulmonary parenchymal opacities are seen. The lung bases are partially excluded. The bony structures appear age-appropriate.     1.  Pulmonary edema and/or infiltrates are identified, which are stable since the prior exam. 2.  Cardiomegaly 3.  Atherosclerosis    Ec-echocardiogram Complete W/o Cont    Result Date: 11/23/2019  Transthoracic Echo Report Echocardiography Laboratory CONCLUSIONS No prior study is available for comparison. Technically difficult study. Normal left ventricular  chamber size. Grossly preserved left ventricular systolic function. The right ventricle was normal in size and function. Mild aortic stenosis. NIR JOSHI Exam Date:         2019                    13:42 Exam Location:     Inpatient Priority:          Routine Ordering Physician:        KRISTINA GALEANA Referring Physician:       [id number] Sonographer:               Tessie Hill RDCS Age:    79     Gender:    M MRN:    [id number] :    1940 BSA:    2.27   Ht (in):    79     Wt (lb):    198 Exam Type:     Complete Indications:     Shortness of breath ICD Codes:       R06.02 CPT Codes:       47484 BP:   124    /   55     HR:   55 Technical Quality:       Technically difficult study -                          adequate information is obtained MEASUREMENTS  (Male / Female) Normal Values 2D ECHO LV Diastolic Diameter PLAX        5 cm                  4.2 - 5.9 / 3.9 - 5.3 cm LV Systolic Diameter PLAX         2.6 cm                2.1 - 4.0 cm IVS Diastolic Thickness           1.1 cm                LVPW Diastolic Thickness          1.3 cm                LVOT Diameter                     1.7 cm                LV Ejection Fraction MOD 2C       67.1 %                DOPPLER AV Peak Velocity                  2.2 m/s               AV Peak Gradient                  19 mmHg               AV Mean Gradient                  10.9 mmHg             LVOT Peak Velocity                1.1 m/s               AV Area Cont Eq vti               0.97 cm???              Mitral E Point Velocity           0.61 m/s              Mitral E to A Ratio               1.7                   MV Pressure Half Time             27.9 ms               MV Area PHT                       7.9 cm???               MV Deceleration Time              96.4 ms               * Indicates values subject to auto-interpretation LV EF:        % FINDINGS Left Ventricle Normal left ventricular chamber size. Mild concentric left ventricular hypertrophy. Grossly  preserved left ventricular systolic function. Unable to assess wall motion. Indeterminate diastolic function. Right Ventricle The right ventricle was normal in size and function. Right Atrium The right atrium is normal in size. Inferior vena cava is not well visualized. Left Atrium The left atrium is normal in size. Left atrial volume index is 22 mL/sq m. Mitral Valve The mitral valve is not well visualized. No stenosis or regurgitation seen. Aortic Valve The aortic valve is not well visualized. Calcified aortic valve leaflets. Mild aortic stenosis. Transvalvular gradients are - Peak: 20 mmHg, Mean: 12 mmHg. Dimensionless index is (0.39). No aortic insufficiency. Tricuspid Valve Structurally normal tricuspid valve without significant stenosis or regurgitation. Pulmonic Valve Structurally normal pulmonic valve without significant stenosis or regurgitation. Pericardium Normal pericardium without effusion. Aorta The aortic root is normal. Ascending aorta not well visualized. Marcelo Smith MD (Electronically Signed) Final Date:     23 November 2019                 16:53      Micro:  Results     Procedure Component Value Units Date/Time    URINALYSIS [[id number]]  (Abnormal) Collected:  12/04/19 0620    Order Status:  Completed Specimen:  Urine, Steen Cath Updated:  12/04/19 0815     Color Yellow     Character Clear     Specific Gravity 1.016     Ph 5.5     Glucose Negative mg/dL      Ketones Negative mg/dL      Protein Negative mg/dL      Bilirubin Negative     Urobilinogen, Urine 0.2     Nitrite Negative     Leukocyte Esterase Small     Occult Blood Moderate     Micro Urine Req Microscopic    Narrative:       Collected By:[id number] HENRI LOPEZ    BLOOD CULTURE [[id number]] Collected:  11/25/19 0800    Order Status:  Completed Specimen:  Blood from Peripheral Updated:  11/30/19 1100     Significant Indicator NEG     Source BLD     Site PERIPHERAL     Culture Result No growth after 5 days of incubation.    Narrative:   "     Collected By:[id number] YUKO LOPEZ.  Per Hospital Policy: Only change Specimen Src: to \"Line\" if  specified by physician order.  Right Forearm/Arm    BLOOD CULTURE [[id number]] Collected:  11/25/19 0800    Order Status:  Completed Specimen:  Blood from Peripheral Updated:  11/30/19 1100     Significant Indicator NEG     Source BLD     Site PERIPHERAL     Culture Result No growth after 5 days of incubation.    Narrative:       Collected By:[id number] YUKO LOPEZ.  Per Hospital Policy: Only change Specimen Src: to \"Line\" if  specified by physician order.  Left Forearm/Arm    Blood Culture [[id number]] Collected:  11/24/19 1300    Order Status:  Completed Specimen:  Blood from Peripheral Updated:  11/29/19 1500     Significant Indicator NEG     Source BLD     Site PERIPHERAL     Culture Result No growth after 5 days of incubation.    Narrative:       Collected By:[id number] CAMRON MCCOY  From different peripheral sites, if not done within the last  24 hours (Per Hospital Policy: Only change specimen source to  \"Line\" if specified by physician order)  Left Forearm/Arm    Blood Culture [[id number]] Collected:  11/24/19 1300    Order Status:  Completed Specimen:  Blood from Peripheral Updated:  11/29/19 1500     Significant Indicator NEG     Source BLD     Site PERIPHERAL     Culture Result No growth after 5 days of incubation.    Narrative:       Collected By:[id number] CAMRON MCCOY  From different peripheral sites, if not done within the last  24 hours (Per Hospital Policy: Only change specimen source to  \"Line\" if specified by physician order)  Right Forearm/Arm          Assessment:  79-year-old white male admitted on 11/21/2019 due to altered mental status.  He had concern for acute-on-chronic renal failure, urinary tract infection as well as CAP.  His renal failure was thought to be due to a malfunctioning Steen catheter and a new catheter was placed with significant urinary retention up over 1500 mL. "  Urine culture was showing  Gram-negative rods and blood cultures showing Gram-negative rods, so infectious disease was consulted for antibiotic recommendations and management.  Rapid response was called due to concern for paresthesia of the distal extremities and he was transferred to the ICU.  Blood cultures on 11/21 with growth of yeast as well as Pseudomonas.      Plan:  UTI and Gram-negative sepsis - PSAR On treatment  Afebrile  No leukocytosis  Urine and Blood cxs +PSAR 11/21  Repeat blood cultures - No growth 11/24 and 11/25  Continue Zosyn for PSAR UTI and bacteremia, possible aspiration pneumonia  Stop date 12/7/2019     Candidemia, on treatment  C glabrata unclear etiology, patient without central line, significant urinary retention but did not grow from urine culture.    CT concerning for developing obstruction but no obvious bowel perforation.  BCx 11/21+ C glabrata -dose dependent fluconazole susceptible  Ophthalmology performed dilated exam on 11/25, no evidence of endophthalmitis  SARAVANAN neg for IE  Continue micafungin   Stop date 12/7/2019     Encephalopathy, stable  Plan had been for LP but this was not completed  MRA/ MRI considered but not done   Query at baseline     Acute renal failure-persistent. Worse today  Multifactorial including his sepsis, urinary tract infection, and obstructed Steen catheter.    His Steen catheter has since been replaced.    Antibiotics will need to be dose adjusted accordingly  CT neg for obstruction or stone  Monitor his renal function and urine output carefully-dose Zosyn decreased.       I have performed a physical exam and reviewed and updated ROS as of today.  In review of yesterday's note dated  12/4/2019, there are no changes except as documented above.    Plan for transfer soon  Will sign off-please reconsult if needed

## 2019-12-05 NOTE — CONSULTS
Consults      [facility] Nephrology Consult    Chief Complaint:   I have a problem with my kidneys    HPI:    79-year-old man with diabetes, hypertension who presented from [facility] who was brought in for altered mental status. Patient was admitted for pneumonia and UTI with LOLA. We were initially consulted for LOLA secondary to urinary obstruction ad this improved with treatment. Blood cultures and urine culture were positive for Pseudomonas; blood also grew Candida glabrata. SARAVANAN on 12/3 was negative for endocarditis. Infectious disease recommends continuing Zosyn and micafungin until 12/7/2019.  Scr initially on presentation was 2.5 and improved to 1.74 but now has climbed to 3 hence prompting nephrology evaluation again.    PAST MEDICAL HISTORY:  As mentioned above, diabetes, hypertension, BPH,   gastroesophageal reflux disease, and chronic kidney disease, unknown what his   baseline serum creatinine is.     FAMILY HISTORY:  Unremarkable for kidney disease.     SOCIAL HISTORY:  There is no evidence of smoking, drinking, or illicit drug   Use.    ALLERGIES: No known drug allergies noted     ROS: Unable to fully assess given patient mental status and medical condition.    Physical Exam  Vitals signs and nursing note reviewed.   Constitutional:       General: He is not in acute distress.     Appearance: He is not ill-appearing.      Comments: Thin, frail   HENT:      Head: Normocephalic.      Mouth/Throat:      Mouth: Mucous membranes are moist.   Eyes:      General:         Right eye: No discharge.         Left eye: No discharge.   Cardiovascular:      Rate and Rhythm: Normal rate and regular rhythm.   Pulmonary:      Effort: No respiratory distress.      Breath sounds: No wheezing or rales.   Abdominal:      General: Abdomen is flat.      Palpations: Abdomen is soft.      Tenderness: There is no tenderness.   Musculoskeletal:      Comments: Diffuse mild muscle atrophy   Skin:     General: Skin is warm and dry.    Neurological:      Mental Status: He is alert.      Comments: Oriented to self only       Laboratory        Recent Labs     12/03/19 0346 12/04/19 0102 12/05/19  0237   WBC 7.7 8.2 9.1   RBC 3.14* 3.03* 3.20*   HEMOGLOBIN 9.3* 9.2* 9.6*   HEMATOCRIT 30.4* 29.3* 31.1*   MCV 96.8 96.7 97.2   MCH 29.6 30.4 30.0   MCHC 30.6* 31.4* 30.9*   RDW 55.6* 55.1* 57.0*   PLATELETCT 238 226 240   MPV 8.9* 8.7* 8.9*            Recent Labs     12/03/19 0346 12/04/19 0102 12/05/19 0237   SODIUM 142 140 142   POTASSIUM 4.1 4.1 4.5   CHLORIDE 109 109 109   CO2 25 25 26   GLUCOSE 84 143* 95   BUN 31* 31* 32*   CREATININE 2.88* 2.85* 3.08*   CALCIUM 8.6 8.2* 8.7      Labs were reviewed in the medical record.    ASSESSMENT:  1.  LOLA again. Etiology is unclear but suspect given high dose Zosyn, interstitial nephritis is high in the differential. Also in the differential is hemodynamic instability with bradycardia but this has been chronic. I would continue IV fluids for now.  He has echocardiogram showing normal left ventricular chamber size, mild concentric left ventricular hypertrophy, and preserved left ventricular systolic function.  Decrease dose of Zosyn to renal dosing. Avoid nephrotoxins.  2.  Chronic kidney disease, unknown baseline levels.  3.  Candidemia  4.  Pseudomonas bacermia  5.  Respiratory Failure - resolved  6.  CAP  7.  Bradycardia  8.  Anemia        Thank you and will follow with you.

## 2019-12-05 NOTE — CARE PLAN
Problem: Communication  Goal: The ability to communicate needs accurately and effectively will improve  Outcome: PROGRESSING SLOWER THAN EXPECTED     Problem: Safety  Goal: Will remain free from injury  Outcome: PROGRESSING SLOWER THAN EXPECTED  Goal: Will remain free from falls  Outcome: PROGRESSING SLOWER THAN EXPECTED     Problem: Infection  Goal: Will remain free from infection  Outcome: PROGRESSING SLOWER THAN EXPECTED     Problem: Venous Thromboembolism (VTW)/Deep Vein Thrombosis (DVT) Prevention:  Goal: Patient will participate in Venous Thrombosis (VTE)/Deep Vein Thrombosis (DVT)Prevention Measures  Outcome: PROGRESSING SLOWER THAN EXPECTED     Problem: Bowel/Gastric:  Goal: Normal bowel function is maintained or improved  Outcome: PROGRESSING SLOWER THAN EXPECTED  Goal: Will not experience complications related to bowel motility  Outcome: PROGRESSING SLOWER THAN EXPECTED     Problem: Knowledge Deficit  Goal: Knowledge of disease process/condition, treatment plan, diagnostic tests, and medications will improve  Outcome: PROGRESSING SLOWER THAN EXPECTED  Goal: Knowledge of the prescribed therapeutic regimen will improve  Outcome: PROGRESSING SLOWER THAN EXPECTED     Problem: Discharge Barriers/Planning  Goal: Patient's continuum of care needs will be met  Outcome: PROGRESSING SLOWER THAN EXPECTED     Problem: Respiratory:  Goal: Respiratory status will improve  Outcome: PROGRESSING SLOWER THAN EXPECTED     Problem: Fluid Volume:  Goal: Will maintain balanced intake and output  Outcome: PROGRESSING SLOWER THAN EXPECTED     Problem: Pain Management  Goal: Pain level will decrease to patient's comfort goal  Outcome: PROGRESSING SLOWER THAN EXPECTED     Problem: Skin Integrity  Goal: Risk for impaired skin integrity will decrease  Outcome: PROGRESSING SLOWER THAN EXPECTED     Problem: Psychosocial Needs:  Goal: Level of anxiety will decrease  Outcome: PROGRESSING SLOWER THAN EXPECTED     Problem: Safety -  Medical Restraint  Goal: Remains free of injury from restraints (Restraint for Interference with Medical Device)  Description  INTERVENTIONS:  1. Determine that other, less restrictive measures have been tried or would not be effective before applying the restraint  2. Evaluate the patient's condition at the time of restraint application  3. Inform patient/family regarding the reason for restraint  4. Q2H: Monitor safety, psychosocial status, comfort, nutrition and hydration  Outcome: PROGRESSING SLOWER THAN EXPECTED  Goal: Free from restraint(s) (Restraint for Interference with Medical Device)  Description  INTERVENTIONS:  1. ONCE/SHIFT or MINIMUM Q12H: Assess and document the continuing need for restraints  2. Q24H: Continued use of restraint requires LIP to perform face to face examination and written order  3. Identify and implement measures to help patient regain control  Outcome: PROGRESSING SLOWER THAN EXPECTED

## 2019-12-05 NOTE — PROGRESS NOTES
Assumed care of pt at 0745. Report received and bedside rounding completed with night RN. Pt is calm no SOB, or in any acute distress noted.    Pt is considered a High fall risk. edu provided on risk level.   Fall precautions in place,  bed alarm. - Treaded non slip socks. Bed locked. Communication board updated with POC. Call light and pt belongings within reach - pt makes needs known - hourly rounding in place. See flowsheets for further assessment.

## 2019-12-05 NOTE — WOUND TEAM
Wound Team consulted for wound to lower back. No wounds see on lower back or sacrum. Small wound over coccyx already seen by Wound Team, is stable and looks smaller, Skin Care orders written that day (11/22/2019). Barrier cream applied, CNA stated that patient incontinent.   No interventions needed from Wound Team today.

## 2019-12-05 NOTE — PROGRESS NOTES
Hospital Medicine Daily Progress Note    Date of Service  12/5/2019    Chief Complaint  79 y.o. male admitted 11/21/2019 with AMS    Hospital Course    79-year-old man with diabetes, hypertension who presented from [facility] who was brought in for altered mental status.  Patient was admitted for pneumonia and UTI with LOLA.  He had a Steen catheter that was replaced.  Nephrology was consulted, his LOLA improved with treatment of UTI and urinary retention.  Blood cultures and urine culture were positive for Pseudomonas; blood also grew Ijeoma glabrata.  Dr. Wyman with ophthalmology showed no evidence of endophthlamitis on dilated exam 11/25.  Unclear source of candidemia.  SARAVANAN on 12/3 was negative for endocarditis.  Infectious disease recommended continuing Zosyn and micafungin until 12/7/2019.      Interval Problem Update  Creat >3. I will consult nephrology  Patient says sometimes his belly aches, but otherwise feels well. Denies dysuria. Steen in place    Consultants/Specialty  Critical care  General surgeon  Ophthalmology  Neurology  Nephrology  Infectious disease  Cardiology    Code Status  Full Code      Disposition  From [facility]  Evaluate for SNF versus memory care vs acute rehab  Does not have PICC    Review of Systems  Review of Systems   Constitutional: Negative for malaise/fatigue.   HENT: Negative for congestion.    Respiratory: Negative for shortness of breath.    Cardiovascular: Negative for chest pain.   Gastrointestinal: Negative for abdominal pain.   Genitourinary: Negative for dysuria and urgency.   Musculoskeletal: Negative for myalgias.   Skin: Negative for itching.   Neurological: Negative for dizziness and headaches.        Physical Exam  Temp:  [36.7 °C (98.1 °F)] 36.7 °C (98.1 °F)  Pulse:  [47-55] 47  Resp:  [16-18] 18  BP: (145)/(62-70) 145/70  SpO2:  [90 %-92 %] 90 %    Physical Exam  Vitals signs and nursing note reviewed.   Constitutional:       General: He is not in acute distress.      Appearance: He is not ill-appearing.      Comments: frail   HENT:      Head: Normocephalic.      Mouth/Throat:      Mouth: Mucous membranes are moist.   Eyes:      General:         Right eye: No discharge.         Left eye: No discharge.   Cardiovascular:      Rate and Rhythm: Normal rate and regular rhythm.   Pulmonary:      Effort: No respiratory distress.      Breath sounds: No wheezing or rales.   Abdominal:      General: Abdomen is flat.      Palpations: Abdomen is soft.      Tenderness: There is no tenderness.   Musculoskeletal:      Comments: Diffuse mild muscle atrophy   Skin:     General: Skin is warm and dry.   Neurological:      Mental Status: He is alert.      Comments: Oriented to self and [facility]. Disoriented to year and month         Fluids    Intake/Output Summary (Last 24 hours) at 12/5/2019 1319  Last data filed at 12/5/2019 1234  Gross per 24 hour   Intake 120 ml   Output 1000 ml   Net -880 ml       Laboratory  Recent Labs     12/03/19  0346 12/04/19  0102 12/05/19  0237   WBC 7.7 8.2 9.1   RBC 3.14* 3.03* 3.20*   HEMOGLOBIN 9.3* 9.2* 9.6*   HEMATOCRIT 30.4* 29.3* 31.1*   MCV 96.8 96.7 97.2   MCH 29.6 30.4 30.0   MCHC 30.6* 31.4* 30.9*   RDW 55.6* 55.1* 57.0*   PLATELETCT 238 226 240   MPV 8.9* 8.7* 8.9*     Recent Labs     12/03/19  0346 12/04/19  0102 12/05/19  0237   SODIUM 142 140 142   POTASSIUM 4.1 4.1 4.5   CHLORIDE 109 109 109   CO2 25 25 26   GLUCOSE 84 143* 95   BUN 31* 31* 32*   CREATININE 2.88* 2.85* 3.08*   CALCIUM 8.6 8.2* 8.7                   Imaging  EC-SARAVANAN W/O CONT   Final Result      DX-CHEST-LIMITED (1 VIEW)   Final Result      Bibasilar and perihilar underinflation atelectasis which could obscure an additional process. This is unchanged.      [id number] VIEW   Final Result      Diffuse bowel distention. Findings are suggestive of ileus. Early obstruction could have a similar appearance.      DX-CHEST-LIMITED (1 VIEW)   Final Result      1.  Interval worsening of pulmonary  opacities in the right mid/lower lung.   2.  Unchanged left basilar atelectasis and/or consolidation. No significant pleural effusions.      DX-CHEST-PORTABLE (1 VIEW)   Final Result      Similar reticular opacities suspicious for edema and there are likely small layering effusions      More focal basilar opacity is more likely from atelectasis than consolidation      EC-ECHOCARDIOGRAM COMPLETE W/O CONT   Final Result      CT-RENAL COLIC EVALUATION(A/P W/O)   Final Result      1.  Dilated fluid-filled small bowel loops in RIGHT lower quadrant concerning for developing obstruction.   2.  No evidence of bowel perforation.   3.  Colonic diverticulosis without evidence for diverticulitis.   4.  No renal stone or hydronephrosis.   5.  Appendix is not visualized.   6.  Enlarged prostate.   7.  Small bilateral pleural effusions with associated atelectasis.   8.  Colonic diverticulosis without evidence for diverticulitis.      DX-CHEST-PORTABLE (1 VIEW)   Final Result         1.  Pulmonary edema and/or infiltrates are identified, which are stable since the prior exam.   2.  Cardiomegaly   3.  Atherosclerosis           Assessment/Plan  * Candidemia (HCC)- (present on admission)  Assessment & Plan  Appreciate ID recommendations  Seen by ophthalmology, negative endophthlamitis  Continuing micafungin stop date 12/7    Bacteremia due to Pseudomonas- (present on admission)  Assessment & Plan  Infectious disease following, continue Zosyn for CAP, bacteremia, and UTI stop date 12/7  SARAVANAN on 12/3 negative for endocarditis    Acute respiratory failure with hypoxia, RAD, PNA (HCC)- (present on admission)  Assessment & Plan  Resolving, wean off O2 as tolerated    Acute renal failure (ARF) ([facility])- (present on admission)  Assessment & Plan  Likely was 2/2 urinary retention/sawyer obstruction  Kidney function had improved to 1.98, but now worsening  I stopped Jardiance, avoid nephrotoxins  CT renal colic on 11/22 showed normal kidneys  FeNa  2.3%  Continue IVF  Monitor urine output, sawyer cath in place  Renal diet  Creat worsening, I consulted nephrology. Adjusted zosyn dose.    Altered mental status- (present on admission)  Assessment & Plan  Patient has baseline dementia and is likely close to baseline being oriented only to person  Neuro checks.  Monitor mental status      UTI (urinary tract infection)- (present on admission)  Assessment & Plan  Urinary tract infection associated with chronic indwelling Sawyer catheter changed 11/21  Complicated urinary tract infection with associated encephalopathy  Appreciate infectious disease consultation, continuing Zosyn stop date 12/7    CAP (community acquired pneumonia)- (present on admission)  Assessment & Plan  Pseudomonas; continue zosyn per ID.  Stop date 12/7  Rt protocol  Wean oxygen as tolerated      Bradycardia- (present on admission)  Assessment & Plan  Patient has been bradycardic, but seems to be asymptomatic.  Normotensive.  Hold AV zoraida blockers.    Hyperglycemia  Assessment & Plan  Jardiance was on medication list as well as insulin sliding scale  A1c does not show diabetes  Glucose has been low, start Jardiance  Monitor glucose after meals  Hypoglycemia protocol    Normocytic normochromic anemia- (present on admission)  Assessment & Plan  Asymptomatic  No active signs of bleeding  Transfuse to maintain hemoglobin > 7       VTE prophylaxis: heparin

## 2019-12-06 LAB
ANION GAP SERPL CALC-SCNC: 9 MMOL/L (ref 0–11.9)
BASOPHILS # BLD AUTO: 1.1 % (ref 0–1.8)
BASOPHILS # BLD: 0.09 K/UL (ref 0–0.12)
BUN SERPL-MCNC: 30 MG/DL (ref 8–22)
CALCIUM SERPL-MCNC: 8.3 MG/DL (ref 8.5–10.5)
CHLORIDE SERPL-SCNC: 109 MMOL/L (ref 96–112)
CO2 SERPL-SCNC: 23 MMOL/L (ref 20–33)
CREAT SERPL-MCNC: 3.02 MG/DL (ref 0.5–1.4)
EOSINOPHIL # BLD AUTO: 0.41 K/UL (ref 0–0.51)
EOSINOPHIL NFR BLD: 4.8 % (ref 0–6.9)
ERYTHROCYTE [DISTWIDTH] IN BLOOD BY AUTOMATED COUNT: 55.5 FL (ref 35.9–50)
GLUCOSE BLD-MCNC: 100 MG/DL (ref 65–99)
GLUCOSE BLD-MCNC: 108 MG/DL (ref 65–99)
GLUCOSE BLD-MCNC: 86 MG/DL (ref 65–99)
GLUCOSE BLD-MCNC: 86 MG/DL (ref 65–99)
GLUCOSE SERPL-MCNC: 86 MG/DL (ref 65–99)
HCT VFR BLD AUTO: 29.7 % (ref 42–52)
HGB BLD-MCNC: 9.5 G/DL (ref 14–18)
IMM GRANULOCYTES # BLD AUTO: 0.07 K/UL (ref 0–0.11)
IMM GRANULOCYTES NFR BLD AUTO: 0.8 % (ref 0–0.9)
LYMPHOCYTES # BLD AUTO: 0.68 K/UL (ref 1–4.8)
LYMPHOCYTES NFR BLD: 7.9 % (ref 22–41)
MCH RBC QN AUTO: 30.4 PG (ref 27–33)
MCHC RBC AUTO-ENTMCNC: 32 G/DL (ref 33.7–35.3)
MCV RBC AUTO: 95.2 FL (ref 81.4–97.8)
MONOCYTES # BLD AUTO: 0.7 K/UL (ref 0–0.85)
MONOCYTES NFR BLD AUTO: 8.2 % (ref 0–13.4)
NEUTROPHILS # BLD AUTO: 6.62 K/UL (ref 1.82–7.42)
NEUTROPHILS NFR BLD: 77.2 % (ref 44–72)
NRBC # BLD AUTO: 0 K/UL
NRBC BLD-RTO: 0 /100 WBC
PLATELET # BLD AUTO: 216 K/UL (ref 164–446)
PMV BLD AUTO: 9.3 FL (ref 9–12.9)
POTASSIUM SERPL-SCNC: 4.2 MMOL/L (ref 3.6–5.5)
RBC # BLD AUTO: 3.12 M/UL (ref 4.7–6.1)
SODIUM SERPL-SCNC: 141 MMOL/L (ref 135–145)
WBC # BLD AUTO: 8.6 K/UL (ref 4.8–10.8)

## 2019-12-06 PROCEDURE — 700102 HCHG RX REV CODE 250 W/ 637 OVERRIDE(OP): Performed by: INTERNAL MEDICINE

## 2019-12-06 PROCEDURE — 770006 HCHG ROOM/CARE - MED/SURG/GYN SEMI*

## 2019-12-06 PROCEDURE — 700105 HCHG RX REV CODE 258: Performed by: INTERNAL MEDICINE

## 2019-12-06 PROCEDURE — 700111 HCHG RX REV CODE 636 W/ 250 OVERRIDE (IP): Performed by: INTERNAL MEDICINE

## 2019-12-06 PROCEDURE — A9270 NON-COVERED ITEM OR SERVICE: HCPCS | Performed by: HOSPITALIST

## 2019-12-06 PROCEDURE — 80048 BASIC METABOLIC PNL TOTAL CA: CPT

## 2019-12-06 PROCEDURE — 82962 GLUCOSE BLOOD TEST: CPT

## 2019-12-06 PROCEDURE — 85025 COMPLETE CBC W/AUTO DIFF WBC: CPT

## 2019-12-06 PROCEDURE — A9270 NON-COVERED ITEM OR SERVICE: HCPCS | Performed by: INTERNAL MEDICINE

## 2019-12-06 PROCEDURE — 99232 SBSQ HOSP IP/OBS MODERATE 35: CPT | Performed by: INTERNAL MEDICINE

## 2019-12-06 PROCEDURE — 36415 COLL VENOUS BLD VENIPUNCTURE: CPT

## 2019-12-06 PROCEDURE — 97530 THERAPEUTIC ACTIVITIES: CPT

## 2019-12-06 PROCEDURE — 700102 HCHG RX REV CODE 250 W/ 637 OVERRIDE(OP): Performed by: HOSPITALIST

## 2019-12-06 RX ADMIN — HEPARIN SODIUM 5000 UNITS: 5000 INJECTION INTRAVENOUS; SUBCUTANEOUS at 14:04

## 2019-12-06 RX ADMIN — CLOTRIMAZOLE 1 APPLICATION: 10 CREAM TOPICAL at 06:00

## 2019-12-06 RX ADMIN — PIPERACILLIN AND TAZOBACTAM 3.38 G: 3; .375 INJECTION, POWDER, LYOPHILIZED, FOR SOLUTION INTRAVENOUS; PARENTERAL at 17:10

## 2019-12-06 RX ADMIN — PIPERACILLIN AND TAZOBACTAM 3.38 G: 3; .375 INJECTION, POWDER, LYOPHILIZED, FOR SOLUTION INTRAVENOUS; PARENTERAL at 06:00

## 2019-12-06 RX ADMIN — HEPARIN SODIUM 5000 UNITS: 5000 INJECTION INTRAVENOUS; SUBCUTANEOUS at 21:08

## 2019-12-06 RX ADMIN — MULTIPLE VITAMINS W/ MINERALS TAB 1 TABLET: TAB at 04:38

## 2019-12-06 RX ADMIN — AMLODIPINE BESYLATE 10 MG: 10 TABLET ORAL at 04:38

## 2019-12-06 RX ADMIN — LATANOPROST 1 DROP: 50 SOLUTION OPHTHALMIC at 21:08

## 2019-12-06 RX ADMIN — OXYCODONE HYDROCHLORIDE AND ACETAMINOPHEN 500 MG: 500 TABLET ORAL at 04:38

## 2019-12-06 RX ADMIN — MICAFUNGIN SODIUM 100 MG: 20 INJECTION, POWDER, LYOPHILIZED, FOR SOLUTION INTRAVENOUS at 06:00

## 2019-12-06 RX ADMIN — CLOTRIMAZOLE 1 APPLICATION: 10 CREAM TOPICAL at 17:10

## 2019-12-06 RX ADMIN — FAMOTIDINE 20 MG: 10 INJECTION INTRAVENOUS at 04:39

## 2019-12-06 RX ADMIN — HEPARIN SODIUM 5000 UNITS: 5000 INJECTION INTRAVENOUS; SUBCUTANEOUS at 04:39

## 2019-12-06 RX ADMIN — HALOPERIDOL LACTATE 5 MG: 5 INJECTION, SOLUTION INTRAMUSCULAR at 19:00

## 2019-12-06 RX ADMIN — TAMSULOSIN HYDROCHLORIDE 0.4 MG: 0.4 CAPSULE ORAL at 04:38

## 2019-12-06 RX ADMIN — SENNOSIDES AND DOCUSATE SODIUM 2 TABLET: 8.6; 5 TABLET ORAL at 17:10

## 2019-12-06 ASSESSMENT — COGNITIVE AND FUNCTIONAL STATUS - GENERAL
HELP NEEDED FOR BATHING: A LOT
DRESSING REGULAR LOWER BODY CLOTHING: A LOT
TOILETING: A LITTLE
DAILY ACTIVITIY SCORE: 17
DRESSING REGULAR UPPER BODY CLOTHING: A LITTLE
PERSONAL GROOMING: A LITTLE
SUGGESTED CMS G CODE MODIFIER DAILY ACTIVITY: CK

## 2019-12-06 ASSESSMENT — ENCOUNTER SYMPTOMS
MYALGIAS: 0
COUGH: 0
SHORTNESS OF BREATH: 0
DIZZINESS: 0
ABDOMINAL PAIN: 0
WEAKNESS: 0

## 2019-12-06 NOTE — PROGRESS NOTES
Assumed care of pt at 0745. Report received and bedside rounding completed with night RN. Pt is calm no SOB, or in any acute distress noted.    Pt is considered a high fall risk. edu provided on risk level. Pt is confused aaox1 self  Fall precautions in place,  bed alarm. - Treaded non slip socks. Bed locked. Communication board updated with POC. Call light and pt belongings within reach - pt makes needs known - hourly rounding in place. See flowsheets for further assessment.

## 2019-12-06 NOTE — THERAPY
"Occupational Therapy Treatment completed  Functional Status:  Pt seen for OT tx today. Pt performed STS from w/c with min to mod a; required cues for sequencing and hand positioning, LB dressing max a, ambulated few steps with min a for safety d/t pt's poor cognition, chair t/f with min a completed. Pt was fitted with anti-tipper w/c to decrease fall risk with nursing during t/f's and once oob. Pt declined any further mobility and ADLs this session and has been observed by this therapist to complete UB ADLs with supervision/min a d/t cognition. Pt is primarily limited by decreased cognition which limits pt's ability to participate in ADLs safely and independently. Pt appears to be functioning close to his baseline and will require 24/7 direct supervision and caregiving once medically cleared for discharge.   Plan of Care: Will benefit from Occupational Therapy 2 times per week  Discharge Recommendations:  Equipment Will Continue to Assess for Equipment Needs. Post-acute therapy  Needs long term placement with 24/7 caregiving.     See \"Rehab Therapy-Acute\" Patient Summary Report for complete documentation.   "

## 2019-12-06 NOTE — CARE PLAN
Problem: Safety  Goal: Will remain free from falls  Outcome: PROGRESSING AS EXPECTED  Bed is locked and in lowest position. Call light and table within reach. Non-skid socks on. Bed alarm on.        Problem: Bowel/Gastric:  Goal: Normal bowel function is maintained or improved  Outcome: PROGRESSING AS EXPECTED   Pt had 1 large bowel movement on this shift.

## 2019-12-06 NOTE — PROGRESS NOTES
[facility] Nephrology Daily Progress Note    Date of Service  12/6/2019    Chief Complaint  79-year-old man with diabetes, hypertension who presented from [facility] who was brought in for altered mental status. Patient was admitted for pneumonia and UTI with LOLA. We were initially consulted for LOLA secondary to urinary obstruction ad this improved with treatment. Blood cultures and urine culture were positive for Pseudomonas; blood also grew Candida glabrata. SARAVANAN on 12/3 was negative for endocarditis. Infectious disease recommends continuing Zosyn and micafungin until 12/7/2019.  Scr initially on presentation was 2.5 and improved to 1.74 but now has climbed to 3 hence prompting nephrology evaluation again.     Interval Problem Update  Daily Nephrology Summary:  12/06/19 - Scr 3.02. Non-oliguric. Feels fine. No complaints.     Review of Systems  Review of Systems   Unable to perform ROS: Medical condition        Physical Exam  Temp:  [36.7 °C (98 °F)-37.6 °C (99.6 °F)] 36.7 °C (98 °F)  Pulse:  [49-50] 50  Resp:  [18] 18  BP: (135-151)/(54-73) 146/71  SpO2:  [92 %-97 %] 97 %    Physical Exam  Constitutional:       General: He is not in acute distress.     Appearance: Normal appearance. He is obese. He is not toxic-appearing.   HENT:      Head: Normocephalic and atraumatic.      Right Ear: There is no impacted cerumen.      Left Ear: There is no impacted cerumen.      Nose: Nose normal. No congestion or rhinorrhea.      Mouth/Throat:      Mouth: Mucous membranes are moist.      Pharynx: Oropharynx is clear. No oropharyngeal exudate or posterior oropharyngeal erythema.   Eyes:      General: No scleral icterus.        Right eye: No discharge.         Left eye: No discharge.      Extraocular Movements: Extraocular movements intact.      Conjunctiva/sclera: Conjunctivae normal.      Pupils: Pupils are equal, round, and reactive to light.   Neck:      Musculoskeletal: Normal range of motion and neck supple. No neck  rigidity or muscular tenderness.   Cardiovascular:      Rate and Rhythm: Normal rate and regular rhythm.      Pulses: Normal pulses.      Heart sounds: Normal heart sounds. No gallop.    Pulmonary:      Effort: Pulmonary effort is normal.      Breath sounds: Normal breath sounds. No stridor. No wheezing or rhonchi.   Abdominal:      General: Abdomen is flat. Bowel sounds are normal.      Palpations: Abdomen is soft. There is no mass.      Tenderness: There is no tenderness.   Musculoskeletal: Normal range of motion.         General: Swelling present.   Skin:     General: Skin is warm.      Coloration: Skin is not jaundiced.      Findings: No erythema.   Neurological:      General: No focal deficit present.      Sensory: No sensory deficit.      Deep Tendon Reflexes: Reflexes normal.   Psychiatric:         Mood and Affect: Mood normal.         Behavior: Behavior normal.         Fluids    Intake/Output Summary (Last 24 hours) at 12/6/2019 0920  Last data filed at 12/6/2019 0500  Gross per 24 hour   Intake 240 ml   Output 1550 ml   Net -1310 ml       Laboratory  Recent Labs     12/04/19  0102 12/05/19 0237 12/06/19  0259   WBC 8.2 9.1 8.6   RBC 3.03* 3.20* 3.12*   HEMOGLOBIN 9.2* 9.6* 9.5*   HEMATOCRIT 29.3* 31.1* 29.7*   MCV 96.7 97.2 95.2   MCH 30.4 30.0 30.4   MCHC 31.4* 30.9* 32.0*   RDW 55.1* 57.0* 55.5*   PLATELETCT 226 240 216   MPV 8.7* 8.9* 9.3     Recent Labs     12/04/19  0102 12/05/19 0237 12/06/19  0259   SODIUM 140 142 141   POTASSIUM 4.1 4.5 4.2   CHLORIDE 109 109 109   CO2 25 26 23   GLUCOSE 143* 95 86   BUN 31* 32* 30*   CREATININE 2.85* 3.08* 3.02*   CALCIUM 8.2* 8.7 8.3*         No results for input(s): NTPROBNP in the last 72 hours.        Imaging       Assessment/Plan  No new Assessment & Plan notes have been filed under this hospital service since the last note was generated.  Service: Nephrology     ASSESSMENT:  1.  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    2.  Chronic  kidney disease, unknown baseline levels.  3.  Candidemia  4.  Pseudomonas bacermia  5.  Respiratory Failure - resolved  6.  CAP  7.  Bradycardia  8.  Anemia    Avoid nephrotoxins.  Renal dose meds as necessary  CPM  Decrease IVF to 75 mL/hr for 24 hrs and then d/c it.  Labs in am

## 2019-12-06 NOTE — PROGRESS NOTES
Hospital Medicine Daily Progress Note    Date of Service  12/6/2019    Chief Complaint  79 y.o. male admitted 11/21/2019 with AMS    Hospital Course    79-year-old man with diabetes, hypertension who presented from [facility] who was brought in for altered mental status.  Patient was admitted for pneumonia and UTI with LOLA.  He had a Steen catheter that was replaced.  Nephrology was consulted, his OLLA improved with treatment of UTI and urinary retention.  Blood cultures and urine culture were positive for Pseudomonas; blood also grew Ijeoma glabrata.  Dr. Wyman with ophthalmology showed no evidence of endophthlamitis on dilated exam 11/25.  Unclear source of candidemia.  SARAVANAN on 12/3 was negative for endocarditis.  Infectious disease recommended continuing Zosyn and micafungin until 12/7/2019.      Interval Problem Update  Creat remains >3. Has good UOP. Patient has no complaints, sitting at nursing station    Consultants/Specialty  Critical care  General surgeon  Ophthalmology  Neurology  Nephrology  Infectious disease  Cardiology    Code Status  Full Code      Disposition  SNF  Does not have PICC    Review of Systems  Review of Systems   Constitutional: Negative for malaise/fatigue.   HENT: Negative for congestion.    Respiratory: Negative for cough and shortness of breath.    Cardiovascular: Negative for chest pain.   Gastrointestinal: Negative for abdominal pain.   Genitourinary: Negative for dysuria and urgency.   Musculoskeletal: Negative for myalgias.   Skin: Negative for itching.   Neurological: Negative for dizziness and weakness.        Physical Exam  Temp:  [36.7 °C (98 °F)-37.6 °C (99.6 °F)] 36.7 °C (98 °F)  Pulse:  [49-50] 50  Resp:  [18] 18  BP: (135-151)/(54-73) 146/71  SpO2:  [92 %-97 %] 97 %    Physical Exam  Vitals signs and nursing note reviewed.   Constitutional:       General: He is not in acute distress.     Appearance: He is not ill-appearing.      Comments: frail   HENT:      Head:  Normocephalic.      Mouth/Throat:      Mouth: Mucous membranes are moist.   Eyes:      General:         Right eye: No discharge.         Left eye: No discharge.      Conjunctiva/sclera: Conjunctivae normal.   Cardiovascular:      Rate and Rhythm: Normal rate and regular rhythm.   Pulmonary:      Effort: No respiratory distress.      Breath sounds: No wheezing or rales.   Abdominal:      General: Abdomen is flat.      Palpations: Abdomen is soft.      Tenderness: There is no tenderness.   Musculoskeletal:      Comments: Diffuse mild muscle atrophy   Skin:     General: Skin is warm and dry.   Neurological:      Mental Status: He is alert.      Comments: Oriented to self and [facility]. Disoriented to year and month         Fluids    Intake/Output Summary (Last 24 hours) at 12/6/2019 1151  Last data filed at 12/6/2019 1049  Gross per 24 hour   Intake 580 ml   Output 1550 ml   Net -970 ml       Laboratory  Recent Labs     12/04/19  0102 12/05/19  0237 12/06/19  0259   WBC 8.2 9.1 8.6   RBC 3.03* 3.20* 3.12*   HEMOGLOBIN 9.2* 9.6* 9.5*   HEMATOCRIT 29.3* 31.1* 29.7*   MCV 96.7 97.2 95.2   MCH 30.4 30.0 30.4   MCHC 31.4* 30.9* 32.0*   RDW 55.1* 57.0* 55.5*   PLATELETCT 226 240 216   MPV 8.7* 8.9* 9.3     Recent Labs     12/04/19  0102 12/05/19  0237 12/06/19  0259   SODIUM 140 142 141   POTASSIUM 4.1 4.5 4.2   CHLORIDE 109 109 109   CO2 25 26 23   GLUCOSE 143* 95 86   BUN 31* 32* 30*   CREATININE 2.85* 3.08* 3.02*   CALCIUM 8.2* 8.7 8.3*                   Imaging  EC-SARAVANAN W/O CONT   Final Result      DX-CHEST-LIMITED (1 VIEW)   Final Result      Bibasilar and perihilar underinflation atelectasis which could obscure an additional process. This is unchanged.      [id number] VIEW   Final Result      Diffuse bowel distention. Findings are suggestive of ileus. Early obstruction could have a similar appearance.      DX-CHEST-LIMITED (1 VIEW)   Final Result      1.  Interval worsening of pulmonary opacities in the right mid/lower  lung.   2.  Unchanged left basilar atelectasis and/or consolidation. No significant pleural effusions.      DX-CHEST-PORTABLE (1 VIEW)   Final Result      Similar reticular opacities suspicious for edema and there are likely small layering effusions      More focal basilar opacity is more likely from atelectasis than consolidation      EC-ECHOCARDIOGRAM COMPLETE W/O CONT   Final Result      CT-RENAL COLIC EVALUATION(A/P W/O)   Final Result      1.  Dilated fluid-filled small bowel loops in RIGHT lower quadrant concerning for developing obstruction.   2.  No evidence of bowel perforation.   3.  Colonic diverticulosis without evidence for diverticulitis.   4.  No renal stone or hydronephrosis.   5.  Appendix is not visualized.   6.  Enlarged prostate.   7.  Small bilateral pleural effusions with associated atelectasis.   8.  Colonic diverticulosis without evidence for diverticulitis.      DX-CHEST-PORTABLE (1 VIEW)   Final Result         1.  Pulmonary edema and/or infiltrates are identified, which are stable since the prior exam.   2.  Cardiomegaly   3.  Atherosclerosis           Assessment/Plan  * Candidemia (HCC)- (present on admission)  Assessment & Plan  Appreciate ID recommendations  Seen by ophthalmology, negative endophthlamitis  Continuing micafungin stop date 12/7    Bacteremia due to Pseudomonas- (present on admission)  Assessment & Plan  Infectious disease following, continue Zosyn for CAP, bacteremia, and UTI stop date 12/7  SARAVANAN on 12/3 negative for endocarditis    Acute respiratory failure with hypoxia, RAD, PNA (HCC)- (present on admission)  Assessment & Plan  Resolving, wean off O2 as tolerated    Acute renal failure (ARF) ([facility])- (present on admission)  Assessment & Plan  Likely was 2/2 urinary retention/sawyer obstruction  Kidney function had improved to 1.98, but now worsening  I stopped Jardiance, avoid nephrotoxins  CT renal colic on 11/22 showed normal kidneys  FeNa 2.3%  Monitor urine output, sawyer  cath in place  Renal diet  Creat worsening, I consulted nephrology. Adjusted zosyn dose. Continue IVF  Monitor BMP    Altered mental status- (present on admission)  Assessment & Plan  Patient has baseline dementia and is likely close to baseline being oriented only to person  Neuro checks.  Monitor mental status      UTI (urinary tract infection)- (present on admission)  Assessment & Plan  Urinary tract infection associated with chronic indwelling Steen catheter changed 11/21  Complicated urinary tract infection with associated encephalopathy  Appreciate infectious disease consultation, continuing Zosyn stop date 12/7    CAP (community acquired pneumonia)- (present on admission)  Assessment & Plan  Pseudomonas; continue zosyn per ID.  Stop date 12/7  Rt protocol  Wean oxygen as tolerated      Bradycardia- (present on admission)  Assessment & Plan  Patient has been bradycardic, but seems to be asymptomatic.  Normotensive.  Hold AV zoraida blockers.    Hyperglycemia  Assessment & Plan  Jardiance was on medication list as well as insulin sliding scale  A1c does not show diabetes  Glucose has been low, start Jardiance  Monitor glucose after meals  Hypoglycemia protocol    Normocytic normochromic anemia- (present on admission)  Assessment & Plan  Asymptomatic  No active signs of bleeding  Transfuse to maintain hemoglobin > 7       VTE prophylaxis: heparin

## 2019-12-06 NOTE — CARE PLAN
Problem: Safety  Goal: Will remain free from injury  Note:   PT/OT at bedside. Pt getting OOB to wheelchair. All safety precautions in place.     Problem: Infection  Goal: Will remain free from infection  Note:   IV abx in place

## 2019-12-06 NOTE — PROGRESS NOTES
Bedside report received from Belia COREAS . Assumed care of pt at 1845 . Pt is awake at this time with no signs of distress. Plan of care discussed with pt. Pt is A&O x 1, knowing only self. Pt is on RA during hte day, 2 L NC during night.. Bed alarm is on, bed in lowest position, bed rails up x 2, belongings and call light within reach. Hourly rounding in place.

## 2019-12-07 LAB
ANION GAP SERPL CALC-SCNC: 7 MMOL/L (ref 0–11.9)
BUN SERPL-MCNC: 31 MG/DL (ref 8–22)
CALCIUM SERPL-MCNC: 8.4 MG/DL (ref 8.5–10.5)
CHLORIDE SERPL-SCNC: 109 MMOL/L (ref 96–112)
CO2 SERPL-SCNC: 24 MMOL/L (ref 20–33)
CREAT SERPL-MCNC: 3.17 MG/DL (ref 0.5–1.4)
CREAT UR-MCNC: 66.9 MG/DL
GLUCOSE BLD-MCNC: 130 MG/DL (ref 65–99)
GLUCOSE BLD-MCNC: 148 MG/DL (ref 65–99)
GLUCOSE BLD-MCNC: 85 MG/DL (ref 65–99)
GLUCOSE SERPL-MCNC: 81 MG/DL (ref 65–99)
POTASSIUM SERPL-SCNC: 4.4 MMOL/L (ref 3.6–5.5)
SODIUM SERPL-SCNC: 140 MMOL/L (ref 135–145)
SODIUM UR-SCNC: 86 MMOL/L

## 2019-12-07 PROCEDURE — 700102 HCHG RX REV CODE 250 W/ 637 OVERRIDE(OP): Performed by: INTERNAL MEDICINE

## 2019-12-07 PROCEDURE — 84300 ASSAY OF URINE SODIUM: CPT

## 2019-12-07 PROCEDURE — 700111 HCHG RX REV CODE 636 W/ 250 OVERRIDE (IP): Performed by: INTERNAL MEDICINE

## 2019-12-07 PROCEDURE — 700102 HCHG RX REV CODE 250 W/ 637 OVERRIDE(OP): Performed by: HOSPITALIST

## 2019-12-07 PROCEDURE — 99232 SBSQ HOSP IP/OBS MODERATE 35: CPT | Performed by: INTERNAL MEDICINE

## 2019-12-07 PROCEDURE — 36415 COLL VENOUS BLD VENIPUNCTURE: CPT

## 2019-12-07 PROCEDURE — 82570 ASSAY OF URINE CREATININE: CPT

## 2019-12-07 PROCEDURE — A9270 NON-COVERED ITEM OR SERVICE: HCPCS | Performed by: INTERNAL MEDICINE

## 2019-12-07 PROCEDURE — 97530 THERAPEUTIC ACTIVITIES: CPT

## 2019-12-07 PROCEDURE — 80048 BASIC METABOLIC PNL TOTAL CA: CPT

## 2019-12-07 PROCEDURE — 700105 HCHG RX REV CODE 258: Performed by: INTERNAL MEDICINE

## 2019-12-07 PROCEDURE — A9270 NON-COVERED ITEM OR SERVICE: HCPCS | Performed by: HOSPITALIST

## 2019-12-07 PROCEDURE — 82962 GLUCOSE BLOOD TEST: CPT | Mod: 91

## 2019-12-07 PROCEDURE — 770006 HCHG ROOM/CARE - MED/SURG/GYN SEMI*

## 2019-12-07 PROCEDURE — 97116 GAIT TRAINING THERAPY: CPT

## 2019-12-07 RX ADMIN — SODIUM CHLORIDE: 4.5 INJECTION, SOLUTION INTRAVENOUS at 14:58

## 2019-12-07 RX ADMIN — PIPERACILLIN AND TAZOBACTAM 3.38 G: 3; .375 INJECTION, POWDER, LYOPHILIZED, FOR SOLUTION INTRAVENOUS; PARENTERAL at 04:34

## 2019-12-07 RX ADMIN — MULTIPLE VITAMINS W/ MINERALS TAB 1 TABLET: TAB at 04:35

## 2019-12-07 RX ADMIN — AMLODIPINE BESYLATE 10 MG: 10 TABLET ORAL at 04:35

## 2019-12-07 RX ADMIN — CLOTRIMAZOLE 1 APPLICATION: 10 CREAM TOPICAL at 04:38

## 2019-12-07 RX ADMIN — ACETAMINOPHEN 650 MG: 325 TABLET, FILM COATED ORAL at 12:43

## 2019-12-07 RX ADMIN — SODIUM CHLORIDE: 4.5 INJECTION, SOLUTION INTRAVENOUS at 02:23

## 2019-12-07 RX ADMIN — SENNOSIDES AND DOCUSATE SODIUM 2 TABLET: 8.6; 5 TABLET ORAL at 04:35

## 2019-12-07 RX ADMIN — TAMSULOSIN HYDROCHLORIDE 0.4 MG: 0.4 CAPSULE ORAL at 04:35

## 2019-12-07 RX ADMIN — MICAFUNGIN SODIUM 100 MG: 20 INJECTION, POWDER, LYOPHILIZED, FOR SOLUTION INTRAVENOUS at 04:30

## 2019-12-07 RX ADMIN — HEPARIN SODIUM 5000 UNITS: 5000 INJECTION INTRAVENOUS; SUBCUTANEOUS at 14:56

## 2019-12-07 RX ADMIN — PIPERACILLIN AND TAZOBACTAM 3.38 G: 3; .375 INJECTION, POWDER, LYOPHILIZED, FOR SOLUTION INTRAVENOUS; PARENTERAL at 17:48

## 2019-12-07 RX ADMIN — POLYETHYLENE GLYCOL 3350 1 PACKET: 17 POWDER, FOR SOLUTION ORAL at 04:35

## 2019-12-07 RX ADMIN — HEPARIN SODIUM 5000 UNITS: 5000 INJECTION INTRAVENOUS; SUBCUTANEOUS at 04:34

## 2019-12-07 RX ADMIN — FAMOTIDINE 20 MG: 10 INJECTION INTRAVENOUS at 04:32

## 2019-12-07 RX ADMIN — CLOTRIMAZOLE 1 APPLICATION: 10 CREAM TOPICAL at 17:53

## 2019-12-07 RX ADMIN — SIMVASTATIN 10 MG: 20 TABLET, FILM COATED ORAL at 20:01

## 2019-12-07 RX ADMIN — HEPARIN SODIUM 5000 UNITS: 5000 INJECTION INTRAVENOUS; SUBCUTANEOUS at 20:02

## 2019-12-07 RX ADMIN — LATANOPROST 1 DROP: 50 SOLUTION OPHTHALMIC at 20:01

## 2019-12-07 RX ADMIN — OXYCODONE HYDROCHLORIDE AND ACETAMINOPHEN 500 MG: 500 TABLET ORAL at 04:35

## 2019-12-07 ASSESSMENT — ENCOUNTER SYMPTOMS
PSYCHIATRIC NEGATIVE: 1
EYES NEGATIVE: 1
FEVER: 0
GASTROINTESTINAL NEGATIVE: 1
ABDOMINAL PAIN: 0
CARDIOVASCULAR NEGATIVE: 1
RESPIRATORY NEGATIVE: 1
MUSCULOSKELETAL NEGATIVE: 1
DIZZINESS: 0
MYALGIAS: 0
SHORTNESS OF BREATH: 0
CHILLS: 0
NEUROLOGICAL NEGATIVE: 1
COUGH: 0

## 2019-12-07 ASSESSMENT — GAIT ASSESSMENTS
DISTANCE (FEET): 70
DEVIATION: OTHER (COMMENT)
ASSISTIVE DEVICE: FRONT WHEEL WALKER
GAIT LEVEL OF ASSIST: CONTACT GUARD ASSIST

## 2019-12-07 ASSESSMENT — COGNITIVE AND FUNCTIONAL STATUS - GENERAL
SUGGESTED CMS G CODE MODIFIER MOBILITY: CK
STANDING UP FROM CHAIR USING ARMS: A LITTLE
TURNING FROM BACK TO SIDE WHILE IN FLAT BAD: A LITTLE
CLIMB 3 TO 5 STEPS WITH RAILING: A LITTLE
WALKING IN HOSPITAL ROOM: A LITTLE
MOVING FROM LYING ON BACK TO SITTING ON SIDE OF FLAT BED: UNABLE
MOBILITY SCORE: 15
MOVING TO AND FROM BED TO CHAIR: A LOT

## 2019-12-07 NOTE — THERAPY
"Physical Therapy Treatment completed.   Bed Mobility:  Supine to Sit: (found in chair)  Transfers: Sit to Stand: Moderate Assist(from cardiac chair; needed min/mod  A and cueing for safety)  Gait: Level Of Assist: Contact Guard Assist with Front-Wheel Walker       Plan of Care: Will benefit from Physical Therapy 2 times per week  Discharge Recommendations: Equipment: Will Continue to Assess for Equipment Needs. See below    Pt progressing as expected given gross deconditioning and medical co-morbidities,including baseline dementia. His baseline cognitive deficits are exacerbating his functional impairements. He was able to demonstrate hallway ambulation with FWW with CGA. He did require  moderate A to stand from cardiac chair, though this was in part 2' to mechanics/setup of chair and pt's height. He essentially would require 24/7 assist/Spv given cognitive deficits at this time. As prior, he would benefit from procedural/working memory training in stable environment for optimal carryover. Will continue to visit, though recs are for long term placement/ 24/7 assist currently.       See \"Rehab Therapy-Acute\" Patient Summary Report for complete documentation.       "

## 2019-12-07 NOTE — PROGRESS NOTES
[facility] Nephrology Daily Progress Note    Date of Service  12/7/2019    Chief Complaint  Follow up LOLA    Interval Problem Update  79-year-old man with diabetes, hypertension who presented from [facility] who was brought in for altered mental status. Patient was admitted for pneumonia and UTI with LOLA. We were initially consulted for LOLA secondary to urinary obstruction ad this improved with treatment. Blood cultures and urine culture were positive for Pseudomonas; blood also grew Candida glabrata. SARAVANAN on 12/3 was negative for endocarditis. Infectious disease recommends continuing Zosyn and micafungin until 12/7/2019.  Scr initially on presentation was 2.5 and improved to 1.74 but now has climbed to 3 hence prompting nephrology evaluation again.     DAILY NEPHROLOGY SUMMARY  12/06/19 - Scr 3.02. Non-oliguric. Feels fine. No complaints.  12/07/19 - No events, taking a little more PO, Cr slightly higher at 3.17, good UOP, BP stable    Review of Systems  Review of Systems   Constitutional: Negative for chills, fever and malaise/fatigue.   HENT: Negative.    Eyes: Negative.    Respiratory: Negative.    Cardiovascular: Negative.    Gastrointestinal: Negative.    Genitourinary: Negative.    Musculoskeletal: Negative.    Skin: Negative.    Neurological: Negative.    Endo/Heme/Allergies: Negative.    Psychiatric/Behavioral: Negative.         Physical Exam  Temp:  [36.8 °C (98.3 °F)-37.1 °C (98.7 °F)] 36.8 °C (98.3 °F)  Pulse:  [52-66] 53  Resp:  [18-20] 20  BP: (148-160)/(60-71) 148/60  SpO2:  [91 %-97 %] 91 %    Physical Exam  Constitutional:       General: He is not in acute distress.     Appearance: Normal appearance. He is obese. He is not toxic-appearing.   HENT:      Head: Normocephalic and atraumatic.      Right Ear: There is no impacted cerumen.      Left Ear: There is no impacted cerumen.      Nose: Nose normal. No congestion or rhinorrhea.      Mouth/Throat:      Mouth: Mucous membranes are moist.       Pharynx: Oropharynx is clear. No oropharyngeal exudate or posterior oropharyngeal erythema.   Eyes:      General: No scleral icterus.        Right eye: No discharge.         Left eye: No discharge.      Extraocular Movements: Extraocular movements intact.      Conjunctiva/sclera: Conjunctivae normal.      Pupils: Pupils are equal, round, and reactive to light.   Neck:      Musculoskeletal: Normal range of motion and neck supple. No neck rigidity or muscular tenderness.   Cardiovascular:      Rate and Rhythm: Normal rate and regular rhythm.      Pulses: Normal pulses.      Heart sounds: Normal heart sounds. No gallop.    Pulmonary:      Effort: Pulmonary effort is normal.      Breath sounds: Normal breath sounds. No stridor. No wheezing or rhonchi.   Abdominal:      General: Abdomen is flat. Bowel sounds are normal.      Palpations: Abdomen is soft. There is no mass.      Tenderness: There is no tenderness.   Musculoskeletal: Normal range of motion.         General: Swelling present.   Skin:     General: Skin is warm.      Coloration: Skin is not jaundiced.      Findings: No erythema.   Neurological:      General: No focal deficit present.      Sensory: No sensory deficit.      Deep Tendon Reflexes: Reflexes normal.   Psychiatric:         Mood and Affect: Mood normal.         Behavior: Behavior normal.         Fluids    Intake/Output Summary (Last 24 hours) at 12/7/2019 1304  Last data filed at 12/7/2019 0900  Gross per 24 hour   Intake 1579 ml   Output 1400 ml   Net 179 ml       Laboratory  Recent Labs     12/05/19  0237 12/06/19  0259   WBC 9.1 8.6   RBC 3.20* 3.12*   HEMOGLOBIN 9.6* 9.5*   HEMATOCRIT 31.1* 29.7*   MCV 97.2 95.2   MCH 30.0 30.4   MCHC 30.9* 32.0*   RDW 57.0* 55.5*   PLATELETCT 240 216   MPV 8.9* 9.3     Recent Labs     12/05/19  0237 12/06/19  0259 12/07/19  0039   SODIUM 142 141 140   POTASSIUM 4.5 4.2 4.4   CHLORIDE 109 109 109   CO2 26 23 24   GLUCOSE 95 86 81   BUN 32* 30* 31*   CREATININE  3.08* 3.02* 3.17*   CALCIUM 8.7 8.3* 8.4*         No results for input(s): NTPROBNP in the last 72 hours.        Imaging       Assessment/Plan  No new Assessment & Plan notes have been filed under this hospital service since the last note was generated.  Service: Nephrology     ASSESSMENT:  1.  LOLA on CKD -  Etiology is unclear, Cr had improved a little with IVF's now slightly higher   2.  Chronic kidney disease, unknown baseline levels.  3.  Candidemia  4.  Pseudomonas bacermia  5.  Respiratory Failure - resolved  6.  CAP  7.  Bradycardia  8.  Anemia    PLAN:  No acute need for RRT  Continue IVF's for now  Encourage PO fluids  Will check FENA  Avoid nephrotoxins  Renal dose meds as necessary

## 2019-12-07 NOTE — PROGRESS NOTES
Hospital Medicine Daily Progress Note    Date of Service  12/7/2019    Chief Complaint  79 y.o. male admitted 11/21/2019 with AMS    Hospital Course    79-year-old man with diabetes, hypertension who presented from [facility] who was brought in for altered mental status.  Patient was admitted for pneumonia and UTI with LOLA.  He had a Sawyer catheter that was replaced.  Nephrology was consulted, his LOLA improved with treatment of UTI and urinary retention.  Blood cultures and urine culture were positive for Pseudomonas; blood also grew Ijeoma glabrata.  Dr. Wyman with ophthalmology showed no evidence of endophthlamitis on dilated exam 11/25.  Unclear source of candidemia.  SARAVANAN on 12/3 was negative for endocarditis.  Infectious disease recommended continuing Zosyn and micafungin until 12/7/2019.      Interval Problem Update  Creat increased. UOP adequate.  He feels well, sitting at nursing station. He says sawyer tugs sometimes, but doesn't bother him    Consultants/Specialty  Critical care  General surgeon  Ophthalmology  Neurology  Nephrology  Infectious disease  Cardiology    Code Status  Full Code      Disposition  SNF  Does not have PICC    Review of Systems  Review of Systems   Constitutional: Negative for malaise/fatigue.   HENT: Negative for congestion.    Respiratory: Negative for cough and shortness of breath.    Cardiovascular: Negative for chest pain.   Gastrointestinal: Negative for abdominal pain.   Genitourinary: Negative for dysuria and urgency.   Musculoskeletal: Negative for myalgias.   Skin: Negative for itching.   Neurological: Negative for dizziness.        Physical Exam  Temp:  [36.8 °C (98.3 °F)-37.1 °C (98.7 °F)] 36.8 °C (98.3 °F)  Pulse:  [52-66] 53  Resp:  [18-20] 20  BP: (148-160)/(60-71) 148/60  SpO2:  [91 %-97 %] 91 %    Physical Exam  Vitals signs and nursing note reviewed.   Constitutional:       General: He is not in acute distress.     Appearance: He is not ill-appearing.      Comments:  frail   HENT:      Head: Normocephalic.      Mouth/Throat:      Mouth: Mucous membranes are moist.   Eyes:      General:         Right eye: No discharge.         Left eye: No discharge.      Conjunctiva/sclera: Conjunctivae normal.   Cardiovascular:      Rate and Rhythm: Normal rate and regular rhythm.   Pulmonary:      Effort: No respiratory distress.      Breath sounds: No wheezing or rales.   Abdominal:      General: Abdomen is flat.      Palpations: Abdomen is soft.      Tenderness: There is no tenderness.   Musculoskeletal:      Comments: Diffuse mild muscle atrophy   Skin:     General: Skin is warm and dry.   Neurological:      Mental Status: He is alert.      Comments: Oriented to self and [facility]. Disoriented to year and month   Psychiatric:      Comments: pleasant         Fluids    Intake/Output Summary (Last 24 hours) at 12/7/2019 1340  Last data filed at 12/7/2019 0900  Gross per 24 hour   Intake 1579 ml   Output 1400 ml   Net 179 ml       Laboratory  Recent Labs     12/05/19  0237 12/06/19  0259   WBC 9.1 8.6   RBC 3.20* 3.12*   HEMOGLOBIN 9.6* 9.5*   HEMATOCRIT 31.1* 29.7*   MCV 97.2 95.2   MCH 30.0 30.4   MCHC 30.9* 32.0*   RDW 57.0* 55.5*   PLATELETCT 240 216   MPV 8.9* 9.3     Recent Labs     12/05/19  0237 12/06/19  0259 12/07/19  0039   SODIUM 142 141 140   POTASSIUM 4.5 4.2 4.4   CHLORIDE 109 109 109   CO2 26 23 24   GLUCOSE 95 86 81   BUN 32* 30* 31*   CREATININE 3.08* 3.02* 3.17*   CALCIUM 8.7 8.3* 8.4*                   Imaging  EC-SARAVANAN W/O CONT   Final Result      DX-CHEST-LIMITED (1 VIEW)   Final Result      Bibasilar and perihilar underinflation atelectasis which could obscure an additional process. This is unchanged.      [id number] VIEW   Final Result      Diffuse bowel distention. Findings are suggestive of ileus. Early obstruction could have a similar appearance.      DX-CHEST-LIMITED (1 VIEW)   Final Result      1.  Interval worsening of pulmonary opacities in the right mid/lower lung.    2.  Unchanged left basilar atelectasis and/or consolidation. No significant pleural effusions.      DX-CHEST-PORTABLE (1 VIEW)   Final Result      Similar reticular opacities suspicious for edema and there are likely small layering effusions      More focal basilar opacity is more likely from atelectasis than consolidation      EC-ECHOCARDIOGRAM COMPLETE W/O CONT   Final Result      CT-RENAL COLIC EVALUATION(A/P W/O)   Final Result      1.  Dilated fluid-filled small bowel loops in RIGHT lower quadrant concerning for developing obstruction.   2.  No evidence of bowel perforation.   3.  Colonic diverticulosis without evidence for diverticulitis.   4.  No renal stone or hydronephrosis.   5.  Appendix is not visualized.   6.  Enlarged prostate.   7.  Small bilateral pleural effusions with associated atelectasis.   8.  Colonic diverticulosis without evidence for diverticulitis.      DX-CHEST-PORTABLE (1 VIEW)   Final Result         1.  Pulmonary edema and/or infiltrates are identified, which are stable since the prior exam.   2.  Cardiomegaly   3.  Atherosclerosis           Assessment/Plan  * Candidemia (HCC)- (present on admission)  Assessment & Plan  Appreciate ID recommendations  Seen by ophthalmology, negative endophthlamitis  Continuing micafungin stop date 12/7    Bacteremia due to Pseudomonas- (present on admission)  Assessment & Plan  Infectious disease following, continue Zosyn for CAP, bacteremia, and UTI stop date 12/7  SARAVANAN on 12/3 negative for endocarditis    Acute respiratory failure with hypoxia, RAD, PNA (HCC)- (present on admission)  Assessment & Plan  Resolving, wean off O2 as tolerated    Acute renal failure (ARF) (HCC)- (present on admission)  Assessment & Plan  Likely was 2/2 urinary retention/sawyer obstruction  Kidney function had improved to 1.98, but now worsening  I stopped Jardiance, avoid nephrotoxins  CT renal colic on 11/22 showed normal kidneys  FeNa 2.3%  Monitor urine output, sawyer cath  in place  Renal diet  Creat worsening, I consulted nephrology. Adjusted zosyn dose. Continue IVF  Rechecking FeNa  Monitor BMP    Altered mental status- (present on admission)  Assessment & Plan  Patient has baseline dementia and is likely close to baseline being oriented only to person  Neuro checks.  Monitor mental status      UTI (urinary tract infection)- (present on admission)  Assessment & Plan  Urinary tract infection associated with chronic indwelling Steen catheter changed 11/21  Complicated urinary tract infection with associated encephalopathy  Appreciate infectious disease consultation, continuing Zosyn stop date 12/7    CAP (community acquired pneumonia)- (present on admission)  Assessment & Plan  Pseudomonas; continue zosyn per ID.  Stop date 12/7  Rt protocol  Wean oxygen as tolerated      Bradycardia- (present on admission)  Assessment & Plan  Patient has been bradycardic, but seems to be asymptomatic.  Normotensive.  Hold AV zoraida blockers.    Hyperglycemia  Assessment & Plan  Jardiance was on medication list as well as insulin sliding scale  A1c does not show diabetes  Glucose has been low, stopped Jardiance  Glucose has been stable, stop ISS    Normocytic normochromic anemia- (present on admission)  Assessment & Plan  Asymptomatic  No active signs of bleeding  Transfuse to maintain hemoglobin > 7       VTE prophylaxis: heparin

## 2019-12-07 NOTE — CARE PLAN
Problem: Safety  Goal: Will remain free from injury  Outcome: PROGRESSING AS EXPECTED  Note:   Ambulation with 2 assist and FWW to WC  Goal: Will remain free from falls  Outcome: PROGRESSING AS EXPECTED     Problem: Infection  Goal: Will remain free from infection  Outcome: PROGRESSING AS EXPECTED     Problem: Venous Thromboembolism (VTW)/Deep Vein Thrombosis (DVT) Prevention:  Goal: Patient will participate in Venous Thrombosis (VTE)/Deep Vein Thrombosis (DVT)Prevention Measures  Outcome: PROGRESSING AS EXPECTED

## 2019-12-07 NOTE — PROGRESS NOTES
"Assumed patient care at 0700. Received Bedside report. Patient asleep in bed. No s/s of distress.  Call light in reach, fall precautions in place. Continue hourly rounding. /63   Pulse 66   Temp 36.9 °C (98.5 °F) (Temporal)   Resp 18   Ht 2.007 m (6' 7\")   Wt 102.2 kg (225 lb 5 oz)   SpO2 95%   BMI 25.38 kg/m²   "

## 2019-12-08 PROBLEM — J96.01 ACUTE RESPIRATORY FAILURE WITH HYPOXIA (HCC): Status: RESOLVED | Noted: 2019-11-22 | Resolved: 2019-12-08

## 2019-12-08 LAB
ANION GAP SERPL CALC-SCNC: 7 MMOL/L (ref 0–11.9)
BASOPHILS # BLD AUTO: 1.7 % (ref 0–1.8)
BASOPHILS # BLD: 0.13 K/UL (ref 0–0.12)
BUN SERPL-MCNC: 30 MG/DL (ref 8–22)
CALCIUM SERPL-MCNC: 8.4 MG/DL (ref 8.5–10.5)
CHLORIDE SERPL-SCNC: 107 MMOL/L (ref 96–112)
CO2 SERPL-SCNC: 23 MMOL/L (ref 20–33)
CREAT SERPL-MCNC: 2.89 MG/DL (ref 0.5–1.4)
EOSINOPHIL # BLD AUTO: 0.34 K/UL (ref 0–0.51)
EOSINOPHIL NFR BLD: 4.5 % (ref 0–6.9)
ERYTHROCYTE [DISTWIDTH] IN BLOOD BY AUTOMATED COUNT: 57.1 FL (ref 35.9–50)
GLUCOSE BLD-MCNC: 119 MG/DL (ref 65–99)
GLUCOSE SERPL-MCNC: 82 MG/DL (ref 65–99)
HCT VFR BLD AUTO: 29.9 % (ref 42–52)
HGB BLD-MCNC: 9.3 G/DL (ref 14–18)
IMM GRANULOCYTES # BLD AUTO: 0.04 K/UL (ref 0–0.11)
IMM GRANULOCYTES NFR BLD AUTO: 0.5 % (ref 0–0.9)
LYMPHOCYTES # BLD AUTO: 0.8 K/UL (ref 1–4.8)
LYMPHOCYTES NFR BLD: 10.7 % (ref 22–41)
MCH RBC QN AUTO: 30 PG (ref 27–33)
MCHC RBC AUTO-ENTMCNC: 31.1 G/DL (ref 33.7–35.3)
MCV RBC AUTO: 96.5 FL (ref 81.4–97.8)
MONOCYTES # BLD AUTO: 0.76 K/UL (ref 0–0.85)
MONOCYTES NFR BLD AUTO: 10.1 % (ref 0–13.4)
NEUTROPHILS # BLD AUTO: 5.44 K/UL (ref 1.82–7.42)
NEUTROPHILS NFR BLD: 72.5 % (ref 44–72)
NRBC # BLD AUTO: 0 K/UL
NRBC BLD-RTO: 0 /100 WBC
PLATELET # BLD AUTO: 210 K/UL (ref 164–446)
PMV BLD AUTO: 9.4 FL (ref 9–12.9)
POTASSIUM SERPL-SCNC: 4.4 MMOL/L (ref 3.6–5.5)
RBC # BLD AUTO: 3.1 M/UL (ref 4.7–6.1)
SODIUM SERPL-SCNC: 137 MMOL/L (ref 135–145)
WBC # BLD AUTO: 7.5 K/UL (ref 4.8–10.8)

## 2019-12-08 PROCEDURE — 770006 HCHG ROOM/CARE - MED/SURG/GYN SEMI*

## 2019-12-08 PROCEDURE — A9270 NON-COVERED ITEM OR SERVICE: HCPCS | Performed by: HOSPITALIST

## 2019-12-08 PROCEDURE — A9270 NON-COVERED ITEM OR SERVICE: HCPCS | Performed by: INTERNAL MEDICINE

## 2019-12-08 PROCEDURE — 700111 HCHG RX REV CODE 636 W/ 250 OVERRIDE (IP): Performed by: INTERNAL MEDICINE

## 2019-12-08 PROCEDURE — 85025 COMPLETE CBC W/AUTO DIFF WBC: CPT

## 2019-12-08 PROCEDURE — 99232 SBSQ HOSP IP/OBS MODERATE 35: CPT | Performed by: INTERNAL MEDICINE

## 2019-12-08 PROCEDURE — 700102 HCHG RX REV CODE 250 W/ 637 OVERRIDE(OP): Performed by: INTERNAL MEDICINE

## 2019-12-08 PROCEDURE — 700102 HCHG RX REV CODE 250 W/ 637 OVERRIDE(OP): Performed by: HOSPITALIST

## 2019-12-08 PROCEDURE — 700105 HCHG RX REV CODE 258: Performed by: INTERNAL MEDICINE

## 2019-12-08 PROCEDURE — 36415 COLL VENOUS BLD VENIPUNCTURE: CPT

## 2019-12-08 PROCEDURE — 82962 GLUCOSE BLOOD TEST: CPT

## 2019-12-08 PROCEDURE — 80048 BASIC METABOLIC PNL TOTAL CA: CPT

## 2019-12-08 RX ADMIN — TAMSULOSIN HYDROCHLORIDE 0.4 MG: 0.4 CAPSULE ORAL at 05:11

## 2019-12-08 RX ADMIN — OXYCODONE HYDROCHLORIDE AND ACETAMINOPHEN 500 MG: 500 TABLET ORAL at 05:11

## 2019-12-08 RX ADMIN — SENNOSIDES AND DOCUSATE SODIUM 2 TABLET: 8.6; 5 TABLET ORAL at 05:11

## 2019-12-08 RX ADMIN — CLOTRIMAZOLE 1 APPLICATION: 10 CREAM TOPICAL at 17:42

## 2019-12-08 RX ADMIN — HALOPERIDOL LACTATE 5 MG: 5 INJECTION, SOLUTION INTRAMUSCULAR at 20:30

## 2019-12-08 RX ADMIN — HEPARIN SODIUM 5000 UNITS: 5000 INJECTION INTRAVENOUS; SUBCUTANEOUS at 05:11

## 2019-12-08 RX ADMIN — POLYETHYLENE GLYCOL 3350 1 PACKET: 17 POWDER, FOR SOLUTION ORAL at 05:13

## 2019-12-08 RX ADMIN — FAMOTIDINE 20 MG: 10 INJECTION INTRAVENOUS at 05:10

## 2019-12-08 RX ADMIN — LATANOPROST 1 DROP: 50 SOLUTION OPHTHALMIC at 20:30

## 2019-12-08 RX ADMIN — SODIUM CHLORIDE: 4.5 INJECTION, SOLUTION INTRAVENOUS at 04:04

## 2019-12-08 RX ADMIN — HEPARIN SODIUM 5000 UNITS: 5000 INJECTION INTRAVENOUS; SUBCUTANEOUS at 20:30

## 2019-12-08 RX ADMIN — HEPARIN SODIUM 5000 UNITS: 5000 INJECTION INTRAVENOUS; SUBCUTANEOUS at 13:28

## 2019-12-08 RX ADMIN — AMLODIPINE BESYLATE 10 MG: 10 TABLET ORAL at 05:09

## 2019-12-08 RX ADMIN — SIMVASTATIN 10 MG: 20 TABLET, FILM COATED ORAL at 20:30

## 2019-12-08 RX ADMIN — MULTIPLE VITAMINS W/ MINERALS TAB 1 TABLET: TAB at 05:11

## 2019-12-08 RX ADMIN — CLOTRIMAZOLE 1 APPLICATION: 10 CREAM TOPICAL at 05:12

## 2019-12-08 ASSESSMENT — ENCOUNTER SYMPTOMS
GASTROINTESTINAL NEGATIVE: 1
RESPIRATORY NEGATIVE: 1
FEVER: 0
DIZZINESS: 0
EYES NEGATIVE: 1
MUSCULOSKELETAL NEGATIVE: 1
NEUROLOGICAL NEGATIVE: 1
CHILLS: 0
CARDIOVASCULAR NEGATIVE: 1
NAUSEA: 0
PSYCHIATRIC NEGATIVE: 1
ABDOMINAL PAIN: 0
COUGH: 0
SHORTNESS OF BREATH: 0
MYALGIAS: 0

## 2019-12-08 NOTE — CARE PLAN
Problem: Safety  Goal: Will remain free from injury  Outcome: PROGRESSING AS EXPECTED  Intervention: Provide assistance with mobility  Flowsheets (Taken 12/7/2019 1956)  Assistance: Assistance of One  Ambulation Tolerance: Tolerates Well  Note:   Patient educated regarding fall risks. Environmental hazards reviewed such as IV tubing, heel float boots, spills. Patient directed to use call light for assistance before getting out of bed or ambulating. Patient verbalized understanding.        Problem: Skin Integrity  Goal: Risk for impaired skin integrity will decrease  Outcome: PROGRESSING AS EXPECTED  Intervention: Assess risk factors for impaired skin integrity and/or pressure ulcers  Note:   Patient skin assessed. Repositioned every 2 hours and PRN. Pillows utilized to float heels and keep weight load off of bony prominences. Winsome care completed as needed. Tubing placement assessed. Dressings observed.

## 2019-12-08 NOTE — PROGRESS NOTES
[facility] Nephrology Daily Progress Note    Date of Service  12/8/2019    Chief Complaint  Follow up LOLA    Interval Problem Update  79-year-old man with diabetes, hypertension who presented from [facility] who was brought in for altered mental status. Patient was admitted for pneumonia and UTI with LOLA. We were initially consulted for LOLA secondary to urinary obstruction ad this improved with treatment. Blood cultures and urine culture were positive for Pseudomonas; blood also grew Candida glabrata. SARAVANAN on 12/3 was negative for endocarditis. Infectious disease recommends continuing Zosyn and micafungin until 12/7/2019.  Scr initially on presentation was 2.5 and improved to 1.74 but now has climbed to 3 hence prompting nephrology evaluation again.     DAILY NEPHROLOGY SUMMARY  12/06/19 - Scr 3.02. Non-oliguric. Feels fine. No complaints.  12/07/19 - No events, taking a little more PO, Cr slightly higher at 3.17, good UOP, BP stable  12/08/19 - No events, Cr trending down 2.9, fair UOP, BP slightly high this afternoon    Review of Systems  Review of Systems   Constitutional: Negative for chills, fever and malaise/fatigue.   HENT: Negative.    Eyes: Negative.    Respiratory: Negative.    Cardiovascular: Negative.    Gastrointestinal: Negative.    Genitourinary: Negative.    Musculoskeletal: Negative.    Skin: Negative.    Neurological: Negative.    Endo/Heme/Allergies: Negative.    Psychiatric/Behavioral: Negative.         Physical Exam  Temp:  [36.7 °C (98.1 °F)-37.2 °C (99 °F)] 36.7 °C (98.1 °F)  Pulse:  [51-97] 52  Resp:  [16-20] 16  BP: (128-153)/(57-67) 153/67  SpO2:  [93 %-97 %] 93 %    Physical Exam  Constitutional:       General: He is not in acute distress.     Appearance: Normal appearance. He is obese. He is not toxic-appearing.   HENT:      Head: Normocephalic and atraumatic.      Right Ear: There is no impacted cerumen.      Left Ear: There is no impacted cerumen.      Nose: Nose normal. No congestion  or rhinorrhea.      Mouth/Throat:      Mouth: Mucous membranes are moist.      Pharynx: Oropharynx is clear. No oropharyngeal exudate or posterior oropharyngeal erythema.   Eyes:      General: No scleral icterus.        Right eye: No discharge.         Left eye: No discharge.      Extraocular Movements: Extraocular movements intact.      Conjunctiva/sclera: Conjunctivae normal.      Pupils: Pupils are equal, round, and reactive to light.   Neck:      Musculoskeletal: Normal range of motion and neck supple. No neck rigidity or muscular tenderness.   Cardiovascular:      Rate and Rhythm: Normal rate and regular rhythm.      Pulses: Normal pulses.      Heart sounds: Normal heart sounds. No gallop.    Pulmonary:      Effort: Pulmonary effort is normal.      Breath sounds: Normal breath sounds. No stridor. No wheezing or rhonchi.   Abdominal:      General: Abdomen is flat. Bowel sounds are normal.      Palpations: Abdomen is soft. There is no mass.      Tenderness: There is no tenderness.   Musculoskeletal: Normal range of motion.         General: Swelling present.   Skin:     General: Skin is warm.      Coloration: Skin is not jaundiced.      Findings: No erythema.   Neurological:      General: No focal deficit present.      Sensory: No sensory deficit.      Deep Tendon Reflexes: Reflexes normal.   Psychiatric:         Mood and Affect: Mood normal.         Behavior: Behavior normal.         Fluids    Intake/Output Summary (Last 24 hours) at 12/8/2019 1301  Last data filed at 12/7/2019 2200  Gross per 24 hour   Intake --   Output 975 ml   Net -975 ml       Laboratory  Recent Labs     12/06/19 0259 12/08/19 0228   WBC 8.6 7.5   RBC 3.12* 3.10*   HEMOGLOBIN 9.5* 9.3*   HEMATOCRIT 29.7* 29.9*   MCV 95.2 96.5   MCH 30.4 30.0   MCHC 32.0* 31.1*   RDW 55.5* 57.1*   PLATELETCT 216 210   MPV 9.3 9.4     Recent Labs     12/06/19  0259 12/07/19  0039 12/08/19 0228   SODIUM 141 140 137   POTASSIUM 4.2 4.4 4.4   CHLORIDE 109 109  107   CO2 23 24 23   GLUCOSE 86 81 82   BUN 30* 31* 30*   CREATININE 3.02* 3.17* 2.89*   CALCIUM 8.3* 8.4* 8.4*         No results for input(s): NTPROBNP in the last 72 hours.        Imaging       Assessment/Plan  No new Assessment & Plan notes have been filed under this hospital service since the last note was generated.  Service: Nephrology     ASSESSMENT:  1.  LOLA on CKD -  Etiology is unclear  --FENA elevated suggesting ATN or possibly AIN  --Pt had been on higher dose zosyn and this was just adjusted based on decreased GFR  2.  Chronic kidney disease, unknown baseline levels.  3.  Candidemia  4.  Pseudomonas bacermia  5.  Respiratory Failure - resolved  6.  CAP  7.  Bradycardia  8.  Anemia    PLAN:  No acute need for RRT  Continue IVF's for another 24hrs and then discontinue and monitor renal function  Encourage PO fluids  Avoid nephrotoxins  Renal dose meds as necessary  Check iron studies and ferritin  Avoid ACE-I/ARB  DC PRN vasotec

## 2019-12-08 NOTE — CARE PLAN
Problem: Pain Management  Goal: Pain level will decrease to patient's comfort goal  Outcome: PROGRESSING AS EXPECTED   Patient reported pain in RT Knee. Tylenol given.   Problem: Psychosocial Needs:  Goal: Level of anxiety will decrease  Outcome: PROGRESSING AS EXPECTED   Patient reoriented easily today. Educated on POC. Patient calm throughout day.

## 2019-12-08 NOTE — PROGRESS NOTES
Hospital Medicine Daily Progress Note    Date of Service  12/8/2019    Chief Complaint  79 y.o. male admitted 11/21/2019 with AMS    Hospital Course    79-year-old man with diabetes, hypertension who presented from [facility] who was brought in for altered mental status.  Patient was admitted for pneumonia and UTI with LOLA.  He had a Steen catheter that was replaced.  Nephrology was consulted, his LOLA improved with treatment of UTI and urinary retention.  Blood cultures and urine culture were positive for Pseudomonas; blood also grew Ijeoma glabrata.  Dr. Wyman with ophthalmology showed no evidence of endophthlamitis on dilated exam 11/25.  Unclear source of candidemia.  SARAVANAN on 12/3 was negative for endocarditis.  Infectious disease recommended continuing Zosyn and micafungin until 12/7/2019. 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      Interval Problem Update  Creat decreased  He continues to feel well, has no complaints.     Consultants/Specialty  Critical care  General surgeon  Ophthalmology  Neurology  Nephrology  Infectious disease  Cardiology    Code Status  Full Code      Disposition  SNF vs memory care    Review of Systems  Review of Systems   Constitutional: Negative for malaise/fatigue.   HENT: Negative for congestion.    Respiratory: Negative for cough and shortness of breath.    Cardiovascular: Negative for chest pain.   Gastrointestinal: Negative for abdominal pain and nausea.   Genitourinary: Negative for dysuria and urgency.   Musculoskeletal: Negative for myalgias.   Neurological: Negative for dizziness.        Physical Exam  Temp:  [36.7 °C (98.1 °F)-37.2 °C (99 °F)] 36.7 °C (98.1 °F)  Pulse:  [51-97] 52  Resp:  [16-20] 16  BP: (128-153)/(57-67) 153/67  SpO2:  [93 %-97 %] 93 %    Physical Exam  Vitals signs and nursing note reviewed.   Constitutional:       General: He is not in acute distress.     Appearance: He is not ill-appearing.      Comments: frail   HENT:      Head:  Normocephalic.      Mouth/Throat:      Mouth: Mucous membranes are moist.   Eyes:      General:         Right eye: No discharge.         Left eye: No discharge.      Conjunctiva/sclera: Conjunctivae normal.   Cardiovascular:      Rate and Rhythm: Normal rate and regular rhythm.   Pulmonary:      Effort: No respiratory distress.      Breath sounds: No wheezing or rales.   Abdominal:      General: Abdomen is flat.      Palpations: Abdomen is soft.      Tenderness: There is no tenderness. There is no guarding or rebound.   Musculoskeletal:      Comments: Diffuse mild muscle atrophy   Skin:     General: Skin is warm and dry.   Neurological:      Mental Status: He is alert.      Comments: Oriented to self and [facility] and remembers he has LOLA. Disoriented to year and month   Psychiatric:      Comments: pleasant         Fluids    Intake/Output Summary (Last 24 hours) at 12/8/2019 0956  Last data filed at 12/7/2019 2200  Gross per 24 hour   Intake --   Output 975 ml   Net -975 ml       Laboratory  Recent Labs     12/06/19  0259 12/08/19 0228   WBC 8.6 7.5   RBC 3.12* 3.10*   HEMOGLOBIN 9.5* 9.3*   HEMATOCRIT 29.7* 29.9*   MCV 95.2 96.5   MCH 30.4 30.0   MCHC 32.0* 31.1*   RDW 55.5* 57.1*   PLATELETCT 216 210   MPV 9.3 9.4     Recent Labs     12/06/19  0259 12/07/19  0039 12/08/19 0228   SODIUM 141 140 137   POTASSIUM 4.2 4.4 4.4   CHLORIDE 109 109 107   CO2 23 24 23   GLUCOSE 86 81 82   BUN 30* 31* 30*   CREATININE 3.02* 3.17* 2.89*   CALCIUM 8.3* 8.4* 8.4*                   Imaging  EC-SARAVANAN W/O CONT   Final Result      DX-CHEST-LIMITED (1 VIEW)   Final Result      Bibasilar and perihilar underinflation atelectasis which could obscure an additional process. This is unchanged.      [id number] VIEW   Final Result      Diffuse bowel distention. Findings are suggestive of ileus. Early obstruction could have a similar appearance.      DX-CHEST-LIMITED (1 VIEW)   Final Result      1.  Interval worsening of pulmonary opacities  in the right mid/lower lung.   2.  Unchanged left basilar atelectasis and/or consolidation. No significant pleural effusions.      DX-CHEST-PORTABLE (1 VIEW)   Final Result      Similar reticular opacities suspicious for edema and there are likely small layering effusions      More focal basilar opacity is more likely from atelectasis than consolidation      EC-ECHOCARDIOGRAM COMPLETE W/O CONT   Final Result      CT-RENAL COLIC EVALUATION(A/P W/O)   Final Result      1.  Dilated fluid-filled small bowel loops in RIGHT lower quadrant concerning for developing obstruction.   2.  No evidence of bowel perforation.   3.  Colonic diverticulosis without evidence for diverticulitis.   4.  No renal stone or hydronephrosis.   5.  Appendix is not visualized.   6.  Enlarged prostate.   7.  Small bilateral pleural effusions with associated atelectasis.   8.  Colonic diverticulosis without evidence for diverticulitis.      DX-CHEST-PORTABLE (1 VIEW)   Final Result         1.  Pulmonary edema and/or infiltrates are identified, which are stable since the prior exam.   2.  Cardiomegaly   3.  Atherosclerosis           Assessment/Plan  * Candidemia (HCC)- (present on admission)  Assessment & Plan  Appreciate ID recommendations  Seen by ophthalmology, negative endophthlamitis  Continuing micafungin stop date 12/7    Bacteremia due to Pseudomonas- (present on admission)  Assessment & Plan  Infectious disease following, continue Zosyn for CAP, bacteremia, and UTI stop date 12/7  SARAVANAN on 12/3 negative for endocarditis    Acute renal failure (ARF) (HCC)- (present on admission)  Assessment & Plan  Likely was 2/2 urinary retention/sawyer obstruction  Kidney function had improved to 1.98, but now worsening  I stopped Jardiance, avoid nephrotoxins  CT renal colic on 11/22 showed normal kidneys  FeNa 2.3%  Monitor urine output, sawyer cath in place  Renal diet  Creat worsening, I consulted nephrology. Adjusted zosyn dose.   Improved today,  continue IVF, monitor BMP    Altered mental status- (present on admission)  Assessment & Plan  Patient has baseline dementia and is likely close to baseline being oriented only to person  Neuro checks.  Monitor mental status      UTI (urinary tract infection)- (present on admission)  Assessment & Plan  Urinary tract infection associated with chronic indwelling Steen catheter changed 11/21  Complicated urinary tract infection with associated encephalopathy  Appreciate infectious disease consultation, continuing Zosyn stop date 12/7    CAP (community acquired pneumonia)- (present on admission)  Assessment & Plan  Pseudomonas; continue zosyn per ID.  Stop date 12/7  Rt protocol  Wean oxygen as tolerated      Bradycardia- (present on admission)  Assessment & Plan  Patient has been bradycardic, but seems to be asymptomatic.  Normotensive.  Hold AV zoraida blockers.    Hyperglycemia  Assessment & Plan  Jardiance was on medication list as well as insulin sliding scale  A1c does not show diabetes  Glucose has been low, stopped Jardiance  Glucose has been stable, stop ISS    Normocytic normochromic anemia- (present on admission)  Assessment & Plan  Asymptomatic  No active signs of bleeding  Transfuse to maintain hemoglobin > 7       VTE prophylaxis: heparin

## 2019-12-08 NOTE — CARE PLAN
Problem: Communication  Goal: The ability to communicate needs accurately and effectively will improve  Outcome: PROGRESSING AS EXPECTED   Patient educated on unit routine  Problem: Safety  Goal: Will remain free from injury  Outcome: PROGRESSING AS EXPECTED   Patient with fall precautions in place and room near nurses station.

## 2019-12-09 LAB
ANION GAP SERPL CALC-SCNC: 8 MMOL/L (ref 0–11.9)
BUN SERPL-MCNC: 30 MG/DL (ref 8–22)
CALCIUM SERPL-MCNC: 8.4 MG/DL (ref 8.5–10.5)
CHLORIDE SERPL-SCNC: 108 MMOL/L (ref 96–112)
CO2 SERPL-SCNC: 21 MMOL/L (ref 20–33)
CREAT SERPL-MCNC: 2.81 MG/DL (ref 0.5–1.4)
FERRITIN SERPL-MCNC: 329.5 NG/ML (ref 22–322)
GLUCOSE SERPL-MCNC: 90 MG/DL (ref 65–99)
IRON SATN MFR SERPL: 20 % (ref 15–55)
IRON SERPL-MCNC: 24 UG/DL (ref 50–180)
POTASSIUM SERPL-SCNC: 4.5 MMOL/L (ref 3.6–5.5)
SODIUM SERPL-SCNC: 137 MMOL/L (ref 135–145)
TIBC SERPL-MCNC: 120 UG/DL (ref 250–450)

## 2019-12-09 PROCEDURE — 700111 HCHG RX REV CODE 636 W/ 250 OVERRIDE (IP): Performed by: INTERNAL MEDICINE

## 2019-12-09 PROCEDURE — 83550 IRON BINDING TEST: CPT

## 2019-12-09 PROCEDURE — A9270 NON-COVERED ITEM OR SERVICE: HCPCS | Performed by: INTERNAL MEDICINE

## 2019-12-09 PROCEDURE — 80048 BASIC METABOLIC PNL TOTAL CA: CPT

## 2019-12-09 PROCEDURE — 700102 HCHG RX REV CODE 250 W/ 637 OVERRIDE(OP): Performed by: INTERNAL MEDICINE

## 2019-12-09 PROCEDURE — A9270 NON-COVERED ITEM OR SERVICE: HCPCS | Performed by: HOSPITALIST

## 2019-12-09 PROCEDURE — 82728 ASSAY OF FERRITIN: CPT

## 2019-12-09 PROCEDURE — 83540 ASSAY OF IRON: CPT

## 2019-12-09 PROCEDURE — 99232 SBSQ HOSP IP/OBS MODERATE 35: CPT | Performed by: INTERNAL MEDICINE

## 2019-12-09 PROCEDURE — 36415 COLL VENOUS BLD VENIPUNCTURE: CPT

## 2019-12-09 PROCEDURE — 770006 HCHG ROOM/CARE - MED/SURG/GYN SEMI*

## 2019-12-09 PROCEDURE — 700102 HCHG RX REV CODE 250 W/ 637 OVERRIDE(OP): Performed by: HOSPITALIST

## 2019-12-09 RX ADMIN — SIMVASTATIN 10 MG: 20 TABLET, FILM COATED ORAL at 20:42

## 2019-12-09 RX ADMIN — OXYCODONE HYDROCHLORIDE AND ACETAMINOPHEN 500 MG: 500 TABLET ORAL at 06:43

## 2019-12-09 RX ADMIN — FAMOTIDINE 20 MG: 10 INJECTION INTRAVENOUS at 06:42

## 2019-12-09 RX ADMIN — HEPARIN SODIUM 5000 UNITS: 5000 INJECTION INTRAVENOUS; SUBCUTANEOUS at 20:42

## 2019-12-09 RX ADMIN — AMLODIPINE BESYLATE 10 MG: 10 TABLET ORAL at 06:43

## 2019-12-09 RX ADMIN — HALOPERIDOL LACTATE 5 MG: 5 INJECTION, SOLUTION INTRAMUSCULAR at 17:16

## 2019-12-09 RX ADMIN — TAMSULOSIN HYDROCHLORIDE 0.4 MG: 0.4 CAPSULE ORAL at 06:42

## 2019-12-09 RX ADMIN — LATANOPROST 1 DROP: 50 SOLUTION OPHTHALMIC at 20:42

## 2019-12-09 RX ADMIN — CLOTRIMAZOLE 1 APPLICATION: 10 CREAM TOPICAL at 17:22

## 2019-12-09 RX ADMIN — SENNOSIDES AND DOCUSATE SODIUM 2 TABLET: 8.6; 5 TABLET ORAL at 06:42

## 2019-12-09 RX ADMIN — POLYETHYLENE GLYCOL 3350 1 PACKET: 17 POWDER, FOR SOLUTION ORAL at 06:42

## 2019-12-09 RX ADMIN — CLOTRIMAZOLE 1 APPLICATION: 10 CREAM TOPICAL at 06:47

## 2019-12-09 RX ADMIN — MULTIPLE VITAMINS W/ MINERALS TAB 1 TABLET: TAB at 06:42

## 2019-12-09 RX ADMIN — HEPARIN SODIUM 5000 UNITS: 5000 INJECTION INTRAVENOUS; SUBCUTANEOUS at 14:53

## 2019-12-09 RX ADMIN — HEPARIN SODIUM 5000 UNITS: 5000 INJECTION INTRAVENOUS; SUBCUTANEOUS at 06:42

## 2019-12-09 ASSESSMENT — ENCOUNTER SYMPTOMS
EYE PAIN: 0
FEVER: 0
COUGH: 0
DEPRESSION: 0
ABDOMINAL PAIN: 0
DIZZINESS: 0
MYALGIAS: 0
POLYDIPSIA: 0
BRUISES/BLEEDS EASILY: 0
HEMOPTYSIS: 0
NAUSEA: 0
SHORTNESS OF BREATH: 0
EYE REDNESS: 0
HEADACHES: 0
HALLUCINATIONS: 0
SORE THROAT: 0

## 2019-12-09 NOTE — PROGRESS NOTES
Hospital Medicine Daily Progress Note    Date of Service  12/9/2019    Chief Complaint  79 y.o. male admitted 11/21/2019 with AMS    Hospital Course    79-year-old man with diabetes, hypertension who presented from [facility] who was brought in for altered mental status.  Patient was admitted for pneumonia and UTI with LOLA.  He had a Steen catheter that was replaced.  Nephrology was consulted, his LOLA improved with treatment of UTI and urinary retention.  Blood cultures and urine culture were positive for Pseudomonas; blood also grew Ijeoma glabrata.  Dr. Wyman with ophthalmology showed no evidence of endophthlamitis on dilated exam 11/25.  Unclear source of candidemia.  SAARVANAN on 12/3 was negative for endocarditis.  Infectious disease recommended continuing Zosyn and micafungin until 12/7/2019. 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 His renal function slowly improved with IVF.       Interval Problem Update  Creat improving.   No acute events, patient is pleasant, has no complaints.     Consultants/Specialty  Critical care  General surgeon  Ophthalmology  Neurology  Nephrology  Infectious disease  Cardiology    Code Status  Full Code      Disposition  SNF once renal function stabilizes    Review of Systems  Review of Systems   Constitutional: Negative for malaise/fatigue.   HENT: Negative for congestion.    Respiratory: Negative for shortness of breath.    Cardiovascular: Negative for chest pain and leg swelling.   Gastrointestinal: Negative for abdominal pain and nausea.   Genitourinary: Negative for dysuria and urgency.   Musculoskeletal: Negative for myalgias.   Neurological: Negative for dizziness and headaches.        Physical Exam  Temp:  [36.7 °C (98.1 °F)-37.1 °C (98.8 °F)] 36.8 °C (98.2 °F)  Pulse:  [51-66] 55  Resp:  [18] 18  BP: (127-145)/(63-67) 145/63  SpO2:  [93 %-96 %] 93 %    Physical Exam  Vitals signs and nursing note reviewed.   Constitutional:       General: He is not in acute  distress.     Appearance: He is not ill-appearing.      Comments: frail   HENT:      Head: Normocephalic.      Mouth/Throat:      Mouth: Mucous membranes are moist.   Eyes:      General:         Right eye: No discharge.         Left eye: No discharge.      Conjunctiva/sclera: Conjunctivae normal.   Cardiovascular:      Rate and Rhythm: Normal rate and regular rhythm.   Pulmonary:      Effort: No respiratory distress.      Breath sounds: No wheezing or rales.   Abdominal:      General: Abdomen is flat.      Palpations: Abdomen is soft.      Tenderness: There is no tenderness.   Musculoskeletal:      Comments: Diffuse mild muscle atrophy   Skin:     General: Skin is warm and dry.   Neurological:      Mental Status: He is alert.      Comments: Oriented to self and [facility] and situation. Disoriented to year and month   Psychiatric:      Comments: pleasant         Fluids    Intake/Output Summary (Last 24 hours) at 12/9/2019 1035  Last data filed at 12/9/2019 0556  Gross per 24 hour   Intake 480 ml   Output 2600 ml   Net -2120 ml       Laboratory  Recent Labs     12/08/19  0228   WBC 7.5   RBC 3.10*   HEMOGLOBIN 9.3*   HEMATOCRIT 29.9*   MCV 96.5   MCH 30.0   MCHC 31.1*   RDW 57.1*   PLATELETCT 210   MPV 9.4     Recent Labs     12/07/19  0039 12/08/19  0228 12/09/19  0307   SODIUM 140 137 137   POTASSIUM 4.4 4.4 4.5   CHLORIDE 109 107 108   CO2 24 23 21   GLUCOSE 81 82 90   BUN 31* 30* 30*   CREATININE 3.17* 2.89* 2.81*   CALCIUM 8.4* 8.4* 8.4*                   Imaging  EC-SARAVANAN W/O CONT   Final Result      DX-CHEST-LIMITED (1 VIEW)   Final Result      Bibasilar and perihilar underinflation atelectasis which could obscure an additional process. This is unchanged.      [id number] VIEW   Final Result      Diffuse bowel distention. Findings are suggestive of ileus. Early obstruction could have a similar appearance.      DX-CHEST-LIMITED (1 VIEW)   Final Result      1.  Interval worsening of pulmonary opacities in the right  mid/lower lung.   2.  Unchanged left basilar atelectasis and/or consolidation. No significant pleural effusions.      DX-CHEST-PORTABLE (1 VIEW)   Final Result      Similar reticular opacities suspicious for edema and there are likely small layering effusions      More focal basilar opacity is more likely from atelectasis than consolidation      EC-ECHOCARDIOGRAM COMPLETE W/O CONT   Final Result      CT-RENAL COLIC EVALUATION(A/P W/O)   Final Result      1.  Dilated fluid-filled small bowel loops in RIGHT lower quadrant concerning for developing obstruction.   2.  No evidence of bowel perforation.   3.  Colonic diverticulosis without evidence for diverticulitis.   4.  No renal stone or hydronephrosis.   5.  Appendix is not visualized.   6.  Enlarged prostate.   7.  Small bilateral pleural effusions with associated atelectasis.   8.  Colonic diverticulosis without evidence for diverticulitis.      DX-CHEST-PORTABLE (1 VIEW)   Final Result         1.  Pulmonary edema and/or infiltrates are identified, which are stable since the prior exam.   2.  Cardiomegaly   3.  Atherosclerosis           Assessment/Plan  * Candidemia (HCC)- (present on admission)  Assessment & Plan  Appreciate ID recommendations  Seen by ophthalmology, negative endophthlamitis  Finished micafungin 12/7    Bacteremia due to Pseudomonas- (present on admission)  Assessment & Plan  Infectious disease following, finished Zosyn 12/7  SARAVANAN on 12/3 negative for endocarditis    Acute renal failure (ARF) (HCC)- (present on admission)  Assessment & Plan  Likely was 2/2 urinary retention/sawyer obstruction  Kidney function had improved to 1.98, but now worsening  I stopped Jardiance, avoid nephrotoxins  CT renal colic on 11/22 showed normal kidneys  FeNa 2.3%  Monitor urine output, sawyer cath in place  Renal diet  Creat slowly improving, IVF per nephrology, monitor BMP    Altered mental status- (present on admission)  Assessment & Plan  Patient has baseline  dementia and is likely close to baseline being oriented only to person  Neuro checks.  Monitor mental status      UTI (urinary tract infection)- (present on admission)  Assessment & Plan  Urinary tract infection associated with chronic indwelling Steen catheter changed 11/21  Complicated urinary tract infection with associated encephalopathy  Appreciate infectious disease consultation, finished Zosyn 12/7    CAP (community acquired pneumonia)- (present on admission)  Assessment & Plan  Pseudomonas; continue zosyn per ID.  Stop date 12/7  Rt protocol  Wean oxygen as tolerated      Bradycardia- (present on admission)  Assessment & Plan  Patient has been bradycardic, but seems to be asymptomatic.  Normotensive.  Hold AV zoraida blockers.    Hyperglycemia  Assessment & Plan  Jardiance was on medication list as well as insulin sliding scale  A1c does not show diabetes  Glucose has been low, stopped Jardiance  Glucose has been stable, stop ISS    Normocytic normochromic anemia- (present on admission)  Assessment & Plan  Asymptomatic  No active signs of bleeding  Transfuse to maintain hemoglobin > 7       VTE prophylaxis: heparin

## 2019-12-09 NOTE — PROGRESS NOTES
[facility] -  PROGRESS NOTE               Author: Arsh Hogan M.D. Date & Time: 12/9/2019  11:05 AM     HPI:  79-year-old man with diabetes, hypertension who presented from [facility] who was brought in for altered mental status. Patient was admitted for pneumonia and UTI with LOLA. We were initially consulted for LOLA secondary to urinary obstruction ad this improved with treatment. Blood cultures and urine culture were positive for Pseudomonas; blood also grew Candida glabrata. SARAVANAN on 12/3 was negative for endocarditis. Infectious disease recommends continuing Zosyn and micafungin until 12/7/2019.  Scr initially on presentation was 2.5 and improved to 1.74 but now has climbed to 3 hence prompting nephrology evaluation again.     DAILY NEPHROLOGY SUMMARY:  12/06/19 - Scr 3.02. Non-oliguric. Feels fine. No complaints.  12/07/19 - No events, taking a little more PO, Cr slightly higher at 3.17, good UOP, BP stable  12/08/19 - No events, Cr trending down 2.9, fair UOP, BP slightly high this afternoon  12/09/19 - NAEO, no complaints, sitting in PT chair      REVIEW OF SYSTEMS:    Review of Systems   Constitutional: Negative for fever and malaise/fatigue.   HENT: Negative for ear pain and sore throat.    Eyes: Negative for pain and redness.   Respiratory: Negative for cough and hemoptysis.    Cardiovascular: Negative for chest pain and leg swelling.   Gastrointestinal: Negative for abdominal pain and nausea.   Musculoskeletal: Negative for joint pain and myalgias.   Skin: Negative for itching and rash.   Neurological: Negative for dizziness and headaches.   Endo/Heme/Allergies: Negative for polydipsia. Does not bruise/bleed easily.   Psychiatric/Behavioral: Negative for depression and hallucinations.   All other systems reviewed and are negative.      PAST FAMILY HISTORY: Reviewed and Unchanged  SOCIAL HISTORY: Reviewed and Unchanged  CURRENT MEDICATIONS: Reviewed  IMAGING STUDIES:  "Reviewed  LABORATORY STUDIES: Reviewed    PHYSICAL EXAM:  VS:  /63   Pulse (!) 55   Temp 36.8 °C (98.2 °F) (Temporal)   Resp 18   Ht 2.007 m (6' 7\")   Wt 102.2 kg (225 lb 5 oz)   SpO2 93%   BMI 25.38 kg/m²   Physical Exam   Constitutional: He is oriented to person, place, and time. He appears well-developed and well-nourished. He appears ill. No distress.   HENT:   Head: Normocephalic and atraumatic.   Right Ear: External ear normal.   Left Ear: External ear normal.   Eyes: Conjunctivae and lids are normal. No scleral icterus.   Neck: Neck supple. No tracheal tenderness present.   Cardiovascular: Normal rate, regular rhythm and normal heart sounds.   Pulmonary/Chest: Effort normal and breath sounds normal.   Abdominal: Normal appearance. There is no tenderness. There is no CVA tenderness.   Musculoskeletal:         General: No tenderness or edema.   Neurological: He is alert and oriented to person, place, and time. He displays no atrophy.   Skin: Skin is warm and dry. No rash noted.   Psychiatric: He has a normal mood and affect. His behavior is normal.   Nursing note and vitals reviewed.      Fluids:  In: 480 [P.O.:480]  Out: 2600     LABS:  Recent Results (from the past 24 hour(s))   Basic Metabolic Panel    Collection Time: 12/09/19  3:07 AM   Result Value Ref Range    Sodium 137 135 - 145 mmol/L    Potassium 4.5 3.6 - 5.5 mmol/L    Chloride 108 96 - 112 mmol/L    Co2 21 20 - 33 mmol/L    Glucose 90 65 - 99 mg/dL    Bun 30 (H) 8 - 22 mg/dL    Creatinine 2.81 (H) 0.50 - 1.40 mg/dL    Calcium 8.4 (L) 8.5 - 10.5 mg/dL    Anion Gap 8.0 0.0 - 11.9   IRON/TOTAL IRON BIND    Collection Time: 12/09/19  3:07 AM   Result Value Ref Range    Iron 24 (L) 50 - 180 ug/dL    Total Iron Binding 120 (L) 250 - 450 ug/dL    % Saturation 20 15 - 55 %   FERRITIN    Collection Time: 12/09/19  3:07 AM   Result Value Ref Range    Ferritin 329.5 (H) 22.0 - 322.0 ng/mL   ESTIMATED GFR    Collection Time: 12/09/19  3:07 AM "   Result Value Ref Range    GFR If  26 (A) >60 mL/min/1.73 m 2    GFR If Non African American 22 (A) >60 mL/min/1.73 m 2       (click the triangle to expand results)      IMPRESSION:  - LOLA    * Etiology unclear    * Diff Dx: ATN vs AIN    * SCr about same last few days    * Abx course almost complete  - CKD Stage unknown    * BCr unknown, not enough data to review  - Candidemia with sepsis  - Pseudomonas bacteremia with sepsis  - Acute respiratory failure    * Resolved  - Bradycardia  - Anemia    PLAN:  - No compelling indication for RRT at this time  - Abx per ID  - Low sodium diet  - Dose all meds per eGFR < 20  - Daily labs to trend SCr  - Encourage po intake  - LR bolus as needed for hypotension

## 2019-12-09 NOTE — CARE PLAN
Problem: Communication  Goal: The ability to communicate needs accurately and effectively will improve  Outcome: PROGRESSING SLOWER THAN EXPECTED     Problem: Safety  Goal: Will remain free from injury  Outcome: PROGRESSING AS EXPECTED  Goal: Will remain free from falls  Outcome: PROGRESSING AS EXPECTED     Problem: Infection  Goal: Will remain free from infection  Outcome: PROGRESSING AS EXPECTED     Problem: Venous Thromboembolism (VTW)/Deep Vein Thrombosis (DVT) Prevention:  Goal: Patient will participate in Venous Thrombosis (VTE)/Deep Vein Thrombosis (DVT)Prevention Measures  Outcome: PROGRESSING AS EXPECTED     Problem: Bowel/Gastric:  Goal: Normal bowel function is maintained or improved  Outcome: PROGRESSING AS EXPECTED  Goal: Will not experience complications related to bowel motility  Outcome: PROGRESSING AS EXPECTED     Problem: Knowledge Deficit  Goal: Knowledge of disease process/condition, treatment plan, diagnostic tests, and medications will improve  Outcome: PROGRESSING SLOWER THAN EXPECTED  Goal: Knowledge of the prescribed therapeutic regimen will improve  Outcome: PROGRESSING SLOWER THAN EXPECTED

## 2019-12-10 PROBLEM — E11.9 TYPE 2 DIABETES MELLITUS, WITH LONG-TERM CURRENT USE OF INSULIN (HCC): Status: ACTIVE | Noted: 2019-12-03

## 2019-12-10 PROBLEM — Z79.4 TYPE 2 DIABETES MELLITUS, WITH LONG-TERM CURRENT USE OF INSULIN (HCC): Status: ACTIVE | Noted: 2019-12-03

## 2019-12-10 LAB
ANION GAP SERPL CALC-SCNC: 10 MMOL/L (ref 0–11.9)
BUN SERPL-MCNC: 31 MG/DL (ref 8–22)
CALCIUM SERPL-MCNC: 8.4 MG/DL (ref 8.5–10.5)
CHLORIDE SERPL-SCNC: 109 MMOL/L (ref 96–112)
CO2 SERPL-SCNC: 21 MMOL/L (ref 20–33)
CREAT SERPL-MCNC: 2.85 MG/DL (ref 0.5–1.4)
GLUCOSE SERPL-MCNC: 85 MG/DL (ref 65–99)
POTASSIUM SERPL-SCNC: 4.6 MMOL/L (ref 3.6–5.5)
SODIUM SERPL-SCNC: 140 MMOL/L (ref 135–145)

## 2019-12-10 PROCEDURE — A9270 NON-COVERED ITEM OR SERVICE: HCPCS | Performed by: HOSPITALIST

## 2019-12-10 PROCEDURE — A9270 NON-COVERED ITEM OR SERVICE: HCPCS | Performed by: INTERNAL MEDICINE

## 2019-12-10 PROCEDURE — 700111 HCHG RX REV CODE 636 W/ 250 OVERRIDE (IP): Performed by: INTERNAL MEDICINE

## 2019-12-10 PROCEDURE — 80048 BASIC METABOLIC PNL TOTAL CA: CPT

## 2019-12-10 PROCEDURE — 700102 HCHG RX REV CODE 250 W/ 637 OVERRIDE(OP): Performed by: INTERNAL MEDICINE

## 2019-12-10 PROCEDURE — 770006 HCHG ROOM/CARE - MED/SURG/GYN SEMI*

## 2019-12-10 PROCEDURE — 700111 HCHG RX REV CODE 636 W/ 250 OVERRIDE (IP): Performed by: HOSPITALIST

## 2019-12-10 PROCEDURE — 700102 HCHG RX REV CODE 250 W/ 637 OVERRIDE(OP): Performed by: HOSPITALIST

## 2019-12-10 PROCEDURE — 36415 COLL VENOUS BLD VENIPUNCTURE: CPT

## 2019-12-10 PROCEDURE — 99232 SBSQ HOSP IP/OBS MODERATE 35: CPT | Performed by: HOSPITALIST

## 2019-12-10 RX ADMIN — CLOTRIMAZOLE 1 APPLICATION: 10 CREAM TOPICAL at 17:08

## 2019-12-10 RX ADMIN — SIMVASTATIN 10 MG: 20 TABLET, FILM COATED ORAL at 20:43

## 2019-12-10 RX ADMIN — AMLODIPINE BESYLATE 10 MG: 10 TABLET ORAL at 05:21

## 2019-12-10 RX ADMIN — TAMSULOSIN HYDROCHLORIDE 0.4 MG: 0.4 CAPSULE ORAL at 05:19

## 2019-12-10 RX ADMIN — HEPARIN SODIUM 5000 UNITS: 5000 INJECTION INTRAVENOUS; SUBCUTANEOUS at 14:33

## 2019-12-10 RX ADMIN — HEPARIN SODIUM 5000 UNITS: 5000 INJECTION INTRAVENOUS; SUBCUTANEOUS at 05:20

## 2019-12-10 RX ADMIN — OXYCODONE HYDROCHLORIDE AND ACETAMINOPHEN 500 MG: 500 TABLET ORAL at 05:19

## 2019-12-10 RX ADMIN — MULTIPLE VITAMINS W/ MINERALS TAB 1 TABLET: TAB at 05:19

## 2019-12-10 RX ADMIN — HEPARIN SODIUM 5000 UNITS: 5000 INJECTION INTRAVENOUS; SUBCUTANEOUS at 20:45

## 2019-12-10 RX ADMIN — CLOTRIMAZOLE 1 APPLICATION: 10 CREAM TOPICAL at 05:26

## 2019-12-10 RX ADMIN — FAMOTIDINE 20 MG: 10 INJECTION INTRAVENOUS at 05:26

## 2019-12-10 RX ADMIN — LATANOPROST 1 DROP: 50 SOLUTION OPHTHALMIC at 20:44

## 2019-12-10 ASSESSMENT — ENCOUNTER SYMPTOMS
POLYDIPSIA: 0
HALLUCINATIONS: 0
EYE PAIN: 0
HEMOPTYSIS: 0
DEPRESSION: 0
MYALGIAS: 0
FEVER: 0
COUGH: 0
NAUSEA: 0
DIZZINESS: 0
EYE REDNESS: 0
ABDOMINAL PAIN: 0
SORE THROAT: 0
HEADACHES: 0
BRUISES/BLEEDS EASILY: 0

## 2019-12-10 NOTE — CARE PLAN
Problem: Safety  Goal: Will remain free from injury  Outcome: PROGRESSING AS EXPECTED   Implemented all fall precautions. Bed alarm in place, bed locked and lowest position, fall risk bracelet, sign on door, call light in reach, upper bed rails in place, yellow treaded socks.    Problem: Venous Thromboembolism (VTW)/Deep Vein Thrombosis (DVT) Prevention:  Goal: Patient will participate in Venous Thrombosis (VTE)/Deep Vein Thrombosis (DVT)Prevention Measures  Outcome: PROGRESSING AS EXPECTED   Patient receiving heparin as ordered. No s/s of adverse reactions.

## 2019-12-10 NOTE — PROGRESS NOTES
[facility] -  PROGRESS NOTE               Author: JUSTYN Velasquez Date & Time: 12/10/2019  10:46 AM   Supervising Physician: Arsh Hogan MD      HPI:  79-year-old man with diabetes, hypertension who presented from [facility] who was brought in for altered mental status. Patient was admitted for pneumonia and UTI with LOLA. We were initially consulted for LOLA secondary to urinary obstruction ad this improved with treatment. Blood cultures and urine culture were positive for Pseudomonas; blood also grew Candida glabrata. SARAVANAN on 12/3 was negative for endocarditis. Infectious disease recommends continuing Zosyn and micafungin until 12/7/2019.  Scr initially on presentation was 2.5 and improved to 1.74 but now has climbed to 3 hence prompting nephrology evaluation again.     DAILY NEPHROLOGY SUMMARY:  12/06/19 - Scr 3.02. Non-oliguric. Feels fine. No complaints.  12/07/19 - No events, taking a little more PO, Cr slightly higher at 3.17, good UOP, BP stable  12/08/19 - No events, Cr trending down 2.9, fair UOP, BP slightly high this afternoon  12/09/19 - NAEO, no complaints, sitting in PT chair    12/10/19 - No new events. Awaiting placement at [facility].     REVIEW OF SYSTEMS:    Review of Systems   Constitutional: Negative for fever and malaise/fatigue.   HENT: Negative for ear pain and sore throat.    Eyes: Negative for pain and redness.   Respiratory: Negative for cough and hemoptysis.    Cardiovascular: Negative for chest pain and leg swelling.   Gastrointestinal: Negative for abdominal pain and nausea.   Musculoskeletal: Negative for joint pain and myalgias.   Skin: Negative for itching and rash.   Neurological: Negative for dizziness and headaches.   Endo/Heme/Allergies: Negative for polydipsia. Does not bruise/bleed easily.   Psychiatric/Behavioral: Negative for depression and hallucinations.   All other systems reviewed and are negative.      PAST FAMILY HISTORY: Reviewed and  "Unchanged  SOCIAL HISTORY: Reviewed and Unchanged  CURRENT MEDICATIONS: Reviewed  IMAGING STUDIES: Reviewed  LABORATORY STUDIES: Reviewed    PHYSICAL EXAM:  VS:  /58   Pulse (!) 58   Temp 36.8 °C (98.3 °F) (Temporal)   Resp 16   Ht 2.007 m (6' 7\")   Wt 102.2 kg (225 lb 5 oz)   SpO2 94%   BMI 25.38 kg/m²   Physical Exam   Constitutional: He is oriented to person, place, and time. He appears well-developed and well-nourished. He appears ill. No distress.   HENT:   Head: Normocephalic and atraumatic.   Right Ear: External ear normal.   Left Ear: External ear normal.   Eyes: Conjunctivae and lids are normal. No scleral icterus.   Neck: Neck supple. No tracheal tenderness present.   Cardiovascular: Normal rate, regular rhythm and normal heart sounds.   Pulmonary/Chest: Effort normal and breath sounds normal.   Abdominal: Normal appearance. There is no tenderness. There is no CVA tenderness.   Musculoskeletal:         General: No tenderness or edema.   Neurological: He is alert and oriented to person, place, and time. He displays no atrophy.   Skin: Skin is warm and dry. No rash noted.   Psychiatric: He has a normal mood and affect. His behavior is normal.   Nursing note and vitals reviewed.      Fluids:  In: -   Out: 1300     LABS:  Recent Results (from the past 24 hour(s))   Basic Metabolic Panel    Collection Time: 12/10/19 12:36 AM   Result Value Ref Range    Sodium 140 135 - 145 mmol/L    Potassium 4.6 3.6 - 5.5 mmol/L    Chloride 109 96 - 112 mmol/L    Co2 21 20 - 33 mmol/L    Glucose 85 65 - 99 mg/dL    Bun 31 (H) 8 - 22 mg/dL    Creatinine 2.85 (H) 0.50 - 1.40 mg/dL    Calcium 8.4 (L) 8.5 - 10.5 mg/dL    Anion Gap 10.0 0.0 - 11.9   ESTIMATED GFR    Collection Time: 12/10/19 12:36 AM   Result Value Ref Range    GFR If  26 (A) >60 mL/min/1.73 m 2    GFR If Non African American 21 (A) >60 mL/min/1.73 m 2       (click the triangle to expand results)      IMPRESSION:  - LOLA    * Etiology " unclear    * Diff Dx: ATN vs AIN    * SCr about same last few days    * Abx course almost complete  - CKD Stage unknown    * BCr unknown, not enough data to review  - Candidemia with sepsis  - Pseudomonas bacteremia with sepsis  - Acute respiratory failure    * Resolved  - Bradycardia  - Anemia    PLAN:  - No compelling indication for RRT at this time  - Renal fxn stable  - Abx per ID  - Low sodium diet  - Dose all meds per eGFR < 20  - Daily labs to trend SCr  - Encourage po intake  - Will need outpt F/U 2-3 weeks after discharge    Thank you,

## 2019-12-10 NOTE — PROGRESS NOTES
Hospital Medicine Daily Progress Note    Date of Service  12/10/2019    Chief Complaint  Increased RR and cough.    Hospital Course   This is an 79-year-old male with PMH significant for dementia, DM 2, and BPH with chronic Steen catheter who transferred to [facility] from [facility] 11/21/2019 with fever, increased respiratory rate, altered mental status and cough. He was found to have >1500mL on bladder scan with associated LOLA.  Nephrology was consulted and recommended holding off on RRT at this time.  Steen catheter was changed 11/21/2019.  Blood culture positive for Pseudomonas and Candida glabrata.  Urine positive for Pseudomonas.  ID was consulted and he completed ABX course with Zosyn and micafungin 12/6/2019.  Cardiology was consulted and he underwent SARAVANAN 12/3/2019 which was negative for endocarditis.  He was evaluated by ophthalmology and found to be negative for endophthalmitis.     Interval Problem Update  -sleeping during rounds. Arouses easily. Oriented to himself at baseline. No acute events overnight.     Consultants/Specialty  -Nephrology  -Infectious disease  -Critical care/pulmonary -signed off  -Cardiology  -Neurology    Code Status  FULL    Disposition  DC to SNF when bed available.     Review of Systems  Review of Systems   Unable to perform ROS: Dementia        Physical Exam  Temp:  [36.2 °C (97.2 °F)-37.2 °C (98.9 °F)] 36.8 °C (98.3 °F)  Pulse:  [58-63] 58  Resp:  [16-18] 16  BP: (124-142)/(58-74) 124/58  SpO2:  [94 %-96 %] 94 %    Physical Exam  Vitals signs and nursing note reviewed.   Constitutional:       General: He is sleeping. He is not in acute distress.     Appearance: Normal appearance. He is well-developed.   HENT:      Head: Normocephalic.      Right Ear: Decreased hearing noted.      Left Ear: Decreased hearing noted.      Mouth/Throat:      Lips: Pink.      Mouth: Mucous membranes are moist.   Eyes:      General:         Right eye: No discharge.         Left eye: No  discharge.      Conjunctiva/sclera: Conjunctivae normal.   Cardiovascular:      Rate and Rhythm: Normal rate and regular rhythm.      Pulses: Decreased pulses.   Pulmonary:      Effort: No respiratory distress.      Breath sounds: No wheezing or rales.   Abdominal:      Palpations: Abdomen is soft.      Tenderness: There is no tenderness.   Musculoskeletal:      Right lower le+ Edema present.      Left lower le+ Edema present.      Comments: Generalized weakness.   Skin:     General: Skin is warm and dry.   Neurological:      Mental Status: He is easily aroused. Mental status is at baseline. He is lethargic and disoriented.      Comments: Oriented to himself   Psychiatric:         Attention and Perception: Attention normal.         Behavior: Behavior normal.         Cognition and Memory: Cognition is impaired. Memory is impaired.      Comments: pleasant         Fluids    Intake/Output Summary (Last 24 hours) at 12/10/2019 1453  Last data filed at 12/10/2019 0601  Gross per 24 hour   Intake --   Output 1300 ml   Net -1300 ml       Laboratory  Recent Labs     19  0228   WBC 7.5   RBC 3.10*   HEMOGLOBIN 9.3*   HEMATOCRIT 29.9*   MCV 96.5   MCH 30.0   MCHC 31.1*   RDW 57.1*   PLATELETCT 210   MPV 9.4     Recent Labs     19  0228 19  0307 12/10/19  0036   SODIUM 137 137 140   POTASSIUM 4.4 4.5 4.6   CHLORIDE 107 108 109   CO2 23 21 21   GLUCOSE 82 90 85   BUN 30* 30* 31*   CREATININE 2.89* 2.81* 2.85*   CALCIUM 8.4* 8.4* 8.4*                   Imaging  EC-SARAVANAN W/O CONT   Final Result      DX-CHEST-LIMITED (1 VIEW)   Final Result      Bibasilar and perihilar underinflation atelectasis which could obscure an additional process. This is unchanged.      [id number] VIEW   Final Result      Diffuse bowel distention. Findings are suggestive of ileus. Early obstruction could have a similar appearance.      DX-CHEST-LIMITED (1 VIEW)   Final Result      1.  Interval worsening of pulmonary opacities in  the right mid/lower lung.   2.  Unchanged left basilar atelectasis and/or consolidation. No significant pleural effusions.      DX-CHEST-PORTABLE (1 VIEW)   Final Result      Similar reticular opacities suspicious for edema and there are likely small layering effusions      More focal basilar opacity is more likely from atelectasis than consolidation      EC-ECHOCARDIOGRAM COMPLETE W/O CONT   Final Result      CT-RENAL COLIC EVALUATION(A/P W/O)   Final Result      1.  Dilated fluid-filled small bowel loops in RIGHT lower quadrant concerning for developing obstruction.   2.  No evidence of bowel perforation.   3.  Colonic diverticulosis without evidence for diverticulitis.   4.  No renal stone or hydronephrosis.   5.  Appendix is not visualized.   6.  Enlarged prostate.   7.  Small bilateral pleural effusions with associated atelectasis.   8.  Colonic diverticulosis without evidence for diverticulitis.      DX-CHEST-PORTABLE (1 VIEW)   Final Result         1.  Pulmonary edema and/or infiltrates are identified, which are stable since the prior exam.   2.  Cardiomegaly   3.  Atherosclerosis           Assessment/Plan  * Candidemia (HCC)- (present on admission)  Assessment & Plan  -Blood cultures  -ID has signed off.  Completed ABX course with micafungin 12/6/2019  -Evaluated by ophthalmology -negative for endophthalmitis  -Afebrile.  No leukocytosis  -Resolved      Bacteremia due to Pseudomonas- (present on admission)  Assessment & Plan  -Positive urine culture as well  -ID has signed off.  Patient completed ABX course 12/7/2019  -Cardiology has signed off.  SARAVANAN on 12/3/2019- for endocarditis  -Afebrile.  No leukocytosis      Acute renal failure (ARF) (HCC)- (present on admission)  Assessment & Plan  -Likely was due urinary retention/sawyer obstruction  -CT renal colic on 11/22 showed normal kidneys  -FeNa 2.3%  -Monitor urine output, sawyer cath in place  -Creatinine stable  -Nephrology following  -follow labs    Altered  mental status- (present on admission)  Assessment & Plan  -likely now has returned back to his baseline  -Neurology has been consulted and recommend OP FU.      UTI (urinary tract infection)- (present on admission)  Assessment & Plan  -Likely due to chronic indwelling Steen catheter  -Steen catheter was changed 11/21/2019  -Completed ABX course 12/7/2018      CAP (community acquired pneumonia)- (present on admission)  Assessment & Plan  -Pseudomonas  -completed ABX course 12/7/19  -resolved      Bradycardia- (present on admission)  Assessment & Plan  -HR's stable 60's  -BP's stable  -avoid AV zoraida and/or beta blockers      Type 2 diabetes mellitus, with long-term current use of insulin (HCC)  Assessment & Plan  -Per his home medication list, patient has been on Jardiance and SSI  -HgbA1c this hospitalization 5.8  -Episodes of hypoglycemia and LOLA.  Jardiance and SSI have been discontinued  -Follow labs as clinically indicated      Normocytic normochromic anemia- (present on admission)  Assessment & Plan  -stable       VTE prophylaxis: Heparin    JOSE M Winn

## 2019-12-10 NOTE — CARE PLAN
Problem: Discharge Barriers/Planning  Goal: Patient's continuum of care needs will be met  Note:   DC pending bed availability at [facility]     Problem: Psychosocial Needs:  Goal: Level of anxiety will decrease  Note:   Pt reoriented frequently to reduce anxiety

## 2019-12-11 PROBLEM — R54 AGE-RELATED PHYSICAL DEBILITY: Status: ACTIVE | Noted: 2019-12-11

## 2019-12-11 PROCEDURE — A9270 NON-COVERED ITEM OR SERVICE: HCPCS | Performed by: INTERNAL MEDICINE

## 2019-12-11 PROCEDURE — 700111 HCHG RX REV CODE 636 W/ 250 OVERRIDE (IP): Performed by: INTERNAL MEDICINE

## 2019-12-11 PROCEDURE — 770006 HCHG ROOM/CARE - MED/SURG/GYN SEMI*

## 2019-12-11 PROCEDURE — 700102 HCHG RX REV CODE 250 W/ 637 OVERRIDE(OP): Performed by: INTERNAL MEDICINE

## 2019-12-11 PROCEDURE — 99232 SBSQ HOSP IP/OBS MODERATE 35: CPT | Performed by: HOSPITALIST

## 2019-12-11 PROCEDURE — A9270 NON-COVERED ITEM OR SERVICE: HCPCS | Performed by: HOSPITALIST

## 2019-12-11 PROCEDURE — 700102 HCHG RX REV CODE 250 W/ 637 OVERRIDE(OP): Performed by: HOSPITALIST

## 2019-12-11 PROCEDURE — 306015 LOCK STAT FOLEY: Performed by: HOSPITALIST

## 2019-12-11 RX ADMIN — POLYETHYLENE GLYCOL 3350 1 PACKET: 17 POWDER, FOR SOLUTION ORAL at 05:58

## 2019-12-11 RX ADMIN — MULTIPLE VITAMINS W/ MINERALS TAB 1 TABLET: TAB at 05:56

## 2019-12-11 RX ADMIN — SIMVASTATIN 10 MG: 20 TABLET, FILM COATED ORAL at 20:43

## 2019-12-11 RX ADMIN — OXYCODONE HYDROCHLORIDE AND ACETAMINOPHEN 500 MG: 500 TABLET ORAL at 05:56

## 2019-12-11 RX ADMIN — FAMOTIDINE 20 MG: 10 INJECTION INTRAVENOUS at 05:58

## 2019-12-11 RX ADMIN — HEPARIN SODIUM 5000 UNITS: 5000 INJECTION INTRAVENOUS; SUBCUTANEOUS at 15:07

## 2019-12-11 RX ADMIN — HALOPERIDOL LACTATE 5 MG: 5 INJECTION, SOLUTION INTRAMUSCULAR at 00:33

## 2019-12-11 RX ADMIN — HEPARIN SODIUM 5000 UNITS: 5000 INJECTION INTRAVENOUS; SUBCUTANEOUS at 05:58

## 2019-12-11 RX ADMIN — CLOTRIMAZOLE 1 APPLICATION: 10 CREAM TOPICAL at 17:56

## 2019-12-11 RX ADMIN — AMLODIPINE BESYLATE 10 MG: 10 TABLET ORAL at 05:56

## 2019-12-11 RX ADMIN — LATANOPROST 1 DROP: 50 SOLUTION OPHTHALMIC at 20:43

## 2019-12-11 RX ADMIN — TAMSULOSIN HYDROCHLORIDE 0.4 MG: 0.4 CAPSULE ORAL at 05:55

## 2019-12-11 RX ADMIN — HEPARIN SODIUM 5000 UNITS: 5000 INJECTION INTRAVENOUS; SUBCUTANEOUS at 20:43

## 2019-12-11 RX ADMIN — SENNOSIDES AND DOCUSATE SODIUM 2 TABLET: 8.6; 5 TABLET ORAL at 05:56

## 2019-12-11 RX ADMIN — CLOTRIMAZOLE 1 APPLICATION: 10 CREAM TOPICAL at 05:58

## 2019-12-11 RX ADMIN — HALOPERIDOL LACTATE 5 MG: 5 INJECTION, SOLUTION INTRAMUSCULAR at 22:38

## 2019-12-11 ASSESSMENT — ENCOUNTER SYMPTOMS
SORE THROAT: 0
POLYDIPSIA: 0
HEMOPTYSIS: 0
DIZZINESS: 0
COUGH: 0
ABDOMINAL PAIN: 0
MYALGIAS: 0
HEADACHES: 0
NAUSEA: 0
EYE REDNESS: 0
EYE PAIN: 0
DEPRESSION: 0
BRUISES/BLEEDS EASILY: 0
FEVER: 0
HALLUCINATIONS: 0

## 2019-12-11 NOTE — PROGRESS NOTES
[facility] -  PROGRESS NOTE               Author: Arsh Hogan M.D. Date & Time: 12/11/2019  10:21 AM       HPI:  79-year-old man with diabetes, hypertension who presented from [facility] who was brought in for altered mental status. Patient was admitted for pneumonia and UTI with LOLA. We were initially consulted for LOLA secondary to urinary obstruction ad this improved with treatment. Blood cultures and urine culture were positive for Pseudomonas; blood also grew Candida glabrata. SARAVANAN on 12/3 was negative for endocarditis. Infectious disease recommends continuing Zosyn and micafungin until 12/7/2019.  Scr initially on presentation was 2.5 and improved to 1.74 but now has climbed to 3 hence prompting nephrology evaluation again.     DAILY NEPHROLOGY SUMMARY:  12/06/19 - Scr 3.02. Non-oliguric. Feels fine. No complaints.  12/07/19 - No events, taking a little more PO, Cr slightly higher at 3.17, good UOP, BP stable  12/08/19 - No events, Cr trending down 2.9, fair UOP, BP slightly high this afternoon  12/09/19 - NAEO, no complaints, sitting in PT chair    12/10/19 - No new events. Awaiting placement at [facility].     REVIEW OF SYSTEMS:    Review of Systems   Constitutional: Negative for fever and malaise/fatigue.   HENT: Positive for hearing loss. Negative for ear pain and sore throat.    Eyes: Negative for pain and redness.   Respiratory: Negative for cough and hemoptysis.    Cardiovascular: Negative for chest pain and leg swelling.   Gastrointestinal: Negative for abdominal pain and nausea.   Musculoskeletal: Negative for joint pain and myalgias.   Skin: Negative for itching and rash.   Neurological: Negative for dizziness and headaches.   Endo/Heme/Allergies: Negative for polydipsia. Does not bruise/bleed easily.   Psychiatric/Behavioral: Negative for depression and hallucinations.   All other systems reviewed and are negative.      PAST FAMILY HISTORY: Reviewed and Unchanged  SOCIAL  "HISTORY: Reviewed and Unchanged  CURRENT MEDICATIONS: Reviewed  IMAGING STUDIES: Reviewed  LABORATORY STUDIES: Reviewed    PHYSICAL EXAM:  VS:  /65   Pulse 60   Temp 37.1 °C (98.8 °F) (Temporal)   Resp 17   Ht 2.007 m (6' 7\")   Wt 102.2 kg (225 lb 5 oz)   SpO2 94%   BMI 25.38 kg/m²   Physical Exam   Constitutional: He appears well-developed and well-nourished. He appears ill. No distress.   HENT:   Head: Normocephalic and atraumatic.   Right Ear: External ear normal.   Left Ear: External ear normal.   Eyes: Conjunctivae and lids are normal. No scleral icterus.   Neck: Neck supple. No tracheal tenderness present.   Cardiovascular: Normal rate, regular rhythm and normal heart sounds.   Pulmonary/Chest: Effort normal and breath sounds normal.   Abdominal: Normal appearance. There is no tenderness. There is no CVA tenderness.   Musculoskeletal:         General: No tenderness or edema.   Neurological: He is alert. He is disoriented. He displays no atrophy. He displays no seizure activity.   Skin: Skin is warm and dry. No rash noted.   Psychiatric: He has a normal mood and affect. His behavior is normal.   Nursing note and vitals reviewed.      Fluids:  In: -   Out: 1800     LABS:  No results found for this or any previous visit (from the past 24 hour(s)).    (click the triangle to expand results)      IMPRESSION:  - LOLA    * Etiology unclear    * Diff Dx: ATN vs AIN    * SCr minimally downtrending    * Abx course almost complete  - CKD Stage unknown    * BCr unknown, not enough data to review  - Candidemia with sepsis  - Pseudomonas bacteremia with sepsis  - Acute respiratory failure    * Resolved  - Bradycardia  - Anemia    PLAN:  - No compelling indication for RRT at this time  - Renal fxn stable  - Abx/Antifungal per ID  - Low sodium diet  - Dose all meds per eGFR < 20  - Daily labs to trend SCr  - Encourage po intake  - Will need outpt F/U 3-4 weeks after discharge with us, our office will coordinate  - " Ok to transition to OP care from renal standpoint

## 2019-12-11 NOTE — PROGRESS NOTES
"Assumed care of patient at 0700 . Received report from RN. Patient is AOX1 . Assessment complete. Labs reviewed.Patient and RN discussed plan of care. Patient questions answered. Patient needs are met at this time. Bed in lowest and locked position. Upper bed rails up.  Call light is within reach. Hourly rounding in place. /57   Pulse (!) 50 Comment: RN notified  Temp 37.1 °C (98.8 °F) (Temporal)   Resp 18   Ht 2.007 m (6' 7\")   Wt 102.2 kg (225 lb 5 oz)   SpO2 93%   BMI 25.38 kg/m²     "

## 2019-12-11 NOTE — CARE PLAN
Problem: Discharge Barriers/Planning  Goal: Patient's continuum of care needs will be met  Note:   [facility]

## 2019-12-11 NOTE — PROGRESS NOTES
Hospital Medicine Daily Progress Note    Date of Service  12/11/2019    Chief Complaint  Increased RR and cough.    Hospital Course   This is an 79-year-old male with PMH significant for dementia, DM 2, and BPH with chronic Steen catheter who transferred to [facility] from [facility] 11/21/2019 with fever, increased respiratory rate, altered mental status and cough. He was found to have >1500mL on bladder scan with associated LOLA.  Nephrology was consulted and recommended holding off on RRT at this time.  Steen catheter was changed 11/21/2019.  Blood culture positive for Pseudomonas and Candida glabrata.  Urine positive for Pseudomonas.  ID was consulted and he completed ABX course with Zosyn and micafungin 12/6/2019.  Cardiology was consulted and he underwent SARAVANAN 12/3/2019 which was negative for endocarditis.  He was evaluated by ophthalmology and found to be negative for endophthalmitis.     Interval Problem Update  12/11 - planned to DC to SNF today, however bed not available.  -no acute events overnight. Behaviors appropriate.    12/10 -sleeping during rounds. Arouses easily. Oriented to himself at baseline. No acute events overnight.     Consultants/Specialty  -Cardiology  -Neurology  -Nephrology  -Infectious disease  -Critical care/pulmonary -signed off    Code Status  FULL    Disposition  Medically cleared to DC to SNF when bed available.     Review of Systems  Review of Systems   Unable to perform ROS: Dementia        Physical Exam  Temp:  [36.9 °C (98.4 °F)-37.2 °C (99 °F)] 37.1 °C (98.8 °F)  Pulse:  [50-62] 60  Resp:  [17-18] 17  BP: (125-174)/(57-65) 125/65  SpO2:  [92 %-95 %] 94 %    Physical Exam  Vitals signs and nursing note reviewed.   Constitutional:       General: He is sleeping. He is not in acute distress.     Appearance: Normal appearance. He is well-developed.   HENT:      Head: Normocephalic.      Right Ear: Decreased hearing noted.      Left Ear: Decreased hearing noted.       Mouth/Throat:      Lips: Pink.      Mouth: Mucous membranes are moist.   Eyes:      General:         Right eye: No discharge.         Left eye: No discharge.      Conjunctiva/sclera: Conjunctivae normal.   Cardiovascular:      Rate and Rhythm: Normal rate and regular rhythm.      Pulses: Decreased pulses.      Heart sounds: Heart sounds are distant.   Pulmonary:      Effort: No respiratory distress.      Breath sounds: No wheezing or rales.   Abdominal:      Palpations: Abdomen is soft.      Tenderness: There is no tenderness.   Musculoskeletal:      Right lower le+ Edema present.      Left lower le+ Edema present.      Comments: Generalized weakness.   Feet:      Comments: Bilateral toe amputations (healed/chronic)  Skin:     General: Skin is warm and dry.   Neurological:      Mental Status: He is easily aroused. Mental status is at baseline. He is lethargic and disoriented.      Comments: Oriented to himself   Psychiatric:         Attention and Perception: Attention normal.         Behavior: Behavior normal.         Cognition and Memory: Cognition is impaired. Memory is impaired.      Comments: pleasant         Fluids    Intake/Output Summary (Last 24 hours) at 2019 1405  Last data filed at 2019 0959  Gross per 24 hour   Intake --   Output 2700 ml   Net -2700 ml       Laboratory      Recent Labs     19  0307 12/10/19  0036   SODIUM 137 140   POTASSIUM 4.5 4.6   CHLORIDE 108 109   CO2 21 21   GLUCOSE 90 85   BUN 30* 31*   CREATININE 2.81* 2.85*   CALCIUM 8.4* 8.4*                   Imaging  EC-SARAVANAN W/O CONT   Final Result      DX-CHEST-LIMITED (1 VIEW)   Final Result      Bibasilar and perihilar underinflation atelectasis which could obscure an additional process. This is unchanged.      [id number] VIEW   Final Result      Diffuse bowel distention. Findings are suggestive of ileus. Early obstruction could have a similar appearance.      DX-CHEST-LIMITED (1 VIEW)   Final Result      1.   Interval worsening of pulmonary opacities in the right mid/lower lung.   2.  Unchanged left basilar atelectasis and/or consolidation. No significant pleural effusions.      DX-CHEST-PORTABLE (1 VIEW)   Final Result      Similar reticular opacities suspicious for edema and there are likely small layering effusions      More focal basilar opacity is more likely from atelectasis than consolidation      EC-ECHOCARDIOGRAM COMPLETE W/O CONT   Final Result      CT-RENAL COLIC EVALUATION(A/P W/O)   Final Result      1.  Dilated fluid-filled small bowel loops in RIGHT lower quadrant concerning for developing obstruction.   2.  No evidence of bowel perforation.   3.  Colonic diverticulosis without evidence for diverticulitis.   4.  No renal stone or hydronephrosis.   5.  Appendix is not visualized.   6.  Enlarged prostate.   7.  Small bilateral pleural effusions with associated atelectasis.   8.  Colonic diverticulosis without evidence for diverticulitis.      DX-CHEST-PORTABLE (1 VIEW)   Final Result         1.  Pulmonary edema and/or infiltrates are identified, which are stable since the prior exam.   2.  Cardiomegaly   3.  Atherosclerosis           Assessment/Plan  * Candidemia (HCC)- (present on admission)  Assessment & Plan  -Blood cultures  -ID has signed off.  Completed ABX course with micafungin 12/6/2019  -Evaluated by ophthalmology -negative for endophthalmitis  -Afebrile.  No leukocytosis  -Resolved      Bacteremia due to Pseudomonas- (present on admission)  Assessment & Plan  -Positive urine culture as well  -ID has signed off.  Patient completed ABX course 12/7/2019  -Cardiology has signed off.  SARAVANAN on 12/3/2019- for endocarditis  -Afebrile.  No leukocytosis      Acute renal failure (ARF) (HCC)- (present on admission)  Assessment & Plan  -Likely was due urinary retention/sawyer obstruction  -CT renal colic on 11/22 showed normal kidneys  -FeNa 2.3%  -Monitor urine output, sawyer cath in place  -Creatinine  stable  -Nephrology following  -follow labs    Altered mental status- (present on admission)  Assessment & Plan  -likely now has returned back to his baseline  -Neurology has been consulted and recommend OP FU.      UTI (urinary tract infection)- (present on admission)  Assessment & Plan  -Likely due to chronic indwelling Steen catheter  -Steen catheter was changed 11/21/2019  -Completed ABX course 12/7/2018      CAP (community acquired pneumonia)- (present on admission)  Assessment & Plan  -Pseudomonas  -completed ABX course 12/7/19  -resolved      Bradycardia- (present on admission)  Assessment & Plan  -HR's stable 60's  -BP's stable  -avoid AV zoraida and/or beta blockers      Age-related physical debility- (present on admission)  Assessment & Plan  -multi-factorial: dementia, previous toe amputations, ARF, urinary retention  -PT OT  -SNF  -encourage PO intake  -Fall precautions    Type 2 diabetes mellitus, with long-term current use of insulin (HCC)  Assessment & Plan  -Per his home medication list, patient has been on Jardiance and SSI  -HgbA1c this hospitalization 5.8  -Episodes of hypoglycemia and LOLA.  Jardiance and SSI have been discontinued  -Follow labs as clinically indicated      Normocytic normochromic anemia- (present on admission)  Assessment & Plan  -stable       VTE prophylaxis: Heparin    JOSE M Winn

## 2019-12-11 NOTE — PROGRESS NOTES
Report received. Assumed care, assessment complete. Pt is oriented to self only.  Pt denies having any pain at this time. Fall precautions and appropriate signs in place. Pt oriented to unit routine, call light/phone system and RN extension number provided. Pt educated regarding fall precautions. Bed alarm in use. Pt denies any additional needs at this time. Call light within reach.

## 2019-12-11 NOTE — ASSESSMENT & PLAN NOTE
-multi-factorial: dementia, previous toe amputations, ARF, urinary retention  -PT OT  -SNF  -encourage PO intake  -Fall precautions

## 2019-12-11 NOTE — CARE PLAN
Problem: Safety  Goal: Will remain free from falls  Outcome: PROGRESSING AS EXPECTED   Patient has remained free from falls. Fall precautions in place.   Problem: Bowel/Gastric:  Goal: Normal bowel function is maintained or improved  Outcome: PROGRESSING AS EXPECTED   Patient has had regular BM's

## 2019-12-12 VITALS
HEIGHT: 78 IN | SYSTOLIC BLOOD PRESSURE: 129 MMHG | HEART RATE: 63 BPM | BODY MASS INDEX: 26.07 KG/M2 | DIASTOLIC BLOOD PRESSURE: 55 MMHG | TEMPERATURE: 98 F | WEIGHT: 225.31 LBS | RESPIRATION RATE: 17 BRPM | OXYGEN SATURATION: 94 %

## 2019-12-12 PROBLEM — N17.9 ACUTE RENAL FAILURE (ARF) (HCC): Status: RESOLVED | Noted: 2019-11-21 | Resolved: 2019-12-12

## 2019-12-12 PROBLEM — R78.81 BACTEREMIA DUE TO PSEUDOMONAS: Status: RESOLVED | Noted: 2019-11-22 | Resolved: 2019-12-12

## 2019-12-12 PROBLEM — J18.9 CAP (COMMUNITY ACQUIRED PNEUMONIA): Status: RESOLVED | Noted: 2019-11-21 | Resolved: 2019-12-12

## 2019-12-12 PROBLEM — N39.0 UTI (URINARY TRACT INFECTION): Status: RESOLVED | Noted: 2019-11-21 | Resolved: 2019-12-12

## 2019-12-12 PROBLEM — B37.7 CANDIDEMIA (HCC): Status: RESOLVED | Noted: 2019-11-25 | Resolved: 2019-12-12

## 2019-12-12 PROBLEM — B96.5 BACTEREMIA DUE TO PSEUDOMONAS: Status: RESOLVED | Noted: 2019-11-22 | Resolved: 2019-12-12

## 2019-12-12 LAB
25(OH)D3 SERPL-MCNC: 20 NG/ML (ref 30–100)
PTH-INTACT SERPL-MCNC: 43.3 PG/ML (ref 14–72)

## 2019-12-12 PROCEDURE — A9270 NON-COVERED ITEM OR SERVICE: HCPCS | Performed by: INTERNAL MEDICINE

## 2019-12-12 PROCEDURE — 700102 HCHG RX REV CODE 250 W/ 637 OVERRIDE(OP): Performed by: HOSPITALIST

## 2019-12-12 PROCEDURE — 700111 HCHG RX REV CODE 636 W/ 250 OVERRIDE (IP): Performed by: INTERNAL MEDICINE

## 2019-12-12 PROCEDURE — A9270 NON-COVERED ITEM OR SERVICE: HCPCS | Performed by: HOSPITALIST

## 2019-12-12 PROCEDURE — 36415 COLL VENOUS BLD VENIPUNCTURE: CPT

## 2019-12-12 PROCEDURE — 700102 HCHG RX REV CODE 250 W/ 637 OVERRIDE(OP): Performed by: INTERNAL MEDICINE

## 2019-12-12 PROCEDURE — 82306 VITAMIN D 25 HYDROXY: CPT

## 2019-12-12 PROCEDURE — 83970 ASSAY OF PARATHORMONE: CPT

## 2019-12-12 PROCEDURE — 99239 HOSP IP/OBS DSCHRG MGMT >30: CPT | Performed by: HOSPITALIST

## 2019-12-12 RX ORDER — ASCORBIC ACID 500 MG
500 TABLET ORAL DAILY
Qty: 30 TAB | Refills: 3 | Status: ON HOLD
Start: 2019-12-13 | End: 2020-03-10

## 2019-12-12 RX ORDER — M-VIT,TX,IRON,MINS/CALC/FOLIC 27MG-0.4MG
1 TABLET ORAL DAILY
Qty: 30 TAB | Refills: 11 | Status: ON HOLD
Start: 2019-12-13 | End: 2020-03-10

## 2019-12-12 RX ADMIN — TAMSULOSIN HYDROCHLORIDE 0.4 MG: 0.4 CAPSULE ORAL at 05:56

## 2019-12-12 RX ADMIN — MULTIPLE VITAMINS W/ MINERALS TAB 1 TABLET: TAB at 05:56

## 2019-12-12 RX ADMIN — FAMOTIDINE 20 MG: 10 INJECTION INTRAVENOUS at 05:56

## 2019-12-12 RX ADMIN — AMLODIPINE BESYLATE 10 MG: 10 TABLET ORAL at 05:56

## 2019-12-12 RX ADMIN — CLOTRIMAZOLE 1 APPLICATION: 10 CREAM TOPICAL at 06:08

## 2019-12-12 RX ADMIN — OXYCODONE HYDROCHLORIDE AND ACETAMINOPHEN 500 MG: 500 TABLET ORAL at 05:56

## 2019-12-12 RX ADMIN — HEPARIN SODIUM 5000 UNITS: 5000 INJECTION INTRAVENOUS; SUBCUTANEOUS at 05:56

## 2019-12-12 ASSESSMENT — ENCOUNTER SYMPTOMS
EYE PAIN: 0
MYALGIAS: 0
POLYDIPSIA: 0
NAUSEA: 0
COUGH: 0
HEADACHES: 0
DIZZINESS: 0
SORE THROAT: 0
EYE REDNESS: 0
HEMOPTYSIS: 0
HALLUCINATIONS: 0
FEVER: 0
DEPRESSION: 0
ABDOMINAL PAIN: 0
BRUISES/BLEEDS EASILY: 0

## 2019-12-12 NOTE — DISCHARGE INSTRUCTIONS
Discharge Instructions    Discharged to other by medical transportation with escort. Discharged via wheelchair, hospital escort: Yes.  Special equipment needed: Not Applicable    Be sure to schedule a follow-up appointment with your primary care doctor or any specialists as instructed.     Discharge Plan:   Diet Plan: Discussed  Activity Level: Discussed  Confirmed Follow up Appointment: Appointment Scheduled  Confirmed Symptoms Management: Discussed  Medication Reconciliation Updated: Yes  Influenza Vaccine Indication: Not indicated: Previously immunized this influenza season and > 8 years of age  Influenza Vaccine Given - only chart on this line when given: Influenza Vaccine Given (See MAR)    I understand that a diet low in cholesterol, fat, and sodium is recommended for good health. Unless I have been given specific instructions below for another diet, I accept this instruction as my diet prescription.   Other diet: Regular    Special Instructions: None    · Is patient discharged on Warfarin / Coumadin?   No     Depression / Suicide Risk    As you are discharged from this [facility] facility, it is important to learn how to keep safe from harming yourself.    Recognize the warning signs:  · Abrupt changes in personality, positive or negative- including increase in energy   · Giving away possessions  · Change in eating patterns- significant weight changes-  positive or negative  · Change in sleeping patterns- unable to sleep or sleeping all the time   · Unwillingness or inability to communicate  · Depression  · Unusual sadness, discouragement and loneliness  · Talk of wanting to die  · Neglect of personal appearance   · Rebelliousness- reckless behavior  · Withdrawal from people/activities they love  · Confusion- inability to concentrate     If you or a loved one observes any of these behaviors or has concerns about self-harm, here's what you can do:  · Talk about it- your feelings and reasons for harming  yourself  · Remove any means that you might use to hurt yourself (examples: pills, rope, extension cords, firearm)  · Get professional help from the community (Mental Health, Substance Abuse, psychological counseling)  · Do not be alone:Call your Safe Contact- someone whom you trust who will be there for you.  · Call your local CRISIS HOTLINE 596-8722 or 844-416-9448  · Call your local Children's Mobile Crisis Response Team [place] (515) 933-8834 or JoyTunes  · Call the toll free National Suicide Prevention Hotlines   · National Suicide Prevention Lifeline 089-091-EJIG (3539)  · [employer] Hope Line Network 800-SUICIDE (002-0287)    Community-Acquired Pneumonia, Adult  Introduction  Pneumonia is an infection of the lungs. One type of pneumonia can happen while a person is in a hospital. A different type can happen when a person is not in a hospital (community-acquired pneumonia). It is easy for this kind to spread from person to person. It can spread to you if you breathe near an infected person who coughs or sneezes. Some symptoms include:  · A dry cough.  · A wet (productive) cough.  · Fever.  · Sweating.  · Chest pain.  Follow these instructions at home:  · Take over-the-counter and prescription medicines only as told by your doctor.  ¨ Only take cough medicine if you are losing sleep.  ¨ If you were prescribed an antibiotic medicine, take it as told by your doctor. Do not stop taking the antibiotic even if you start to feel better.  · Sleep with your head and neck raised (elevated). You can do this by putting a few pillows under your head, or you can sleep in a recliner.  · Do not use tobacco products. These include cigarettes, chewing tobacco, and e-cigarettes. If you need help quitting, ask your doctor.  · Drink enough water to keep your pee (urine) clear or pale yellow.  A shot (vaccine) can help prevent pneumonia. Shots are often suggested for:  · People older than 65 years of age.  · People  older than 19 years of age:  ¨ Who are having cancer treatment.  ¨ Who have long-term (chronic) lung disease.  ¨ Who have problems with their body's defense system (immune system).  You may also prevent pneumonia if you take these actions:  · Get the flu (influenza) shot every year.  · Go to the dentist as often as told.  · Wash your hands often. If soap and water are not available, use hand .  Contact a doctor if:  · You have a fever.  · You lose sleep because your cough medicine does not help.  Get help right away if:  · You are short of breath and it gets worse.  · You have more chest pain.  · Your sickness gets worse. This is very serious if:  ¨ You are an older adult.  ¨ Your body's defense system is weak.  · You cough up blood.  This information is not intended to replace advice given to you by your health care provider. Make sure you discuss any questions you have with your health care provider.  Document Released: 06/05/2009 Document Revised: 05/25/2017 Document Reviewed: 04/13/2016  © 2017 Elsevier

## 2019-12-12 NOTE — DISCHARGE PLANNING
Acute Rehab Hospital/ Transitional Care Coordination  Follow up.   SARAVANAN to be done on 12/2.  Tcc will follow for updated PT/OT notes to assist w/ determining for post acute needs.   Physiatry consult remains pended per protocol at this time.   DC disposition: [facility] to [facility] w/ the eventual goal of him  Transitioning  to the [facility].    TCC will continue to follow.   
"LSW received call from pt's Carrie ALVES. Carrie stated that she was concerned about the plan for pt to discharge to Rehab then to [facility] due to the timing of everything. Carrie was told pt would only be in Rehab for 10 days and is unsure if [facility] would be ready by then. Carrie stated that she didn't get good \"vibes\" from the people at [facility] and felt that they were more into the money then caring for the pt. Discussed different options. Per Carrie, pt has long term care insurance. Discussed difference between [facility] vs. SNF for LTC. Carrie stated that she may want pt to go to SNF because she doesn't want him moving around a lot. Carrie would have to pay for all the furniture for [facility] and she is afraid he might not work out there. LSW informed Carrie that a referral can be sent to local SNF's for LTC. Carrie agreed. Carrie stated she does have pt's long term care policy that she can provide to SNF.     LSW faxed CHOICE to [facility].  "
"SW received phone call from women identified as patient's daughter, living in [place]. She reports he came from [facility] reports she is POA to her father, will email SW paperwork over weekend.     JAYNA informed her no plan on D/C at this time. She will continue to check in.    JAYNA went to patient's room to say hello. Patient does not appear to be oriented, thinks he is still in SNF. [place]. Says he likes the rooms at [facility]. Patient states he is \"doing great!\" SW thanked patient for talking with her. Will have to follow up with family for more information.    Facesheet is currently blank, no information. JAYNA contacted [facility] to request information. They will fax over information.     SW receiving call from [place] of the [facility] yen [phone] requesting information on this patient.     JAYNA contacted [facility] the [facility] to get more information.   "
@1000  Agency[facility]Facility Name: [facility]  Spoke To: David  Outcome: Still no beds available today.    @8493  [facility]Fac[facility]: [facility]  Outcome: Left message, awaiting call back.  
@1030  [facility]Facility [facility]: [facility]  Spoke To: David  Outcome: No beds today.  SW informed.    @4229  [facility]Facility [facility]: [facility]  Outcome: Left message, awaiting call back.    
@1120  Agency/Facility Name: [facility]  Spoke To: aMrine  Outcome: Transport set up for 1430.    @1100  Agency[facility]Facility Name: [facility]  Spoke To: Marine  Outcome: Will have a bed today, will call later with a time.  SW informed.    @3040  Agency/Facility Name: [facility]  Outcome: Left message, awaiting call back.    
@1320  Agency/Facility Name: [facility]  Spoke To: Marine  Outcome: Requesting us to set up a later transport time.    Received Transport Form @ 1320  Spoke to Noam @ [employer]    Transport is scheduled for 12/12 @1600 going to [facility].  SW informed.  
Agency/Facility Name: [facility]  Outcome: Patient declined - not accepting long term care.      Agency/Facility Name: [facility]  Spoke To: Jaime  Outcome: Patient declined. No long term beds.       Agency/Facility Name: [facility]  Spoke To: Lynnette  Outcome: Patient declined. No long term beds.      Agency/Facility Name: [facility]  Outcome: Patient declined. Not accepting long term care patients.    
Anticipated Discharge Disposition: [facility]    Action: Pt discussed in IDT rounds. Anticipated medically clear Wed/Thurs. No bed available at [facility] currently.     LSW discussed with [facility] LiaisonMarine. Marine to contact [facility] for long term care insurance policy.    Barriers to Discharge: None    Plan: Await medical clearance, bed availability.     
Anticipated Discharge Disposition: [facility]    Action: Transportation set up for 1430. LSW contacted pt's POA, Carrie and informed of transfer. POA provided verbal consent for [facility]. LSW informed of right to appeal, POA gave verbal understanding.     LSW updated bedside RN and MD.    Barriers to Discharge: None    Plan: Complete transfer packet when DC summary complete.    
Anticipated Discharge Disposition: [facility] to [facility]    Action: Pt discussed in IDT rounds. Pt requiring SARAVANAN that will be completed on Monday. Pt is not medically clear.    Barriers to Discharge: None    Plan: LSW will continue to assess and be available for discharge needs.     
Anticipated Discharge Disposition: SNF    Action: LSW received call from David at [facility]. Pt accepted.     LSW contacted [facility] due to declined because of  Med costs. LSW discussed with Jiame and sent new referral. Jaime to review again.    LSW called pt's POA, Carrie, and informed of above and provide information on facilities. Carrie to review and inform LSW what facility she would like pt to go to.     Barriers to Discharge: None    Plan: Await decision regarding SNF.    
Anticipated Discharge Disposition: TBD    Action: LSW contacted pt's POA, Carrie. Carrie requesting 2nd MD letter for financial POA. LSW asked Carrie if she will would like pt to return to [facility] prior to [facility]. Carrie stated that she wasn't sure and would like someone to give her a recommendation. Carrie requested LSW to reach out to [facility] the [facility] to see if they will do an in-person assessment.    LSW spoke with Genoveva at [facility]. Genoveva requested confirmation of discharge to [facility] vs. [facility]. Gen[facility]va to fax all required documentation for SW.    LSW spoke with PT. PT worked with pt today and pt is limited due to cognition and would not benefit from rehab prior to memory care.    LSW discussed with MD. MD signed letter. MD to discuss recommendation with Carrie.     Barriers to Discharge: None    Plan: LSW continue to collaborate with IDT and POA to facilitate discharge plan.    
Care Transition Team Discharge Planning    Anticipated Discharge Disposition: TBD    Action: Tayw acquired completed letter from MD, and contacted [facility] Carrie to provide the document.    Lsw scanned and emailed letter to Carrie, per her request.       Barriers to Discharge: TBD    Plan: f/u w/ medical team, etc    
Care Transition Team Discharge Planning    Anticipated Discharge Disposition: d/c back to [facility] for [facility] then admission to long term care bed at [facility]     Action: Pt to [facility] to SNf [facility], need order for SNF.     Pt's POA over both health and finances is Carrie bean in [facility] @ phone [phone]. She called, and wants to acquire a letter from MD indicating the pt is not A/O. He is A/O x1-2 per RN.     Flower rqeuested the POA over finances be faxed to our fax machine-next to my desk. So then PCP who starts work on Tuesday can see the document and hopfuilly write the letter as Carrie wants to write pt's bills and get him into the [facility] following SNF rehab.     Barriers to Discharge: receipt of document, letter from MD is appropriate,     Plan: f/u w/ medical team, Carrie, etc    
Care Transition Team Discharge Planning    Anticipated Discharge Disposition: d/c to SNF for rehab followed by admission to [facility] w/ POA Carrie bean's assistance    Action: Lsw noted pt had transferred floors.    Lsw received VM from pt's Carrie ALVES. She needs a second Md letter to place pt in [facility].    Lsw left VM w/ S5 Kaden Napoles.    Lsw faxed MD letter from last week and POa over finances to Yesika. Lsw received fax confirmation.     Barriers to Discharge: TBd    Plan: f/u w/ medical team, [place]/POA    
Care Transition Team Discharge Planning    Anticipated Discharge Disposition: d/c to SNF then [facility]    Action: Lsw received POA over finances which was completed in 2008. The document was notarized and cousin Carrie is the POA.    Barriers to Discharge: receipt of letter from MD to Carrie    Plan: f/u w/ MD, medical team, Carrie    
F/U for Rehab. Mental status improved, out of restraints. ID following. 10L high flow nasal cannula. Will follow for PT/OT updates - as clinically appropriate - to assist with determination for post acute needs. Physiatry consult remains pended per protocol at this time. TCC monitoring.   
JAYNA talked with [facility] to fill in blanks regarding patient.     Patient is a 79-year old man living in his own condo in [place], [place]. Patient was then ill, taken to [facility], family member and cousin (his only family) Carrie Hirschberg is now patient's POA, Guardian, and NOK. Patient was D/C from [facility] to [facility] on 10/23/2019. He became ill and was transferred to [facility].    Plan per Caitlin, Carrie plan is now to D/C to back to SNF ([facility]) per PT/OT notes, then move into [facility] on 12/2/2019. Guardian is working with admissions personKaren for the [facility] at [phone].     JAYNA will continue to keep in touch with Karen Butler, and Chaya for D/C planning.   
PASRR [id number]     
RPG to consult.   
Received Choice form at 4677  Agency/Facility Name: All Local SNFs (except [facility])  Referral sent per Choice form @ 0462     
[facility] Transitional Care Coordination     Referral from:  Dr. Alba    Facesheet indicates: Medicare    Potential Rehab Diagnosis: Debility    Chart review indicates patient has medical management and therapy needs to possibly meet inpatient rehab facility criteria with the goal of returning to community.    D/C support: Needs clarification -  Admitted from skilled facility. Chart notes reflect plan in the works by POA/KELI for admission to intermediate when discharged from post acute care.      Physiatry consultation pended per protocol.      Sepsis/Pneumonia/UTI/Acute renal failure. + Blood cultures. ID following. Restrained this morning. Restraints are barrier to IRF. TCC following.     Thank you for the referral.        
Spoke by phone with POA Pam Hirschberg, NOK/POA. Carrie is Randy's cousin. She tells me prior to admission to [facility], pt was living in [facility]. She has been helping Randy with finances - paying bills - due to memory issues. Carrie confirms she has spoken with [facility] in hopes of having Randy transition to their memory care. He is pending evaluation by [facility] staff, does not yet have room secured.  If he is appropriate for facility, she will need to furnish room. Carrie lives [facility], has not yet arranged furnishings. She voiced frustrations with challenges presented due to long distance. We reviewed anticipated length of stay IRF vs SNF. She understands that while Randy would receive more intense therapy regimen IRF, he would likely have shorter benefit ~10-12 days than skilled. She does not think she will be able to coordinate [facility] admission in shorter time frame. Per Carrie's request, provided  Yesika HOLLOWAY contact number as Carrie would like to discuss skilled option. Provided [facility] contact information for any additional questions.     At present, lack of discharge plan is barrier to IRF. Rehab will continue to follow for updates. If discharge plan to [facility] is solidified, patient would be appropriate for IRF admission prior to admission to [facility].   
5

## 2019-12-12 NOTE — PROGRESS NOTES
[facility] -  PROGRESS NOTE               Author: Arsh Hogan M.D. Date & Time: 12/12/2019  10:49 AM       HPI:  79-year-old man with diabetes, hypertension who presented from [facility] who was brought in for altered mental status. Patient was admitted for pneumonia and UTI with LOLA. We were initially consulted for LOLA secondary to urinary obstruction ad this improved with treatment. Blood cultures and urine culture were positive for Pseudomonas; blood also grew Candida glabrata. SARAVANAN on 12/3 was negative for endocarditis. Infectious disease recommends continuing Zosyn and micafungin until 12/7/2019.  Scr initially on presentation was 2.5 and improved to 1.74 but now has climbed to 3 hence prompting nephrology evaluation again.     DAILY NEPHROLOGY SUMMARY:  12/06/19 - Scr 3.02. Non-oliguric. Feels fine. No complaints.  12/07/19 - No events, taking a little more PO, Cr slightly higher at 3.17, good UOP, BP stable  12/08/19 - No events, Cr trending down 2.9, fair UOP, BP slightly high this afternoon  12/09/19 - NAEO, no complaints, sitting in PT chair    12/10/19 - No new events. Awaiting placement at [facility].     REVIEW OF SYSTEMS:    Review of Systems   Constitutional: Negative for fever and malaise/fatigue.   HENT: Positive for hearing loss. Negative for ear pain and sore throat.    Eyes: Negative for pain and redness.   Respiratory: Negative for cough and hemoptysis.    Cardiovascular: Negative for chest pain and leg swelling.   Gastrointestinal: Negative for abdominal pain and nausea.   Musculoskeletal: Negative for joint pain and myalgias.   Skin: Negative for itching and rash.   Neurological: Negative for dizziness and headaches.   Endo/Heme/Allergies: Negative for polydipsia. Does not bruise/bleed easily.   Psychiatric/Behavioral: Negative for depression and hallucinations.   All other systems reviewed and are negative.    PAST FAMILY HISTORY: Reviewed and unchanged since  "admission  PAST SURGICAL HISTORY:  Reviewed and unchanged since admission  SOCIAL HISTORY:  Reviewed and unchanged since admission  FAMILY HISTORY: Reviewed and unchanged since admission  CURRENT MEDICATIONS: Reviewed since admission to present  IMAGING STUDIES: Reviewed since admission to present  LABORATORY STUDIES: Reviewed since admission to present    PHYSICAL EXAM:  VS:  /66   Pulse (!) 59   Temp 36.9 °C (98.4 °F) (Temporal)   Resp 18   Ht 2.007 m (6' 7\")   Wt 102.2 kg (225 lb 5 oz)   SpO2 94%   BMI 25.38 kg/m²   Physical Exam   Constitutional: He appears well-developed and well-nourished. He appears ill. No distress.   HENT:   Head: Normocephalic and atraumatic.   Right Ear: External ear normal.   Left Ear: External ear normal.   Eyes: Conjunctivae and lids are normal. No scleral icterus.   Neck: Neck supple. No tracheal tenderness present.   Cardiovascular: Normal rate, regular rhythm and normal heart sounds.   Pulmonary/Chest: Effort normal and breath sounds normal.   Abdominal: Normal appearance. There is no tenderness. There is no CVA tenderness.   Musculoskeletal:         General: No tenderness or edema.   Neurological: He is alert. He is disoriented. He displays no atrophy. He displays no seizure activity.   Skin: Skin is warm and dry. No rash noted.   Psychiatric: He has a normal mood and affect. His behavior is normal.   Nursing note and vitals reviewed.    Fluids:  In: 480 [P.O.:480]  Out: 2225     LABS:  Recent Results (from the past 24 hour(s))   PTH INTACT (PTH ONLY)    Collection Time: 12/12/19  7:11 AM   Result Value Ref Range    Pth, Intact 43.3 14.0 - 72.0 pg/mL   VITAMIN D,25 HYDROXY    Collection Time: 12/12/19  7:11 AM   Result Value Ref Range    25-Hydroxy   Vitamin D 25 20 (L) 30 - 100 ng/mL       (click the triangle to expand results)      IMPRESSION:  - LOLA    * Etiology unclear    * Diff Dx: ATN vs AIN    * SCr unchanged past few days, could be new baseline    * Abx course " almost complete  - CKD Stage unknown    * BCr unknown, not enough data to review  - Candidemia with sepsis    * On antifungal    * Improving  - Pseudomonas bacteremia with sepsis    * On abx, improving  - Acute respiratory failure    * Resolved  - Bradycardia  - Anemia    * Goal Hgb 10-11    * Not at goal    PLAN:  - No compelling indication for RRT at this time  - Renal fxn stable  - Abx/Antifungal per ID  - Low sodium diet  - Dose all meds per eGFR < 20  - Daily labs to trend SCr  - Encourage po intake  - Will need outpt F/U 3-4 weeks after discharge with us, our office will coordinate  - Ok to transition to OP care from renal standpoint    All prior notes form other doctors and RN staff were reviewed from admission to current day to help me make my clinical decisions

## 2019-12-12 NOTE — CARE PLAN
Problem: Venous Thromboembolism (VTW)/Deep Vein Thrombosis (DVT) Prevention:  Goal: Patient will participate in Venous Thrombosis (VTE)/Deep Vein Thrombosis (DVT)Prevention Measures  Outcome: PROGRESSING AS EXPECTED  Pt receiving heparin for VTE prophylaxis    Problem: Psychosocial Needs:  Goal: Level of anxiety will decrease   Pt continues to be restless and impulsive, continually tries to get out of bed.

## 2019-12-12 NOTE — DISCHARGE SUMMARY
Discharge Summary    CHIEF COMPLAINT ON ADMISSION  Chief Complaint   Patient presents with   • Shortness of Breath     RR 30, came from assisted living facility, staff found him AOx2, does not wear O2, placed on 4lpm       Reason for Admission  EMS     CODE STATUS  Full Code    HPI & HOSPITAL COURSE  This is an 79-year-old male with PMH significant for dementia, DM 2, and BPH with chronic Steen catheter who transferred to [facility] from [facility] 11/21/2019 with fever, increased respiratory rate, altered mental status and cough.    He was found to have >1500mL on bladder scan with associated LOLA.  Nephrology was consulted and recommended holding off on dilaysis at this time and FU with them in 1-2 weeks following discharge. Steen catheter was changed 11/21/2019.  Blood culture positive for Pseudomonas and Candida glabrata.  Urine positive for Pseudomonas.  ID was consulted and he completed ABX course with Zosyn and micafungin 12/6/2019.  Cardiology was consulted and he underwent SARAVANAN 12/3/2019 which was negative for endocarditis.  He was evaluated by ophthalmology and found to be negative for endophthalmitis. Completed ABX course 12/7.    He was evaluated by therapies who recommend 24/7 supervision.    He was seen and examined prior to discharge. He is alert, very pleasant and cooperative and oriented only to himself at his baseline. He remains afebrile, hemodynamically stable and denies pain.     Therefore, he is discharged in fair and stable condition to skilled nursing facility.    The patient met 2-midnight criteria for an inpatient stay at the time of discharge.    FOLLOW UP ITEMS POST DISCHARGE  Per [facility] Recommendations  FU with Nephrology - someone from their office will call.     DISCHARGE DIAGNOSES  Principal Problem (Resolved):    Candidemia (HCC) POA: Yes  Active Problems:    Altered mental status POA: Yes    Bradycardia POA: Yes    Type 2 diabetes mellitus, with long-term current use of insulin  ([facility]) POA: Unknown    Age-related physical debility POA: Yes    Normocytic normochromic anemia POA: Yes  Resolved Problems:    CAP (community acquired pneumonia) POA: Yes    UTI (urinary tract infection) POA: Yes    Acute renal failure (ARF) ([facility]) POA: Yes    Acute respiratory failure with hypoxia, RAD, PNA ([facility]) POA: Yes    Bacteremia due to Pseudomonas POA: Yes    Leukocytosis POA: Yes    Toxic metabolic encephalopathy POA: Yes    Hypocalcemia POA: Yes    Reactive airway disease with wheezing with acute exacerbation POA: Yes      FOLLOW UP  No future appointments.  [facility]  [address] [facility] [place] [address]  [phone]          MEDICATIONS ON DISCHARGE     Medication List      START taking these medications      Instructions   ascorbic acid 500 MG tablet  Start taking on:  December 13, 2019  Commonly known as:  VITAMIN C   Take 1 Tab by mouth every day.  Dose:  500 mg     therapeutic multivitamin-minerals Tabs  Start taking on:  December 13, 2019   Take 1 Tab by mouth every day.  Dose:  1 Tab        CONTINUE taking these medications      Instructions   acetaminophen 500 MG Tabs  Commonly known as:  TYLENOL   Take 500-1,000 mg by mouth every 6 hours as needed.  Dose:  500-1,000 mg     amLODIPine 10 MG Tabs  Commonly known as:  NORVASC   Take 10 mg by mouth every day.  Dose:  10 mg     aspirin EC 81 MG Tbec  Commonly known as:  ECOTRIN   Take 81 mg by mouth every day.  Dose:  81 mg     clotrimazole 1 % Crea  Commonly known as:  LOTRIMIN   Apply 1 Application to affected area(s) 2 times a day. Apply to feet and toes  Dose:  1 Application     docusate sodium 100 MG Caps  Commonly known as:  COLACE   Take 100 mg by mouth 2 times a day.  Dose:  100 mg     famotidine 20 MG Tabs  Commonly known as:  PEPCID   Take 20 mg by mouth every day.  Dose:  20 mg     insulin regular 100 Unit/mL Soln  Commonly known as:  HUMULIN R   Inject 4-12 Units as instructed 4 Times a Day,Before Meals and at  Bedtime. Sliding Scale  201-250= 4 units  251-300= 6 units  301-350= 8 units  351-400= 12 units  Dose:  4-12 Units     latanoprost 0.005 % Soln  Commonly known as:  XALATAN   Place 1 Drop in both eyes every evening.  Dose:  1 Drop     ondansetron 4 MG Tbdp  Commonly known as:  ZOFRAN ODT   Take 4 mg by mouth every 6 hours as needed for Nausea.  Dose:  4 mg     polyethylene glycol/lytes Pack  Commonly known as:  MIRALAX   Take 17 g by mouth every day.  Dose:  17 g     simvastatin 10 MG Tabs  Commonly known as:  ZOCOR   Take 10 mg by mouth every evening.  Dose:  10 mg     SITagliptin 50 MG Tabs  Commonly known as:  JANUVIA   Take 50 mg by mouth every day.  Dose:  50 mg     tamsulosin 0.4 MG capsule  Commonly known as:  FLOMAX   Take 0.4 mg by mouth every day.  Dose:  0.4 mg            Allergies  No Known Allergies    DIET  Orders Placed This Encounter   Procedures   • Diet Order Renal     Standing Status:   Standing     Number of Occurrences:   1     Order Specific Question:   Diet:     Answer:   Renal [8]       ACTIVITY  As tolerated and directed by skilled nursing.  Weight bearing as tolerated    LINES, DRAINS, AND WOUNDS  This is an automated list. Peripheral IVs will be removed prior to discharge.  Peripheral IV 11/30/19 20 G Anterior;Left Forearm (Active)   Site Assessment Clean;Dry;Intact 12/11/2019  9:40 PM   Dressing Type Transparent 12/11/2019  9:40 PM   Line Status Saline locked;Scrubbed the hub prior to access;Flushed 12/11/2019  9:40 PM   Dressing Status Clean;Dry;Intact 12/11/2019  9:40 PM   Dressing Intervention N/A 12/11/2019  9:40 PM   Date Primary Tubing Changed 12/07/19 12/8/2019  8:00 AM   Date Secondary Tubing Changed 12/07/19 12/8/2019  8:00 AM   NEXT Primary Tubing Change  12/10/19 12/8/2019  8:00 AM   NEXT Secondary Tubing Change  12/08/19 12/8/2019  8:00 AM   Infiltration Grading ([facility], [facility]) 0 12/11/2019  9:40 PM   Phlebitis Scale ([facility] Only) 0 12/11/2019  9:40 PM     Urethral Catheter  Temperature probe 16 Fr. (Active)   Site Assessment Clean;Skin intact 12/11/2019  9:40 PM   Collection Container Standard drainage bag 12/11/2019  9:40 PM   Urinary Catheter Care Drainage Tube Extended;Drainage Bag Not Overfilled;Drainage Tubing Properly Secured;Drainage Bag Below Bladder Level and Not on Floor 12/11/2019  9:40 PM   Securement Method Securing device (Describe) 12/11/2019  9:40 PM   Output (mL) 325 mL 12/12/2019  6:00 AM         Peripheral IV 11/30/19 20 G Anterior;Left Forearm (Active)   Site Assessment Clean;Dry;Intact 12/11/2019  9:40 PM   Dressing Type Transparent 12/11/2019  9:40 PM   Line Status Saline locked;Scrubbed the hub prior to access;Flushed 12/11/2019  9:40 PM   Dressing Status Clean;Dry;Intact 12/11/2019  9:40 PM   Dressing Intervention N/A 12/11/2019  9:40 PM   Date Primary Tubing Changed 12/07/19 12/8/2019  8:00 AM   Date Secondary Tubing Changed 12/07/19 12/8/2019  8:00 AM   NEXT Primary Tubing Change  12/10/19 12/8/2019  8:00 AM   NEXT Secondary Tubing Change  12/08/19 12/8/2019  8:00 AM   Infiltration Grading ([facility], [facility]) 0 12/11/2019  9:40 PM   Phlebitis Scale ([facility] Only) 0 12/11/2019  9:40 PM           Urethral Catheter Temperature probe 16 Fr. (Active)   Site Assessment Clean;Skin intact 12/11/2019  9:40 PM   Collection Container Standard drainage bag 12/11/2019  9:40 PM   Urinary Catheter Care Drainage Tube Extended;Drainage Bag Not Overfilled;Drainage Tubing Properly Secured;Drainage Bag Below Bladder Level and Not on Floor 12/11/2019  9:40 PM   Securement Method Securing device (Describe) 12/11/2019  9:40 PM   Output (mL) 325 mL 12/12/2019  6:00 AM        MENTAL STATUS ON TRANSFER  Level of Consciousness: Alert  Orientation : Disoriented to Event, Disoriented to Place, Disoriented to Time(oriented to self)  Speech: Speech Clear    CONSULTATIONS  Nephrology    PROCEDURES  NONE    LABORATORY  Lab Results   Component Value Date    SODIUM 140 12/10/2019    POTASSIUM 4.6  12/10/2019    CHLORIDE 109 12/10/2019    CO2 21 12/10/2019    GLUCOSE 85 12/10/2019    BUN 31 (H) 12/10/2019    CREATININE 2.85 (H) 12/10/2019        Lab Results   Component Value Date    WBC 7.5 12/08/2019    HEMOGLOBIN 9.3 (L) 12/08/2019    HEMATOCRIT 29.9 (L) 12/08/2019    PLATELETCT 210 12/08/2019        Total time of the discharge process exceeds 40 minutes.    Please note that this dictation was created using voice recognition software. I have made every reasonable attempt to correct obvious errors, but there may be errors of grammar and possibly content that I did not discover before finalizing the note.    BROOKLYN Winn.

## 2019-12-12 NOTE — PROGRESS NOTES
Assumed care at 1900, report received from day shift RN.  Pt sitting up in wheelchair at nurses station, chair alarm in place. Pt is A&O to self, denies any pain at this time.

## 2019-12-13 NOTE — PROGRESS NOTES
Patient discharged with Luz Elena from [facility] to [facility]. Luz Elena has the COBRA in her possession.

## 2019-12-20 NOTE — DOCUMENTATION QUERY
[facility]                                                                       Query Response Note      PATIENT:               NIR JOSHI  ACCT #:                  [id number]  MRN:                     [id number]  :                      1940  ADMIT DATE:       2019 4:30 AM  DISCH DATE:        2019 4:14 PM  RESPONDING  PROVIDER #:        [id number]           QUERY TEXT:    There is conflicting documentation in the medical record.  Sepsis and bacteremia have both been documented throughout the medical record, however the discharge summary only confirms bacteremia.  Please clarify if sepsis was ruled in or ruled out during admission.    NOTE:  If an appropriate response is not listed below, please respond with a new note.    The patient's Clinical Indicators include:  19 Progress note:  Bacteremia due to Pseudomonas    -Positive urine culture as well  -ID has signed off.  Patient completed ABX course 2019  -Cardiology has signed off.  SARAVANAN on 12/3/2019- for endocarditis  -Afebrile.  No leukocytosis     assessment note:  Acute renal failure (ARF) (HCC)  Secondary to sepsis, ATN - Creatinine is stable.   Target goal UOP >50cc/hr  Avoid nephrotoxins  Monitor with ongoing sepsis management, maintain perfusion  Monitor electrolytes, urine output, creatinine closely     assessment note:  Related Problems  Toxic metabolic encephalopathy  Now back to baseline mental status after treatment for sepsis         PN:    * UTI (urinary tract infection)- (present on admission)    Assessment & Plan    With pansensitive Pseudomonas    Continue antibiotic, antifungal    ID following    Sawyer has been exchanged at admission. Pt is chronic sawyer indwelling.         Acute renal failure (ARF) (HCC)- (present on admission)    Assessment & Plan    Secondary to sepsis, ATN - Creatinine is stable.     Target goal UOP  >50cc/hr    Avoid nephrotoxins    Monitor with ongoing sepsis management, maintain perfusion    Monitor electrolytes, urine output, creatinine closely        12/11 PN:    Bacteremia due to Pseudomonas- (present on admission)    Assessment & Plan    -Positive urine culture as well    -ID has signed off. Patient completed ABX course 12/7/2019    -Cardiology has signed off. SARAVANAN on 12/3/2019- for endocarditis    -Afebrile. No leukocytosis        12/12 ID PN:    IMPRESSION:    - LOLA    * Etiology unclear    * Diff Dx: ATN vs AIN    * SCr minimally downtrending    * Abx course almost complete    - CKD Stage unknown    * BCr unknown, not enough data to review    - Candidemia with sepsis    - Pseudomonas bacteremia with sepsis    - Acute respiratory failure    * Resolved    - Bradycardia    - Anemia    per discharge summary:  DISCHARGE DIAGNOSES  Principal Problem (Resolved):    Candidemia (HCC) POA: Yes  Active Problems:    Altered mental status POA: Yes    Bradycardia POA: Yes    Type 2 diab  Options provided:   -- Sepsis is ruled in, present on admit   -- Sepsis is ruled in, developed after admit   -- Sepsis is ruled out   -- unable to determine      Query created by: Barby Tate on 12/19/2019 5:09 PM    RESPONSE TEXT:    Sepsis is ruled in, present on admit          Electronically signed by:  TRAN ROACH 12/19/2019 5:31 PM

## 2019-12-25 ENCOUNTER — HOSPITAL ENCOUNTER (OUTPATIENT)
Facility: MEDICAL CENTER | Age: 79
End: 2019-12-28
Attending: EMERGENCY MEDICINE | Admitting: HOSPITALIST
Payer: MEDICARE

## 2019-12-25 ENCOUNTER — APPOINTMENT (OUTPATIENT)
Dept: RADIOLOGY | Facility: MEDICAL CENTER | Age: 79
End: 2019-12-25
Attending: HOSPITALIST
Payer: MEDICARE

## 2019-12-25 ENCOUNTER — APPOINTMENT (OUTPATIENT)
Dept: RADIOLOGY | Facility: MEDICAL CENTER | Age: 79
End: 2019-12-25
Payer: MEDICARE

## 2019-12-25 DIAGNOSIS — R53.81 DEBILITY: ICD-10-CM

## 2019-12-25 DIAGNOSIS — F02.80 DEMENTIA ASSOCIATED WITH OTHER UNDERLYING DISEASE WITHOUT BEHAVIORAL DISTURBANCE (HCC): ICD-10-CM

## 2019-12-25 PROBLEM — R07.9 PAIN IN THE CHEST: Status: ACTIVE | Noted: 2019-12-25

## 2019-12-25 PROBLEM — E11.22 CKD STAGE 4 DUE TO TYPE 2 DIABETES MELLITUS (HCC): Status: ACTIVE | Noted: 2019-12-25

## 2019-12-25 PROBLEM — R33.9 URINARY RETENTION: Status: ACTIVE | Noted: 2019-12-25

## 2019-12-25 PROBLEM — N18.4 CKD STAGE 4 DUE TO TYPE 2 DIABETES MELLITUS (HCC): Status: ACTIVE | Noted: 2019-12-25

## 2019-12-25 LAB
ALBUMIN SERPL BCP-MCNC: 3.2 G/DL (ref 3.2–4.9)
ALBUMIN/GLOB SERPL: 1 G/DL
ALP SERPL-CCNC: 52 U/L (ref 30–99)
ALT SERPL-CCNC: 6 U/L (ref 2–50)
ANION GAP SERPL CALC-SCNC: 10 MMOL/L (ref 0–11.9)
AST SERPL-CCNC: 16 U/L (ref 12–45)
BASOPHILS # BLD AUTO: 0.3 % (ref 0–1.8)
BASOPHILS # BLD: 0.04 K/UL (ref 0–0.12)
BILIRUB SERPL-MCNC: 0.4 MG/DL (ref 0.1–1.5)
BUN SERPL-MCNC: 54 MG/DL (ref 8–22)
CALCIUM SERPL-MCNC: 8.5 MG/DL (ref 8.5–10.5)
CHLORIDE SERPL-SCNC: 102 MMOL/L (ref 96–112)
CO2 SERPL-SCNC: 22 MMOL/L (ref 20–33)
CREAT SERPL-MCNC: 2.76 MG/DL (ref 0.5–1.4)
EKG IMPRESSION: NORMAL
EKG IMPRESSION: NORMAL
EOSINOPHIL # BLD AUTO: 0.02 K/UL (ref 0–0.51)
EOSINOPHIL NFR BLD: 0.2 % (ref 0–6.9)
ERYTHROCYTE [DISTWIDTH] IN BLOOD BY AUTOMATED COUNT: 54.4 FL (ref 35.9–50)
GLOBULIN SER CALC-MCNC: 3.2 G/DL (ref 1.9–3.5)
GLUCOSE BLD-MCNC: 76 MG/DL (ref 65–99)
GLUCOSE BLD-MCNC: 79 MG/DL (ref 65–99)
GLUCOSE BLD-MCNC: 86 MG/DL (ref 65–99)
GLUCOSE SERPL-MCNC: 111 MG/DL (ref 65–99)
HCT VFR BLD AUTO: 28 % (ref 42–52)
HGB BLD-MCNC: 9 G/DL (ref 14–18)
IMM GRANULOCYTES # BLD AUTO: 0.04 K/UL (ref 0–0.11)
IMM GRANULOCYTES NFR BLD AUTO: 0.3 % (ref 0–0.9)
LYMPHOCYTES # BLD AUTO: 0.27 K/UL (ref 1–4.8)
LYMPHOCYTES NFR BLD: 2.4 % (ref 22–41)
MAGNESIUM SERPL-MCNC: 1.5 MG/DL (ref 1.5–2.5)
MCH RBC QN AUTO: 30.6 PG (ref 27–33)
MCHC RBC AUTO-ENTMCNC: 32.1 G/DL (ref 33.7–35.3)
MCV RBC AUTO: 95.2 FL (ref 81.4–97.8)
MONOCYTES # BLD AUTO: 0.77 K/UL (ref 0–0.85)
MONOCYTES NFR BLD AUTO: 6.7 % (ref 0–13.4)
NEUTROPHILS # BLD AUTO: 10.33 K/UL (ref 1.82–7.42)
NEUTROPHILS NFR BLD: 90.1 % (ref 44–72)
NRBC # BLD AUTO: 0 K/UL
NRBC BLD-RTO: 0 /100 WBC
PLATELET # BLD AUTO: 199 K/UL (ref 164–446)
PMV BLD AUTO: 9.3 FL (ref 9–12.9)
POTASSIUM SERPL-SCNC: 4.8 MMOL/L (ref 3.6–5.5)
PROT SERPL-MCNC: 6.4 G/DL (ref 6–8.2)
RBC # BLD AUTO: 2.94 M/UL (ref 4.7–6.1)
SODIUM SERPL-SCNC: 134 MMOL/L (ref 135–145)
TROPONIN T SERPL-MCNC: 105 NG/L (ref 6–19)
TROPONIN T SERPL-MCNC: 113 NG/L (ref 6–19)
WBC # BLD AUTO: 11.5 K/UL (ref 4.8–10.8)

## 2019-12-25 PROCEDURE — 94760 N-INVAS EAR/PLS OXIMETRY 1: CPT

## 2019-12-25 PROCEDURE — 71045 X-RAY EXAM CHEST 1 VIEW: CPT | Mod: XU

## 2019-12-25 PROCEDURE — 304561 HCHG STAT O2

## 2019-12-25 PROCEDURE — 82962 GLUCOSE BLOOD TEST: CPT | Mod: 91

## 2019-12-25 PROCEDURE — 700102 HCHG RX REV CODE 250 W/ 637 OVERRIDE(OP): Performed by: HOSPITALIST

## 2019-12-25 PROCEDURE — 83735 ASSAY OF MAGNESIUM: CPT

## 2019-12-25 PROCEDURE — 80053 COMPREHEN METABOLIC PANEL: CPT

## 2019-12-25 PROCEDURE — G0378 HOSPITAL OBSERVATION PER HR: HCPCS

## 2019-12-25 PROCEDURE — 99218 PR INITIAL OBSERVATION CARE,LEVL I: CPT | Performed by: HOSPITALIST

## 2019-12-25 PROCEDURE — 700111 HCHG RX REV CODE 636 W/ 250 OVERRIDE (IP): Performed by: HOSPITALIST

## 2019-12-25 PROCEDURE — 93005 ELECTROCARDIOGRAM TRACING: CPT | Performed by: EMERGENCY MEDICINE

## 2019-12-25 PROCEDURE — 93005 ELECTROCARDIOGRAM TRACING: CPT

## 2019-12-25 PROCEDURE — 99285 EMERGENCY DEPT VISIT HI MDM: CPT

## 2019-12-25 PROCEDURE — 85025 COMPLETE CBC W/AUTO DIFF WBC: CPT

## 2019-12-25 PROCEDURE — 700105 HCHG RX REV CODE 258: Performed by: HOSPITALIST

## 2019-12-25 PROCEDURE — 71046 X-RAY EXAM CHEST 2 VIEWS: CPT

## 2019-12-25 PROCEDURE — 93010 ELECTROCARDIOGRAM REPORT: CPT | Performed by: INTERNAL MEDICINE

## 2019-12-25 PROCEDURE — A9270 NON-COVERED ITEM OR SERVICE: HCPCS | Performed by: HOSPITALIST

## 2019-12-25 PROCEDURE — 93005 ELECTROCARDIOGRAM TRACING: CPT | Performed by: HOSPITALIST

## 2019-12-25 PROCEDURE — 96372 THER/PROPH/DIAG INJ SC/IM: CPT

## 2019-12-25 PROCEDURE — 84484 ASSAY OF TROPONIN QUANT: CPT

## 2019-12-25 RX ORDER — OMEPRAZOLE 20 MG/1
20 CAPSULE, DELAYED RELEASE ORAL DAILY
Status: ON HOLD | COMMUNITY
End: 2020-03-10

## 2019-12-25 RX ORDER — HEPARIN SODIUM 5000 [USP'U]/ML
5000 INJECTION, SOLUTION INTRAVENOUS; SUBCUTANEOUS EVERY 8 HOURS
Status: DISCONTINUED | OUTPATIENT
Start: 2019-12-25 | End: 2019-12-28 | Stop reason: HOSPADM

## 2019-12-25 RX ORDER — OMEPRAZOLE 20 MG/1
20 CAPSULE, DELAYED RELEASE ORAL EVERY 12 HOURS
Status: DISCONTINUED | OUTPATIENT
Start: 2019-12-25 | End: 2019-12-28 | Stop reason: HOSPADM

## 2019-12-25 RX ORDER — LATANOPROST 50 UG/ML
1 SOLUTION/ DROPS OPHTHALMIC NIGHTLY
Status: DISCONTINUED | OUTPATIENT
Start: 2019-12-25 | End: 2019-12-28 | Stop reason: HOSPADM

## 2019-12-25 RX ORDER — SIMVASTATIN 10 MG
10 TABLET ORAL NIGHTLY
Status: DISCONTINUED | OUTPATIENT
Start: 2019-12-25 | End: 2019-12-28 | Stop reason: HOSPADM

## 2019-12-25 RX ORDER — ONDANSETRON 4 MG/1
4 TABLET, ORALLY DISINTEGRATING ORAL EVERY 4 HOURS PRN
Status: DISCONTINUED | OUTPATIENT
Start: 2019-12-25 | End: 2019-12-28 | Stop reason: HOSPADM

## 2019-12-25 RX ORDER — DOCUSATE SODIUM 100 MG/1
100 CAPSULE, LIQUID FILLED ORAL 2 TIMES DAILY
Status: DISCONTINUED | OUTPATIENT
Start: 2019-12-25 | End: 2019-12-28 | Stop reason: HOSPADM

## 2019-12-25 RX ORDER — BISACODYL 10 MG
10 SUPPOSITORY, RECTAL RECTAL
Status: DISCONTINUED | OUTPATIENT
Start: 2019-12-25 | End: 2019-12-28 | Stop reason: HOSPADM

## 2019-12-25 RX ORDER — ACETAMINOPHEN 325 MG/1
650 TABLET ORAL EVERY 6 HOURS PRN
Status: DISCONTINUED | OUTPATIENT
Start: 2019-12-25 | End: 2019-12-28 | Stop reason: HOSPADM

## 2019-12-25 RX ORDER — SODIUM CHLORIDE 9 MG/ML
INJECTION, SOLUTION INTRAVENOUS CONTINUOUS
Status: DISCONTINUED | OUTPATIENT
Start: 2019-12-25 | End: 2019-12-26

## 2019-12-25 RX ORDER — ONDANSETRON 2 MG/ML
4 INJECTION INTRAMUSCULAR; INTRAVENOUS EVERY 4 HOURS PRN
Status: DISCONTINUED | OUTPATIENT
Start: 2019-12-25 | End: 2019-12-28 | Stop reason: HOSPADM

## 2019-12-25 RX ORDER — POLYETHYLENE GLYCOL 3350 17 G/17G
1 POWDER, FOR SOLUTION ORAL DAILY
Status: DISCONTINUED | OUTPATIENT
Start: 2019-12-25 | End: 2019-12-28 | Stop reason: HOSPADM

## 2019-12-25 RX ORDER — OMEPRAZOLE 20 MG/1
20 CAPSULE, DELAYED RELEASE ORAL DAILY
Status: DISCONTINUED | OUTPATIENT
Start: 2019-12-25 | End: 2019-12-25

## 2019-12-25 RX ORDER — AMOXICILLIN 250 MG
2 CAPSULE ORAL 2 TIMES DAILY
Status: DISCONTINUED | OUTPATIENT
Start: 2019-12-25 | End: 2019-12-28 | Stop reason: HOSPADM

## 2019-12-25 RX ORDER — TAMSULOSIN HYDROCHLORIDE 0.4 MG/1
0.4 CAPSULE ORAL
Status: DISCONTINUED | OUTPATIENT
Start: 2019-12-25 | End: 2019-12-28 | Stop reason: HOSPADM

## 2019-12-25 RX ORDER — AMLODIPINE BESYLATE 5 MG/1
10 TABLET ORAL DAILY
Status: DISCONTINUED | OUTPATIENT
Start: 2019-12-25 | End: 2019-12-26

## 2019-12-25 RX ORDER — M-VIT,TX,IRON,MINS/CALC/FOLIC 27MG-0.4MG
1 TABLET ORAL DAILY
Status: DISCONTINUED | OUTPATIENT
Start: 2019-12-25 | End: 2019-12-28 | Stop reason: HOSPADM

## 2019-12-25 RX ORDER — NITROGLYCERIN 0.4 MG/1
0.4 TABLET SUBLINGUAL
Status: DISCONTINUED | OUTPATIENT
Start: 2019-12-25 | End: 2019-12-28 | Stop reason: HOSPADM

## 2019-12-25 RX ORDER — ASCORBIC ACID 500 MG
500 TABLET ORAL DAILY
Status: DISCONTINUED | OUTPATIENT
Start: 2019-12-25 | End: 2019-12-28 | Stop reason: HOSPADM

## 2019-12-25 RX ORDER — POLYETHYLENE GLYCOL 3350 17 G/17G
1 POWDER, FOR SOLUTION ORAL
Status: DISCONTINUED | OUTPATIENT
Start: 2019-12-25 | End: 2019-12-28 | Stop reason: HOSPADM

## 2019-12-25 RX ADMIN — MULTIPLE VITAMINS W/ MINERALS TAB 1 TABLET: TAB at 14:03

## 2019-12-25 RX ADMIN — AMLODIPINE BESYLATE 10 MG: 5 TABLET ORAL at 14:03

## 2019-12-25 RX ADMIN — OXYCODONE HYDROCHLORIDE AND ACETAMINOPHEN 500 MG: 500 TABLET ORAL at 14:03

## 2019-12-25 RX ADMIN — HEPARIN SODIUM 5000 UNITS: 5000 INJECTION, SOLUTION INTRAVENOUS; SUBCUTANEOUS at 14:05

## 2019-12-25 RX ADMIN — TAMSULOSIN HYDROCHLORIDE 0.4 MG: 0.4 CAPSULE ORAL at 14:04

## 2019-12-25 RX ADMIN — SIMVASTATIN 10 MG: 10 TABLET, FILM COATED ORAL at 22:56

## 2019-12-25 RX ADMIN — ASPIRIN 81 MG: 81 TABLET, COATED ORAL at 14:03

## 2019-12-25 RX ADMIN — HEPARIN SODIUM 5000 UNITS: 5000 INJECTION, SOLUTION INTRAVENOUS; SUBCUTANEOUS at 22:55

## 2019-12-25 RX ADMIN — SODIUM CHLORIDE: 9 INJECTION, SOLUTION INTRAVENOUS at 14:24

## 2019-12-25 RX ADMIN — LATANOPROST 1 DROP: 50 SOLUTION OPHTHALMIC at 23:29

## 2019-12-25 ASSESSMENT — COGNITIVE AND FUNCTIONAL STATUS - GENERAL
SUGGESTED CMS G CODE MODIFIER MOBILITY: CL
DRESSING REGULAR UPPER BODY CLOTHING: A LITTLE
MOVING TO AND FROM BED TO CHAIR: A LOT
WALKING IN HOSPITAL ROOM: A LOT
SUGGESTED CMS G CODE MODIFIER DAILY ACTIVITY: CK
TOILETING: A LOT
EATING MEALS: A LITTLE
DAILY ACTIVITIY SCORE: 17
HELP NEEDED FOR BATHING: A LITTLE
MOBILITY SCORE: 13
STANDING UP FROM CHAIR USING ARMS: A LOT
PERSONAL GROOMING: A LITTLE
TURNING FROM BACK TO SIDE WHILE IN FLAT BAD: A LITTLE
DRESSING REGULAR LOWER BODY CLOTHING: A LITTLE
MOVING FROM LYING ON BACK TO SITTING ON SIDE OF FLAT BED: A LOT
CLIMB 3 TO 5 STEPS WITH RAILING: A LOT

## 2019-12-25 ASSESSMENT — LIFESTYLE VARIABLES
EVER FELT BAD OR GUILTY ABOUT YOUR DRINKING: NO
DO YOU DRINK ALCOHOL: NO
TOTAL SCORE: 0
DOES PATIENT WANT TO STOP DRINKING: NO
ON A TYPICAL DAY WHEN YOU DRINK ALCOHOL HOW MANY DRINKS DO YOU HAVE: 0
AVERAGE NUMBER OF DAYS PER WEEK YOU HAVE A DRINK CONTAINING ALCOHOL: 0
CONSUMPTION TOTAL: NEGATIVE
EVER HAD A DRINK FIRST THING IN THE MORNING TO STEADY YOUR NERVES TO GET RID OF A HANGOVER: NO
TOTAL SCORE: 0
HAVE PEOPLE ANNOYED YOU BY CRITICIZING YOUR DRINKING: NO
TOTAL SCORE: 0
ALCOHOL_USE: NO
HAVE YOU EVER FELT YOU SHOULD CUT DOWN ON YOUR DRINKING: NO
HOW MANY TIMES IN THE PAST YEAR HAVE YOU HAD 5 OR MORE DRINKS IN A DAY: 0

## 2019-12-25 ASSESSMENT — ENCOUNTER SYMPTOMS
EYES NEGATIVE: 1
MEMORY LOSS: 1
HEMOPTYSIS: 0
RESPIRATORY NEGATIVE: 1
FEVER: 0
SHORTNESS OF BREATH: 0
GASTROINTESTINAL NEGATIVE: 1
ORTHOPNEA: 1
CHILLS: 0
MUSCULOSKELETAL NEGATIVE: 1
NEUROLOGICAL NEGATIVE: 1
SPUTUM PRODUCTION: 0

## 2019-12-25 ASSESSMENT — PAIN DESCRIPTION - DESCRIPTORS: DESCRIPTORS: BURNING

## 2019-12-25 ASSESSMENT — PATIENT HEALTH QUESTIONNAIRE - PHQ9
2. FEELING DOWN, DEPRESSED, IRRITABLE, OR HOPELESS: NOT AT ALL
SUM OF ALL RESPONSES TO PHQ9 QUESTIONS 1 AND 2: 0
1. LITTLE INTEREST OR PLEASURE IN DOING THINGS: NOT AT ALL

## 2019-12-25 NOTE — PROGRESS NOTES
2 RN skin check complete with Kady RN  Devices in place SCDs, PIV, sawyer catheter.  Skin assessed under devices : yes.  Confirmed pressure ulcers found on : sacrum, back, left ankle  New potential pressure ulcers noted on : sacrum, back, ankle. Wound consult placed Yes.    Noted sacrum skin tear,  red non blanching areas: wound to the back, bilateral feet and left ankle. Generalized scabs to legs and arms. Heels dry and flaky.   The following interventions in place : q2 turns, waffle cushion, barrier cream, moisturizer, mepilex to sacrum and back*

## 2019-12-25 NOTE — ED TRIAGE NOTES
"Randy Bates  79 y.o. male  Chief Complaint   Patient presents with   • Shortness of Breath   • Chest Pain     Pt BIB EMS for above complaint.      Pt is a resident at [facility]. Per staff, pt experiencing sudden onset chest pain (described as burning) and SOB at 00:20, lasting till 00:40. The patient during that time sated mid 80% on his baseline 2L O2. Pt received 1 breathing treatment from fire and PIV placement from EMS.   Upon arrival to [facility], pt no longer in any distress. Blood drawn, tubed to lab, EKG obtained. Protocol ordered. Pt remains on baseline 2L O2 sating above 94%. Pt also with hx of dementia, currently at baseline mentation (A&Ox1, to self).       Blood Pressure : 101/52, Pulse: (!) 49, Respiration: 18, Temperature: 37.2 °C (99 °F), Height: 200.7 cm (6' 7\"), Weight: 87.5 kg (193 lb), BMI (Calculated): 21.74, BSA (Calculated): 2.2, Pulse Oximetry: 96 %, O2 (LPM): 2, O2 Delivery: Nasal Cannula  "

## 2019-12-25 NOTE — ED NOTES
Pt arouses to verbal stimuli, denies pain, oriented to self. SB 40's on monitor, no ectopy noted, lungs clear, denies SOB. Pt with no c/o's ay this time. Call bell within reach.

## 2019-12-25 NOTE — ED NOTES
Med Rec completed per patient's MAR  Allergies reviewed  No ORAL antibiotics listed on patient's MAR

## 2019-12-25 NOTE — ASSESSMENT & PLAN NOTE
Likely reactive     No focal sign of infection at this point   Continue to montior for signs of infection

## 2019-12-25 NOTE — ASSESSMENT & PLAN NOTE
The patient is chronically on O2 but apparently had a drop in sats during his episode of chest pain which resolved with breathing treatments, incentive spirometer  Imaging shows atelectasis and left-sided effusion, patient does not have capacity to consent for the procedure and is refusing as he does not feel short of breath or having any pain anymore  12/26, shortness of breath resolved

## 2019-12-25 NOTE — PROGRESS NOTES
Report received from Felicia COREAS. Pt arrived to the floor via gurney with RN. A/O x2.  Denies pain, SOB. Assessment complete. Steen catheter in place PTA, intact with moderate yellow urine output. Noted generalized scabs to legs and arms. Wound to bilateral feet, back and skin tear to sacrum noted. Discussed POC, monitor VS/labs, I/Os, safety, mobility, DC planning , pt verbalizes understanding. Explained importance of calling before getting OOB. Call light and belongings within reach. Bed alarm on. Bed in the lowest position. Treaded socks in place. Hourly rounding in progress. Will continue to monitor .

## 2019-12-25 NOTE — ASSESSMENT & PLAN NOTE
Patient has a intermediate troponin level which is likely due to his chronic kidney disease   12/26, chest pain resolved

## 2019-12-25 NOTE — ED NOTES
Pt waking up and doesn't know where he is, reoriented, repositioned and resettled, call bell within reach. Pt denies pain.

## 2019-12-25 NOTE — ED PROVIDER NOTES
ED Provider Note    CHIEF COMPLAINT  Chief Complaint   Patient presents with   • Shortness of Breath   • Chest Pain       HPI  Randy Bates is a 79 y.o. male who presents by ambulance with complaints of chest pain for about 20 minutes earlier this evening the patient has some dementia unable to wake him up but can really not get any useful history from him but he did state he is not having any complaints or discomfort.  Patient did receive an breathing treatment in route to the hospital.  He is not seem to have any complaints or discomfort or be short of breath after arrival here.  He is on oxygen normally at 2 L and saturating 94+    REVIEW OF SYSTEMS  See HPI for further details.  Not otherwise obtainable   PAST MEDICAL HISTORY  Past Medical History:   Diagnosis Date   • Diabetes (HCC)    • Hypertension        FAMILY HISTORY  History reviewed. No pertinent family history.    SOCIAL HISTORY  Social History     Socioeconomic History   • Marital status:      Spouse name: Not on file   • Number of children: Not on file   • Years of education: Not on file   • Highest education level: Not on file   Occupational History   • Not on file   Social Needs   • Financial resource strain: Not on file   • Food insecurity:     Worry: Not on file     Inability: Not on file   • Transportation needs:     Medical: Not on file     Non-medical: Not on file   Tobacco Use   • Smoking status: Never Smoker   • Smokeless tobacco: Never Used   Substance and Sexual Activity   • Alcohol use: Not Currently   • Drug use: Not Currently   • Sexual activity: Not on file   Lifestyle   • Physical activity:     Days per week: Not on file     Minutes per session: Not on file   • Stress: Not on file   Relationships   • Social connections:     Talks on phone: Not on file     Gets together: Not on file     Attends Sabianism service: Not on file     Active member of club or organization: Not on file     Attends meetings of clubs or organizations:  "Not on file     Relationship status: Not on file   • Intimate partner violence:     Fear of current or ex partner: Not on file     Emotionally abused: Not on file     Physically abused: Not on file     Forced sexual activity: Not on file   Other Topics Concern   • Not on file   Social History Narrative   • Not on file       SURGICAL HISTORY  History reviewed. No pertinent surgical history.    CURRENT MEDICATIONS  Home Medications     Reviewed by Ortiz Rowlnad R.N. (Registered Nurse) on 12/25/19 at 0209  Med List Status: Partial   Medication Last Dose Status   acetaminophen (TYLENOL) 500 MG Tab  Active   amLODIPine (NORVASC) 10 MG Tab  Active   ascorbic acid (VITAMIN C) 500 MG tablet  Active   aspirin EC (ECOTRIN) 81 MG Tablet Delayed Response  Active   clotrimazole (LOTRIMIN) 1 % Cream  Active   docusate sodium (COLACE) 100 MG Cap  Active   famotidine (PEPCID) 20 MG Tab  Active   insulin regular (HUMULIN R) 100 Unit/mL Solution  Active   latanoprost (XALATAN) 0.005 % Solution  Active   ondansetron (ZOFRAN ODT) 4 MG TABLET DISPERSIBLE  Active   polyethylene glycol/lytes (MIRALAX) Pack  Active   simvastatin (ZOCOR) 10 MG Tab  Active   SITagliptin (JANUVIA) 50 MG Tab  Active   tamsulosin (FLOMAX) 0.4 MG capsule  Active   therapeutic multivitamin-minerals (THERAGRAN-M) Tab  Active                ALLERGIES  No Known Allergies    PHYSICAL EXAM  VITAL SIGNS: /48   Pulse (!) 44   Temp 37.2 °C (99 °F) (Temporal)   Resp 20   Ht 2.007 m (6' 7\")   Wt 87.5 kg (193 lb)   SpO2 95%   BMI 21.74 kg/m²      Constitutional: Well developed, Well nourished, sleeping till aroused   hENT: Normocephalic, Atraumatic, Bilateral external ears normal, mucous membranes moist, No oral exudates, Nose normal.   Eyes: PERRLA,  Conjunctiva and lids normal, No discharge.   Cardiovascular: Normal heart rate, Normal rhythm, No murmurs.   Thorax & Lungs: Normal breath sounds, No respiratory distress, No wheezing, or other abnormal breath " sounds. No chest tenderness.   Abdomen: Bowel sounds normal, Soft, No tenderness, No masses, No pulsatile masses. No guarding.  Skin: Warm, Dry, No erythema, No rash.   Back: No tenderness, No CVA tenderness.  Extremities: Intact distal pulses, No edema, No tenderness, No cyanosis, No clubbing.   Musculoskeletal: Good range of motion in all major joints. No tenderness to palpation or major deformities, or injuries noted.   Neurologic: Alert & oriented x1, this is reportedly his baseline status normal motor function, Normal sensory function, No focal deficits noted.      DX-CHEST-PORTABLE (1 VIEW)    (Results Pending)        RADIOLOGY/PROCEDURES  DX-CHEST-PORTABLE (1 VIEW)    (Results Pending)     COURSE & MEDICAL DECISION MAKING  Pertinent Labs & Imaging studies reviewed. (See chart for details)  Patient presented to the emergency room after reported episode of chest pain.  He denies having any discomfort or shortness of breath at this time.  He is confused but does answer simple questions.  It is noted that the patient's troponin elevated at 113 which I think will need to be followed this may be secondary to his worsened renal failure as well.  EKG is not showing ischemic changes and the patient seems completely comfortable at this point he would be admitted for observation and to follow troponins    FINAL IMPRESSION  1.  Chest pain  2.  Elevated troponin  3.  Renal insufficiency       Electronically signed by: Manjeet Durham, 12/25/2019 3:01 AM

## 2019-12-25 NOTE — CARE PLAN
Problem: Safety  Goal: Will remain free from injury  Outcome: PROGRESSING AS EXPECTED  Note:   Treaded socks in place, bed in the lowest position, bed alarm on, call light and belongings within reach, pt call for assistance appropriately      Problem: Knowledge Deficit  Goal: Knowledge of disease process/condition, treatment plan, diagnostic tests, and medications will improve  Outcome: PROGRESSING AS EXPECTED  Note:   Educated on POC, all questions answered, hourly rounding in progress     Problem: Skin Integrity  Goal: Risk for impaired skin integrity will decrease  Outcome: PROGRESSING AS EXPECTED  Note:   q2 turns, gopal risk assessment, applied barrier cream, waffle cushion

## 2019-12-25 NOTE — H&P
Hospital Medicine History & Physical Note    Date of Service  12/25/2019    Primary Care Physician  Madhuri Rahman M.D.    Consultants  None    Code Status  Full code by default    Chief Complaint  Chest pain and hypoxia    History of Presenting Illness  79 y.o. male who presented 12/25/2019 with history of recent bacteremia from a urinary source being treated with full course of antibiotics for Pseudomonas and Candida, the patient lives at a care facility where the patient complaint this morning 12:20 of burning chest pain, it lasted approximately 20 minutes, at the time he saturated only at 80% on his usual oxygen flow of 2 L per report, EMS was called and patient received a breathing treatment which apparently helped the patient he is chronically demented and alert and oriented only x1.  On my examination hours later in the emergency room he is currently feeling fine and does not have ongoing chest pain, he cannot describe the chest pain any further, he denies a prior heart history, there is nothing on his medical records stating it prior coronary artery disease diagnosis, he appears to have longstanding diabetes and is recently Steen dependent with a prior BPH, on a previous CT he had bilateral pulmonary/pleural effusions with atelectasis which is currently redemonstrated on a chest x-ray, questionably slightly worse.  He denies fevers, chills, cough.  Overall the patient is a poor historian and gives unreliable review of systems.  Per his nursing home records he is a full code  Review of Systems  Review of Systems   Unable to perform ROS: Mental acuity   Constitutional: Positive for malaise/fatigue. Negative for chills and fever.   HENT: Negative.    Eyes: Negative.    Respiratory: Negative.  Negative for hemoptysis, sputum production and shortness of breath.    Cardiovascular: Positive for chest pain and orthopnea.   Gastrointestinal: Negative.    Genitourinary: Negative.    Musculoskeletal: Negative.    Skin:  Negative.    Neurological: Negative.    Endo/Heme/Allergies: Negative.    Psychiatric/Behavioral: Positive for memory loss.   All other systems reviewed and are negative.      Past Medical History   has a past medical history of Diabetes (HCC) and Hypertension.    Surgical History   has no past surgical history on file.     Family History  family history is not on file.     Social History   reports that he has never smoked. He has never used smokeless tobacco. He reports previous alcohol use. He reports previous drug use.    Allergies  No Known Allergies    Medications  Prior to Admission Medications   Prescriptions Last Dose Informant Patient Reported? Taking?   SITagliptin (JANUVIA) 50 MG Tab 12/24/2019 at 0900 MAR from Other Facility Yes No   Sig: Take 50 mg by mouth every day.   acetaminophen (TYLENOL) 500 MG Tab PRN at PRN MAR from Other Facility Yes No   Sig: Take 500-1,000 mg by mouth every 6 hours as needed.   amLODIPine (NORVASC) 10 MG Tab 12/24/2019 at 0900 MAR from Other Facility Yes No   Sig: Take 10 mg by mouth every day.   ascorbic acid (VITAMIN C) 500 MG tablet 12/24/2019 at 0900 MAR from Other Facility No No   Sig: Take 1 Tab by mouth every day.   aspirin EC (ECOTRIN) 81 MG Tablet Delayed Response 12/24/2019 at 0900 MAR from Other Facility Yes No   Sig: Take 81 mg by mouth every day.   docusate sodium (COLACE) 100 MG Cap 12/24/2019 at 2000 MAR from Other Facility Yes No   Sig: Take 100 mg by mouth 2 times a day.   insulin regular (HUMULIN R) 100 Unit/mL Solution 12/18/2019 at D/C MAR from Other Facility Yes No   Sig: Inject 4-12 Units as instructed 4 Times a Day,Before Meals and at Bedtime. Sliding Scale  201-250= 4 units  251-300= 6 units  301-350= 8 units  351-400= 12 units   latanoprost (XALATAN) 0.005 % Solution 12/24/2019 at 2100 MAR from Other Facility Yes No   Sig: Place 1 Drop in both eyes every evening.   omeprazole (PRILOSEC) 20 MG delayed-release capsule 12/24/2019 at 2000 MAR from Other  Facility Yes Yes   Sig: Take 20 mg by mouth every day.   ondansetron (ZOFRAN ODT) 4 MG TABLET DISPERSIBLE PRN at PRN MAR from Other Facility Yes No   Sig: Take 4 mg by mouth every 6 hours as needed for Nausea.   polyethylene glycol/lytes (MIRALAX) Pack 12/24/2019 at 0900 MAR from Other Facility Yes No   Sig: Take 17 g by mouth every day.   simvastatin (ZOCOR) 10 MG Tab 12/24/2019 at 2100 MAR from Other Facility Yes No   Sig: Take 10 mg by mouth every evening.   tamsulosin (FLOMAX) 0.4 MG capsule 12/24/2019 at 0900 MAR from Other Facility Yes No   Sig: Take 0.4 mg by mouth every day.   therapeutic multivitamin-minerals (THERAGRAN-M) Tab 12/24/2019 at 0900 MAR from Other Facility No No   Sig: Take 1 Tab by mouth every day.      Facility-Administered Medications: None       Physical Exam  Temp:  [37.2 °C (99 °F)] 37.2 °C (99 °F)  Pulse:  [43-52] 45  Resp:  [16-26] 22  BP: (100-132)/(47-63) 132/63  SpO2:  [93 %-97 %] 97 %    Physical Exam  Vitals signs and nursing note reviewed.   Constitutional:       Appearance: He is well-developed.      Comments: Pt seen and examined.   HENT:      Head: Normocephalic and atraumatic.   Eyes:      Pupils: Pupils are equal, round, and reactive to light.   Neck:      Musculoskeletal: Normal range of motion and neck supple.   Cardiovascular:      Rate and Rhythm: Normal rate.      Heart sounds: Normal heart sounds.   Pulmonary:      Effort: Pulmonary effort is normal.      Breath sounds: Rhonchi present.   Abdominal:      General: Bowel sounds are normal.      Palpations: Abdomen is soft.   Genitourinary:     Penis: Normal.       Comments: Steen  Musculoskeletal: Normal range of motion.   Skin:     General: Skin is warm and dry.   Neurological:      Mental Status: He is alert. Mental status is at baseline.      Comments: Alert and oriented x1         Laboratory:  Recent Labs     12/25/19  0210   WBC 11.5*   RBC 2.94*   HEMOGLOBIN 9.0*   HEMATOCRIT 28.0*   MCV 95.2   MCH 30.6   MCHC 32.1*    RDW 54.4*   PLATELETCT 199   MPV 9.3     Recent Labs     12/25/19 0210   SODIUM 134*   POTASSIUM 4.8   CHLORIDE 102   CO2 22   GLUCOSE 111*   BUN 54*   CREATININE 2.76*   CALCIUM 8.5     Recent Labs     12/25/19 0210   ALTSGPT 6   ASTSGOT 16   ALKPHOSPHAT 52   TBILIRUBIN 0.4   GLUCOSE 111*         No results for input(s): NTPROBNP in the last 72 hours.      Recent Labs     12/25/19 0210   TROPONINT 113*       Urinalysis:    No results found     Imaging:  DX-CHEST-PORTABLE (1 VIEW)   Final Result      Worsening left basilar pulmonary opacity which could represent atelectasis or pneumonitis.         DX-CHEST-2 VIEWS    (Results Pending)         Assessment/Plan:  I anticipate this patient is appropriate for observation status at this time.    Pain in the chest  Assessment & Plan  Described as burning lasting for approximately 20 minutes  Patient has a intermediate troponin level  Patient is demented and is advanced age at this time with follow-up on troponin and a observation admission  He did have recent echocardiogram which did not show any wall motion abnormality  The patient certainly not at low risk,  Could consider re-stratification    Acute respiratory failure with hypoxia, RAD, PNA (HCC)- (present on admission)  Assessment & Plan  The patient is chronically on O2 but apparently had a drop in sats during his episode of chest pain which resolved with breathing treatments,    Currently patient is saturating at 100% and is not complaining of any type of dyspnea  His chest x-ray is quite abnormal though with left basal opacification  The patient denies cough or sputum production but is demented and his history taking is somewhat difficult    Dementia associated with other underlying disease without behavioral disturbance (HCC)- (present on admission)  Assessment & Plan  Patient is chronically awake and alert only x1, oriented to self  Would consider involving palliative care for further goal of care  discussion    Bradycardia- (present on admission)  Assessment & Plan  Chronic, appears asymptomatic overall  Avoid zoraida agents    Leukocytosis- (present on admission)  Assessment & Plan  Possibly reactive, the patient is afebrile, not tachycardic, does not appear to be septic at this time  His pulmonary infiltrate is somewhat concerning  He did have a recent septic episodes that was fully treated with antibiotics      CKD stage 4 due to type 2 diabetes mellitus (HCC)  Assessment & Plan  Likely secondary to diabetes, hypertension and recent development of urinary retention  Hydrate slightly, monitor    Urinary retention  Assessment & Plan  Chronic Steen, will exchange Steen and continue drainage    Age-related physical debility- (present on admission)  Assessment & Plan  Chronically institutionalized    Type 2 diabetes mellitus, with long-term current use of insulin ([facility])- (present on admission)  Assessment & Plan  Continue with control, the patient has apparently not been on much insulin recently      Plan  The patient will be admitted to evaluate for chest pain and hypoxia  Will follow up on troponin levels, continue telemonitoring  Continue to follow the patient clinically  2 view chest x-ray to follow-up on pulmonary infiltrate  Continue Steen and exchange current catheter  Laboratory follow-up  Overall the patient should possibly have a goals of care discussion given his age and mental status   Patient is medically complex, high risk      VTE prophylaxis: Heparin

## 2019-12-26 PROBLEM — J96.21 ACUTE ON CHRONIC RESPIRATORY FAILURE WITH HYPOXIA (HCC): Status: ACTIVE | Noted: 2019-11-22

## 2019-12-26 LAB
ALBUMIN SERPL BCP-MCNC: 2.9 G/DL (ref 3.2–4.9)
ALBUMIN/GLOB SERPL: 1 G/DL
ALP SERPL-CCNC: 45 U/L (ref 30–99)
ALT SERPL-CCNC: <5 U/L (ref 2–50)
ANION GAP SERPL CALC-SCNC: 8 MMOL/L (ref 0–11.9)
AST SERPL-CCNC: 13 U/L (ref 12–45)
BASOPHILS # BLD AUTO: 0.3 % (ref 0–1.8)
BASOPHILS # BLD: 0.03 K/UL (ref 0–0.12)
BILIRUB SERPL-MCNC: 0.4 MG/DL (ref 0.1–1.5)
BUN SERPL-MCNC: 48 MG/DL (ref 8–22)
CALCIUM SERPL-MCNC: 8.5 MG/DL (ref 8.5–10.5)
CHLORIDE SERPL-SCNC: 105 MMOL/L (ref 96–112)
CHOLEST SERPL-MCNC: 91 MG/DL (ref 100–199)
CO2 SERPL-SCNC: 22 MMOL/L (ref 20–33)
CREAT SERPL-MCNC: 2.33 MG/DL (ref 0.5–1.4)
EOSINOPHIL # BLD AUTO: 0.05 K/UL (ref 0–0.51)
EOSINOPHIL NFR BLD: 0.5 % (ref 0–6.9)
ERYTHROCYTE [DISTWIDTH] IN BLOOD BY AUTOMATED COUNT: 52.5 FL (ref 35.9–50)
GLOBULIN SER CALC-MCNC: 2.9 G/DL (ref 1.9–3.5)
GLUCOSE BLD-MCNC: 128 MG/DL (ref 65–99)
GLUCOSE BLD-MCNC: 164 MG/DL (ref 65–99)
GLUCOSE BLD-MCNC: 80 MG/DL (ref 65–99)
GLUCOSE BLD-MCNC: 89 MG/DL (ref 65–99)
GLUCOSE SERPL-MCNC: 113 MG/DL (ref 65–99)
HCT VFR BLD AUTO: 29.3 % (ref 42–52)
HDLC SERPL-MCNC: 38 MG/DL
HGB BLD-MCNC: 9.3 G/DL (ref 14–18)
IMM GRANULOCYTES # BLD AUTO: 0.03 K/UL (ref 0–0.11)
IMM GRANULOCYTES NFR BLD AUTO: 0.3 % (ref 0–0.9)
LDLC SERPL CALC-MCNC: 39 MG/DL
LYMPHOCYTES # BLD AUTO: 0.35 K/UL (ref 1–4.8)
LYMPHOCYTES NFR BLD: 3.6 % (ref 22–41)
MCH RBC QN AUTO: 30.5 PG (ref 27–33)
MCHC RBC AUTO-ENTMCNC: 31.7 G/DL (ref 33.7–35.3)
MCV RBC AUTO: 96.1 FL (ref 81.4–97.8)
MONOCYTES # BLD AUTO: 0.77 K/UL (ref 0–0.85)
MONOCYTES NFR BLD AUTO: 7.8 % (ref 0–13.4)
NEUTROPHILS # BLD AUTO: 8.58 K/UL (ref 1.82–7.42)
NEUTROPHILS NFR BLD: 87.5 % (ref 44–72)
NRBC # BLD AUTO: 0 K/UL
NRBC BLD-RTO: 0 /100 WBC
PLATELET # BLD AUTO: 191 K/UL (ref 164–446)
PMV BLD AUTO: 9.3 FL (ref 9–12.9)
POTASSIUM SERPL-SCNC: 4.8 MMOL/L (ref 3.6–5.5)
PROT SERPL-MCNC: 5.8 G/DL (ref 6–8.2)
RBC # BLD AUTO: 3.05 M/UL (ref 4.7–6.1)
SODIUM SERPL-SCNC: 135 MMOL/L (ref 135–145)
TRIGL SERPL-MCNC: 69 MG/DL (ref 0–149)
WBC # BLD AUTO: 9.8 K/UL (ref 4.8–10.8)

## 2019-12-26 PROCEDURE — 82962 GLUCOSE BLOOD TEST: CPT

## 2019-12-26 PROCEDURE — 99225 PR SUBSEQUENT OBSERVATION CARE,LEVEL II: CPT | Performed by: HOSPITALIST

## 2019-12-26 PROCEDURE — 700105 HCHG RX REV CODE 258: Performed by: HOSPITALIST

## 2019-12-26 PROCEDURE — 80061 LIPID PANEL: CPT

## 2019-12-26 PROCEDURE — 700111 HCHG RX REV CODE 636 W/ 250 OVERRIDE (IP): Performed by: HOSPITALIST

## 2019-12-26 PROCEDURE — A9270 NON-COVERED ITEM OR SERVICE: HCPCS | Performed by: HOSPITALIST

## 2019-12-26 PROCEDURE — 80053 COMPREHEN METABOLIC PANEL: CPT

## 2019-12-26 PROCEDURE — 96372 THER/PROPH/DIAG INJ SC/IM: CPT

## 2019-12-26 PROCEDURE — 700102 HCHG RX REV CODE 250 W/ 637 OVERRIDE(OP): Performed by: HOSPITALIST

## 2019-12-26 PROCEDURE — G0378 HOSPITAL OBSERVATION PER HR: HCPCS

## 2019-12-26 PROCEDURE — 85025 COMPLETE CBC W/AUTO DIFF WBC: CPT

## 2019-12-26 PROCEDURE — 700112 HCHG RX REV CODE 229: Performed by: HOSPITALIST

## 2019-12-26 PROCEDURE — 36415 COLL VENOUS BLD VENIPUNCTURE: CPT

## 2019-12-26 RX ORDER — FUROSEMIDE 20 MG/1
20 TABLET ORAL
Status: DISCONTINUED | OUTPATIENT
Start: 2019-12-26 | End: 2019-12-28 | Stop reason: HOSPADM

## 2019-12-26 RX ADMIN — SENNOSIDES AND DOCUSATE SODIUM 2 TABLET: 8.6; 5 TABLET ORAL at 17:00

## 2019-12-26 RX ADMIN — OMEPRAZOLE 20 MG: 20 CAPSULE, DELAYED RELEASE ORAL at 05:41

## 2019-12-26 RX ADMIN — AMLODIPINE BESYLATE 10 MG: 5 TABLET ORAL at 05:41

## 2019-12-26 RX ADMIN — HEPARIN SODIUM 5000 UNITS: 5000 INJECTION, SOLUTION INTRAVENOUS; SUBCUTANEOUS at 22:37

## 2019-12-26 RX ADMIN — HEPARIN SODIUM 5000 UNITS: 5000 INJECTION, SOLUTION INTRAVENOUS; SUBCUTANEOUS at 05:40

## 2019-12-26 RX ADMIN — OMEPRAZOLE 20 MG: 20 CAPSULE, DELAYED RELEASE ORAL at 17:01

## 2019-12-26 RX ADMIN — DOCUSATE SODIUM 100 MG: 100 CAPSULE, LIQUID FILLED ORAL at 17:00

## 2019-12-26 RX ADMIN — SODIUM CHLORIDE: 9 INJECTION, SOLUTION INTRAVENOUS at 08:55

## 2019-12-26 RX ADMIN — HEPARIN SODIUM 5000 UNITS: 5000 INJECTION, SOLUTION INTRAVENOUS; SUBCUTANEOUS at 14:02

## 2019-12-26 RX ADMIN — OXYCODONE HYDROCHLORIDE AND ACETAMINOPHEN 500 MG: 500 TABLET ORAL at 05:41

## 2019-12-26 RX ADMIN — MULTIPLE VITAMINS W/ MINERALS TAB 1 TABLET: TAB at 05:41

## 2019-12-26 RX ADMIN — SITAGLIPTIN 50 MG: 50 TABLET, FILM COATED ORAL at 05:42

## 2019-12-26 RX ADMIN — ASPIRIN 81 MG: 81 TABLET, COATED ORAL at 05:41

## 2019-12-26 RX ADMIN — LATANOPROST 1 DROP: 50 SOLUTION OPHTHALMIC at 22:37

## 2019-12-26 RX ADMIN — SIMVASTATIN 10 MG: 10 TABLET, FILM COATED ORAL at 22:37

## 2019-12-26 RX ADMIN — INSULIN HUMAN 2 UNITS: 100 INJECTION, SOLUTION PARENTERAL at 17:01

## 2019-12-26 RX ADMIN — TAMSULOSIN HYDROCHLORIDE 0.4 MG: 0.4 CAPSULE ORAL at 08:53

## 2019-12-26 RX ADMIN — SENNOSIDES AND DOCUSATE SODIUM 2 TABLET: 8.6; 5 TABLET ORAL at 05:41

## 2019-12-26 RX ADMIN — FUROSEMIDE 20 MG: 20 TABLET ORAL at 17:01

## 2019-12-26 ASSESSMENT — ENCOUNTER SYMPTOMS
DIZZINESS: 0
EYE REDNESS: 0
FEVER: 0
MEMORY LOSS: 1
SHORTNESS OF BREATH: 0
CHILLS: 0
STRIDOR: 0
BACK PAIN: 0
SORE THROAT: 0
BLOOD IN STOOL: 0
EYE DISCHARGE: 0
SEIZURES: 0
CONSTIPATION: 0
FLANK PAIN: 0
EYE PAIN: 0

## 2019-12-26 NOTE — PROGRESS NOTES
Notified MD that had 10 beats of bundle brunch block, per monitor room, /73, HR 51, pt not having any pain or discomfort. No new orders received.

## 2019-12-26 NOTE — PROGRESS NOTES
Bedside report received. Assumed care of pt. Pt sitting up in bed, A&O X2. No signs of distress, no complaints of pain at this time. Tele box on.Call light and belongings within reach. Bed alarm on, bed locked and in lowest position.

## 2019-12-26 NOTE — CARE PLAN
Problem: Communication  Goal: The ability to communicate needs accurately and effectively will improve  Intervention: Reorient patient to environment as needed  Note:   Reorienting pt to hospital and situation. Educating on plan of care and treatments.      Problem: Safety  Goal: Will remain free from falls  Intervention: Implement fall precautions  Note:   Fall precautions in place, bed alarm on, bed locked and in lowest position.

## 2019-12-26 NOTE — PROGRESS NOTES
Hospital Medicine Daily Progress Note    Date of Service  12/26/2019    Chief Complaint  79 y.o. male admitted 12/25/2019 with chest pain and shortness of breath     Hospital Course      79M, PMHx DM, CKD4, chronic sawyer, dementia, AxO x1-2, admit w chest pain & SOB. Found to have left effusion w atelectasis.          Interval Problem Update  Requiring 2 L to achieve adequate saturation, encourage incentive spirometer.  I will hold his amlodipine.  Effusion is not large enough to be tapped, patient does not have capacity to consent to the procedure, and is refusing as he does not feel short of breath or having any pain anymore.  I will start Lasix.  Bradycardic heart rate mid 40s-50s, denies dizziness and lightheadedness.  I am holding amlodipine.  Continue cardiac monitoring.  Discussed with patient, patient's nurse and with multidisciplinary team during rounds including , pharmacist and charge nurse.      Consultants/Specialty  None    Code Status  Full code    Disposition  To be determined    Review of Systems  Review of Systems   Unable to perform ROS: Dementia (Limitted )   Constitutional: Negative for chills and fever.   HENT: Negative for sore throat.    Eyes: Negative for pain, discharge and redness.   Respiratory: Negative for shortness of breath (Resolved) and stridor.    Cardiovascular: Negative for chest pain (Resolved).   Gastrointestinal: Negative for blood in stool, constipation and melena.   Genitourinary: Negative for flank pain and hematuria.   Musculoskeletal: Negative for back pain and joint pain.   Neurological: Negative for dizziness and seizures.   Psychiatric/Behavioral: Positive for memory loss.      Physical Exam  Temp:  [35.9 °C (96.7 °F)-37.1 °C (98.8 °F)] 35.9 °C (96.7 °F)  Pulse:  [44-51] 47  Resp:  [18-24] 18  BP: (110-147)/(55-75) 119/67  SpO2:  [92 %-100 %] 99 %    Physical Exam  Constitutional:       General: He is not in acute distress.     Appearance: He is not  ill-appearing or diaphoretic.   HENT:      Head: Atraumatic.      Right Ear: External ear normal.      Left Ear: External ear normal.      Nose: No congestion or rhinorrhea.      Mouth/Throat:      Mouth: Mucous membranes are moist.   Eyes:      General: No scleral icterus.        Right eye: No discharge.         Left eye: No discharge.      Pupils: Pupils are equal, round, and reactive to light.   Neck:      Musculoskeletal: Neck supple. No neck rigidity or muscular tenderness.   Cardiovascular:      Rate and Rhythm: Normal rate and regular rhythm.      Heart sounds: No friction rub. No gallop.    Pulmonary:      Effort: No respiratory distress.      Breath sounds: No stridor. No wheezing.      Comments: Reduced air entry bilaterally basally, more on the left  Abdominal:      Palpations: Abdomen is soft.      Tenderness: There is no tenderness. There is no guarding or rebound.   Musculoskeletal:      Right lower leg: No edema.      Left lower leg: No edema.   Skin:     Coloration: Skin is not jaundiced or pale.   Neurological:      Mental Status: He is alert.      Coordination: Coordination normal.      Comments: Oriented to self only   Psychiatric:         Mood and Affect: Mood normal.         Behavior: Behavior normal.       Fluids    Intake/Output Summary (Last 24 hours) at 12/26/2019 1603  Last data filed at 12/26/2019 0900  Gross per 24 hour   Intake 420 ml   Output 2800 ml   Net -2380 ml       Laboratory  Recent Labs     12/25/19 0210 12/26/19 0335   WBC 11.5* 9.8   RBC 2.94* 3.05*   HEMOGLOBIN 9.0* 9.3*   HEMATOCRIT 28.0* 29.3*   MCV 95.2 96.1   MCH 30.6 30.5   MCHC 32.1* 31.7*   RDW 54.4* 52.5*   PLATELETCT 199 191   MPV 9.3 9.3     Recent Labs     12/25/19 0210 12/26/19 0335   SODIUM 134* 135   POTASSIUM 4.8 4.8   CHLORIDE 102 105   CO2 22 22   GLUCOSE 111* 113*   BUN 54* 48*   CREATININE 2.76* 2.33*   CALCIUM 8.5 8.5             Recent Labs     12/26/19  0335   TRIGLYCERIDE 69   HDL 38*   LDL 39        Imaging  DX-CHEST-2 VIEWS   Final Result      1.  Bibasilar atelectasis. No new consolidation. No pleural effusions.   2.  Prominent pulmonary arteries, suggestive of pulmonary hypertension.      DX-CHEST-PORTABLE (1 VIEW)   Final Result      Worsening left basilar pulmonary opacity which could represent atelectasis or pneumonitis.            Assessment/Plan  Pain in the chest  Assessment & Plan  Patient has a intermediate troponin level which is likely due to his chronic kidney disease   12/26, chest pain resolved     Acute on chronic respiratory failure with hypoxia (HCC)- (present on admission)  Assessment & Plan  The patient is chronically on O2 but apparently had a drop in sats during his episode of chest pain which resolved with breathing treatments, incentive spirometer  Imaging shows atelectasis and left-sided effusion, patient does not have capacity to consent for the procedure and is refusing as he does not feel short of breath or having any pain anymore  12/26, shortness of breath resolved       Dementia associated with other underlying disease without behavioral disturbance ([facility])- (present on admission)  Assessment & Plan  Patient is chronically awake and alert only x1, oriented to self     Bradycardia- (present on admission)  Assessment & Plan  Chronic, appears asymptomatic   Continuous cardiac monitoring     Leukocytosis- (present on admission)  Assessment & Plan  Likely reactive     No focal sign of infection at this point   Continue to montior for signs of infection      CKD stage 4 due to type 2 diabetes mellitus ([facility])  Assessment & Plan  Avoid nephrotoxins as much as possible, renally dose medications, monitor inputs and outputs     Urinary retention  Assessment & Plan  Chronic Steen, changed on admission     Age-related physical debility- (present on admission)  Assessment & Plan  [facility]     Type 2 diabetes mellitus, with long-term current use of insulin ([facility])- (present on  admission)  Assessment & Plan  With hyperglycemia   Januvia & short acting insulin  Accu-Checks, hypoglycemia protocol      VTE prophylaxis: Heparin SC

## 2019-12-26 NOTE — RESPIRATORY CARE
COPD EDUCATION by COPD CLINICAL EDUCATOR  12/26/2019 at 7:16 AM by Saritha Mansfield     Patient reviewed by COPD education team. Patient has dementia, therefore does not qualify for the COPD program.

## 2019-12-27 PROBLEM — R07.9 PAIN IN THE CHEST: Status: RESOLVED | Noted: 2019-12-25 | Resolved: 2019-12-27

## 2019-12-27 LAB
ALBUMIN SERPL BCP-MCNC: 2.9 G/DL (ref 3.2–4.9)
ALBUMIN/GLOB SERPL: 1 G/DL
ALP SERPL-CCNC: 52 U/L (ref 30–99)
ALT SERPL-CCNC: 6 U/L (ref 2–50)
ANION GAP SERPL CALC-SCNC: 9 MMOL/L (ref 0–11.9)
AST SERPL-CCNC: 14 U/L (ref 12–45)
BASOPHILS # BLD AUTO: 0.4 % (ref 0–1.8)
BASOPHILS # BLD: 0.04 K/UL (ref 0–0.12)
BILIRUB SERPL-MCNC: 0.4 MG/DL (ref 0.1–1.5)
BUN SERPL-MCNC: 40 MG/DL (ref 8–22)
CALCIUM SERPL-MCNC: 8.3 MG/DL (ref 8.5–10.5)
CHLORIDE SERPL-SCNC: 102 MMOL/L (ref 96–112)
CO2 SERPL-SCNC: 24 MMOL/L (ref 20–33)
CREAT SERPL-MCNC: 1.99 MG/DL (ref 0.5–1.4)
EOSINOPHIL # BLD AUTO: 0.09 K/UL (ref 0–0.51)
EOSINOPHIL NFR BLD: 0.9 % (ref 0–6.9)
ERYTHROCYTE [DISTWIDTH] IN BLOOD BY AUTOMATED COUNT: 51.9 FL (ref 35.9–50)
GLOBULIN SER CALC-MCNC: 3 G/DL (ref 1.9–3.5)
GLUCOSE BLD-MCNC: 82 MG/DL (ref 65–99)
GLUCOSE BLD-MCNC: 86 MG/DL (ref 65–99)
GLUCOSE BLD-MCNC: 88 MG/DL (ref 65–99)
GLUCOSE SERPL-MCNC: 87 MG/DL (ref 65–99)
HCT VFR BLD AUTO: 28.4 % (ref 42–52)
HGB BLD-MCNC: 9.1 G/DL (ref 14–18)
IMM GRANULOCYTES # BLD AUTO: 0.04 K/UL (ref 0–0.11)
IMM GRANULOCYTES NFR BLD AUTO: 0.4 % (ref 0–0.9)
LYMPHOCYTES # BLD AUTO: 0.5 K/UL (ref 1–4.8)
LYMPHOCYTES NFR BLD: 4.9 % (ref 22–41)
MAGNESIUM SERPL-MCNC: 1.5 MG/DL (ref 1.5–2.5)
MCH RBC QN AUTO: 30.2 PG (ref 27–33)
MCHC RBC AUTO-ENTMCNC: 32 G/DL (ref 33.7–35.3)
MCV RBC AUTO: 94.4 FL (ref 81.4–97.8)
MONOCYTES # BLD AUTO: 0.94 K/UL (ref 0–0.85)
MONOCYTES NFR BLD AUTO: 9.3 % (ref 0–13.4)
NEUTROPHILS # BLD AUTO: 8.53 K/UL (ref 1.82–7.42)
NEUTROPHILS NFR BLD: 84.1 % (ref 44–72)
NRBC # BLD AUTO: 0 K/UL
NRBC BLD-RTO: 0 /100 WBC
PLATELET # BLD AUTO: 186 K/UL (ref 164–446)
PMV BLD AUTO: 9.5 FL (ref 9–12.9)
POTASSIUM SERPL-SCNC: 4.5 MMOL/L (ref 3.6–5.5)
PROT SERPL-MCNC: 5.9 G/DL (ref 6–8.2)
RBC # BLD AUTO: 3.01 M/UL (ref 4.7–6.1)
SODIUM SERPL-SCNC: 135 MMOL/L (ref 135–145)
WBC # BLD AUTO: 10.1 K/UL (ref 4.8–10.8)

## 2019-12-27 PROCEDURE — 80053 COMPREHEN METABOLIC PANEL: CPT

## 2019-12-27 PROCEDURE — 83735 ASSAY OF MAGNESIUM: CPT

## 2019-12-27 PROCEDURE — G0378 HOSPITAL OBSERVATION PER HR: HCPCS

## 2019-12-27 PROCEDURE — 700102 HCHG RX REV CODE 250 W/ 637 OVERRIDE(OP): Performed by: HOSPITALIST

## 2019-12-27 PROCEDURE — A9270 NON-COVERED ITEM OR SERVICE: HCPCS | Performed by: HOSPITALIST

## 2019-12-27 PROCEDURE — 700111 HCHG RX REV CODE 636 W/ 250 OVERRIDE (IP): Performed by: HOSPITALIST

## 2019-12-27 PROCEDURE — 82962 GLUCOSE BLOOD TEST: CPT | Mod: 91

## 2019-12-27 PROCEDURE — 36415 COLL VENOUS BLD VENIPUNCTURE: CPT

## 2019-12-27 PROCEDURE — 99225 PR SUBSEQUENT OBSERVATION CARE,LEVEL II: CPT | Performed by: HOSPITALIST

## 2019-12-27 PROCEDURE — 700112 HCHG RX REV CODE 229: Performed by: HOSPITALIST

## 2019-12-27 PROCEDURE — 96372 THER/PROPH/DIAG INJ SC/IM: CPT

## 2019-12-27 PROCEDURE — 85025 COMPLETE CBC W/AUTO DIFF WBC: CPT

## 2019-12-27 RX ADMIN — OMEPRAZOLE 20 MG: 20 CAPSULE, DELAYED RELEASE ORAL at 05:53

## 2019-12-27 RX ADMIN — ASPIRIN 81 MG: 81 TABLET, COATED ORAL at 05:53

## 2019-12-27 RX ADMIN — SIMVASTATIN 10 MG: 10 TABLET, FILM COATED ORAL at 21:27

## 2019-12-27 RX ADMIN — TAMSULOSIN HYDROCHLORIDE 0.4 MG: 0.4 CAPSULE ORAL at 09:04

## 2019-12-27 RX ADMIN — SITAGLIPTIN 50 MG: 50 TABLET, FILM COATED ORAL at 09:04

## 2019-12-27 RX ADMIN — MULTIPLE VITAMINS W/ MINERALS TAB 1 TABLET: TAB at 05:53

## 2019-12-27 RX ADMIN — HEPARIN SODIUM 5000 UNITS: 5000 INJECTION, SOLUTION INTRAVENOUS; SUBCUTANEOUS at 21:27

## 2019-12-27 RX ADMIN — FUROSEMIDE 20 MG: 20 TABLET ORAL at 05:53

## 2019-12-27 RX ADMIN — OMEPRAZOLE 20 MG: 20 CAPSULE, DELAYED RELEASE ORAL at 18:10

## 2019-12-27 RX ADMIN — HEPARIN SODIUM 5000 UNITS: 5000 INJECTION, SOLUTION INTRAVENOUS; SUBCUTANEOUS at 05:52

## 2019-12-27 RX ADMIN — HEPARIN SODIUM 5000 UNITS: 5000 INJECTION, SOLUTION INTRAVENOUS; SUBCUTANEOUS at 14:42

## 2019-12-27 RX ADMIN — DOCUSATE SODIUM 100 MG: 100 CAPSULE, LIQUID FILLED ORAL at 18:10

## 2019-12-27 RX ADMIN — LATANOPROST 1 DROP: 50 SOLUTION OPHTHALMIC at 21:28

## 2019-12-27 RX ADMIN — OXYCODONE HYDROCHLORIDE AND ACETAMINOPHEN 500 MG: 500 TABLET ORAL at 05:53

## 2019-12-27 RX ADMIN — SENNOSIDES AND DOCUSATE SODIUM 2 TABLET: 8.6; 5 TABLET ORAL at 18:10

## 2019-12-27 RX ADMIN — DOCUSATE SODIUM 100 MG: 100 CAPSULE, LIQUID FILLED ORAL at 05:52

## 2019-12-27 ASSESSMENT — ENCOUNTER SYMPTOMS
SORE THROAT: 0
MEMORY LOSS: 1
FLANK PAIN: 0
SHORTNESS OF BREATH: 0
BLOOD IN STOOL: 0
CHILLS: 0
EYE PAIN: 0
STRIDOR: 0
CONSTIPATION: 0
DIZZINESS: 0
EYE DISCHARGE: 0
SEIZURES: 0
FEVER: 0
BACK PAIN: 0
EYE REDNESS: 0

## 2019-12-27 NOTE — DISCHARGE PLANNING
@1323  Agency/Facility Name: [facility]  Spoke To: David  Outcome: Transport set for 1500 on 12/28.    @1240  Agency/Facility Name: [facility]  Outcome: Left message about transport tomorrow at 1300, awaiting call back.

## 2019-12-27 NOTE — PROGRESS NOTES
RN received report, assumed patient care. Patient is sleeping at this time. Lap belt in place. Pt is able to remove lap belt independently. Call light within reach. Bed alarm on.

## 2019-12-27 NOTE — CARE PLAN
Problem: Communication  Goal: The ability to communicate needs accurately and effectively will improve  Intervention: Educate patient and significant other/support system about the plan of care, procedures, treatments, medications and allow for questions  Note:   Pt educated on plan of care and treatments for the day.      Problem: Safety  Goal: Will remain free from falls  Intervention: Implement fall precautions  Note:   Fall precautions in place,  bed alarm on.

## 2019-12-27 NOTE — PROGRESS NOTES
Hospital Medicine Daily Progress Note    Date of Service  12/27/2019    Chief Complaint  79 y.o. male admitted 12/25/2019 with chest pain and shortness of breath     Hospital Course      79M, PMHx DM, CKD4, chronic sawyer, dementia, AxO x1-2, admit w chest pain & SOB. Found to have left effusion w atelectasis.          Interval Problem Update  Heart rate 45-48, denies dizziness and lightheadedness.  Blood pressure within normal limits this morning.  Patient denies dizziness, lightheadedness, chest pain or shortness of breath.  Saturating % on 2 L of oxygen, should be able to titrate, I discussed with nursing.  Encourage incentive spirometer.  I am ordering physical and occupational therapy.  I discussed with patient, patient's nurse and with multidisciplinary team during rounds including , pharmacist and charge nurse.      Consultants/Specialty  None    Code Status  Full code    Disposition  Likely back to his SNF     Review of Systems  Review of Systems   Unable to perform ROS: Dementia (Limitted )   Constitutional: Negative for chills and fever.   HENT: Negative for sore throat.    Eyes: Negative for pain, discharge and redness.   Respiratory: Negative for shortness of breath (Resolved) and stridor.    Cardiovascular: Negative for chest pain (Resolved).   Gastrointestinal: Negative for blood in stool, constipation and melena.   Genitourinary: Negative for flank pain and hematuria.   Musculoskeletal: Negative for back pain and joint pain.   Neurological: Negative for dizziness and seizures.   Psychiatric/Behavioral: Positive for memory loss.      Physical Exam  Temp:  [36.6 °C (97.8 °F)-37.3 °C (99.2 °F)] 36.6 °C (97.8 °F)  Pulse:  [45-67] 45  Resp:  [16-18] 16  BP: (115-137)/(55-92) 115/61  SpO2:  [98 %-100 %] 99 %    Physical Exam  Constitutional:       General: He is not in acute distress.     Appearance: He is not ill-appearing or diaphoretic.   HENT:      Head: Atraumatic.      Right Ear:  External ear normal.      Left Ear: External ear normal.      Nose: No congestion or rhinorrhea.      Mouth/Throat:      Mouth: Mucous membranes are moist.   Eyes:      General: No scleral icterus.        Right eye: No discharge.         Left eye: No discharge.      Pupils: Pupils are equal, round, and reactive to light.   Neck:      Musculoskeletal: Neck supple. No neck rigidity or muscular tenderness.   Cardiovascular:      Rate and Rhythm: Normal rate and regular rhythm.      Heart sounds: No friction rub. No gallop.    Pulmonary:      Effort: No respiratory distress.      Breath sounds: No stridor. No wheezing.      Comments: Reduced air entry bilaterally basally, more on the left  Abdominal:      Palpations: Abdomen is soft.      Tenderness: There is no tenderness. There is no guarding or rebound.   Musculoskeletal:      Right lower leg: No edema.      Left lower leg: No edema.   Skin:     Coloration: Skin is not jaundiced or pale.   Neurological:      Mental Status: He is alert.      Coordination: Coordination normal.      Comments: Oriented to self only   Psychiatric:         Mood and Affect: Mood normal.         Behavior: Behavior normal.       Fluids    Intake/Output Summary (Last 24 hours) at 12/27/2019 1405  Last data filed at 12/27/2019 1000  Gross per 24 hour   Intake 240 ml   Output 2200 ml   Net -1960 ml       Laboratory  Recent Labs     12/25/19  0210 12/26/19 0335 12/27/19  0252   WBC 11.5* 9.8 10.1   RBC 2.94* 3.05* 3.01*   HEMOGLOBIN 9.0* 9.3* 9.1*   HEMATOCRIT 28.0* 29.3* 28.4*   MCV 95.2 96.1 94.4   MCH 30.6 30.5 30.2   MCHC 32.1* 31.7* 32.0*   RDW 54.4* 52.5* 51.9*   PLATELETCT 199 191 186   MPV 9.3 9.3 9.5     Recent Labs     12/25/19  0210 12/26/19  0335 12/27/19  0252   SODIUM 134* 135 135   POTASSIUM 4.8 4.8 4.5   CHLORIDE 102 105 102   CO2 22 22 24   GLUCOSE 111* 113* 87   BUN 54* 48* 40*   CREATININE 2.76* 2.33* 1.99*   CALCIUM 8.5 8.5 8.3*             Recent Labs     12/26/19 0335    TRIGLYCERIDE 69   HDL 38*   LDL 39       Imaging  DX-CHEST-2 VIEWS   Final Result      1.  Bibasilar atelectasis. No new consolidation. No pleural effusions.   2.  Prominent pulmonary arteries, suggestive of pulmonary hypertension.      DX-CHEST-PORTABLE (1 VIEW)   Final Result      Worsening left basilar pulmonary opacity which could represent atelectasis or pneumonitis.            Assessment/Plan  Acute on chronic respiratory failure with hypoxia (HCC)- (present on admission)  Assessment & Plan  The patient is chronically on O2 but apparently had a drop in sats during his episode of chest pain which resolved with breathing treatments, incentive spirometer  Imaging shows atelectasis and left-sided effusion, patient does not have capacity to consent for the procedure and is refusing as he does not feel short of breath or having any pain anymore  12/26, shortness of breath resolved       Dementia associated with other underlying disease without behavioral disturbance ([facility])- (present on admission)  Assessment & Plan  Patient is chronically awake and alert only x1, oriented to self     Bradycardia- (present on admission)  Assessment & Plan  Chronic, appears asymptomatic   Continuous cardiac monitoring     Leukocytosis- (present on admission)  Assessment & Plan  Likely reactive     No focal sign of infection at this point   Continue to montior for signs of infection      CKD stage 4 due to type 2 diabetes mellitus ([facility])  Assessment & Plan  Avoid nephrotoxins as much as possible, renally dose medications, monitor inputs and outputs     Urinary retention  Assessment & Plan  Chronic Steen, changed on admission     Age-related physical debility- (present on admission)  Assessment & Plan  [facility], PT/OT       Type 2 diabetes mellitus, with long-term current use of insulin ([facility])- (present on admission)  Assessment & Plan  With hyperglycemia   Januvia & short acting insulin  Accu-Checks, hypoglycemia protocol       VTE prophylaxis: Heparin SC

## 2019-12-27 NOTE — DISCHARGE PLANNING
"Anticipated Discharge Disposition: Return to [facility].    Action: CM met with pt who states during conversation, \"I have problems with my memory\", and chuckles. Pt disoriented to date, states January 7th, and cannot give the year. When asked if he has a guardian, pt stated yes, and was able to give me her name without hesitation. Pt states Carrie is his cousin and lives in [facility] in [place]. Pt would like a call from Carrie. This writer told pt I would call Carrie after rounds today and relay his request. Pt states he is on oxygen at home, but cannot name provider.    Barriers to Discharge: Medical clearance.    Plan: PT/OT jez requested for return snf referral.    "

## 2019-12-28 VITALS
TEMPERATURE: 96.9 F | BODY MASS INDEX: 23.9 KG/M2 | OXYGEN SATURATION: 98 % | HEIGHT: 78 IN | RESPIRATION RATE: 18 BRPM | DIASTOLIC BLOOD PRESSURE: 97 MMHG | SYSTOLIC BLOOD PRESSURE: 121 MMHG | HEART RATE: 43 BPM | WEIGHT: 206.57 LBS

## 2019-12-28 PROBLEM — J96.10 CHRONIC RESPIRATORY FAILURE (HCC): Status: ACTIVE | Noted: 2019-12-28

## 2019-12-28 PROBLEM — D72.829 LEUKOCYTOSIS: Status: RESOLVED | Noted: 2019-11-21 | Resolved: 2019-12-28

## 2019-12-28 PROBLEM — J96.21 ACUTE ON CHRONIC RESPIRATORY FAILURE WITH HYPOXIA (HCC): Status: RESOLVED | Noted: 2019-11-22 | Resolved: 2019-12-28

## 2019-12-28 LAB
ALBUMIN SERPL BCP-MCNC: 2.9 G/DL (ref 3.2–4.9)
ALBUMIN/GLOB SERPL: 0.9 G/DL
ALP SERPL-CCNC: 46 U/L (ref 30–99)
ALT SERPL-CCNC: 6 U/L (ref 2–50)
ANION GAP SERPL CALC-SCNC: 7 MMOL/L (ref 0–11.9)
AST SERPL-CCNC: 16 U/L (ref 12–45)
BASOPHILS # BLD AUTO: 0.2 % (ref 0–1.8)
BASOPHILS # BLD: 0.02 K/UL (ref 0–0.12)
BILIRUB SERPL-MCNC: 0.4 MG/DL (ref 0.1–1.5)
BUN SERPL-MCNC: 39 MG/DL (ref 8–22)
CALCIUM SERPL-MCNC: 8.4 MG/DL (ref 8.5–10.5)
CHLORIDE SERPL-SCNC: 99 MMOL/L (ref 96–112)
CO2 SERPL-SCNC: 27 MMOL/L (ref 20–33)
CREAT SERPL-MCNC: 1.98 MG/DL (ref 0.5–1.4)
EOSINOPHIL # BLD AUTO: 0.12 K/UL (ref 0–0.51)
EOSINOPHIL NFR BLD: 1.4 % (ref 0–6.9)
ERYTHROCYTE [DISTWIDTH] IN BLOOD BY AUTOMATED COUNT: 50.4 FL (ref 35.9–50)
GLOBULIN SER CALC-MCNC: 3.1 G/DL (ref 1.9–3.5)
GLUCOSE BLD-MCNC: 126 MG/DL (ref 65–99)
GLUCOSE BLD-MCNC: 84 MG/DL (ref 65–99)
GLUCOSE SERPL-MCNC: 84 MG/DL (ref 65–99)
HCT VFR BLD AUTO: 29.8 % (ref 42–52)
HGB BLD-MCNC: 9.5 G/DL (ref 14–18)
IMM GRANULOCYTES # BLD AUTO: 0.05 K/UL (ref 0–0.11)
IMM GRANULOCYTES NFR BLD AUTO: 0.6 % (ref 0–0.9)
LYMPHOCYTES # BLD AUTO: 0.55 K/UL (ref 1–4.8)
LYMPHOCYTES NFR BLD: 6.2 % (ref 22–41)
MAGNESIUM SERPL-MCNC: 1.5 MG/DL (ref 1.5–2.5)
MCH RBC QN AUTO: 29.7 PG (ref 27–33)
MCHC RBC AUTO-ENTMCNC: 31.9 G/DL (ref 33.7–35.3)
MCV RBC AUTO: 93.1 FL (ref 81.4–97.8)
MONOCYTES # BLD AUTO: 0.81 K/UL (ref 0–0.85)
MONOCYTES NFR BLD AUTO: 9.2 % (ref 0–13.4)
NEUTROPHILS # BLD AUTO: 7.3 K/UL (ref 1.82–7.42)
NEUTROPHILS NFR BLD: 82.4 % (ref 44–72)
NRBC # BLD AUTO: 0 K/UL
NRBC BLD-RTO: 0 /100 WBC
PLATELET # BLD AUTO: 195 K/UL (ref 164–446)
PMV BLD AUTO: 9.1 FL (ref 9–12.9)
POTASSIUM SERPL-SCNC: 4.2 MMOL/L (ref 3.6–5.5)
PROT SERPL-MCNC: 6 G/DL (ref 6–8.2)
RBC # BLD AUTO: 3.2 M/UL (ref 4.7–6.1)
SODIUM SERPL-SCNC: 133 MMOL/L (ref 135–145)
WBC # BLD AUTO: 8.9 K/UL (ref 4.8–10.8)

## 2019-12-28 PROCEDURE — 36415 COLL VENOUS BLD VENIPUNCTURE: CPT

## 2019-12-28 PROCEDURE — 96374 THER/PROPH/DIAG INJ IV PUSH: CPT

## 2019-12-28 PROCEDURE — 80053 COMPREHEN METABOLIC PANEL: CPT

## 2019-12-28 PROCEDURE — G0378 HOSPITAL OBSERVATION PER HR: HCPCS

## 2019-12-28 PROCEDURE — 85025 COMPLETE CBC W/AUTO DIFF WBC: CPT

## 2019-12-28 PROCEDURE — 96372 THER/PROPH/DIAG INJ SC/IM: CPT

## 2019-12-28 PROCEDURE — 700112 HCHG RX REV CODE 229: Performed by: HOSPITALIST

## 2019-12-28 PROCEDURE — 700111 HCHG RX REV CODE 636 W/ 250 OVERRIDE (IP): Performed by: HOSPITALIST

## 2019-12-28 PROCEDURE — 99217 PR OBSERVATION CARE DISCHARGE: CPT | Performed by: HOSPITALIST

## 2019-12-28 PROCEDURE — 700102 HCHG RX REV CODE 250 W/ 637 OVERRIDE(OP): Performed by: HOSPITALIST

## 2019-12-28 PROCEDURE — 82962 GLUCOSE BLOOD TEST: CPT

## 2019-12-28 PROCEDURE — A9270 NON-COVERED ITEM OR SERVICE: HCPCS | Performed by: HOSPITALIST

## 2019-12-28 PROCEDURE — 83735 ASSAY OF MAGNESIUM: CPT

## 2019-12-28 RX ORDER — HALOPERIDOL 5 MG/ML
1 INJECTION INTRAMUSCULAR ONCE
Status: COMPLETED | OUTPATIENT
Start: 2019-12-28 | End: 2019-12-28

## 2019-12-28 RX ORDER — FUROSEMIDE 20 MG/1
20 TABLET ORAL DAILY
Qty: 30 TAB | Refills: 0 | Status: ON HOLD | OUTPATIENT
Start: 2019-12-29 | End: 2020-03-10

## 2019-12-28 RX ADMIN — DOCUSATE SODIUM 100 MG: 100 CAPSULE, LIQUID FILLED ORAL at 05:53

## 2019-12-28 RX ADMIN — MULTIPLE VITAMINS W/ MINERALS TAB 1 TABLET: TAB at 05:53

## 2019-12-28 RX ADMIN — HALOPERIDOL LACTATE 1 MG: 5 INJECTION, SOLUTION INTRAMUSCULAR at 03:59

## 2019-12-28 RX ADMIN — POLYETHYLENE GLYCOL 3350 1 PACKET: 17 POWDER, FOR SOLUTION ORAL at 05:54

## 2019-12-28 RX ADMIN — ASPIRIN 81 MG: 81 TABLET, COATED ORAL at 05:53

## 2019-12-28 RX ADMIN — OMEPRAZOLE 20 MG: 20 CAPSULE, DELAYED RELEASE ORAL at 05:53

## 2019-12-28 RX ADMIN — OXYCODONE HYDROCHLORIDE AND ACETAMINOPHEN 500 MG: 500 TABLET ORAL at 05:53

## 2019-12-28 RX ADMIN — SITAGLIPTIN 50 MG: 50 TABLET, FILM COATED ORAL at 05:53

## 2019-12-28 RX ADMIN — TAMSULOSIN HYDROCHLORIDE 0.4 MG: 0.4 CAPSULE ORAL at 08:22

## 2019-12-28 RX ADMIN — SENNOSIDES AND DOCUSATE SODIUM 2 TABLET: 8.6; 5 TABLET ORAL at 05:53

## 2019-12-28 RX ADMIN — HEPARIN SODIUM 5000 UNITS: 5000 INJECTION, SOLUTION INTRAVENOUS; SUBCUTANEOUS at 05:54

## 2019-12-28 RX ADMIN — FUROSEMIDE 20 MG: 20 TABLET ORAL at 05:53

## 2019-12-28 RX ADMIN — HEPARIN SODIUM 5000 UNITS: 5000 INJECTION, SOLUTION INTRAVENOUS; SUBCUTANEOUS at 13:21

## 2019-12-28 NOTE — PROGRESS NOTES
Orders received from Dr. Corbett for 1mg IV Haldol. However, patient is sleeping and calm at this time. Will hold Haldol at this time. Will continue to monitor.

## 2019-12-28 NOTE — PROGRESS NOTES
Patient pulling on sawyer despite lap belt in place and redirecting. Patient also making repeat attempts to get out of bed. Paging Dr. Blake Corbett for nonviolent restraint orders.

## 2019-12-28 NOTE — PROGRESS NOTES
Bedside report received from night nurse. Assumed care of patient. Pt. resting comfortably without any sign of distress. Alert and oriented to self only, VSS, no complaints at this time. POC reviewed and white board updated, Tele box on, Call light in reach, Bed locked in lowest position. Pt. Is in lap belt to help remind him not to get up without calling for help, Pt can demonstrate how to remove properly.

## 2019-12-28 NOTE — DISCHARGE INSTRUCTIONS
Discharge Instructions    Discharged to other by medical transportation with escort. Discharged via wheelchair, hospital escort: Yes.  Special equipment needed: Not Applicable    Be sure to schedule a follow-up appointment with your primary care doctor or any specialists as instructed.     Discharge Plan:   Diet Plan: Discussed  Activity Level: Discussed  Confirmed Follow up Appointment: Patient to Call and Schedule Appointment  Confirmed Symptoms Management: Discussed  Medication Reconciliation Updated: Yes  Influenza Vaccine Indication: Not indicated: Previously immunized this influenza season and > 8 years of age    I understand that a diet low in cholesterol, fat, and sodium is recommended for good health. Unless I have been given specific instructions below for another diet, I accept this instruction as my diet prescription.   Other diet: Diabetic    Special Instructions: None    · Is patient discharged on Warfarin / Coumadin?   No     Depression / Suicide Risk    As you are discharged from this [facility] facility, it is important to learn how to keep safe from harming yourself.    Recognize the warning signs:  · Abrupt changes in personality, positive or negative- including increase in energy   · Giving away possessions  · Change in eating patterns- significant weight changes-  positive or negative  · Change in sleeping patterns- unable to sleep or sleeping all the time   · Unwillingness or inability to communicate  · Depression  · Unusual sadness, discouragement and loneliness  · Talk of wanting to die  · Neglect of personal appearance   · Rebelliousness- reckless behavior  · Withdrawal from people/activities they love  · Confusion- inability to concentrate     If you or a loved one observes any of these behaviors or has concerns about self-harm, here's what you can do:  · Talk about it- your feelings and reasons for harming yourself  · Remove any means that you might use to hurt yourself (examples:  pills, rope, extension cords, firearm)  · Get professional help from the community (Mental Health, Substance Abuse, psychological counseling)  · Do not be alone:Call your Safe Contact- someone whom you trust who will be there for you.  · Call your local CRISIS HOTLINE 532-0015 or 362-074-9344  · Call your local Children's Mobile Crisis Response Team [place] (126) 685-1487 or www.Brazzlebox  · Call the toll free National Suicide Prevention Hotlines   · National Suicide Prevention Lifeline 183-829-FGEA (3331)  · [employer] Line Network 800-SUICIDE (170-6265)    Pleural Effusion  You have a pleural effusion. This means you have fluid in the space between your lung and your chest wall. This condition may result from many different problems including:  · Pleurisy or inflammation of the lining of the lung.   · Lung infections such as pneumonia.   · Blood clots, heart disease, chest injuries, cancer, and other medical problems.   A pleural effusion may not cause any symptoms. If it is large, however, it can make you short of breath. Other symptoms include cough, chest pain, or fever. The diagnosis of a pleural effusion is most often made by chest X-ray, CT, or ultrasound studies. A sample of the fluid can be taken using local anesthetic and a needle or catheter placed between the ribs. This procedure may be needed to determine the cause of the effusion, or to treat a large effusion.   Treatment is specific to the cause, such as antibiotics for infections. Mild pain or anti-inflammatory medicines may be helpful in relieving symptoms. Chronic effusions often have to be periodically drained. It is very important that you see your doctor or a specialist to be sure the effusion is not from a serious medical condition. A follow-up chest X-ray is usually needed.   SEEK IMMEDIATE MEDICAL CARE IF:  · You develop shortness of breath.   · You develop increased pain.   · You develop a high fever.   Document Released:  01/25/2006 Document Revised: 03/11/2013 Document Reviewed: 12/18/2006  ExitCare® Patient Information ©2013 [employer].

## 2019-12-28 NOTE — THERAPY
OT consult received, pt is resident of SNF, has discharge and transport time s/u for 1300 on 12/28. Discussed with RN, no acute OT needs at this time, RN to cancel the order.

## 2019-12-28 NOTE — DISCHARGE SUMMARY
Discharge Summary    CHIEF COMPLAINT ON ADMISSION  Chief Complaint   Patient presents with   • Shortness of Breath   • Chest Pain     Reason for Admission  Shortness of breath    Admission Date  12/25/2019    CODE STATUS  Full code    HPI & HOSPITAL COURSE  This is a 79 y.o. male with past medical history of diabetes, chronic kidney disease stage IV, dementia, chronic Steen catheter admitted for chest pain and shortness of breath.  Was noticed to have bilateral atelectasis and felt to have slight volume overload he was started on intravenous Lasix and incentive spirometer.  Shortness of breath improved.  Patient chest pain resolved, he was noticed to have troponin elevation 113 that trended down to 105 thought to be secondary to chronic kidney disease.  He was noticed to be bradycardic however did not have dizziness or lightheadedness.  There was no recurrence of chest pain or shortness of breath. Therefore, he is discharged in fair and stable condition to skilled nursing facility.    This is observation discharge.    Discharge Date  12/28/2019     DISCHARGE DIAGNOSES  Active Problems:    Bradycardia POA: Yes    Dementia associated with other underlying disease without behavioral disturbance (HCC) POA: Yes    Type 2 diabetes mellitus, with long-term current use of insulin (HCC) POA: Yes    Age-related physical debility POA: Yes    Urinary retention POA: Unknown    CKD stage 4 due to type 2 diabetes mellitus (HCC) POA: Unknown    Chronic respiratory failure (HCC) POA: Unknown  Resolved Problems:    Acute on chronic respiratory failure with hypoxia (HCC) POA: Yes    Pain in the chest POA: Unknown    Leukocytosis POA: Yes    FOLLOW UP  [facility]  [address] [facility] [address]  [phone]        MEDICATIONS ON DISCHARGE     Medication List      START taking these medications      Instructions   furosemide 20 MG Tabs  Start taking on:  December 29, 2019  Commonly known as:  LASIX   Take 1  Tab by mouth every day.  Dose:  20 mg        CONTINUE taking these medications      Instructions   acetaminophen 500 MG Tabs  Commonly known as:  TYLENOL   Take 500-1,000 mg by mouth every 6 hours as needed.  Dose:  500-1,000 mg     ascorbic acid 500 MG tablet  Commonly known as:  VITAMIN C   Take 1 Tab by mouth every day.  Dose:  500 mg     aspirin EC 81 MG Tbec  Commonly known as:  ECOTRIN   Take 81 mg by mouth every day.  Dose:  81 mg     docusate sodium 100 MG Caps  Commonly known as:  COLACE   Take 100 mg by mouth 2 times a day.  Dose:  100 mg     insulin regular 100 Unit/mL Soln  Commonly known as:  HUMULIN R   Inject 4-12 Units as instructed 4 Times a Day,Before Meals and at Bedtime. Sliding Scale  201-250= 4 units  251-300= 6 units  301-350= 8 units  351-400= 12 units  Dose:  4-12 Units     latanoprost 0.005 % Soln  Commonly known as:  XALATAN   Place 1 Drop in both eyes every evening.  Dose:  1 Drop     omeprazole 20 MG delayed-release capsule  Commonly known as:  PRILOSEC   Take 20 mg by mouth every day.  Dose:  20 mg     ondansetron 4 MG Tbdp  Commonly known as:  ZOFRAN ODT   Take 4 mg by mouth every 6 hours as needed for Nausea.  Dose:  4 mg     polyethylene glycol/lytes Pack  Commonly known as:  MIRALAX   Take 17 g by mouth every day.  Dose:  17 g     simvastatin 10 MG Tabs  Commonly known as:  ZOCOR   Take 10 mg by mouth every evening.  Dose:  10 mg     SITagliptin 50 MG Tabs  Commonly known as:  JANUVIA   Take 50 mg by mouth every day.  Dose:  50 mg     therapeutic multivitamin-minerals Tabs   Take 1 Tab by mouth every day.  Dose:  1 Tab        STOP taking these medications    amLODIPine 10 MG Tabs  Commonly known as:  NORVASC     tamsulosin 0.4 MG capsule  Commonly known as:  FLOMAX            Allergies  No Known Allergies    DIET  Orders Placed This Encounter   Procedures   • Diet Order Regular, Diabetic, Cardiac     Standing Status:   Standing     Number of Occurrences:   1     Order Specific  Question:   Diet:     Answer:   Regular [1]     Order Specific Question:   Diet:     Answer:   Diabetic [3]     Order Specific Question:   Diet:     Answer:   Cardiac [6]       CONSULTATIONS  None    PROCEDURES  None    LABORATORY  Lab Results   Component Value Date    SODIUM 133 (L) 12/28/2019    POTASSIUM 4.2 12/28/2019    CHLORIDE 99 12/28/2019    CO2 27 12/28/2019    GLUCOSE 84 12/28/2019    BUN 39 (H) 12/28/2019    CREATININE 1.98 (H) 12/28/2019        Lab Results   Component Value Date    WBC 8.9 12/28/2019    HEMOGLOBIN 9.5 (L) 12/28/2019    HEMATOCRIT 29.8 (L) 12/28/2019    PLATELETCT 195 12/28/2019      Total time of the discharge process exceeds 35 minutes.

## 2019-12-28 NOTE — PROGRESS NOTES
Report given to Nadja COREAS. Family aware of transfer.   [facility]. Photo ID taken of transport.   All belongings sent with patient.

## 2019-12-28 NOTE — PROGRESS NOTES
PATIENT IS RESTLESS AND ATTEMPTING TO PULL ON LORA AND TELEMETRY LEADS. PATIENT IS ALSO ATTEMPTING TO GET OUT OF BED. IV HALDOL GIVEN. WILL CONTINUE TO MONITOR.

## 2019-12-28 NOTE — PROGRESS NOTES
RN received report, assumed patient care. Patient is resting at this time. Lap belt in place. Pt is able to remove lap belt independently. Call light within reach. Bed alarm on.

## 2020-02-10 ENCOUNTER — TELEPHONE (OUTPATIENT)
Dept: CARDIOLOGY | Facility: MEDICAL CENTER | Age: 80
End: 2020-02-10

## 2020-02-10 NOTE — TELEPHONE ENCOUNTER
Called number on file to see if patient has followed with a cardiologist in the past to get records prior to new patient appt with CR. Spoke with rain, who states she lives in [place], [place] and Randy is in skilled nursery in [place], [place]. She asked that I call skilled nursing to see if they plan to bring patient to appointment.     [facility] (P: [phone]) and asked if they plan to bring patient to appointment. She transferred me to someone named Yasmin, however she did not  and she is not accepting voicemails.

## 2020-02-12 NOTE — TELEPHONE ENCOUNTER
Called [facility] (P: [phone]) again and spoke with Yasmin. She states that patient will be able to make it for his appt scheduled with CR on 2/18/2020.     Asked if he has seen a cardiologist in the past to get records but she states she is not sure, he has only been in and out of [facility] since he came to [facility].     Confirmed appt date and time.

## 2020-02-18 ENCOUNTER — OFFICE VISIT (OUTPATIENT)
Dept: CARDIOLOGY | Facility: MEDICAL CENTER | Age: 80
End: 2020-02-18
Payer: MEDICARE

## 2020-02-18 VITALS
DIASTOLIC BLOOD PRESSURE: 50 MMHG | HEART RATE: 48 BPM | BODY MASS INDEX: 23.87 KG/M2 | OXYGEN SATURATION: 99 % | SYSTOLIC BLOOD PRESSURE: 88 MMHG | HEIGHT: 78 IN

## 2020-02-18 DIAGNOSIS — I35.0 MILD AORTIC STENOSIS: ICD-10-CM

## 2020-02-18 DIAGNOSIS — R00.1 SINUS BRADYCARDIA: ICD-10-CM

## 2020-02-18 DIAGNOSIS — I25.2 HISTORY OF NON-ST ELEVATION MYOCARDIAL INFARCTION (NSTEMI): ICD-10-CM

## 2020-02-18 PROCEDURE — 99204 OFFICE O/P NEW MOD 45 MIN: CPT | Performed by: INTERNAL MEDICINE

## 2020-02-18 RX ORDER — DRONABINOL 2.5 MG/1
2.5 CAPSULE ORAL 2 TIMES DAILY
Status: ON HOLD | COMMUNITY
End: 2020-03-10

## 2020-02-18 RX ORDER — CEFDINIR 300 MG/1
300 CAPSULE ORAL 2 TIMES DAILY
Status: ON HOLD | COMMUNITY
End: 2020-04-02

## 2020-02-18 RX ORDER — PIPERACILLIN SODIUM, TAZOBACTAM SODIUM 2; .25 G/10ML; G/10ML
INJECTION, POWDER, LYOPHILIZED, FOR SOLUTION INTRAVENOUS
Status: ON HOLD | COMMUNITY
End: 2020-03-10

## 2020-02-18 RX ORDER — TAMSULOSIN HYDROCHLORIDE 0.4 MG/1
0.4 CAPSULE ORAL
COMMUNITY

## 2020-02-18 ASSESSMENT — ENCOUNTER SYMPTOMS
BRUISES/BLEEDS EASILY: 0
BLURRED VISION: 0
DOUBLE VISION: 0
CONSTIPATION: 0
WEIGHT LOSS: 1
VOMITING: 0
DIZZINESS: 1
MEMORY LOSS: 1
SENSORY CHANGE: 0
SHORTNESS OF BREATH: 0
DIARRHEA: 0
NAUSEA: 0
PALPITATIONS: 0
FOCAL WEAKNESS: 0

## 2020-02-18 NOTE — PROGRESS NOTES
Chief Complaint   Patient presents with   • Bradycardia     hospital follow up       Subjective:   Randy Bates is a 80 y.o. male who presents today for evaluation of above issue    He is seen as a new patient for evaluation of bradycardia.  He has history of diabetes mellitus, hypertension, dementia, chronic kidney disease with chronic indwelling Steen catheter for urinary obstruction who was noted to have remittent bradycardia during his recent hospitalization at the end of last year.  He used to take amlodipine and was not on any rate slowing medication for his hypertension.    According to the record heart rate at times would go down to the 40s but the monitor strips all demonstrated sinus bradycardia.  He is poor historian due to his dementia but denies history of syncope.  He has been at the [facility] and he was released from the hospital in late December.  According to the nurse there is heart rate would mostly been in the 50s.  He occasionally reports dizziness when he gets up for physical therapy but no syncope or other cardiac symptoms.  Blood pressure recently has mostly been in the 110s.  He is currently on low-dose furosemide as well as some Marinol to increase his appetite.    During his admission in December he did have elevated troponin into the 110s as well.  He has not had any evaluation for possible underlying coronary artery disease.  His most recent creatinine was 1.98 mg/dL on December 28.  He may require some type of procedure for his prostate in the near future    Past Medical History:   Diagnosis Date   • Diabetes (HCC)    • Hypertension      History reviewed. No pertinent surgical history.  History reviewed. No pertinent family history.  Social History     Socioeconomic History   • Marital status:      Spouse name: Not on file   • Number of children: Not on file   • Years of education: Not on file   • Highest education level: Not on file   Occupational History   • Not on file    Social Needs   • Financial resource strain: Not on file   • Food insecurity     Worry: Not on file     Inability: Not on file   • Transportation needs     Medical: Not on file     Non-medical: Not on file   Tobacco Use   • Smoking status: Never Smoker   • Smokeless tobacco: Never Used   Substance and Sexual Activity   • Alcohol use: Not Currently   • Drug use: Not Currently   • Sexual activity: Not on file   Lifestyle   • Physical activity     Days per week: Not on file     Minutes per session: Not on file   • Stress: Not on file   Relationships   • Social connections     Talks on phone: Not on file     Gets together: Not on file     Attends Buddhism service: Not on file     Active member of club or organization: Not on file     Attends meetings of clubs or organizations: Not on file     Relationship status: Not on file   • Intimate partner violence     Fear of current or ex partner: Not on file     Emotionally abused: Not on file     Physically abused: Not on file     Forced sexual activity: Not on file   Other Topics Concern   • Not on file   Social History Narrative   • Not on file     Not on File  Outpatient Encounter Medications as of 2/18/2020   Medication Sig Dispense Refill   • piperacillin-tazobactam (ZOSYN) 2.25 (2-0.25) g Recon Soln by Intravenous route.     • LACTOBACILLUS PO Take  by mouth 2 Times a Day.     • dronabinol (MARINOL) 2.5 MG Cap Take 2.5 mg by mouth 2 times a day.     • cefdinir (OMNICEF) 300 MG Cap Take 300 mg by mouth 2 times a day.     • tamsulosin (FLOMAX) 0.4 MG capsule Take 0.4 mg by mouth ONE-HALF HOUR AFTER BREAKFAST.     • SITagliptin (JANUVIA) 25 MG Tab Take 25 mg by mouth every day.     • furosemide (LASIX) 20 MG Tab Take 1 Tab by mouth every day. 30 Tab 0   • omeprazole (PRILOSEC) 20 MG delayed-release capsule Take 20 mg by mouth every day.     • ascorbic acid (VITAMIN C) 500 MG tablet Take 1 Tab by mouth every day. 30 Tab 3   • therapeutic multivitamin-minerals (THERAGRAN-M)  Tab Take 1 Tab by mouth every day. 30 Tab 11   • aspirin EC (ECOTRIN) 81 MG Tablet Delayed Response Take 81 mg by mouth every day.     • docusate sodium (COLACE) 100 MG Cap Take 100 mg by mouth 2 times a day.     • latanoprost (XALATAN) 0.005 % Solution Place 1 Drop in both eyes every evening.     • insulin regular (HUMULIN R) 100 Unit/mL Solution Inject 4-12 Units as instructed 4 Times a Day,Before Meals and at Bedtime. Sliding Scale  201-250= 4 units  251-300= 6 units  301-350= 8 units  351-400= 12 units     • ondansetron (ZOFRAN ODT) 4 MG TABLET DISPERSIBLE Take 4 mg by mouth every 6 hours as needed for Nausea.     • polyethylene glycol/lytes (MIRALAX) Pack Take 17 g by mouth every day.     • simvastatin (ZOCOR) 10 MG Tab Take 10 mg by mouth every evening.     • SITagliptin (JANUVIA) 50 MG Tab Take 50 mg by mouth every day.     • acetaminophen (TYLENOL) 500 MG Tab Take 500-1,000 mg by mouth every 6 hours as needed.       No facility-administered encounter medications on file as of 2/18/2020.      Review of Systems   Constitutional: Positive for weight loss.   HENT: Negative for congestion, nosebleeds and tinnitus.    Eyes: Negative for blurred vision and double vision.   Respiratory: Negative for shortness of breath.         Denies prior deep vein thrombosis   Cardiovascular: Negative for chest pain, palpitations and leg swelling.   Gastrointestinal: Negative for constipation, diarrhea, nausea and vomiting.   Genitourinary:        Chronic indwelling catheter for urinary obstruction   Musculoskeletal: Positive for joint pain.   Neurological: Positive for dizziness. Negative for sensory change and focal weakness.        Infrequent mostly in upright position   Endo/Heme/Allergies: Does not bruise/bleed easily.   Psychiatric/Behavioral: Positive for memory loss.   All other systems reviewed and are negative.       Objective:   BP (!) 88/50 (BP Location: Left arm, Patient Position: Sitting)   Pulse (!) 48   Ht 1.981  "m (6' 6\")   SpO2 99%   BMI 23.87 kg/m²     Physical Exam   Constitutional: He is oriented to person, place, and time. No distress.   Somewhat frail  In a wheelchair   Neck: No JVD present.   Cardiovascular: Regular rhythm. Bradycardia present. Exam reveals no gallop.   Murmur heard.   Systolic murmur is present with a grade of 2/6.  Varicose veins right lower extremity   Pulmonary/Chest: Effort normal and breath sounds normal. No respiratory distress. He has no wheezes. He has no rales.   Abdominal: Soft. He exhibits no distension. There is no abdominal tenderness.   Genitourinary:    Genitourinary Comments: Steen catheter in place     Musculoskeletal:         General: No edema.   Neurological: He is alert and oriented to person, place, and time.   Skin: Skin is warm and dry. No erythema.   Chronic static changes both lower legs   Psychiatric: He has a normal mood and affect.     Echocardiography November 23 of last year by my review showed mild aortic stenosis with normal LV systolic function.  Right ventricle probably mildly enlarged    EKG from November and December by my review showed sinus rhythm or sinus bradycardia with normal QRS duration and normal IA interval.     LDL December 26 was 39 HDL 38    Assessment:     1. Sinus bradycardia  Holter Monitor Study   2. Mild aortic stenosis     3. History of non-ST elevation myocardial infarction (NSTEMI)      12/2019       Medical Decision Making:  Today's Assessment / Status / Plan:     His bradycardia is so far has been sinus bradycardia.  He does not appear to be symptomatic from it.  This is likely age-related sinus node dysfunction.  There is no indication for any pacemaker at this point.  We will ensure no more advanced form of bradycardia arrhythmia with 24-hour Holter monitor.  I would try to avoid using any rate slowing medication including Marinol.  His blood pressure is slightly low.  He has no evidence of fluid retention at this time.  I would reduce " or discontinue furosemide.    Aortic stenosis is mild.  We will plan to repeat echocardiography in a couple of years.    His recent elevated troponin was probably type II myocardial infarction.  If he requires surgical procedure under general anesthesia, we would arrange for him to undergo noninvasive imaging (pharmacological myocardial perfusion imaging ) to assess his preoperative risk.  He however has significant renal insufficiency and will be at increased risk for invasive coronary angiography and intervention.  We will see him back in a few months for follow-up or sooner as needed.  We will keep you posted about our findings and further recommendations as they become available.   Please also do not hesitate to call for any questions.  Thank you kindly for allowing me to participate in the care of this patient.

## 2020-03-09 NOTE — OR NURSING
COVID-19 Pre-surgery screening: (Answered by RN at nursing facility for patient)    1. Do you have an undiagnosed respiratory illness or symptoms such as coughing or sneezing? NO (Yes/No)  a. Onset of Sx N/A  b. Acute vs. chronic respiratory illness N/A    2. Do you have an unexplained fever greater than 100.4 degrees Fahrenheit or 38 degrees Celsius?     NO (Yes/No)    3. Have you had direct exposure to a patient who tested positive for Covid-19?    NO (Yes/No)    4. Have you traveled within the last 14 days to [place], [place], [place], [place], or [place]?    NO (Yes/No)

## 2020-03-10 ENCOUNTER — ANESTHESIA EVENT (OUTPATIENT)
Dept: SURGERY | Facility: MEDICAL CENTER | Age: 80
End: 2020-03-10
Payer: MEDICARE

## 2020-03-10 ENCOUNTER — ANESTHESIA (OUTPATIENT)
Dept: SURGERY | Facility: MEDICAL CENTER | Age: 80
End: 2020-03-10
Payer: MEDICARE

## 2020-03-10 ENCOUNTER — HOSPITAL ENCOUNTER (OUTPATIENT)
Facility: MEDICAL CENTER | Age: 80
End: 2020-03-10
Attending: ORTHOPAEDIC SURGERY | Admitting: ORTHOPAEDIC SURGERY
Payer: MEDICARE

## 2020-03-10 VITALS
HEIGHT: 78 IN | BODY MASS INDEX: 21.58 KG/M2 | OXYGEN SATURATION: 95 % | WEIGHT: 186.51 LBS | RESPIRATION RATE: 18 BRPM | SYSTOLIC BLOOD PRESSURE: 163 MMHG | TEMPERATURE: 98.7 F | HEART RATE: 47 BPM | DIASTOLIC BLOOD PRESSURE: 72 MMHG

## 2020-03-10 LAB
ANION GAP SERPL CALC-SCNC: 9 MMOL/L (ref 0–11.9)
BUN SERPL-MCNC: 54 MG/DL (ref 8–22)
CALCIUM SERPL-MCNC: 9.6 MG/DL (ref 8.5–10.5)
CHLORIDE SERPL-SCNC: 105 MMOL/L (ref 96–112)
CO2 SERPL-SCNC: 24 MMOL/L (ref 20–33)
CREAT SERPL-MCNC: 2.17 MG/DL (ref 0.5–1.4)
GLUCOSE SERPL-MCNC: 93 MG/DL (ref 65–99)
POTASSIUM SERPL-SCNC: 4.3 MMOL/L (ref 3.6–5.5)
SODIUM SERPL-SCNC: 138 MMOL/L (ref 135–145)

## 2020-03-10 PROCEDURE — 87075 CULTR BACTERIA EXCEPT BLOOD: CPT

## 2020-03-10 PROCEDURE — 80048 BASIC METABOLIC PNL TOTAL CA: CPT

## 2020-03-10 PROCEDURE — 87070 CULTURE OTHR SPECIMN AEROBIC: CPT

## 2020-03-10 PROCEDURE — 160028 HCHG SURGERY MINUTES - 1ST 30 MINS LEVEL 3: Performed by: ORTHOPAEDIC SURGERY

## 2020-03-10 PROCEDURE — 160036 HCHG PACU - EA ADDL 30 MINS PHASE I: Performed by: ORTHOPAEDIC SURGERY

## 2020-03-10 PROCEDURE — 160048 HCHG OR STATISTICAL LEVEL 1-5: Performed by: ORTHOPAEDIC SURGERY

## 2020-03-10 PROCEDURE — 160046 HCHG PACU - 1ST 60 MINS PHASE II: Performed by: ORTHOPAEDIC SURGERY

## 2020-03-10 PROCEDURE — 160035 HCHG PACU - 1ST 60 MINS PHASE I: Performed by: ORTHOPAEDIC SURGERY

## 2020-03-10 PROCEDURE — 501330 HCHG SET, CYSTO IRRIG TUBING: Performed by: ORTHOPAEDIC SURGERY

## 2020-03-10 PROCEDURE — 87077 CULTURE AEROBIC IDENTIFY: CPT

## 2020-03-10 PROCEDURE — 700101 HCHG RX REV CODE 250: Performed by: ANESTHESIOLOGY

## 2020-03-10 PROCEDURE — 160002 HCHG RECOVERY MINUTES (STAT): Performed by: ORTHOPAEDIC SURGERY

## 2020-03-10 PROCEDURE — 160039 HCHG SURGERY MINUTES - EA ADDL 1 MIN LEVEL 3: Performed by: ORTHOPAEDIC SURGERY

## 2020-03-10 PROCEDURE — 501838 HCHG SUTURE GENERAL: Performed by: ORTHOPAEDIC SURGERY

## 2020-03-10 PROCEDURE — 87205 SMEAR GRAM STAIN: CPT

## 2020-03-10 PROCEDURE — 160009 HCHG ANES TIME/MIN: Performed by: ORTHOPAEDIC SURGERY

## 2020-03-10 PROCEDURE — 160025 RECOVERY II MINUTES (STATS): Performed by: ORTHOPAEDIC SURGERY

## 2020-03-10 PROCEDURE — 500881 HCHG PACK, EXTREMITY: Performed by: ORTHOPAEDIC SURGERY

## 2020-03-10 PROCEDURE — 87015 SPECIMEN INFECT AGNT CONCNTJ: CPT

## 2020-03-10 PROCEDURE — 87186 SC STD MICRODIL/AGAR DIL: CPT | Mod: 91

## 2020-03-10 PROCEDURE — 700111 HCHG RX REV CODE 636 W/ 250 OVERRIDE (IP): Performed by: ANESTHESIOLOGY

## 2020-03-10 PROCEDURE — 700111 HCHG RX REV CODE 636 W/ 250 OVERRIDE (IP): Performed by: ORTHOPAEDIC SURGERY

## 2020-03-10 PROCEDURE — 700111 HCHG RX REV CODE 636 W/ 250 OVERRIDE (IP)

## 2020-03-10 PROCEDURE — 700105 HCHG RX REV CODE 258: Performed by: ORTHOPAEDIC SURGERY

## 2020-03-10 RX ORDER — SODIUM CHLORIDE, SODIUM LACTATE, POTASSIUM CHLORIDE, CALCIUM CHLORIDE 600; 310; 30; 20 MG/100ML; MG/100ML; MG/100ML; MG/100ML
INJECTION, SOLUTION INTRAVENOUS CONTINUOUS
Status: DISCONTINUED | OUTPATIENT
Start: 2020-03-10 | End: 2020-03-10 | Stop reason: HOSPADM

## 2020-03-10 RX ORDER — LIDOCAINE HYDROCHLORIDE 10 MG/ML
INJECTION, SOLUTION EPIDURAL; INFILTRATION; INTRACAUDAL; PERINEURAL
Status: COMPLETED
Start: 2020-03-10 | End: 2020-03-10

## 2020-03-10 RX ORDER — CEFAZOLIN SODIUM 1 G/3ML
INJECTION, POWDER, FOR SOLUTION INTRAMUSCULAR; INTRAVENOUS PRN
Status: DISCONTINUED | OUTPATIENT
Start: 2020-03-10 | End: 2020-03-10 | Stop reason: SURG

## 2020-03-10 RX ORDER — FINASTERIDE 5 MG/1
5 TABLET, FILM COATED ORAL DAILY
COMMUNITY

## 2020-03-10 RX ORDER — ONDANSETRON 2 MG/ML
4 INJECTION INTRAMUSCULAR; INTRAVENOUS
Status: DISCONTINUED | OUTPATIENT
Start: 2020-03-10 | End: 2020-03-10 | Stop reason: HOSPADM

## 2020-03-10 RX ORDER — ONDANSETRON 2 MG/ML
INJECTION INTRAMUSCULAR; INTRAVENOUS PRN
Status: DISCONTINUED | OUTPATIENT
Start: 2020-03-10 | End: 2020-03-10 | Stop reason: SURG

## 2020-03-10 RX ORDER — HALOPERIDOL 5 MG/ML
1 INJECTION INTRAMUSCULAR
Status: DISCONTINUED | OUTPATIENT
Start: 2020-03-10 | End: 2020-03-10 | Stop reason: HOSPADM

## 2020-03-10 RX ORDER — OXYCODONE HCL 5 MG/5 ML
5 SOLUTION, ORAL ORAL
Status: DISCONTINUED | OUTPATIENT
Start: 2020-03-10 | End: 2020-03-10 | Stop reason: HOSPADM

## 2020-03-10 RX ORDER — IPRATROPIUM BROMIDE AND ALBUTEROL SULFATE 2.5; .5 MG/3ML; MG/3ML
3 SOLUTION RESPIRATORY (INHALATION)
Status: DISCONTINUED | OUTPATIENT
Start: 2020-03-10 | End: 2020-03-10 | Stop reason: HOSPADM

## 2020-03-10 RX ORDER — LIDOCAINE HYDROCHLORIDE 20 MG/ML
INJECTION, SOLUTION EPIDURAL; INFILTRATION; INTRACAUDAL; PERINEURAL PRN
Status: DISCONTINUED | OUTPATIENT
Start: 2020-03-10 | End: 2020-03-10 | Stop reason: SURG

## 2020-03-10 RX ORDER — OXYCODONE HCL 5 MG/5 ML
10 SOLUTION, ORAL ORAL
Status: DISCONTINUED | OUTPATIENT
Start: 2020-03-10 | End: 2020-03-10 | Stop reason: HOSPADM

## 2020-03-10 RX ORDER — LINAGLIPTIN 5 MG/1
5 TABLET, FILM COATED ORAL DAILY
Status: ON HOLD | COMMUNITY
End: 2020-04-02 | Stop reason: CLARIF

## 2020-03-10 RX ORDER — BUPIVACAINE HYDROCHLORIDE 5 MG/ML
INJECTION, SOLUTION PERINEURAL
Status: DISCONTINUED | OUTPATIENT
Start: 2020-03-10 | End: 2020-03-10 | Stop reason: HOSPADM

## 2020-03-10 RX ADMIN — ONDANSETRON 4 MG: 2 INJECTION INTRAMUSCULAR; INTRAVENOUS at 15:35

## 2020-03-10 RX ADMIN — FENTANYL CITRATE 50 MCG: 50 INJECTION, SOLUTION INTRAMUSCULAR; INTRAVENOUS at 15:35

## 2020-03-10 RX ADMIN — CEFAZOLIN 2 G: 330 INJECTION, POWDER, FOR SOLUTION INTRAMUSCULAR; INTRAVENOUS at 15:35

## 2020-03-10 RX ADMIN — PROPOFOL 100 MG: 10 INJECTION, EMULSION INTRAVENOUS at 15:37

## 2020-03-10 RX ADMIN — Medication 0.3 ML: at 15:07

## 2020-03-10 RX ADMIN — FENTANYL CITRATE 50 MCG: 50 INJECTION, SOLUTION INTRAMUSCULAR; INTRAVENOUS at 15:44

## 2020-03-10 RX ADMIN — LIDOCAINE HYDROCHLORIDE 0.3 ML: 10 INJECTION, SOLUTION EPIDURAL; INFILTRATION; INTRACAUDAL; PERINEURAL at 15:07

## 2020-03-10 RX ADMIN — LIDOCAINE HYDROCHLORIDE 5 ML: 20 INJECTION, SOLUTION EPIDURAL; INFILTRATION; INTRACAUDAL at 15:37

## 2020-03-10 RX ADMIN — SODIUM CHLORIDE, POTASSIUM CHLORIDE, SODIUM LACTATE AND CALCIUM CHLORIDE: 600; 310; 30; 20 INJECTION, SOLUTION INTRAVENOUS at 15:07

## 2020-03-10 ASSESSMENT — FIBROSIS 4 INDEX: FIB4 SCORE: 2.75

## 2020-03-10 ASSESSMENT — PAIN SCALES - GENERAL: PAIN_LEVEL: 0

## 2020-03-10 NOTE — ANESTHESIA TIME REPORT
Anesthesia Start and Stop Event Times     Date Time Event    3/10/2020 1535 Ready for Procedure     1535 Anesthesia Start     1621 Anesthesia Stop        Responsible Staff  03/10/20    Name Role Begin End    Jassi Munguia M.D. Anesth 1535 1621        Preop Diagnosis (Free Text):  Pre-op Diagnosis     CHRONIC ULCER OF RIGHT TOE, FOOT PAIN RIGHT        Preop Diagnosis (Codes):    Post op Diagnosis  Gangrene of right foot (HCC)      Premium Reason  A. 3PM - 7AM    Comments:

## 2020-03-10 NOTE — ANESTHESIA PROCEDURE NOTES
Airway  Date/Time: 3/10/2020 3:39 PM  Performed by: Jassi Munguia M.D.  Authorized by: Jassi Munguia M.D.     Location:  OR  Urgency:  Elective  Indications for Airway Management:  Anesthesia  Spontaneous Ventilation: absent    Sedation Level:  Deep  Preoxygenated: Yes    Mask Difficulty Assessment:  0 - not attempted  Final Airway Type:  Supraglottic airway  Final Supraglottic Airway:  Standard LMA  SGA Size:  4  Number of Attempts at Approach:  1

## 2020-03-10 NOTE — ANESTHESIA PREPROCEDURE EVALUATION
Gangrenous right toes.     No problems with anesthesia per patient. Denies angina, dyspnea at present.     Light breakfast > 8 hours ago.         Relevant Problems      (+) CKD stage 4 due to type 2 diabetes mellitus (HCC)      ENDO   (+) Type 2 diabetes mellitus, with long-term current use of insulin (HCC)      Other   (+) Age-related physical debility   (+) Chronic respiratory failure (HCC)   (+) Dementia associated with other underlying disease without behavioral disturbance (HCC)   (+) Urinary retention       Physical Exam    Airway   Mallampati: II  TM distance: >3 FB  Neck ROM: full       Cardiovascular - normal exam  Rhythm: regular  Rate: normal  (-) murmur     Dental - normal exam         Pulmonary - normal exam  Breath sounds clear to auscultation     Abdominal    Neurological - normal exam                 Anesthesia Plan    ASA 3   ASA physical status 3 criteria: other (comment) and hypertension - poorly controlled    Plan - general       Airway plan will be LMA        Induction: intravenous    Postoperative Plan: Postoperative administration of opioids is intended.    Pertinent diagnostic labs and testing reviewed    Informed Consent:    Anesthetic plan and risks discussed with patient.    Use of blood products discussed with: patient whom consented to blood products.

## 2020-03-10 NOTE — OR NURSING
Bmp sent to lab.  Vss. Picture taken on left foot for small open sore.  Pt states he was seeing wound care at [facility], last time was sept 2019

## 2020-03-10 NOTE — OR SURGEON
Immediate Post OP Note    PreOp Diagnosis: Right third toe osteo    PostOp Diagnosis: Same    Procedure(s):  AMPUTATION, TOE-3RD - Wound Class: Dirty or Infected    Surgeon(s):  Hayes Deleon M.D.    Anesthesiologist/Type of Anesthesia:  Anesthesiologist: Jassi Munguia M.D./General    Surgical Staff:  Assistant: JOSE M Salazar  Circulator: Coty Rey R.N.  Scrub Person: Randolph Vann    Specimens removed if any:  ID Type Source Tests Collected by Time Destination   1 : Right 3rd toe Bone Toe AEROBIC/ANAEROBIC CULTURE (SURGERY) Hayes Deleon M.D. 3/10/2020  4:00 PM        Estimated Blood Loss: 5cc    TT: 6 min @ 250mmHg    Findings: Necrotic second toe ulcer communicating with PIP joint, all infection removed    Complications: None        3/10/2020 4:14 PM Hayes Deleon M.D.

## 2020-03-10 NOTE — ANESTHESIA POSTPROCEDURE EVALUATION
Patient: Randy Bates    Procedure Summary     Date:  03/10/20 Room / Location:  [facility] 14 / SURGERY [facility]    Anesthesia Start:  1535 Anesthesia Stop:  1621    Procedure:  AMPUTATION, TOE-3RD (Right Foot) Diagnosis:  (CHRONIC ULCER OF RIGHT TOE, FOOT PAIN RIGHT)    Surgeon:  Hayes Deleon M.D. Responsible Provider:  Jassi Munguia M.D.    Anesthesia Type:  general ASA Status:  3          Final Anesthesia Type: general  Last vitals  BP   Blood Pressure : 136/58    Temp   36.4 °C (97.6 °F)    Pulse   Pulse: (!) 43   Resp   14    SpO2   99 %      Anesthesia Post Evaluation    Patient location during evaluation: PACU  Patient participation: complete - patient participated  Level of consciousness: awake and alert  Pain score: 0    Airway patency: patent  Anesthetic complications: no  Cardiovascular status: hemodynamically stable  Respiratory status: acceptable  Hydration status: euvolemic    PONV: none           Nurse Pain Score: 0 (NPRS)

## 2020-03-11 LAB
GRAM STN SPEC: NORMAL
SIGNIFICANT IND 70042: NORMAL
SITE SITE: NORMAL
SOURCE SOURCE: NORMAL

## 2020-03-11 NOTE — OR NURSING
GLORIA Anderson here to  patient to take to [facility].  Prescriptions and instructions in the folder.  Justin will give the folder to his nurse.  He had a post-op shoe but I believe that [employer] will bring him a new show.  Patient states he feels great and was able to dress himself with assistance.

## 2020-03-11 NOTE — OP REPORT
DATE OF SERVICE:  03/10/2020    PREOPERATIVE DIAGNOSIS:  Right third toe ulcer, full thickness, to PIP joint.    POSTOPERATIVE DIAGNOSIS:  Right third toe ulcer, full thickness, to PIP joint.    PROCEDURE:  Right third toe amputation.    SURGEON:  Hayes Deleon MD.    ASSISTANT:  PHILLIP Dial.    ANESTHESIA:  General with 30 mL local 0.5% Marcaine without epinephrine.    ESTIMATED BLOOD LOSS:  5 mL.    TOURNIQUET TIME:  6 minutes at 250 mmHg.    FINDINGS:  Full-thickness ulcer to the necrotic proximal interphalangeal joint   of the third toe.  He is status post a second toe amputation.    COMPLICATIONS:  None.    OUTCOME:  PACU in stable condition.    HISTORY OF PRESENT ILLNESS:  This is a pleasant 80-year-old gentleman,   resident of [facility].  He presented to my clinic with a full-thickness   ulceration in the right second toe.  He has more superficial ulcer of the left   great toe that does not necessitate surgical management at this time.  He was   greeted in the preoperative holding area and identified by name and medical   record number.  The right lower extremity was marked.  Risks of procedure   including bleeding, infection, pain, wound breakdown, neurovascular damage,   need for more surgery were discussed and he provided written consent.    DESCRIPTION OF PROCEDURE:  He was taken to the operating room and placed on   the table in supine position.  Preoperative antibiotics were administered.    General anesthesia was induced.  An operative pause was taken, where all   present were in agreement with patient identification, laterality, and   procedure to be performed.  The limb was elevated for 5 minutes, tourniquet   raised to 250 mmHg.  We performed an elliptical incision around the base of   the third toe.  We then made a longitudinal incision proximally to the   metatarsophalangeal joint.  The joint was disarticulated there.  Flexor and   extensor tendons were removed.  The toe and the  proximal phalanx were passed   off for culture.  There was no evidence of residual infection.  The wound was   copiously irrigated with sterile normal saline.  The incision was meticulously   closed with interrupted 3-0 nylon in horizontal mattress fashion.  The   tourniquet was let down prior to irrigation and I did see some blood flow to   skin flaps, though it was somewhat slow.  Closure as dictated.  Adaptic gauze   and compressive wrap.  He was awakened and extubated in stable condition.    POSTOPERATIVE COURSE:  He will be discharged home today assuming adequate pain   control and mobilization.  He will have a postoperative shoe brought by   [employer].  I have sent a prescription for this and talked to the   company.  I will see him back in 1-2 weeks for a wound check.       ____________________________________     MD MAYKEL WYNN / TIM    DD:  03/10/2020 16:31:13  DT:  03/10/2020 20:10:11    D#:  [id number]  Job#:  [id number]

## 2020-03-11 NOTE — DISCHARGE INSTRUCTIONS
ACTIVITY: Rest and take it easy for the first 24 hours.  A responsible adult is recommended to remain with you during that time.  It is normal to feel sleepy.  We encourage you to not do anything that requires balance, judgment or coordination.    MILD FLU-LIKE SYMPTOMS ARE NORMAL. YOU MAY EXPERIENCE GENERALIZED MUSCLE ACHES, THROAT IRRITATION, HEADACHE AND/OR SOME NAUSEA.    FOR 24 HOURS DO NOT:  Drive, operate machinery or run household appliances.  Drink beer or alcoholic beverages.   Make important decisions or sign legal documents.    SPECIAL INSTRUCTIONS: *Weight Bear As Tolerated  Right Lower Extremity in postop shoe - [employer] will bring shoe to [facility]   Elevate   Keep dressing clean and dry   JONO Deleon next week for dressing change and wound check**    DIET: To avoid nausea, slowly advance diet as tolerated, avoiding spicy or greasy foods for the first day.  Add more substantial food to your diet according to your physician's instructions.  Babies can be fed formula or breast milk as soon as they are hungry.  INCREASE FLUIDS AND FIBER TO AVOID CONSTIPATION.      FOLLOW-UP APPOINTMENT:  A follow-up appointment should be arranged with your doctor in ***; call to schedule.    You should CALL YOUR PHYSICIAN if you develop:  Fever greater than 101 degrees F.  Pain not relieved by medication, or persistent nausea or vomiting.  Excessive bleeding (blood soaking through dressing) or unexpected drainage from the wound.  Extreme redness or swelling around the incision site, drainage of pus or foul smelling drainage.  Inability to urinate or empty your bladder within 8 hours.  Problems with breathing or chest pain.    You should call 911 if you develop problems with breathing or chest pain.  If you are unable to contact your doctor or surgical center, you should go to the nearest emergency room or urgent care center.  Physician's telephone #: *Dr. Deleon  [phone]**    If any questions arise, call your doctor.   If your doctor is not available, please feel free to call the Surgical Center at [phone].  The Center is open Monday through Friday from 7AM to 7PM.  You can also call the HEALTH HOTLINE open 24 hours/day, 7 days/week and speak to a nurse at [phone], or toll free at [phone].    A registered nurse may call you a few days after your surgery to see how you are doing after your procedure.    MEDICATIONS: Resume taking daily medication.  Take prescribed pain medication with food.  If no medication is prescribed, you may take non-aspirin pain medication if needed.  PAIN MEDICATION CAN BE VERY CONSTIPATING.  Take a stool softener or laxative such as senokot, pericolace, or milk of magnesia if needed.    Prescription given for *Norco and Vistaril**(can take at any time)**.    If your physician has prescribed pain medication that includes Acetaminophen (Tylenol), do not take additional Acetaminophen (Tylenol) while taking the prescribed medication.    Depression / Suicide Risk    As you are discharged from this [facility] facility, it is important to learn how to keep safe from harming yourself.    Recognize the warning signs:  · Abrupt changes in personality, positive or negative- including increase in energy   · Giving away possessions  · Change in eating patterns- significant weight changes-  positive or negative  · Change in sleeping patterns- unable to sleep or sleeping all the time   · Unwillingness or inability to communicate  · Depression  · Unusual sadness, discouragement and loneliness  · Talk of wanting to die  · Neglect of personal appearance   · Rebelliousness- reckless behavior  · Withdrawal from people/activities they love  · Confusion- inability to concentrate     If you or a loved one observes any of these behaviors or has concerns about self-harm, here's what you can do:  · Talk about it- your feelings and reasons for harming yourself  · Remove any means that you might use to hurt  yourself (examples: pills, rope, extension cords, firearm)  · Get professional help from the community (Mental Health, Substance Abuse, psychological counseling)  · Do not be alone:Call your Safe Contact- someone whom you trust who will be there for you.  · Call your local CRISIS HOTLINE 657-3405 or 765-469-7291  · Call your local Children's Mobile Crisis Response Team [place] (006) 635-7576 or www.PerBlue  · Call the toll free National Suicide Prevention Hotlines   · National Suicide Prevention Lifeline 671-197-YJPT (6625)  · National Hope Line Network 800-SUICIDE (972-5223)

## 2020-03-11 NOTE — OR NURSING
Report given to Patsy at [facility].  Justin called for a ride.  He states he will be here in 45 minutes.

## 2020-03-13 LAB
BACTERIA SPEC ANAEROBE CULT: NORMAL
BACTERIA TISS AEROBE CULT: ABNORMAL
GRAM STN SPEC: ABNORMAL
SIGNIFICANT IND 70042: ABNORMAL
SIGNIFICANT IND 70042: NORMAL
SITE SITE: ABNORMAL
SITE SITE: NORMAL
SOURCE SOURCE: ABNORMAL
SOURCE SOURCE: NORMAL

## 2020-03-26 ENCOUNTER — NON-PROVIDER VISIT (OUTPATIENT)
Dept: CARDIOLOGY | Facility: MEDICAL CENTER | Age: 80
End: 2020-03-26
Payer: MEDICARE

## 2020-03-26 ENCOUNTER — TELEPHONE (OUTPATIENT)
Dept: CARDIOLOGY | Facility: MEDICAL CENTER | Age: 80
End: 2020-03-26

## 2020-03-26 DIAGNOSIS — R00.1 SINUS BRADYCARDIA: ICD-10-CM

## 2020-03-26 NOTE — TELEPHONE ENCOUNTER
4 y.o. male, Dani Keane, presents with Otalgia (sx. for one week.  brought in by mom modesta) and Nasal Congestion   Ear Pain  Patient presents with right ear pain. Symptoms include congestion and fever. Symptoms began 1 week ago and there has been no improvement since that time. Patient denies nonproductive cough. History of previous ear infections: yes - 2 other infections this ear.    Review of Systems  Review of Systems   Constitutional: Positive for fever. Negative for activity change and appetite change.   HENT: Positive for congestion and ear pain. Negative for rhinorrhea and sore throat.    Respiratory: Negative for cough, shortness of breath and wheezing.    Gastrointestinal: Negative for constipation, diarrhea, nausea and vomiting.   Genitourinary: Negative for decreased urine volume and difficulty urinating.   Musculoskeletal: Negative for arthralgias and myalgias.   Skin: Negative for rash.      Objective:   Physical Exam   Constitutional: He appears well-developed. He is active. No distress.   HENT:   Head: Normocephalic and atraumatic.   Right Ear: Tympanic membrane normal. There is swelling (inferior ear canal) and tenderness. There is pain on movement.   Left Ear: Tympanic membrane normal.   Nose: Nose normal.   Mouth/Throat: Mucous membranes are moist. Oropharynx is clear.   Eyes: Conjunctivae and lids are normal.   Cardiovascular: Normal rate, regular rhythm and S1 normal.  Pulses are palpable.    No murmur heard.  Pulmonary/Chest: Effort normal and breath sounds normal. There is normal air entry. No respiratory distress. He has no wheezes.   Skin: Skin is warm. Capillary refill takes less than 2 seconds. No rash noted.   Vitals reviewed.    Assessment:     4 y.o. male Dani HASSAN was seen today for otalgia and nasal congestion.    Diagnoses and all orders for this visit:    Acute diffuse otitis externa of right ear  -     ofloxacin (FLOXIN) 0.3 % otic solution; Place 5 drops into the left ear 2  Patient enrolled in the 3 day [employer] monitoring program per Dr. Peck.  Monitor to be shipped to patient by [employer].  >Pending Baseline.  >Pending EOS   (two) times daily.      Plan:      1. Use ofloxacin as prescribed. Return to clinic if symptoms do not improve or worsens. Handout provided.

## 2020-03-27 RX ORDER — SODIUM CHLORIDE 9 MG/ML
INJECTION, SOLUTION INTRAVENOUS CONTINUOUS
Status: CANCELLED | OUTPATIENT
Start: 2020-04-02

## 2020-04-02 ENCOUNTER — HOSPITAL ENCOUNTER (OUTPATIENT)
Facility: MEDICAL CENTER | Age: 80
End: 2020-04-02
Attending: UROLOGY | Admitting: UROLOGY
Payer: MEDICARE

## 2020-04-02 ENCOUNTER — APPOINTMENT (OUTPATIENT)
Dept: RADIOLOGY | Facility: MEDICAL CENTER | Age: 80
End: 2020-04-02
Attending: UROLOGY
Payer: MEDICARE

## 2020-04-02 VITALS
WEIGHT: 176.81 LBS | OXYGEN SATURATION: 88 % | HEART RATE: 53 BPM | HEIGHT: 78 IN | DIASTOLIC BLOOD PRESSURE: 54 MMHG | RESPIRATION RATE: 16 BRPM | SYSTOLIC BLOOD PRESSURE: 101 MMHG | TEMPERATURE: 97.3 F | BODY MASS INDEX: 20.46 KG/M2

## 2020-04-02 DIAGNOSIS — N36.9 URETHRAL DISORDER, UNSPECIFIED: ICD-10-CM

## 2020-04-02 DIAGNOSIS — R33.9 RETENTION OF URINE, UNSPECIFIED: ICD-10-CM

## 2020-04-02 LAB
GLUCOSE BLD-MCNC: 70 MG/DL (ref 65–99)
INR PPP: 1.25 (ref 0.87–1.13)
PROTHROMBIN TIME: 16 SEC (ref 12–14.6)

## 2020-04-02 PROCEDURE — 36415 COLL VENOUS BLD VENIPUNCTURE: CPT

## 2020-04-02 PROCEDURE — 82962 GLUCOSE BLOOD TEST: CPT

## 2020-04-02 PROCEDURE — 99153 MOD SED SAME PHYS/QHP EA: CPT

## 2020-04-02 PROCEDURE — 700111 HCHG RX REV CODE 636 W/ 250 OVERRIDE (IP)

## 2020-04-02 PROCEDURE — 700117 HCHG RX CONTRAST REV CODE 255: Performed by: RADIOLOGY

## 2020-04-02 PROCEDURE — 700105 HCHG RX REV CODE 258: Performed by: RADIOLOGY

## 2020-04-02 PROCEDURE — 85610 PROTHROMBIN TIME: CPT

## 2020-04-02 PROCEDURE — 700111 HCHG RX REV CODE 636 W/ 250 OVERRIDE (IP): Performed by: RADIOLOGY

## 2020-04-02 PROCEDURE — 160002 HCHG RECOVERY MINUTES (STAT)

## 2020-04-02 RX ORDER — SODIUM CHLORIDE 9 MG/ML
INJECTION, SOLUTION INTRAVENOUS CONTINUOUS
Status: DISCONTINUED | OUTPATIENT
Start: 2020-04-02 | End: 2020-04-02

## 2020-04-02 RX ORDER — MIDAZOLAM HYDROCHLORIDE 1 MG/ML
.5-2 INJECTION INTRAMUSCULAR; INTRAVENOUS PRN
Status: DISCONTINUED | OUTPATIENT
Start: 2020-04-02 | End: 2020-04-02 | Stop reason: HOSPADM

## 2020-04-02 RX ORDER — LIDOCAINE HYDROCHLORIDE 10 MG/ML
INJECTION, SOLUTION EPIDURAL; INFILTRATION; INTRACAUDAL; PERINEURAL
Status: COMPLETED
Start: 2020-04-02 | End: 2020-04-02

## 2020-04-02 RX ORDER — ONDANSETRON 2 MG/ML
4 INJECTION INTRAMUSCULAR; INTRAVENOUS PRN
Status: DISCONTINUED | OUTPATIENT
Start: 2020-04-02 | End: 2020-04-02 | Stop reason: HOSPADM

## 2020-04-02 RX ORDER — FUROSEMIDE 20 MG/1
20 TABLET ORAL DAILY
COMMUNITY

## 2020-04-02 RX ORDER — DOCUSATE SODIUM 100 MG/1
100 CAPSULE, LIQUID FILLED ORAL EVERY MORNING
COMMUNITY

## 2020-04-02 RX ORDER — DEXTROSE AND SODIUM CHLORIDE 5; .45 G/100ML; G/100ML
1000 INJECTION, SOLUTION INTRAVENOUS CONTINUOUS
Status: DISCONTINUED | OUTPATIENT
Start: 2020-04-02 | End: 2020-04-02 | Stop reason: HOSPADM

## 2020-04-02 RX ORDER — SODIUM CHLORIDE 9 MG/ML
500 INJECTION, SOLUTION INTRAVENOUS
Status: DISCONTINUED | OUTPATIENT
Start: 2020-04-02 | End: 2020-04-02 | Stop reason: HOSPADM

## 2020-04-02 RX ORDER — CEFAZOLIN SODIUM 1 G/3ML
INJECTION, POWDER, FOR SOLUTION INTRAMUSCULAR; INTRAVENOUS
Status: COMPLETED
Start: 2020-04-02 | End: 2020-04-02

## 2020-04-02 RX ORDER — MIDAZOLAM HYDROCHLORIDE 1 MG/ML
INJECTION INTRAMUSCULAR; INTRAVENOUS
Status: COMPLETED
Start: 2020-04-02 | End: 2020-04-02

## 2020-04-02 RX ORDER — POLYETHYLENE GLYCOL 3350 17 G/17G
17 POWDER, FOR SOLUTION ORAL DAILY
COMMUNITY

## 2020-04-02 RX ADMIN — IOHEXOL 30 ML: 300 INJECTION, SOLUTION INTRAVENOUS at 11:15

## 2020-04-02 RX ADMIN — LIDOCAINE HYDROCHLORIDE 10 ML: 10 INJECTION, SOLUTION EPIDURAL; INFILTRATION; INTRACAUDAL at 10:40

## 2020-04-02 RX ADMIN — FENTANYL CITRATE 25 MCG: 50 INJECTION, SOLUTION INTRAMUSCULAR; INTRAVENOUS at 10:35

## 2020-04-02 RX ADMIN — MIDAZOLAM HYDROCHLORIDE 0.5 MG: 1 INJECTION INTRAMUSCULAR; INTRAVENOUS at 10:35

## 2020-04-02 RX ADMIN — MIDAZOLAM HYDROCHLORIDE 0.5 MG: 1 INJECTION, SOLUTION INTRAMUSCULAR; INTRAVENOUS at 10:35

## 2020-04-02 RX ADMIN — DEXTROSE AND SODIUM CHLORIDE 1000 ML: 5; 450 INJECTION, SOLUTION INTRAVENOUS at 10:03

## 2020-04-02 RX ADMIN — FENTANYL CITRATE 25 MCG: 50 INJECTION, SOLUTION INTRAMUSCULAR; INTRAVENOUS at 10:43

## 2020-04-02 ASSESSMENT — FIBROSIS 4 INDEX: FIB4 SCORE: 2.75

## 2020-04-02 NOTE — OR NURSING
1115 Patient arrived from IR s/p suprapubic catheter. Drainage bag with small bloody drainage. Patient awake, denies pain, denies nausea. Vss.   1130 Patient provided with water tolerating well.   1116 Criteria met to discharge patient home  1223 Discharge instructions given to patient and caregiver. Patient verbalize understanding of the orders, reviewed activity, worsening symptoms, follow up, medications. Dressing care.  1230 Patient escorted via w/c with all his personal belongings.

## 2020-04-02 NOTE — DISCHARGE INSTRUCTIONS
"  ACTIVITY: Rest and take it easy for the first 24 hours.  A responsible adult is recommended to remain with you during that time.  It is normal to feel sleepy.  We encourage you to not do anything that requires balance, judgment or coordination.    MILD FLU-LIKE SYMPTOMS ARE NORMAL. YOU MAY EXPERIENCE GENERALIZED MUSCLE ACHES, THROAT IRRITATION, HEADACHE AND/OR SOME NAUSEA.    FOR 24 HOURS DO NOT:  Drive, operate machinery or run household appliances.  Drink beer or alcoholic beverages.   Make important decisions or sign legal documents.    SPECIAL INSTRUCTIONS: follow up with primary care physician call find out when to follow up.   Follow up with your urologist call find out when to follow up  Resume your home medications  Keep dressing clean dry intact for 3 days then remove.  Discharge Instructions: Caring for Your Suprapubic Catheter   You are going home with a suprapubic catheter in place. This tube is placed directly into the bladder through your belly (abdomen) to drain urine from your bladder. You were shown how to care for your catheter in the hospital. This sheet will help remind you of those steps and guidelines when you are at home.   Home care  · Shower as needed.   · Change your dressing every day. Change the dressing more often if it falls off, becomes dirty, or has absorbed a lot of drainage.   Gather your supplies  · Tape  · Soap  · Wash cloth  · Wastebasket and plastic bag   · Dressing sponges (4\" x 4\") that are cut or split custodial into the middle   Remove the dressing and check for problems   · Wash your hands thoroughly before and after all catheter care.   · Gently remove the old dressing if you have one.   ? Don’t pull on the tube.   ? Check the dressing for drainage. Notice whether anything looks abnormal or smells bad.   ? Place your dressing in the plastic bag and throw it away in the wastebasket.   · Now look at the place where the catheter leaves your body (exit site).   ? Note any " "swelling, bleeding, irritation, abnormal, or smelly drainage.   ? Also check for any sores next to the exit site. Sores form around the exit site if there is too much pressure from the tube on the skin.   Clean the area  · Wash the area around the catheter exit site gently with soap and water.   · Gently pat the area dry   · Don't use powders, creams, or sprays near the exit site.   · Place a split 4\" x 4\" sponge around the catheter. Tape it in place.     Follow-up care  Make a follow-up appointment, or as advised.   When to call your healthcare provider  Call your healthcare provider right away if you have any of the following:   · Catheter that falls out, or is clogged or feels clogged   · Stitches that fall out  · Urine leaking around catheter   · Urine that is cloudy, bloody, or smells bad   · No urine drainage  · Bladder that feels full or painful   · Rash, itching, redness, swelling, or drainage at the catheter site   · Fever of 100.4°F ( 38°C ) or higher, or as directed by your provider   · Shaking chills      DIET: To avoid nausea, slowly advance diet as tolerated, avoiding spicy or greasy foods for the first day.  Add more substantial food to your diet according to your physician's instructions.  Babies can be fed formula or breast milk as soon as they are hungry.  INCREASE FLUIDS AND FIBER TO AVOID CONSTIPATION.    SURGICAL DRESSING/BATHING: Keep dressing clean dry intact     FOLLOW-UP APPOINTMENT:  A follow-up appointment should be arranged with your doctor in [phone]; call to schedule.    You should CALL YOUR PHYSICIAN if you develop:  Fever greater than 101 degrees F.  Pain not relieved by medication, or persistent nausea or vomiting.  Excessive bleeding (blood soaking through dressing) or unexpected drainage from the wound.  Extreme redness or swelling around the incision site, drainage of pus or foul smelling drainage.  Inability to urinate or empty your bladder within 8 hours.  Problems with " breathing or chest pain.    You should call 911 if you develop problems with breathing or chest pain.  If you are unable to contact your doctor or surgical center, you should go to the nearest emergency room or urgent care center.  Physician's telephone #: [phone]    If any questions arise, call your doctor.  If your doctor is not available, please feel free to call the Surgical Center at [phone].  The Center is open Monday through Friday from 7AM to 7PM.  You can also call the [facility] HOTLINE open 24 hours/day, 7 days/week and speak to a nurse at [phone], or toll free at [phone].    A registered nurse may call you a few days after your surgery to see how you are doing after your procedure.    MEDICATIONS: Resume taking daily medication.  Take prescribed pain medication with food.  If no medication is prescribed, you may take non-aspirin pain medication if needed.  PAIN MEDICATION CAN BE VERY CONSTIPATING.  Take a stool softener or laxative such as senokot, pericolace, or milk of magnesia if needed.  Suprapubic Catheter Home Guide  A suprapubic catheter is a rubber tube used to drain urine from the bladder into a collection bag. The catheter is inserted into the bladder through a small opening in the in the lower abdomen, near the center of the body, above the pubic bone (suprapubic area). There is a tiny balloon filled with germ-free (sterile) water on the end of the catheter that is in the bladder. The balloon helps to keep the catheter in place.  Your suprapubic catheter may need to be replaced every 4-6 weeks, or as often as recommended by your health care provider. The collection bag must be emptied every day and cleaned every 2-3 days. The collection bag can be put beside your bed at night and attached to your leg during the day. You may have a large collection bag to use at night and a smaller one to use during the day.  What are the risks?  · Urine flow can become blocked. This can happen  if the catheter is not working correctly, or if you have a blood clot in your bladder or in the catheter.  · Tissue near the catheter may can become irritated and bleed.  · Bacteria may get into your bladder and cause a urinary tract infection.  How do I change the catheter?  Supplies needed  · Two pairs of sterile gloves.  · Catheter.  · Two syringes.  · Sterile water.  · Sterile cleaning solution.  · Lubricant.  · Collection bags.  Changing the catheter   To replace your catheter, take the following steps:  1. Drink plenty of fluids during the hours before you plan to change the catheter.  2. Wash your hands with soap and water. If soap and water are not available, use hand .  3. Lie on your back and put on sterile gloves.  4. Clean the skin around the catheter opening using the sterile cleaning solution.  5. Remove the water from the balloon using a syringe.  6. Slowly remove the catheter.  ¨ Do not pull on the catheter if it seems stuck.  ¨ Call your health care provider immediately if you have difficulty removing the catheter.  7. Take off the used gloves, and put on a new pair.  8. Put lubricant on the end of the new catheter that will go into your bladder.  9. Gently slide the catheter through the opening in your abdomen and into your bladder.  10. Wait for some urine to start flowing through the catheter. When urine starts to flow through the catheter, use a new syringe to fill the balloon with sterile water.  11. Attach the collection bag to the end of the catheter. Make sure the connection is tight.  12. Remove the gloves and wash your hands with soap and water.  How do I care for my skin around the catheter?  Use a clean washcloth and soapy water to clean the skin around your catheter every day. Pat the area dry with a clean towel.  · Do not pull on the catheter.  · Do not use ointment or lotion on this area unless told by your health care provider.  · Check your skin around the catheter every  day for signs of infection. Check for:  ¨ Redness, swelling, or pain.  ¨ Fluid or blood.  ¨ Warmth.  ¨ Pus or a bad smell.  How do I clean and empty the collection bag?  Clean the collection bag every 2-3 days, or as often as told by your health care provider. To do this, take the following steps:  · Wash your hands with soap and water. If soap and water are not available, use hand .  · Disconnect the bag from the catheter and immediately attach a new bag to the catheter.  · Empty the used bag completely.  · Clean the used bag using one of the following methods:  ¨ Rinse the used bag with warm water and soap.  ¨ Fill the bag with water and add 1 tsp of vinegar. Let it sit for about 30 minutes, then empty the bag.  · Let the bag dry completely, and put it in a clean plastic bag before storing it.  Empty the large collection bag every 8 hours. Empty the small collection bag when it is about ? full. To empty your large or small collection bag, take the following steps:  · Always keep the bag below the level of the catheter. This keeps urine from flowing backwards into the catheter.  · Hold the bag over the toilet or another container. Turn the valve (spigot) at the bottom of the bag to empty the urine.  ¨ Do not touch the opening of the spigot.  ¨ Do not let the opening touch the toilet or container.  · Close the spigot tightly when the bag is empty.  What are some general tips?  · Always wash your hands before and after caring for your catheter and collection bag. Use a mild, fragrance-free soap. If soap and water are not available, use hand .  · Clean the catheter with soap and water as often as told by your health care provider.  · Always make sure there are no twists or curls (kinks) in the catheter tube.  · Always make sure there are no leaks in the catheter or collection bag.  · Drink enough fluid to keep your urine clear or pale yellow.  · Do not take baths, swim, or use a hot tub.  When should  I seek medical care?  Seek medical care if:  · You leak urine.  · You have redness, swelling, or pain around your catheter opening.  · You have fluid or blood coming from your catheter opening.  · Your catheter opening feels warm to the touch.  · You have pus or a bad smell coming from your catheter opening.  · You have a fever or chills.  · Your urine flow slows down.  · Your urine becomes cloudy or smelly.  When should I seek immediate medical care?  Seek immediate medical care if your catheter comes out, or if you have:  · Nausea.  · Back pain.  · Difficulty changing your catheter.  · Blood in your urine.  · No urine flow for 1 hour.  This information is not intended to replace advice given to you by your health care provider. Make sure you discuss any questions you have with your health care provider.  Document Released: 09/04/2012 Document Revised: 08/16/2017 Document Reviewed: 08/30/2016  [employer] Interactive Patient Education © 2017 [employer]      If your physician has prescribed pain medication that includes Acetaminophen (Tylenol), do not take additional Acetaminophen (Tylenol) while taking the prescribed medication.    Depression / Suicide Risk    As you are discharged from this [facility] facility, it is important to learn how to keep safe from harming yourself.    Recognize the warning signs:  · Abrupt changes in personality, positive or negative- including increase in energy   · Giving away possessions  · Change in eating patterns- significant weight changes-  positive or negative  · Change in sleeping patterns- unable to sleep or sleeping all the time   · Unwillingness or inability to communicate  · Depression  · Unusual sadness, discouragement and loneliness  · Talk of wanting to die  · Neglect of personal appearance   · Rebelliousness- reckless behavior  · Withdrawal from people/activities they love  · Confusion- inability to concentrate     If you or a loved one observes any of these  behaviors or has concerns about self-harm, here's what you can do:  · Talk about it- your feelings and reasons for harming yourself  · Remove any means that you might use to hurt yourself (examples: pills, rope, extension cords, firearm)  · Get professional help from the community (Mental Health, Substance Abuse, psychological counseling)  · Do not be alone:Call your Safe Contact- someone whom you trust who will be there for you.  · Call your local CRISIS HOTLINE 854-3027 or 467-043-8835  · Call your local Children's Mobile Crisis Response Team [place] (409) 007-4797 or www.[employer]esp[employer]  · Call the toll free National Suicide Prevention Hotlines   · National Suicide Prevention Lifeline 154-034-BZBA (1209)  · National Hope Line Network 800-SUICIDE (347-4229)

## 2020-04-02 NOTE — OR SURGEON
Immediate Post- Operative Note        PostOp Diagnosis: urinary retention    Procedure(s): 16 F SPC placement      Estimated Blood Loss: Less than 5 ml        Complications: None            4/2/2020     10:57 AM     Salazar Nicole M.D.

## 2020-04-02 NOTE — OR NURSING
0940 Pt has mild dementia although is oriented to person and place. Pt able to partially explain today's procedure in his own words. Spoke with pt.'s POA Bonnie Guevara on the telephone and confirmed consent for procedure as well as procedural sedation. Dr. Mcdonough to call POA and ensure all questions are answered. Two RN consent obtained at this time.

## 2020-04-02 NOTE — PROGRESS NOTES
IR Nursing Note      Procedure Confirmed with MD, RN, RT and patient pre procedure.  Suprapubic Catheter Placement by MD Nicole assisted by RT Yonny, Lower Mid-Abdomen access site.  Pt on 81mg ASA daily, last does 4/1, and HR in mid-30s - per MD ok to proceed.    Access site with SPC catheter in place, secured with 10cc balloon, covered with gauze/medipore, C/D/I.    Patient tolerated procedure, hemodynamically stable; pt drowsy but talking post procedure; report given to LYUDMILA Puga.  Patient transported to [address] via IR RN monitored then transferred care to report RN.    Suprapubic Catheter:  Bard, LubrilSil All -Silicone Steen Catheter, 16Fr, [place] Tip, REF# [id number], LOT# [id number], Exp. Date 8/30/2022.

## 2020-05-08 ENCOUNTER — TELEPHONE (OUTPATIENT)
Dept: CARDIOLOGY | Facility: MEDICAL CENTER | Age: 80
End: 2020-05-08

## 2020-05-08 NOTE — TELEPHONE ENCOUNTER
Called patient care nurse. Spoke with Deepthi and she did not see anything in patients chart regarding the [employer] monitor.     Called and left a  for Gena holter monitor tech here to look into this and lat me know what happened.

## 2020-05-11 NOTE — TELEPHONE ENCOUNTER
Spoke with [facility] today and they informed me that they have cancelled his service due to no contact. They said he had been hospitalized when the monitor came to him, then sent to a nursing home.  I spoke with his nurse and she said they still have his monitor sealed up in the closet there. They had no one who was willing to hook it up for him.  I have asked the facility to return the equipment to [facility].

## 2020-05-18 ENCOUNTER — OFFICE VISIT (OUTPATIENT)
Dept: CARDIOLOGY | Facility: MEDICAL CENTER | Age: 80
End: 2020-05-18
Payer: MEDICARE

## 2020-05-18 VITALS
DIASTOLIC BLOOD PRESSURE: 50 MMHG | WEIGHT: 232 LBS | HEART RATE: 88 BPM | SYSTOLIC BLOOD PRESSURE: 70 MMHG | HEIGHT: 78 IN | BODY MASS INDEX: 26.84 KG/M2 | OXYGEN SATURATION: 99 %

## 2020-05-18 DIAGNOSIS — E78.5 DYSLIPIDEMIA (HIGH LDL; LOW HDL): ICD-10-CM

## 2020-05-18 DIAGNOSIS — N18.4 CKD STAGE 4 DUE TO TYPE 2 DIABETES MELLITUS (HCC): ICD-10-CM

## 2020-05-18 DIAGNOSIS — I49.5 SINUS NODE DYSFUNCTION (HCC): ICD-10-CM

## 2020-05-18 DIAGNOSIS — F02.80 DEMENTIA ASSOCIATED WITH OTHER UNDERLYING DISEASE WITHOUT BEHAVIORAL DISTURBANCE (HCC): ICD-10-CM

## 2020-05-18 DIAGNOSIS — R33.9 RETENTION OF URINE: ICD-10-CM

## 2020-05-18 DIAGNOSIS — R00.1 SINUS BRADYCARDIA: ICD-10-CM

## 2020-05-18 DIAGNOSIS — E11.22 CKD STAGE 4 DUE TO TYPE 2 DIABETES MELLITUS (HCC): ICD-10-CM

## 2020-05-18 PROBLEM — R41.82 ALTERED MENTAL STATUS: Status: RESOLVED | Noted: 2019-11-21 | Resolved: 2020-05-18

## 2020-05-18 PROCEDURE — 99213 OFFICE O/P EST LOW 20 MIN: CPT | Performed by: PHYSICIAN ASSISTANT

## 2020-05-18 RX ORDER — NITROFURANTOIN 25; 75 MG/1; MG/1
100 CAPSULE ORAL 2 TIMES DAILY
COMMUNITY

## 2020-05-18 RX ORDER — BISACODYL 10 MG
10 SUPPOSITORY, RECTAL RECTAL DAILY
COMMUNITY

## 2020-05-18 RX ORDER — ENEMA 19; 7 G/133ML; G/133ML
1 ENEMA RECTAL ONCE
COMMUNITY

## 2020-05-18 RX ORDER — ASCORBIC ACID 500 MG
500 TABLET ORAL DAILY
COMMUNITY

## 2020-05-18 RX ORDER — ACETAMINOPHEN 500 MG
500-1000 TABLET ORAL EVERY 6 HOURS PRN
COMMUNITY

## 2020-05-18 RX ORDER — CALCIUM CARBONATE 500 MG/1
500 TABLET, CHEWABLE ORAL DAILY
COMMUNITY

## 2020-05-18 RX ORDER — OXYBUTYNIN CHLORIDE 5 MG/1
2.5 TABLET ORAL 3 TIMES DAILY
COMMUNITY

## 2020-05-18 ASSESSMENT — ENCOUNTER SYMPTOMS
BRUISES/BLEEDS EASILY: 0
ABDOMINAL PAIN: 0
ORTHOPNEA: 0
VOMITING: 0
DYSPNEA ON EXERTION: 0
DECREASED APPETITE: 0
SNORING: 0
BLOATING: 0
PALPITATIONS: 0
DIAPHORESIS: 0
WEAKNESS: 0
SHORTNESS OF BREATH: 0
BLURRED VISION: 0
SYNCOPE: 0
NEAR-SYNCOPE: 0
NAUSEA: 0
FEVER: 0
DIZZINESS: 0
MYALGIAS: 0

## 2020-05-18 ASSESSMENT — FIBROSIS 4 INDEX: FIB4 SCORE: 2.75

## 2020-05-18 NOTE — PROGRESS NOTES
Cardiology Clinic Follow-up Note    Date of note:    5/18/2020  Primary Care Provider: Dale Kirkland M.D.    Name:             Randy Bates  YOB: 1940  MRN:               [id number]    CC: bradycardia     Primary Cardiologist: Dr. Peck    Patient HPI:   Randy Bates is a 80 y.o. male with PMH including asymptomatic sinus bradycardia, HTN, Diabetes mellitus type II, dementia and CKD.  He also has chronic urinary retention with indwelling sawyer catheter.    Interim History:  Mr. Bates was last seen in this cardiology office by Dr. Peck on 2/18/2020.     He has been doing well overall.   C/o dizziness when changing position from laying to sitting or turning over on the side of the bed.   These episodes do not last long.    He denies pre-syncope or syncope  No chest pain, no shortness of breath, no LE swelling   Still w in-dwelling sawyer.    Review of Systems   Constitution: Negative for decreased appetite, diaphoresis, fever and malaise/fatigue.   Eyes: Negative for blurred vision.   Cardiovascular: Negative for chest pain, dyspnea on exertion, leg swelling, near-syncope, orthopnea, palpitations and syncope.   Respiratory: Negative for shortness of breath and snoring.    Hematologic/Lymphatic: Negative for bleeding problem. Does not bruise/bleed easily.   Skin: Negative for rash.   Musculoskeletal: Negative for joint pain and myalgias.   Gastrointestinal: Negative for bloating, abdominal pain, nausea and vomiting.   Genitourinary: Negative for frequency.   Neurological: Negative for dizziness and weakness.       Past Medical History:   Diagnosis Date   • Diabetes (HCC)    • Hypertension        No concerning family hx.     Social History     Socioeconomic History   • Marital status:      Spouse name: Not on file   • Number of children: Not on file   • Years of education: Not on file   • Highest education level: Not on file   Occupational History   • Not on file      Social Needs   • Financial resource strain: Not on file   • Food insecurity     Worry: Not on file     Inability: Not on file   • Transportation needs     Medical: Not on file     Non-medical: Not on file   Tobacco Use   • Smoking status: Never Smoker   • Smokeless tobacco: Never Used   Substance and Sexual Activity   • Alcohol use: Not Currently   • Drug use: Not Currently   • Sexual activity: Not on file   Lifestyle   • Physical activity     Days per week: Not on file     Minutes per session: Not on file   • Stress: Not on file   Relationships   • Social connections     Talks on phone: Not on file     Gets together: Not on file     Attends Congregation service: Not on file     Active member of club or organization: Not on file     Attends meetings of clubs or organizations: Not on file     Relationship status: Not on file   • Intimate partner violence     Fear of current or ex partner: Not on file     Emotionally abused: Not on file     Physically abused: Not on file     Forced sexual activity: Not on file   Other Topics Concern   • Not on file   Social History Narrative   • Not on file       Current Outpatient Medications   Medication Sig Dispense Refill   • oxybutynin (DITROPAN) 5 MG Tab Take 2.5 mg by mouth 3 times a day.     • nitrofurantoin (MACROBID) 100 MG Cap Take 100 mg by mouth 2 times a day.     • ascorbic acid (ASCORBIC ACID) 500 MG Tab Take 500 mg by mouth every day.     • acetaminophen (TYLENOL) 500 MG Tab Take 500-1,000 mg by mouth every 6 hours as needed.     • calcium carbonate (TUMS) 500 MG Chew Tab Take 500 mg by mouth every day.     • Sodium Phosphates (FLEET) 7-19 GM/118ML Enema Insert 1 Each in rectum Once.     • magnesium hydroxide (MILK OF MAGNESIA) 400 MG/5ML Suspension Take 30 mL by mouth 1 time daily as needed.     • bisacodyl (DULCOLAX) 10 MG Suppos Insert 10 mg in rectum every day.     • NS SOLN 50 mL with ertapenem 1 GM SOLR 500 mg 500 mg by Intravenous route every 24 hours.     •  "docusate sodium (COLACE) 100 MG Cap Take 100 mg by mouth every morning.     • SITagliptin (JANUVIA) 25 MG Tab Take 25 mg by mouth every day.     • furosemide (LASIX) 20 MG Tab Take 20 mg by mouth every day.     • polyethylene glycol/lytes (MIRALAX) Pack Take 17 g by mouth every day.     • Multiple Vitamins-Minerals (MULTI COMPLETE/IRON PO) Take  by mouth every day.     • aspirin EC (ECOTRIN) 81 MG Tablet Delayed Response Take 81 mg by mouth every day.     • finasteride (PROSCAR) 5 MG Tab Take 5 mg by mouth every day.     • tamsulosin (FLOMAX) 0.4 MG capsule Take 0.4 mg by mouth ONE-HALF HOUR AFTER BREAKFAST.     • latanoprost (XALATAN) 0.005 % Solution Place 1 Drop in both eyes every evening.     • simvastatin (ZOCOR) 10 MG Tab Take 10 mg by mouth every evening.       No current facility-administered medications for this visit.        No Known Allergies      Physical Exam:  Ambulatory Vitals  BP (!) 70/50 (BP Location: Left arm, Patient Position: Sitting)   Pulse 88   Ht 1.981 m (6' 6\")   Wt 105.2 kg (232 lb)   SpO2 99%    BP Readings from Last 4 Encounters:   05/18/20 (!) 70/50  Repeat manual 100/60, pulse 50  [facility]   04/02/20 101/54   03/10/20 (!) 163/72   02/18/20 (!) 88/50       Weight/BMI: Body mass index is 26.81 kg/m².  Wt Readings from Last 4 Encounters:   05/18/20 105.2 kg (232 lb)   04/02/20 80.2 kg (176 lb 12.9 oz)   03/10/20 84.6 kg (186 lb 8.2 oz)   12/28/19 93.7 kg (206 lb 9.1 oz)       General: no apparent distress, thin.  Eyes: normal conjunctiva  ENT: OP clear  Neck: no JVD   Lungs: normal respiratory effort, clear with crackles, no wheezing or rhonchi.  Heart: normal rate, regular rhythm, no murmurs, no rubs or gallops,   Ext:  no edema bilateral lower extremities. + bilateral pedal pulses. no cyanosis.  Abdomen: soft, non tender, non distended,  Neurological: No focal deficits, no facial asymmetry.  Normal speech.  Psychiatric: Appropriate affect, alert and oriented x 3.   Skin: Warm " extremities, no rash.      Lab Data Review:  Lab Results   Component Value Date/Time    CHOLSTRLTOT 91 (L) 2019 03:35 AM    LDL 39 2019 03:35 AM    HDL 38 (A) 2019 03:35 AM    TRIGLYCERIDE 69 2019 03:35 AM       Lab Results   Component Value Date/Time    SODIUM 138 03/10/2020 02:43 PM    POTASSIUM 4.3 03/10/2020 02:43 PM    CHLORIDE 105 03/10/2020 02:43 PM    CO2 24 03/10/2020 02:43 PM    GLUCOSE 93 03/10/2020 02:43 PM    BUN 54 (H) 03/10/2020 02:43 PM    CREATININE 2.17 (H) 03/10/2020 02:43 PM     Lab Results   Component Value Date/Time    ALKPHOSPHAT 46 2019 04:36 AM    ASTSGOT 16 2019 04:36 AM    ALTSGPT 6 2019 04:36 AM    TBILIRUBIN 0.4 2019 04:36 AM      Lab Results   Component Value Date/Time    WBC 8.9 2019 04:36 AM         Cardiac Imaging and Procedures Review:      Personal EKG Interpretation 2019 :  NS bryan HR 47, MS interval 180.  Qrsd 88, qtc 469.  No heart block, no ST changes.    Echo 12/3/19:  CONCLUSIONS  LVEF estimated to be 55%.  No significant valve or Doppler abnormality.  No evidence of endocarditis.  Small calcified atheromatous plaque in the ascending aorta.  Compared to prior transthoracic echo 2019, no significant change.        Assessment and Clinical Decision Makin   Sinus node dysfunction with asymptomatic sinus bradycardia.  Stable HR in the 50s.  No syncopal episodes.  Avoid AV zoraida blockers.      2   Dementia.  Stable.     3   CKD III.    4   Essential hypertension.  BP on the lower side today, initially taken with electronic cuff.  My repeat BP was very distinctly heard, seems accurate.  May continue Lasix and flomax at this time.  However, if symptoms of othostatic hypotension worsen, he may benefit from reduced dose of discontinuation of these meds.     5   Diabetes mellitus type IV.   A1C 5.6  2019    6   Urinary retention, in-dwelling sawyer.    7   Dyslipidemia.  LDL 39, HDL 38.  Continue  simvastatin.      Annual follow up seem appropriate.    Encouraged pt and his CNA to call or come in earlier with concerns.      Shellie Arevalo PA-C     [facility], [facility]  [address] [address]  Phone: [phone]  Fax: [phone]    Collaborating Physician: Dr. Morrison.

## 2020-05-28 ENCOUNTER — TELEPHONE (OUTPATIENT)
Dept: CARDIOLOGY | Facility: MEDICAL CENTER | Age: 80
End: 2020-05-28

## 2020-05-28 NOTE — TELEPHONE ENCOUNTER
"Returned Jessica's phone call and reviewed findings.  She states, \"We were sent a Holter monitor before but it was  and we had to return it back.  We never got another one to do the test.\"  Reassurance given that findings will be sent to Holter Technician.  She verbalizes understanding and states no other concerns or questions at this time.  She is appreciative of information given.    Findings relayed Holter Technician.  "

## 2020-05-28 NOTE — TELEPHONE ENCOUNTER
MARY/jefferson Lopez Nurse  at [facility], calling with a question regarding holter monitor.  Does it still need to be done?    Please call Jessica,  [phone].

## 2020-05-29 NOTE — TELEPHONE ENCOUNTER
Called Jessica, per  she is off for today.  Call transferred to Deon, unable to reach.  Left detailed voicemail to return this call at his earliest convenience to discuss scheduling for 24 hour holter placement.    Will await for phone call to be returned.

## 2020-06-05 ENCOUNTER — APPOINTMENT (OUTPATIENT)
Dept: RADIOLOGY | Facility: MEDICAL CENTER | Age: 80
End: 2020-06-05
Attending: EMERGENCY MEDICINE
Payer: MEDICARE

## 2020-06-05 ENCOUNTER — HOSPITAL ENCOUNTER (EMERGENCY)
Facility: MEDICAL CENTER | Age: 80
End: 2020-06-05
Attending: EMERGENCY MEDICINE
Payer: MEDICARE

## 2020-06-05 VITALS
TEMPERATURE: 97.9 F | WEIGHT: 220.46 LBS | OXYGEN SATURATION: 99 % | HEART RATE: 43 BPM | SYSTOLIC BLOOD PRESSURE: 157 MMHG | DIASTOLIC BLOOD PRESSURE: 93 MMHG | HEIGHT: 78 IN | BODY MASS INDEX: 25.51 KG/M2 | RESPIRATION RATE: 14 BRPM

## 2020-06-05 DIAGNOSIS — Z43.5 ENCOUNTER FOR SUPRAPUBIC CATHETER CARE (HCC): ICD-10-CM

## 2020-06-05 LAB
APTT PPP: 29 SEC (ref 24.7–36)
BASOPHILS # BLD AUTO: 0.6 % (ref 0–1.8)
BASOPHILS # BLD: 0.06 K/UL (ref 0–0.12)
EOSINOPHIL # BLD AUTO: 0.3 K/UL (ref 0–0.51)
EOSINOPHIL NFR BLD: 2.8 % (ref 0–6.9)
ERYTHROCYTE [DISTWIDTH] IN BLOOD BY AUTOMATED COUNT: 49.8 FL (ref 35.9–50)
HCT VFR BLD AUTO: 38.3 % (ref 42–52)
HGB BLD-MCNC: 12.7 G/DL (ref 14–18)
IMM GRANULOCYTES # BLD AUTO: 0.06 K/UL (ref 0–0.11)
IMM GRANULOCYTES NFR BLD AUTO: 0.6 % (ref 0–0.9)
INR PPP: 1.28 (ref 0.87–1.13)
LYMPHOCYTES # BLD AUTO: 0.89 K/UL (ref 1–4.8)
LYMPHOCYTES NFR BLD: 8.4 % (ref 22–41)
MCH RBC QN AUTO: 29.5 PG (ref 27–33)
MCHC RBC AUTO-ENTMCNC: 33.2 G/DL (ref 33.7–35.3)
MCV RBC AUTO: 88.9 FL (ref 81.4–97.8)
MONOCYTES # BLD AUTO: 0.55 K/UL (ref 0–0.85)
MONOCYTES NFR BLD AUTO: 5.2 % (ref 0–13.4)
NEUTROPHILS # BLD AUTO: 8.71 K/UL (ref 1.82–7.42)
NEUTROPHILS NFR BLD: 82.4 % (ref 44–72)
NRBC # BLD AUTO: 0 K/UL
NRBC BLD-RTO: 0 /100 WBC
PLATELET # BLD AUTO: 196 K/UL (ref 164–446)
PMV BLD AUTO: 8.4 FL (ref 9–12.9)
PROTHROMBIN TIME: 16.3 SEC (ref 12–14.6)
RBC # BLD AUTO: 4.31 M/UL (ref 4.7–6.1)
WBC # BLD AUTO: 10.6 K/UL (ref 4.8–10.8)

## 2020-06-05 PROCEDURE — 99284 EMERGENCY DEPT VISIT MOD MDM: CPT

## 2020-06-05 PROCEDURE — 51102 DRAIN BL W/CATH INSERTION: CPT

## 2020-06-05 PROCEDURE — 85730 THROMBOPLASTIN TIME PARTIAL: CPT

## 2020-06-05 PROCEDURE — 85610 PROTHROMBIN TIME: CPT

## 2020-06-05 PROCEDURE — 700117 HCHG RX CONTRAST REV CODE 255: Performed by: EMERGENCY MEDICINE

## 2020-06-05 PROCEDURE — C1887 CATHETER, GUIDING: HCPCS

## 2020-06-05 PROCEDURE — 85025 COMPLETE CBC W/AUTO DIFF WBC: CPT

## 2020-06-05 PROCEDURE — 77002 NEEDLE LOCALIZATION BY XRAY: CPT

## 2020-06-05 RX ORDER — MIDAZOLAM HYDROCHLORIDE 1 MG/ML
INJECTION INTRAMUSCULAR; INTRAVENOUS
Status: DISCONTINUED
Start: 2020-06-05 | End: 2020-06-05 | Stop reason: HOSPADM

## 2020-06-05 RX ORDER — SODIUM CHLORIDE 9 MG/ML
500 INJECTION, SOLUTION INTRAVENOUS
Status: DISCONTINUED | OUTPATIENT
Start: 2020-06-05 | End: 2020-06-05 | Stop reason: HOSPADM

## 2020-06-05 RX ORDER — ONDANSETRON 2 MG/ML
4 INJECTION INTRAMUSCULAR; INTRAVENOUS PRN
Status: DISCONTINUED | OUTPATIENT
Start: 2020-06-05 | End: 2020-06-05 | Stop reason: HOSPADM

## 2020-06-05 RX ORDER — MIDAZOLAM HYDROCHLORIDE 1 MG/ML
.5-2 INJECTION INTRAMUSCULAR; INTRAVENOUS PRN
Status: DISCONTINUED | OUTPATIENT
Start: 2020-06-05 | End: 2020-06-05 | Stop reason: HOSPADM

## 2020-06-05 RX ADMIN — IOHEXOL 45 ML: 300 INJECTION, SOLUTION INTRAVENOUS at 15:30

## 2020-06-05 ASSESSMENT — FIBROSIS 4 INDEX: FIB4 SCORE: 2.75

## 2020-06-05 NOTE — PROGRESS NOTES
IR Nursing Note:    Procedure Confirmed with MD, patient and RN pre procedure. 2 MD consent, Patient A&O x3, placed in Patient's chart. Patient assisted to the table in the supine position with all bony prominences padded and draped in sterile fashion.    Suprapubic Catheter Re-Placement/ Possible New Stick by MD Nicole assisted by RT Gt, mid-low pubic access site CDI with split gauze and medi pore tape.    No sedation administered     End Tidal CO2 range 10-17 during procedure.     Patient tolerated procedure, hemodynamically stable; pt awake and alert post procedure; report given to RN Elier; patient transported to ER YE 66 via IR RN monitored then transferred care to report RN.    BARD All-Silicone Steen Catheter 16Fr 10cc Balloon  REF# [id number] LOT# [id number]  EXP. 08/30/2022

## 2020-06-05 NOTE — OR SURGEON
Immediate Post- Operative Note        PostOp Diagnosis: Dislodged SPC      Procedure(s): Reinsertion of SPC    Estimated Blood Loss: Less than 5 ml        Complications: None            6/5/2020     2:52 PM     Salazar Nicole M.D.

## 2020-06-05 NOTE — DISCHARGE PLANNING
MSW received call from bedside RN. Pt needs to return to [facility]. Pt's bedside RN gave report to [facility]. MSW faxed [facility] transport form. [facility] Yesika set transport for around 1530. MSW updated bedside RN. Transport packet on pt's chart. Pt consent to return to [facility] and did not want anyone contacted.

## 2020-06-05 NOTE — ED PROVIDER NOTES
ED Provider Note    Scribed for Matias Islas M.D. by Richie Hernandez. 6/5/2020  9:49 AM    Primary care provider: Dale Kirkland M.D.  Means of arrival: Ambulance  History obtained from: Patient  History limited by: None    CHIEF COMPLAINT  Chief Complaint   Patient presents with   • Medical Clearance     suprapubic catheter dislodged       HPI  Randy Bates is a 80 y.o. male who presents to the Emergency Department by ambulance for evaluation of suprapubic catheter problem onset this morning. He reports his suprapubic catheter came out at [facility] this morning. He denies any leakage from his suprapubic catheter, or any pain. He states he does not believe the catheter has not been changed, since first inserted on 4/2.     PPE Note: I personally donned PPE for all patient encounters during this visit, including being clean-shaven with a surgical mask, gloves, and eye protection.     Scribe remained outside the patient's room and did not have any contact with the patient for the duration of patient encounter.      REVIEW OF SYSTEMS  Pertinent positives include suprapubic catheter problem. Pertinent negatives include no leakage from his suprapubic catheter, or any pain.      PAST MEDICAL HISTORY   has a past medical history of Diabetes (HCC) and Hypertension.    SURGICAL HISTORY   has a past surgical history that includes toe amputation (Right, 3/10/2020).    SOCIAL HISTORY  Social History     Tobacco Use   • Smoking status: Never Smoker   • Smokeless tobacco: Never Used   Substance Use Topics   • Alcohol use: Not Currently   • Drug use: Not Currently      Social History     Substance and Sexual Activity   Drug Use Not Currently       FAMILY HISTORY  None pertinent     CURRENT MEDICATIONS  Home Medications     Reviewed by Marcelo Granda R.N. (Registered Nurse) on 06/05/20 at 0943  Med List Status: Not Addressed   Medication Last Dose Status   acetaminophen (TYLENOL) 500 MG Tab   "Active   ascorbic acid (ASCORBIC ACID) 500 MG Tab  Active   aspirin EC (ECOTRIN) 81 MG Tablet Delayed Response  Active   bisacodyl (DULCOLAX) 10 MG Suppos  Active   calcium carbonate (TUMS) 500 MG Chew Tab  Active   docusate sodium (COLACE) 100 MG Cap  Active   finasteride (PROSCAR) 5 MG Tab  Active   furosemide (LASIX) 20 MG Tab  Active   latanoprost (XALATAN) 0.005 % Solution  Active   magnesium hydroxide (MILK OF MAGNESIA) 400 MG/5ML Suspension  Active   Multiple Vitamins-Minerals (MULTI COMPLETE/IRON PO)  Active   nitrofurantoin (MACROBID) 100 MG Cap  Active   NS SOLN 50 mL with ertapenem 1 GM SOLR 500 mg  Active   oxybutynin (DITROPAN) 5 MG Tab  Active   polyethylene glycol/lytes (MIRALAX) Pack  Active   simvastatin (ZOCOR) 10 MG Tab  Active   SITagliptin (JANUVIA) 25 MG Tab  Active   Sodium Phosphates (FLEET) 7-19 GM/118ML Enema  Active   tamsulosin (FLOMAX) 0.4 MG capsule  Active                ALLERGIES  No Known Allergies    PHYSICAL EXAM  VITAL SIGNS: /82   Pulse (!) 45   Temp 36.6 °C (97.9 °F) (Tympanic)   Resp 18   Ht 1.981 m (6' 6\")   Wt 100 kg (220 lb 7.4 oz)   SpO2 99%   BMI 25.48 kg/m²     Constitutional: Well developed, Well nourished, no acute distress, Non-toxic appearance.   HENT: Normocephalic, Atraumatic.  Oropharynx moist.   Eyes: PERRL, EOMI, Conjunctiva normal, No discharge.   CV: Good pulses  Thorax & Lungs: No respiratory distress.   Skin: Warm, Dry, No erythema, No rash.    Musculoskeletal: No major deformities noted.   ; Suprapubic catheter site slightly draining urine, no erythema.  Neurologic: Awake, alert. Moves all extremities spontaneously.  Psychiatric: Affect normal, Mood normal.    LABS  Results for orders placed or performed during the hospital encounter of 06/05/20   CBC WITH DIFFERENTIAL   Result Value Ref Range    WBC 10.6 4.8 - 10.8 K/uL    RBC 4.31 (L) 4.70 - 6.10 M/uL    Hemoglobin 12.7 (L) 14.0 - 18.0 g/dL    Hematocrit 38.3 (L) 42.0 - 52.0 %    MCV 88.9 " 81.4 - 97.8 fL    MCH 29.5 27.0 - 33.0 pg    MCHC 33.2 (L) 33.7 - 35.3 g/dL    RDW 49.8 35.9 - 50.0 fL    Platelet Count 196 164 - 446 K/uL    MPV 8.4 (L) 9.0 - 12.9 fL    Neutrophils-Polys 82.40 (H) 44.00 - 72.00 %    Lymphocytes 8.40 (L) 22.00 - 41.00 %    Monocytes 5.20 0.00 - 13.40 %    Eosinophils 2.80 0.00 - 6.90 %    Basophils 0.60 0.00 - 1.80 %    Immature Granulocytes 0.60 0.00 - 0.90 %    Nucleated RBC 0.00 /100 WBC    Neutrophils (Absolute) 8.71 (H) 1.82 - 7.42 K/uL    Lymphs (Absolute) 0.89 (L) 1.00 - 4.80 K/uL    Monos (Absolute) 0.55 0.00 - 0.85 K/uL    Eos (Absolute) 0.30 0.00 - 0.51 K/uL    Baso (Absolute) 0.06 0.00 - 0.12 K/uL    Immature Granulocytes (abs) 0.06 0.00 - 0.11 K/uL    NRBC (Absolute) 0.00 K/uL   APTT   Result Value Ref Range    APTT 29.0 24.7 - 36.0 sec   PROTHROMBIN TIME   Result Value Ref Range    PT 16.3 (H) 12.0 - 14.6 sec    INR 1.28 (H) 0.87 - 1.13        RADIOLOGY  IR-DRAIN-BLADDER SUPRAPUB W/CATH    (Results Pending)     The radiologist's interpretation of all radiological studies have been reviewed by me.    COURSE & MEDICAL DECISION MAKING  Nursing notes, VS, PMSFHx reviewed in chart.    Obtained and reviewed past medical records which indicated the patient had a suprapubic catheter placed on 4/2/20.    9:49 AM - Patient seen and examined at bedside. I informed the patient of my plan to run diagnostic studies to evaluate their symptoms including interventional radiology. Patient verbalizes understanding and support with my plan of care.  Ordered IR-Drain-Bladder-Surpapub w/ Cath to evaluate his symptoms.     11:01 PM - Ordered CBC with diff, APTT, Prothrombin Time.     Decision Making:  Patient here for suprapubic catheter replacement.  This was done by interventional radiology, the patient will be transferred back to skilled nursing facility.     The patient will return for new or worsening symptoms and is stable at the time of discharge.    The patient is referred to a  primary physician for blood pressure management, diabetic screening, and for all other preventative health concerns.        DISPOSITION:  Patient will be discharged home in stable condition.    FOLLOW UP:  [facility], Emergency Dept  [address] [address] [phone]    If symptoms worsen      OUTPATIENT MEDICATIONS:  New Prescriptions    No medications on file             FINAL IMPRESSION  1. Encounter for suprapubic catheter care (HCC)          Richie CROSS (Scribe), am scribing for, and in the presence of, Matias Islas M.D..    Electronically signed by: Richie Hernandez (Beccaibe), 6/5/2020    IMatias M.D. personally performed the services described in this documentation, as scribed by Richie Hernandez in my presence, and it is both accurate and complete.    The note accurately reflects work and decisions made by me.  Matias Islas M.D.  6/5/2020  3:16 PM

## 2020-06-05 NOTE — ED TRIAGE NOTES
Pt bib ems after suprabupic catheter came out at [facility]. EMS provided little intervention as nothing needed. Pt calm cooperative and pleasant at this time. Pt noted to have HR in 40-50's but is known per EMS. Here for replacement of catheter. Hx dementia but alert and oriented x 3 at this time.

## 2020-06-05 NOTE — DISCHARGE PLANNING
Agency/Facility Name: [facility]  Spoke To: Asim  Outcome: [facility] dedicated van booked for the day.     Spoke to David @ [facility], he will call and arrange. Transport to arrive to  patient within 10-15 minutes.     SANDHYA Vinson notified.

## 2020-06-05 NOTE — ED NOTES
Pt has urgency to urinate, assisted with urinal ~ 200mls out. Suprapubic dressing yellowed and dry. Changed to new 4x4. Pt comfortable and alert at this time watching news.  Awaiting IR.

## 2020-06-05 NOTE — H&P
History and Physical    Date: 6/5/2020    PCP: Dale Kirkland M.D.        HPI: This is a 80 y.o. male who is presenting with dislodged SPC.    Past Medical History:   Diagnosis Date   • Diabetes (HCC)    • Hypertension        Past Surgical History:   Procedure Laterality Date   • TOE AMPUTATION Right 3/10/2020    Procedure: AMPUTATION, TOE-3RD;  Surgeon: Hayes Deleon M.D.;  Location: SURGERY [facility];  Service: Orthopedics       Current Facility-Administered Medications   Medication Dose Route Frequency Provider Last Rate Last Dose   • MIDAZOLAM HCL 2 MG/2ML INJ SOLN            • FENTANYL CITRATE (PF) 0.05 MG/ML INJ SOLN            • NS (BOLUS) infusion 500 mL  500 mL Intravenous Once PRN Salazar Nicole M.D.       • fentaNYL (SUBLIMAZE) injection 12.5-50 mcg  12.5-50 mcg Intravenous PRN Salazar Nicole M.D.       • midazolam (VERSED) injection 0.5-2 mg  0.5-2 mg Intravenous PRN Salazar Nicole M.D.       • ondansetron (ZOFRAN) syringe/vial injection 4 mg  4 mg Intravenous PRN Salazar Nicole M.D.         Current Outpatient Medications   Medication Sig Dispense Refill   • oxybutynin (DITROPAN) 5 MG Tab Take 2.5 mg by mouth 3 times a day.     • nitrofurantoin (MACROBID) 100 MG Cap Take 100 mg by mouth 2 times a day.     • ascorbic acid (ASCORBIC ACID) 500 MG Tab Take 500 mg by mouth every day.     • acetaminophen (TYLENOL) 500 MG Tab Take 500-1,000 mg by mouth every 6 hours as needed.     • calcium carbonate (TUMS) 500 MG Chew Tab Take 500 mg by mouth every day.     • Sodium Phosphates (FLEET) 7-19 GM/118ML Enema Insert 1 Each in rectum Once.     • magnesium hydroxide (MILK OF MAGNESIA) 400 MG/5ML Suspension Take 30 mL by mouth 1 time daily as needed.     • bisacodyl (DULCOLAX) 10 MG Suppos Insert 10 mg in rectum every day.     • NS SOLN 50 mL with ertapenem 1 GM SOLR 500 mg 500 mg by Intravenous route every 24 hours.     • docusate sodium (COLACE) 100 MG Cap Take 100 mg by mouth every morning.      • SITagliptin (JANUVIA) 25 MG Tab Take 25 mg by mouth every day.     • furosemide (LASIX) 20 MG Tab Take 20 mg by mouth every day.     • polyethylene glycol/lytes (MIRALAX) Pack Take 17 g by mouth every day.     • Multiple Vitamins-Minerals (MULTI COMPLETE/IRON PO) Take  by mouth every day.     • aspirin EC (ECOTRIN) 81 MG Tablet Delayed Response Take 81 mg by mouth every day.     • finasteride (PROSCAR) 5 MG Tab Take 5 mg by mouth every day.     • tamsulosin (FLOMAX) 0.4 MG capsule Take 0.4 mg by mouth ONE-HALF HOUR AFTER BREAKFAST.     • latanoprost (XALATAN) 0.005 % Solution Place 1 Drop in both eyes every evening.     • simvastatin (ZOCOR) 10 MG Tab Take 10 mg by mouth every evening.          Social History     Socioeconomic History   • Marital status:      Spouse name: Not on file   • Number of children: Not on file   • Years of education: Not on file   • Highest education level: Not on file   Occupational History   • Not on file   Social Needs   • Financial resource strain: Not on file   • Food insecurity     Worry: Not on file     Inability: Not on file   • Transportation needs     Medical: Not on file     Non-medical: Not on file   Tobacco Use   • Smoking status: Never Smoker   • Smokeless tobacco: Never Used   Substance and Sexual Activity   • Alcohol use: Not Currently   • Drug use: Not Currently   • Sexual activity: Not on file   Lifestyle   • Physical activity     Days per week: Not on file     Minutes per session: Not on file   • Stress: Not on file   Relationships   • Social connections     Talks on phone: Not on file     Gets together: Not on file     Attends [employer] service: Not on file     Active member of club or organization: Not on file     Attends meetings of clubs or organizations: Not on file     Relationship status: Not on file   • Intimate partner violence     Fear of current or ex partner: Not on file     Emotionally abused: Not on file     Physically abused: Not on file      Forced sexual activity: Not on file   Other Topics Concern   • Not on file   Social History Narrative   • Not on file       No family history on file.    Allergies:  Patient has no known allergies.    Review of Systems:  Urinary retention    Physical Exam   Constitutional: He is oriented to person, place, and time. He appears well-developed and well-nourished. No distress.   Eyes: No scleral icterus.   Pulmonary/Chest: Effort normal. No respiratory distress.   Abdominal: Soft. He exhibits no distension.   Neurological: He is alert and oriented to person, place, and time. No cranial nerve deficit. Coordination normal.   Skin: Skin is warm and dry. No rash noted. He is not diaphoretic. No erythema. No pallor.   Psychiatric: He has a normal mood and affect. His behavior is normal. Judgment and thought content normal.       Vital Signs  Blood Pressure : 139/56   Temperature: 36.6 °C (97.9 °F)   Pulse: (!) 42   Respiration: 18   Pulse Oximetry: 98 %        Labs:  Recent Labs     06/05/20  1103   WBC 10.6   RBC 4.31*   HEMOGLOBIN 12.7*   HEMATOCRIT 38.3*   MCV 88.9   MCH 29.5   MCHC 33.2*   RDW 49.8   PLATELETCT 196   MPV 8.4*         Recent Labs     06/05/20  1103   APTT 29.0   INR 1.28*     Recent Labs     06/05/20  1103   INR 1.28*       Radiology:  IR-DRAIN-BLADDER SUPRAPUB W/CATH    (Results Pending)             Assessment and Plan:This is a 80 y.o. with dislodged SPC.    Plan:reinsertion of SPC.

## 2020-06-05 NOTE — DISCHARGE PLANNING
JAYNA called [facility] and updated staff (Jessica) on [employer] transport time.

## 2020-06-05 NOTE — ED NOTES
Patient understands discharge instructions. Given all DC education, prescriptions, f/u appointments. Pt verbalized understanding.  In no distress. IV and telemetry dc'd. Has all belongings.  Ambulating well with steady gait. Will return for worsening symptoms.

## 2020-06-08 NOTE — TELEPHONE ENCOUNTER
Upon chart review, this RN has not received any response from [facility].    Returned Jessica's phone call and reviewed findings.  She states scheduling can be made with [facility] transportation , [phone] and noted Nursing Assistant will be present during appt.  Reassurance given that findings will be relayed to schedulers to schedule holter placement.  She verbalizes understanding and states no other concerns or questions at this time and is appreciative of information given.    Task deferred to schedulers to schedule appt.

## 2020-06-12 NOTE — DOCUMENTATION QUERY
[facility]                                                                       Query Response Note      PATIENT:               NIR JOSHI  ACCT #:                  [id number]  MRN:                     [id number]  :                      1940  ADMIT DATE:       2020 9:32 AM  DISCH DATE:        2020 3:36 PM  RESPONDING  PROVIDER #:        [id number]           QUERY TEXT:    Your assistance is needed in determining the reason  for  PARTIAL THROMBO TIME that was ordered.  This service is non-covered by the diagnoses documented in the medical record.      Can you please provide an additional diagnosis that describes the sign or symptom that  (prompted/initiated) the  PARTIAL THROMBO TIME.    NOTE:  If an appropriate answer is not listed below, please respond with a new note.        The patient's Clinical Indicators include:   PARTIAL THROMBO TIME  Options provided:   -- Other related diagnosis, Please specify diagnosis demonstrating medical necessity for lab/imaging/other test.)   -- Unable to determine      Query created by: Lorena Carrion on 6/10/2020 5:57 AM    RESPONSE TEXT:    Unable to determine          Electronically signed by:  PRETTY GLOVER MD 2020 9:37 AM

## 2020-06-15 ENCOUNTER — NON-PROVIDER VISIT (OUTPATIENT)
Dept: CARDIOLOGY | Facility: MEDICAL CENTER | Age: 80
End: 2020-06-15
Payer: MEDICARE

## 2020-06-15 DIAGNOSIS — I49.1 PAC (PREMATURE ATRIAL CONTRACTION): ICD-10-CM

## 2020-06-15 DIAGNOSIS — R00.1 SINUS BRADYCARDIA: ICD-10-CM

## 2020-06-15 PROCEDURE — 93224 XTRNL ECG REC UP TO 48 HRS: CPT | Performed by: INTERNAL MEDICINE

## 2020-06-17 DIAGNOSIS — R00.1 SINUS BRADYCARDIA: ICD-10-CM

## 2020-06-18 LAB — EKG IMPRESSION: NORMAL

## 2020-06-29 ENCOUNTER — TELEPHONE (OUTPATIENT)
Dept: CARDIOLOGY | Facility: MEDICAL CENTER | Age: 80
End: 2020-06-29

## 2020-06-29 NOTE — TELEPHONE ENCOUNTER
----- Message from Rebecca Peck M.D. sent at 6/23/2020  5:33 PM PDT -----  HR rather slow especially at night  If feeling dizzy and lack of energy, may benefit from PPM  RTC 6 months  ----- Message -----  From: Nida Ramirez R.N.  Sent: 6/18/2020   2:14 PM PDT  To: Rebecca Peck M.D.    Please advise on what to say to pt, thank you!    No FV scheduled

## 2020-06-29 NOTE — TELEPHONE ENCOUNTER
"Called pt who resides at [facility] and spoke with Deana nurse regarding MD recommendations.  She states she will note recommendations and let their doctor know of the findings.    Called pt Legal Guardian, Carrie, and reviewed MD recommendations.  She states, \"I will try to call him in the next few days.  One day he will be cognitive and is here and other days he's not.\"  She states she will call this office back with decision.  [phone] given to Carrie.  She verbalizes understanding and states no other concerns or questions at this time and is appreciative of information given.    Will await for Carrie to return this call.  "

## 2020-11-16 ENCOUNTER — NURSE TRIAGE (OUTPATIENT)
Dept: HEALTH INFORMATION MANAGEMENT | Facility: OTHER | Age: 80
End: 2020-11-16

## 2020-11-16 NOTE — TELEPHONE ENCOUNTER
1. Caller Name: Yasmin Nurse at [facility]   Call Back Number: [phone]  [facility] PCP or Specialty Provider: No          2.  Has the patient previously tested positive for COVID-19? No was exposed and tested on 11/04/2020 test came back negative    3.  In the last two weeks, has the patient had any new or worsening symptoms (not explained by alternative diagnosis)? No.    4.  Does patient have any comoribidities? CHF, DM and dementia    5.  Has the patient had any known contact with someone who is suspected or confirmed to have COVID-19? No.    5. Disposition: Cleared by RN Triage as potential is low for COVID-19; OK to keep/schedule appointment    Note routed to [facility] Provider: [place] only.

## 2021-01-14 DIAGNOSIS — Z23 NEED FOR VACCINATION: ICD-10-CM

## 2022-11-28 NOTE — ED NOTES
Pt sleeping at present, vss.   O-T Advancement Flap Text: The defect edges were debeveled with a #15 scalpel blade.  Given the location of the defect, shape of the defect and the proximity to free margins an O-T advancement flap was deemed most appropriate.  Using a sterile surgical marker, an appropriate advancement flap was drawn incorporating the defect and placing the expected incisions within the relaxed skin tension lines where possible.    The area thus outlined was incised deep to adipose tissue with a #15 scalpel blade.  The skin margins were undermined to an appropriate distance in all directions utilizing iris scissors.

## 2024-06-24 NOTE — CODE DOCUMENTATION
First attempt for June Monthly PCM call attempted. No answer. Message left with contact information and request for return call.    MD to enter ICU transfer orders

## 2024-07-18 NOTE — PROGRESS NOTES
Hospital Medicine Daily Progress Note    Date of Service  11/25/2019    Chief Complaint  79 y.o. male admitted 11/21/2019 with confusion.    Hospital Course    Mr Bates has a history of insulin-dependent diabetes, hypertension, and urinary retention with chronic Steen catheter.  He presented to the emergency room 11/21/2019 with altered mental status.  Patient was admitted and treated for pneumonia as well as urinary tract infection.  Blood and urine cultures are positive for Pseudomonas.  On 11/24/2019 the patient worsened becoming more confused and developing increasing oxygen demands, he was transferred to the ICU.         Interval Problem Update  On high flow oxygen, denies shortness of breath  Mental status has improved, he is oriented to person and hospital, answers questions about current condition, does not recall any recent events, overall though much more cooperative, follows commands out of restraints  Urine output 3.3L over last 24 hours    Consultants/Specialty  Critical Care, I discussed the patient's condition with Dr. Gutierrez today on ICU Rounds  Infectious disease  Ophthalmology  Surgery    Code Status  Full Code    Disposition  ICU for now as he is on high flow O2  Eventual discharge likely back to SNF    Review of Systems  Review of Systems   Constitutional: Negative for chills and malaise/fatigue.   Respiratory: Positive for cough and shortness of breath. Negative for hemoptysis and sputum production.    Cardiovascular: Negative for chest pain, palpitations and orthopnea.   Gastrointestinal: Negative for nausea and vomiting.   Skin: Negative for itching and rash.   Neurological: Negative for dizziness.   All other systems reviewed and are negative.       Physical Exam  Temp:  [36.7 °C (98 °F)-38.4 °C (101.1 °F)] 36.8 °C (98.2 °F)  Pulse:  [44-68] 52  Resp:  [16-34] 16  BP: (114-149)/() 138/64  SpO2:  [89 %-100 %] 96 %    Physical Exam  Constitutional:       General: He is not in acute  distress.     Appearance: Normal appearance. He is normal weight.   HENT:      Head: Normocephalic and atraumatic.      Right Ear: External ear normal.      Left Ear: External ear normal.      Nose: Nose normal.      Mouth/Throat:      Mouth: Mucous membranes are moist.      Pharynx: Oropharynx is clear.   Eyes:      Extraocular Movements: Extraocular movements intact.      Conjunctiva/sclera: Conjunctivae normal.      Pupils: Pupils are equal, round, and reactive to light.   Neck:      Musculoskeletal: Normal range of motion and neck supple.   Cardiovascular:      Rate and Rhythm: Regular rhythm. Tachycardia present.      Pulses: Normal pulses.   Pulmonary:      Effort: Pulmonary effort is normal.      Breath sounds: Wheezing and rales present.   Abdominal:      General: Abdomen is flat. Bowel sounds are normal.      Palpations: Abdomen is soft.   Musculoskeletal: Normal range of motion.   Skin:     General: Skin is warm and dry.      Capillary Refill: Capillary refill takes less than 2 seconds.      Coloration: Skin is not jaundiced.   Neurological:      General: No focal deficit present.      Mental Status: He is alert. He is disoriented.      Cranial Nerves: No cranial nerve deficit.      Gait: Gait normal.   Psychiatric:         Mood and Affect: Mood normal.         Behavior: Behavior normal.         Fluids    Intake/Output Summary (Last 24 hours) at 11/25/2019 0716  Last data filed at 11/25/2019 0614  Gross per 24 hour   Intake 800.1 ml   Output 3300 ml   Net -2499.9 ml       Laboratory  Recent Labs     11/24/19  0255 11/24/19  0900 11/25/19  0440   WBC 11.8* 10.8 8.0   RBC 3.08* 2.97* 3.09*   HEMOGLOBIN 9.3* 8.9* 9.3*   HEMATOCRIT 29.3* 28.3* 29.7*   MCV 95.1 95.3 96.1   MCH 30.2 30.0 30.1   MCHC 31.7* 31.4* 31.3*   RDW 52.8* 52.7* 53.0*   PLATELETCT 172 172 165   MPV 9.5 9.2 9.2     Recent Labs     11/24/19  0255 11/24/19  0827 11/25/19  0400   SODIUM 136 134* 141   POTASSIUM 4.5 4.4 4.4   CHLORIDE 105 107  107   CO2 24 22 25   GLUCOSE 94 114* 77   BUN 34* 31* 27*   CREATININE 1.95* 1.84* 1.83*   CALCIUM 8.0* 8.3* 8.2*     Recent Labs     11/24/19  0900   INR 1.50*         Recent Labs     11/25/19  0400   TRIGLYCERIDE 69   HDL 38*   LDL 45       Imaging  DX-CHEST-LIMITED (1 VIEW)   Final Result      1.  Interval worsening of pulmonary opacities in the right mid/lower lung.   2.  Unchanged left basilar atelectasis and/or consolidation. No significant pleural effusions.      DX-CHEST-PORTABLE (1 VIEW)   Final Result      Similar reticular opacities suspicious for edema and there are likely small layering effusions      More focal basilar opacity is more likely from atelectasis than consolidation      EC-ECHOCARDIOGRAM COMPLETE W/O CONT   Final Result      CT-RENAL COLIC EVALUATION(A/P W/O)   Final Result      1.  Dilated fluid-filled small bowel loops in RIGHT lower quadrant concerning for developing obstruction.   2.  No evidence of bowel perforation.   3.  Colonic diverticulosis without evidence for diverticulitis.   4.  No renal stone or hydronephrosis.   5.  Appendix is not visualized.   6.  Enlarged prostate.   7.  Small bilateral pleural effusions with associated atelectasis.   8.  Colonic diverticulosis without evidence for diverticulitis.      DX-CHEST-PORTABLE (1 VIEW)   Final Result         1.  Pulmonary edema and/or infiltrates are identified, which are stable since the prior exam.   2.  Cardiomegaly   3.  Atherosclerosis      CT-CHEST (THORAX) W/O    (Results Pending)   CT-HEAD W/O    (Results Pending)   [id number] VIEW    (Results Pending)        Assessment/Plan  * UTI (urinary tract infection)- (present on admission)  Assessment & Plan  Urinary tract infection associated with chronic indwelling Steen catheter  This is a complicated urinary tract infection with associated encephalopathy  Appreciate infectious disease consultation, continue Zosyn    Candidemia (HCC)- (present on admission)  Assessment &  Plan  Appreciate ID recommendations  Continue micafungin    Gram-negative bacteremia- (present on admission)  Assessment & Plan  Infectious disease following, continue Zosyn    Acute respiratory failure with hypoxia, RAD, PNA (HCC)- (present on admission)  Assessment & Plan  Continue ICU care with high flow oxygen  Optimize volume status    Altered mental status- (present on admission)  Assessment & Plan  Patient has baseline dementia  He was quite confused yesterday but this morning has improved  Suspect altered mental status related to infection and sepsis  Hold off on CNS imaging at this time    CAP (community acquired pneumonia)- (present on admission)  Assessment & Plan  Continue broad-spectrum antibiotics  Patient remains on high flow oxygen, continue ICU care      Acute renal failure (ARF) (HCC)- (present on admission)  Assessment & Plan  Baseline creatinine is unknown  Appreciate nephrology consultation  Avoid nephrotoxins    Leukocytosis- (present on admission)  Assessment & Plan  Secondary to the current pneumonia and urinary tract infection continue to follow white blood cell count    Reactive airway disease with wheezing with acute exacerbation- (present on admission)  Assessment & Plan  RT protocol    Hypocalcemia- (present on admission)  Assessment & Plan  Give calcium supplementation although corrected calcium at this point is within normal limits.    Normocytic normochromic anemia- (present on admission)  Assessment & Plan  Monitor H&H if drops below 7 or 21 transfuse.  In the meantime check an iron panel.       VTE prophylaxis: heparin       normal/clear to auscultation bilaterally/no wheezes/no rales/no rhonchi

## (undated) DEVICE — ELECTRODE DUAL RETURN W/ CORD - (50/PK)

## (undated) DEVICE — GLOVE BIOGEL SZ 8 SURGICAL PF LTX - (50PR/BX 4BX/CA)

## (undated) DEVICE — GLOVE BIOGEL PI INDICATOR SZ 7.0 SURGICAL PF LF - (50/BX 4BX/CA)

## (undated) DEVICE — WATER IRRIG. STER. 1500 ML - (9/CA)

## (undated) DEVICE — PAD LAP STERILE 18 X 18 - (5/PK 40PK/CA)

## (undated) DEVICE — MASK ANESTHESIA ADULT  - (100/CA)

## (undated) DEVICE — SUTURE 3-0 ETHILON FS-1 - (36/BX) 30 INCH

## (undated) DEVICE — LACTATED RINGERS INJ 1000 ML - (14EA/CA 60CA/PF)

## (undated) DEVICE — SUCTION INSTRUMENT YANKAUER BULBOUS TIP W/O VENT (50EA/CA)

## (undated) DEVICE — GLOVE BIOGEL SZ 7 SURGICAL PF LTX - (50PR/BX 4BX/CA)

## (undated) DEVICE — CONTAINER SPECIMEN BAG OR - STERILE 4 OZ W/LID (100EA/CA)

## (undated) DEVICE — GLOVE BIOGEL INDICATOR SZ 8.5 SURGICAL PF LTX - (50/BX 4BX/CA)

## (undated) DEVICE — PROTECTOR ULNA NERVE - (36PR/CA)

## (undated) DEVICE — MASK, LARYNGEAL AIRWAY #5

## (undated) DEVICE — BLADE SURGICAL #15 - (50/BX 3BX/CA)

## (undated) DEVICE — SODIUM CHL IRRIGATION 0.9% 1000ML (12EA/CA)

## (undated) DEVICE — TOURNIQUET CUFF 24 X 4 ONE PORT - STERILE (10/BX)

## (undated) DEVICE — NEPTUNE 4 PORT MANIFOLD - (20/PK)

## (undated) DEVICE — SENSOR SPO2 NEO LNCS ADHESIVE (20/BX) SEE USER NOTES

## (undated) DEVICE — GOWN WARMING STANDARD FLEX - (30/CA)

## (undated) DEVICE — PACK LOWER EXTREMITY - (2/CA)

## (undated) DEVICE — ELECTRODE 850 FOAM ADHESIVE - HYDROGEL RADIOTRNSPRNT (50/PK)

## (undated) DEVICE — PADDING CAST 4 IN STERILE - 4 X 4 YDS (24/CA)

## (undated) DEVICE — BANDAGE ELASTIC 4 HONEYCOMB - 4"X5YD LF (20/CA)"

## (undated) DEVICE — CHLORAPREP 26 ML APPLICATOR - ORANGE TINT(25/CA)

## (undated) DEVICE — SET IRRIGATION CYSTOSCOPY TUBE L80 IN (20EA/CA)

## (undated) DEVICE — GLOVE BIOGEL PI ORTHO SZ 7.5 PF LF (40PR/BX)

## (undated) DEVICE — CANISTER SUCTION 3000ML MECHANICAL FILTER AUTO SHUTOFF MEDI-VAC NONSTERILE LF DISP  (40EA/CA)

## (undated) DEVICE — SET EXTENSION WITH 2 PORTS (48EA/CA) ***PART #2C8610 IS A SUBSTITUTE*****

## (undated) DEVICE — SLEEVE, VASO, THIGH, MED

## (undated) DEVICE — TUBING CLEARLINK DUO-VENT - C-FLO (48EA/CA)

## (undated) DEVICE — SET LEADWIRE 5 LEAD BEDSIDE DISPOSABLE ECG (1SET OF 5/EA)

## (undated) DEVICE — TRAY SKIN SCRUB PVP WET (20EA/CA) PART #DYND70356 DISCONTINUED

## (undated) DEVICE — KIT ANESTHESIA W/CIRCUIT & 3/LT BAG W/FILTER (20EA/CA)

## (undated) DEVICE — HEAD HOLDER JUNIOR/ADULT

## (undated) DEVICE — KIT ROOM DECONTAMINATION

## (undated) DEVICE — SUTURE GENERAL